# Patient Record
Sex: FEMALE | Race: WHITE | Employment: OTHER | URBAN - NONMETROPOLITAN AREA
[De-identification: names, ages, dates, MRNs, and addresses within clinical notes are randomized per-mention and may not be internally consistent; named-entity substitution may affect disease eponyms.]

---

## 2017-09-19 ENCOUNTER — TELEPHONE (OUTPATIENT)
Dept: ADMINISTRATIVE | Age: 72
End: 2017-09-19

## 2017-10-26 RX ORDER — METOPROLOL SUCCINATE 50 MG/1
TABLET, EXTENDED RELEASE ORAL
Qty: 180 TABLET | Refills: 3 | Status: SHIPPED | OUTPATIENT
Start: 2017-10-26 | End: 2018-10-22 | Stop reason: SDUPTHER

## 2017-10-26 RX ORDER — FENOFIBRATE 145 MG/1
TABLET, COATED ORAL
Qty: 90 TABLET | Refills: 3 | Status: SHIPPED | OUTPATIENT
Start: 2017-10-26 | End: 2018-10-22 | Stop reason: SDUPTHER

## 2017-11-03 ENCOUNTER — OFFICE VISIT (OUTPATIENT)
Dept: FAMILY MEDICINE CLINIC | Age: 72
End: 2017-11-03
Payer: MEDICARE

## 2017-11-03 VITALS
DIASTOLIC BLOOD PRESSURE: 72 MMHG | RESPIRATION RATE: 17 BRPM | SYSTOLIC BLOOD PRESSURE: 160 MMHG | WEIGHT: 160.2 LBS | TEMPERATURE: 98.2 F | HEART RATE: 63 BPM | BODY MASS INDEX: 27.35 KG/M2 | OXYGEN SATURATION: 98 %

## 2017-11-03 DIAGNOSIS — J40 BRONCHITIS: Primary | ICD-10-CM

## 2017-11-03 DIAGNOSIS — M19.90 ARTHRITIS: ICD-10-CM

## 2017-11-03 PROCEDURE — G8484 FLU IMMUNIZE NO ADMIN: HCPCS | Performed by: FAMILY MEDICINE

## 2017-11-03 PROCEDURE — 3014F SCREEN MAMMO DOC REV: CPT | Performed by: FAMILY MEDICINE

## 2017-11-03 PROCEDURE — 4004F PT TOBACCO SCREEN RCVD TLK: CPT | Performed by: FAMILY MEDICINE

## 2017-11-03 PROCEDURE — G8427 DOCREV CUR MEDS BY ELIG CLIN: HCPCS | Performed by: FAMILY MEDICINE

## 2017-11-03 PROCEDURE — 99213 OFFICE O/P EST LOW 20 MIN: CPT | Performed by: FAMILY MEDICINE

## 2017-11-03 PROCEDURE — 4040F PNEUMOC VAC/ADMIN/RCVD: CPT | Performed by: FAMILY MEDICINE

## 2017-11-03 PROCEDURE — 1090F PRES/ABSN URINE INCON ASSESS: CPT | Performed by: FAMILY MEDICINE

## 2017-11-03 PROCEDURE — G8419 CALC BMI OUT NRM PARAM NOF/U: HCPCS | Performed by: FAMILY MEDICINE

## 2017-11-03 PROCEDURE — G8399 PT W/DXA RESULTS DOCUMENT: HCPCS | Performed by: FAMILY MEDICINE

## 2017-11-03 PROCEDURE — 1123F ACP DISCUSS/DSCN MKR DOCD: CPT | Performed by: FAMILY MEDICINE

## 2017-11-03 PROCEDURE — 3017F COLORECTAL CA SCREEN DOC REV: CPT | Performed by: FAMILY MEDICINE

## 2017-11-03 RX ORDER — PREDNISONE 20 MG/1
40 TABLET ORAL DAILY
Qty: 10 TABLET | Refills: 0 | Status: SHIPPED | OUTPATIENT
Start: 2017-11-03 | End: 2017-11-08

## 2017-11-03 RX ORDER — LEVOFLOXACIN 500 MG/1
500 TABLET, FILM COATED ORAL DAILY
Qty: 10 TABLET | Refills: 0 | Status: SHIPPED | OUTPATIENT
Start: 2017-11-03 | End: 2017-11-13

## 2017-11-03 ASSESSMENT — PATIENT HEALTH QUESTIONNAIRE - PHQ9
SUM OF ALL RESPONSES TO PHQ QUESTIONS 1-9: 0
1. LITTLE INTEREST OR PLEASURE IN DOING THINGS: 0
2. FEELING DOWN, DEPRESSED OR HOPELESS: 0
SUM OF ALL RESPONSES TO PHQ9 QUESTIONS 1 & 2: 0

## 2017-11-03 NOTE — PROGRESS NOTES
Subjective:      Patient ID: Keith Sanon is a 81 y.o. female. HPI  Chief Complaint   Patient presents with    Cough     2 weeks using OTC helping a little    Trigger finger     bilateral stiffness on going but getting worse in the morning        Here for 2 weeks of cough productive of phlegm. Associated with headache, chest congestion, low grade temp up to 101, but normal appetite. Tried:  mucinex BId and push water and cough drops with temporary relief only. Hands swelling and tender in the morning. Worse since the weather changing. Review of Systems  Constitutional: Negative for fever, chills, diaphoresis, activity change, appetite change and fatigue. HENT: Negative for hearing loss, ear pain, congestion, sore throat, rhinorrhea, postnasal drip and ear discharge. Eyes: Negative for photophobia and visual disturbance. Respiratory: Negative for cough, chest tightness, shortness of breath and wheezing. Cardiovascular: Negative for chest pain and leg swelling. Gastrointestinal: Negative for nausea, vomiting, abdominal pain, diarrhea and constipation. Genitourinary: Negative for dysuria, urgency and frequency. Neurological: Negative for weakness, light-headedness and headaches. Psychiatric/Behavioral: Negative for sleep disturbance. Objective:   Physical Exam  Vitals:    11/03/17 1104   BP: (!) 160/72   Pulse:    Resp:    Temp:    SpO2:      Wt Readings from Last 3 Encounters:   11/03/17 160 lb 3.2 oz (72.7 kg)   11/22/16 154 lb (69.9 kg)   10/17/16 156 lb 3.2 oz (70.9 kg)     Physical Exam   Constitutional: Vital signs are normal. She appears well-developed and well-nourished. She is active. HENT:   Head: Normocephalic and atraumatic. Right Ear: Tympanic membrane, external ear and ear canal normal. No drainage or tenderness. Left Ear: Tympanic membrane, external ear and ear canal normal. No drainage or tenderness. Nose: Nose normal. No mucosal edema or rhinorrhea. Mouth/Throat: Uvula is midline, oropharynx is clear and moist and mucous membranes are normal. Mucous membranes are not pale. Normal dentition. No posterior oropharyngeal edema or posterior oropharyngeal erythema. Eyes: Lids are normal. Right eye exhibits no chemosis and no discharge. Left eye exhibits no chemosis and no drainage. Right conjunctiva has no hemorrhage. Left conjunctiva has no hemorrhage. Right eye exhibits normal extraocular motion. Left eye exhibits normal extraocular motion. Right pupil is round and reactive. Left pupil is round and reactive. Pupils are equal.   Cardiovascular: Normal rate, regular rhythm, S1 normal, S2 normal and normal heart sounds. Exam reveals no gallop. No murmur heard. Pulmonary/Chest: Effort normal and breath sounds normal. No respiratory distress. She has no wheezes. She has no rhonchi. She has no rales. Abdominal: Soft. Normal appearance and bowel sounds are normal. She exhibits no distension and no mass. There is no hepatosplenomegaly. No tenderness. She has no rigidity, no rebound and no guarding. No hernia. Musculoskeletal:        Right lower leg: She exhibits no edema. Left lower leg: She exhibits no edema. Neurological: She is alert. Assessment:      Mamadou Welsh was seen today for cough and trigger finger. Diagnoses and all orders for this visit:    Bronchitis  -     predniSONE (DELTASONE) 20 MG tablet; Take 2 tablets by mouth daily for 5 days  -     levofloxacin (LEVAQUIN) 500 MG tablet; Take 1 tablet by mouth daily for 10 days    Arthritis    Other orders  -     Cancel: INFLUENZA, QUADV, 3 YRS AND OLDER, IM, PF, PREFILL SYR OR SDV, 0.5ML (FLUZONE QUADV, PF)      STOP SMOKING    Push fluids  Tylenol or ibuprofen prn fever  Cool mist Humidifier in the bedroom  Follow up if not better in 1 week or if symptoms get worse.

## 2018-02-12 RX ORDER — DARIFENACIN HYDROBROMIDE 15 MG/1
TABLET, EXTENDED RELEASE ORAL
Qty: 90 TABLET | Refills: 3 | Status: SHIPPED | OUTPATIENT
Start: 2018-02-12 | End: 2019-02-04 | Stop reason: SDUPTHER

## 2018-05-23 ENCOUNTER — HOSPITAL ENCOUNTER (OUTPATIENT)
Age: 73
Discharge: HOME | End: 2018-05-23
Payer: MEDICARE

## 2018-05-23 LAB
ALBUMIN SERPL-MCNC: 4.5 G/DL (ref 3.5–5.1)
ALP BLD-CCNC: 73 U/L (ref 38–126)
ALT SERPL-CCNC: 23 U/L (ref 11–66)
ANION GAP SERPL CALCULATED.3IONS-SCNC: 14 MEQ/L (ref 8–16)
AST SERPL-CCNC: 25 U/L (ref 5–40)
BASOPHILS # BLD: 0.8 %
BASOPHILS ABSOLUTE: 0.1 THOU/MM3 (ref 0–0.1)
BILIRUB SERPL-MCNC: 0.3 MG/DL (ref 0.3–1.2)
BILIRUBIN DIRECT: < 0.2 MG/DL (ref 0–0.3)
BUN BLDV-MCNC: 20 MG/DL (ref 7–22)
CALCIUM SERPL-MCNC: 9.7 MG/DL (ref 8.5–10.5)
CHLORIDE BLD-SCNC: 106 MEQ/L (ref 98–111)
CHOLESTEROL, FASTING: 178 MG/DL (ref 100–199)
CO2: 24 MEQ/L (ref 23–33)
CREAT SERPL-MCNC: 0.8 MG/DL (ref 0.4–1.2)
EOSINOPHIL # BLD: 3.6 %
EOSINOPHILS ABSOLUTE: 0.3 THOU/MM3 (ref 0–0.4)
GFR SERPL CREATININE-BSD FRML MDRD: 71 ML/MIN/1.73M2
GLUCOSE FASTING: 128 MG/DL (ref 70–108)
HCT VFR BLD CALC: 42.2 % (ref 37–47)
HDLC SERPL-MCNC: 46 MG/DL
HEMOGLOBIN: 14 GM/DL (ref 12–16)
LDL CHOLESTEROL CALCULATED: 88 MG/DL
LYMPHOCYTES # BLD: 27.2 %
LYMPHOCYTES ABSOLUTE: 2.4 THOU/MM3 (ref 1–4.8)
MCH RBC QN AUTO: 29.9 PG (ref 27–31)
MCHC RBC AUTO-ENTMCNC: 33.1 GM/DL (ref 33–37)
MCV RBC AUTO: 90.3 FL (ref 81–99)
MONOCYTES # BLD: 7 %
MONOCYTES ABSOLUTE: 0.6 THOU/MM3 (ref 0.4–1.3)
NUCLEATED RED BLOOD CELLS: 0 /100 WBC
PDW BLD-RTO: 13.8 % (ref 11.5–14.5)
PLATELET # BLD: 295 THOU/MM3 (ref 130–400)
PMV BLD AUTO: 10.6 FL (ref 7.4–10.4)
POTASSIUM SERPL-SCNC: 4.1 MEQ/L (ref 3.5–5.2)
RBC # BLD: 4.67 MILL/MM3 (ref 4.2–5.4)
SEG NEUTROPHILS: 61.4 %
SEGMENTED NEUTROPHILS ABSOLUTE COUNT: 5.5 THOU/MM3 (ref 1.8–7.7)
SODIUM BLD-SCNC: 144 MEQ/L (ref 135–145)
TOTAL PROTEIN: 7 G/DL (ref 6.1–8)
TRIGLYCERIDE, FASTING: 221 MG/DL (ref 0–199)
WBC # BLD: 9 THOU/MM3 (ref 4.8–10.8)

## 2018-05-23 PROCEDURE — 36415 COLL VENOUS BLD VENIPUNCTURE: CPT

## 2018-05-23 PROCEDURE — 83036 HEMOGLOBIN GLYCOSYLATED A1C: CPT

## 2018-05-23 PROCEDURE — 80076 HEPATIC FUNCTION PANEL: CPT

## 2018-05-23 PROCEDURE — 80048 BASIC METABOLIC PNL TOTAL CA: CPT

## 2018-05-23 PROCEDURE — 80061 LIPID PANEL: CPT

## 2018-05-23 PROCEDURE — 85025 COMPLETE CBC W/AUTO DIFF WBC: CPT

## 2018-05-23 PROCEDURE — 84443 ASSAY THYROID STIM HORMONE: CPT

## 2018-05-24 ENCOUNTER — OFFICE VISIT (OUTPATIENT)
Dept: FAMILY MEDICINE CLINIC | Age: 73
End: 2018-05-24
Payer: MEDICARE

## 2018-05-24 VITALS
RESPIRATION RATE: 16 BRPM | BODY MASS INDEX: 27.59 KG/M2 | OXYGEN SATURATION: 96 % | WEIGHT: 161.6 LBS | TEMPERATURE: 98.2 F | HEIGHT: 64 IN | HEART RATE: 76 BPM | DIASTOLIC BLOOD PRESSURE: 78 MMHG | SYSTOLIC BLOOD PRESSURE: 136 MMHG

## 2018-05-24 DIAGNOSIS — Z00.00 MEDICARE ANNUAL WELLNESS VISIT, INITIAL: Primary | ICD-10-CM

## 2018-05-24 DIAGNOSIS — F33.42 RECURRENT MAJOR DEPRESSIVE DISORDER, IN FULL REMISSION (HCC): ICD-10-CM

## 2018-05-24 DIAGNOSIS — I10 HYPERTENSION, UNSPECIFIED TYPE: ICD-10-CM

## 2018-05-24 DIAGNOSIS — R73.9 HYPERGLYCEMIA: ICD-10-CM

## 2018-05-24 DIAGNOSIS — R35.0 URINARY FREQUENCY: ICD-10-CM

## 2018-05-24 DIAGNOSIS — Z00.00 ROUTINE GENERAL MEDICAL EXAMINATION AT A HEALTH CARE FACILITY: ICD-10-CM

## 2018-05-24 DIAGNOSIS — E78.00 PURE HYPERCHOLESTEROLEMIA: ICD-10-CM

## 2018-05-24 DIAGNOSIS — Z72.0 TOBACCO ABUSE: ICD-10-CM

## 2018-05-24 DIAGNOSIS — M85.89 OSTEOPENIA OF MULTIPLE SITES: ICD-10-CM

## 2018-05-24 LAB
AVERAGE GLUCOSE: 129 MG/DL (ref 70–126)
HBA1C MFR BLD: 6.3 % (ref 4.4–6.4)
TSH SERPL DL<=0.05 MIU/L-ACNC: 1.98 UIU/ML (ref 0.4–4.2)

## 2018-05-24 PROCEDURE — G0438 PPPS, INITIAL VISIT: HCPCS | Performed by: FAMILY MEDICINE

## 2018-05-24 RX ORDER — BUPROPION HYDROCHLORIDE 300 MG/1
300 TABLET ORAL EVERY MORNING
Qty: 30 TABLET | Refills: 3 | Status: ON HOLD | OUTPATIENT
Start: 2018-05-24 | End: 2018-07-18 | Stop reason: ALTCHOICE

## 2018-05-24 RX ORDER — BUPROPION HYDROCHLORIDE 150 MG/1
150 TABLET ORAL EVERY MORNING
Qty: 30 TABLET | Refills: 0 | Status: SHIPPED | OUTPATIENT
Start: 2018-05-24 | End: 2018-07-18 | Stop reason: SDUPTHER

## 2018-05-24 ASSESSMENT — LIFESTYLE VARIABLES
HAS A RELATIVE, FRIEND, DOCTOR, OR ANOTHER HEALTH PROFESSIONAL EXPRESSED CONCERN ABOUT YOUR DRINKING OR SUGGESTED YOU CUT DOWN: 0
HOW OFTEN DURING THE LAST YEAR HAVE YOU BEEN UNABLE TO REMEMBER WHAT HAPPENED THE NIGHT BEFORE BECAUSE YOU HAD BEEN DRINKING: 0
HOW OFTEN DURING THE LAST YEAR HAVE YOU FAILED TO DO WHAT WAS NORMALLY EXPECTED FROM YOU BECAUSE OF DRINKING: 0
HOW OFTEN DO YOU HAVE A DRINK CONTAINING ALCOHOL: 1
HAVE YOU OR SOMEONE ELSE BEEN INJURED AS A RESULT OF YOUR DRINKING: 0
HOW OFTEN DURING THE LAST YEAR HAVE YOU FOUND THAT YOU WERE NOT ABLE TO STOP DRINKING ONCE YOU HAD STARTED: 0
HOW OFTEN DO YOU HAVE SIX OR MORE DRINKS ON ONE OCCASION: 0
HOW OFTEN DURING THE LAST YEAR HAVE YOU HAD A FEELING OF GUILT OR REMORSE AFTER DRINKING: 0
HOW MANY STANDARD DRINKS CONTAINING ALCOHOL DO YOU HAVE ON A TYPICAL DAY: 0
AUDIT TOTAL SCORE: 1
AUDIT-C TOTAL SCORE: 1
HOW OFTEN DURING THE LAST YEAR HAVE YOU NEEDED AN ALCOHOLIC DRINK FIRST THING IN THE MORNING TO GET YOURSELF GOING AFTER A NIGHT OF HEAVY DRINKING: 0

## 2018-05-24 ASSESSMENT — ANXIETY QUESTIONNAIRES: GAD7 TOTAL SCORE: 0

## 2018-05-24 ASSESSMENT — PATIENT HEALTH QUESTIONNAIRE - PHQ9: SUM OF ALL RESPONSES TO PHQ QUESTIONS 1-9: 0

## 2018-06-11 ENCOUNTER — TELEPHONE (OUTPATIENT)
Dept: FAMILY MEDICINE CLINIC | Age: 73
End: 2018-06-11

## 2018-06-11 ENCOUNTER — HOSPITAL ENCOUNTER (OUTPATIENT)
Dept: WOMENS IMAGING | Age: 73
Discharge: HOME | End: 2018-06-11
Payer: MEDICARE

## 2018-06-11 DIAGNOSIS — M85.89 OSTEOPENIA OF MULTIPLE SITES: ICD-10-CM

## 2018-06-11 DIAGNOSIS — Z12.31 VISIT FOR SCREENING MAMMOGRAM: ICD-10-CM

## 2018-06-11 PROCEDURE — 77080 DXA BONE DENSITY AXIAL: CPT

## 2018-06-11 PROCEDURE — 77067 SCR MAMMO BI INCL CAD: CPT

## 2018-06-12 RX ORDER — ZOLEDRONIC ACID 5 MG/100ML
5 INJECTION, SOLUTION INTRAVENOUS ONCE
Qty: 100 ML | Refills: 0 | Status: SHIPPED | OUTPATIENT
Start: 2018-06-12 | End: 2018-07-30

## 2018-07-05 ENCOUNTER — TELEPHONE (OUTPATIENT)
Dept: FAMILY MEDICINE CLINIC | Age: 73
End: 2018-07-05

## 2018-07-05 NOTE — TELEPHONE ENCOUNTER
[facility] on 6[facility]-18 is [id number]

## 2018-07-18 ENCOUNTER — APPOINTMENT (OUTPATIENT)
Dept: CARDIAC CATH/INVASIVE PROCEDURES | Age: 73
End: 2018-07-18
Attending: INTERNAL MEDICINE
Payer: MEDICARE

## 2018-07-18 PROBLEM — Z98.61 POST PTCA: Status: ACTIVE | Noted: 2018-07-18

## 2018-07-19 ENCOUNTER — APPOINTMENT (OUTPATIENT)
Dept: CARDIAC CATH/INVASIVE PROCEDURES | Age: 73
End: 2018-07-19
Attending: INTERNAL MEDICINE
Payer: MEDICARE

## 2018-07-23 ENCOUNTER — TELEPHONE (OUTPATIENT)
Dept: FAMILY MEDICINE CLINIC | Age: 73
End: 2018-07-23

## 2018-07-23 RX ORDER — ATORVASTATIN CALCIUM 40 MG/1
TABLET, FILM COATED ORAL
Qty: 90 TABLET | Refills: 3 | Status: SHIPPED | OUTPATIENT
Start: 2018-07-23 | End: 2019-07-23 | Stop reason: SDUPTHER

## 2018-07-27 NOTE — TELEPHONE ENCOUNTER
Pt is having SOB off and on since leaving the hospital. Pt states it is not terrible, she just can not do a lot without feeling winded. She spoke with Cardiologist, Dr. Sofia Salgado who wants her to follow up with Dr. Aure Lewis. If it is cardiac related, then he will see her. Can you see pt? Please advise. Luzmaria 45 Transitions Initial Follow Up Call    Call within 2 business days of discharge: No     Patient: Marsha Mirza Patient : 1945   MRN: [id number]  Reason for Admission: There are no discharge diagnoses documented for the most recent discharge. Discharge Date: 18 RARS: No Data Recorded     Spoke with: Pt    Discharge department/facility: Home      Non-face-to-face services provided: Follow Up  Future Appointments  Date Time Provider Harvinder Ramon   2018 9:10 AM MD Aleyda Nloan [facility] [address] [facility] ([address] [place] [address])     Have the medications prescribed at discharge been filled? No meds prescribed  Does the patient understand what the medications are for? NA  Has the patient experienced any new symptoms or have previous symptoms worsened? Yes - SOB  Is the patient experiencing any pain? No If yes, is the pain well controlled? NA  Does the patient understand all the discharge instructions and how to care for self at home? Yes  Does the patient have any questions/concerns? SOB

## 2018-07-30 ENCOUNTER — HOSPITAL ENCOUNTER (OUTPATIENT)
Age: 73
Discharge: HOME | End: 2018-07-30
Payer: MEDICARE

## 2018-07-30 ENCOUNTER — OFFICE VISIT (OUTPATIENT)
Dept: FAMILY MEDICINE CLINIC | Age: 73
End: 2018-07-30
Payer: MEDICARE

## 2018-07-30 ENCOUNTER — TELEPHONE (OUTPATIENT)
Dept: FAMILY MEDICINE CLINIC | Age: 73
End: 2018-07-30

## 2018-07-30 ENCOUNTER — HOSPITAL ENCOUNTER (OUTPATIENT)
Dept: GENERAL RADIOLOGY | Age: 73
Discharge: HOME | End: 2018-07-30
Payer: MEDICARE

## 2018-07-30 VITALS
DIASTOLIC BLOOD PRESSURE: 60 MMHG | SYSTOLIC BLOOD PRESSURE: 128 MMHG | OXYGEN SATURATION: 98 % | RESPIRATION RATE: 8 BRPM | WEIGHT: 166 LBS | BODY MASS INDEX: 28.94 KG/M2 | HEART RATE: 72 BPM | TEMPERATURE: 98.4 F

## 2018-07-30 DIAGNOSIS — Z01.89 ROUTINE LAB DRAW: ICD-10-CM

## 2018-07-30 DIAGNOSIS — R07.89 OTHER CHEST PAIN: ICD-10-CM

## 2018-07-30 DIAGNOSIS — Z98.61 POST PTCA: ICD-10-CM

## 2018-07-30 DIAGNOSIS — R06.02 SOB (SHORTNESS OF BREATH) ON EXERTION: ICD-10-CM

## 2018-07-30 DIAGNOSIS — R07.89 OTHER CHEST PAIN: Primary | ICD-10-CM

## 2018-07-30 LAB
ALBUMIN SERPL-MCNC: 4.4 G/DL (ref 3.5–5.1)
ALP BLD-CCNC: 61 U/L (ref 38–126)
ALT SERPL-CCNC: 19 U/L (ref 11–66)
ANION GAP SERPL CALCULATED.3IONS-SCNC: 15 MEQ/L (ref 8–16)
AST SERPL-CCNC: 23 U/L (ref 5–40)
BILIRUB SERPL-MCNC: 0.2 MG/DL (ref 0.3–1.2)
BUN BLDV-MCNC: 24 MG/DL (ref 7–22)
CALCIUM SERPL-MCNC: 10 MG/DL (ref 8.5–10.5)
CHLORIDE BLD-SCNC: 105 MEQ/L (ref 98–111)
CO2: 23 MEQ/L (ref 23–33)
CREAT SERPL-MCNC: 1 MG/DL (ref 0.4–1.2)
ERYTHROCYTE [DISTWIDTH] IN BLOOD BY AUTOMATED COUNT: 13.1 % (ref 11.5–14.5)
ERYTHROCYTE [DISTWIDTH] IN BLOOD BY AUTOMATED COUNT: 44.7 FL (ref 35–45)
GFR SERPL CREATININE-BSD FRML MDRD: 55 ML/MIN/1.73M2
GLUCOSE BLD-MCNC: 254 MG/DL (ref 70–108)
HCT VFR BLD CALC: 35.7 % (ref 37–47)
HEMOGLOBIN: 11.5 GM/DL (ref 12–16)
MCH RBC QN AUTO: 30.3 PG (ref 26–33)
MCHC RBC AUTO-ENTMCNC: 32.2 GM/DL (ref 32.2–35.5)
MCV RBC AUTO: 93.9 FL (ref 81–99)
PLATELET # BLD: 387 THOU/MM3 (ref 130–400)
PMV BLD AUTO: 11.5 FL (ref 9.4–12.4)
POTASSIUM SERPL-SCNC: 4 MEQ/L (ref 3.5–5.2)
PRO-BNP: 343.4 PG/ML (ref 0–900)
RBC # BLD: 3.8 MILL/MM3 (ref 4.2–5.4)
SODIUM BLD-SCNC: 143 MEQ/L (ref 135–145)
TOTAL PROTEIN: 7 G/DL (ref 6.1–8)
TROPONIN T: < 0.01 NG/ML
WBC # BLD: 7.5 THOU/MM3 (ref 4.8–10.8)

## 2018-07-30 PROCEDURE — 36415 COLL VENOUS BLD VENIPUNCTURE: CPT | Performed by: FAMILY MEDICINE

## 2018-07-30 PROCEDURE — 71046 X-RAY EXAM CHEST 2 VIEWS: CPT

## 2018-07-30 PROCEDURE — 93000 ELECTROCARDIOGRAM COMPLETE: CPT | Performed by: FAMILY MEDICINE

## 2018-07-30 PROCEDURE — 99214 OFFICE O/P EST MOD 30 MIN: CPT | Performed by: FAMILY MEDICINE

## 2018-07-30 NOTE — PROGRESS NOTES
Subjective:      Patient ID: Shannon Swift is a 72 y.o. female. HPI  Chief Complaint   Patient presents with    Shortness of Breath       Here for SOB that started on 7/20/18. She went home from the hospital that day after a total of 4 stents were placed. associated with posterior headache, sore in the right jaw worse with chewing, chest tight, SOB with exertion, but normal appetite and no fever. Weight gain but that has steadily increased since stopping smoking 2 months ago. Review of Systems  Constitutional: Positive for fever, chills, diaphoresis, activity change, appetite change and fatigue. HENT: Negative for hearing loss, ear pain, congestion, sore throat, rhinorrhea, postnasal drip and ear discharge. Eyes: Negative for photophobia and visual disturbance. Respiratory: Negative for cough, and wheezing. Cardiovascular: Negative for chest pain and leg swelling. Gastrointestinal: Negative for nausea, vomiting, abdominal pain, diarrhea and constipation. Genitourinary: Negative for dysuria, urgency and frequency. Neurological: Negative for weakness, light-headedness and headaches. Psychiatric/Behavioral: Negative for sleep disturbance. Objective:   Physical Exam  Vitals:    07/30/18 1445   BP: 128/60   Pulse: 72   Resp: 8   Temp: 98.4 °F (36.9 °C)   SpO2: 98%     Wt Readings from Last 3 Encounters:   07/30/18 166 lb (75.3 kg)   07/20/18 166 lb 11.2 oz (75.6 kg)   05/24/18 161 lb 9.6 oz (73.3 kg)     Physical Exam   Constitutional: Vital signs are normal. She appears well-developed and well-nourished. She is active. HENT:   Head: Normocephalic and atraumatic. Right Ear: Tympanic membrane, external ear and ear canal normal. No drainage or tenderness. Left Ear: Tympanic membrane, external ear and ear canal normal. No drainage or tenderness. Nose: Nose normal. No mucosal edema or rhinorrhea.    Mouth/Throat: Uvula is midline, oropharynx is clear and moist and mucous membranes are normal. Mucous membranes are not pale. Normal dentition. No posterior oropharyngeal edema or posterior oropharyngeal erythema. Eyes: Lids are normal. Right eye exhibits no chemosis and no discharge. Left eye exhibits no chemosis and no drainage. Right conjunctiva has no hemorrhage. Left conjunctiva has no hemorrhage. Right eye exhibits normal extraocular motion. Left eye exhibits normal extraocular motion. Right pupil is round and reactive. Left pupil is round and reactive. Pupils are equal.   Cardiovascular: Normal rate, regular rhythm, S1 normal, S2 normal and normal heart sounds. Exam reveals no gallop. No murmur heard. Pulmonary/Chest: Effort normal and breath sounds normal. No respiratory distress. She has no wheezes. She has no rhonchi. She has no rales. Abdominal: Soft. Normal appearance and bowel sounds are normal. She exhibits no distension and no mass. There is no hepatosplenomegaly. No tenderness. She has no rigidity, no rebound and no guarding. No hernia. Musculoskeletal:        Right lower leg: She exhibits no edema. Left lower leg: She exhibits no edema. Neurological: She is alert. EKG done and reviewed by cardiology who does not believe there has been a change since her recent hospitalization. Assessment:      Reed Allen was seen today for shortness of breath. Diagnoses and all orders for this visit:    Other chest pain  -     EKG 12 Lead  -     XR CHEST STANDARD (2 VW); Future  -     CBC; Future  -     Comprehensive Metabolic Panel; Future  -     Troponin; Future  -     Brain Natriuretic Peptide; Future  -     FULL PFT STUDY WITH PRE AND POST; Future  -     CBC  -     Comprehensive Metabolic Panel  -     Troponin  -     Brain Natriuretic Peptide    Post PTCA  -     EKG 12 Lead  -     XR CHEST STANDARD (2 VW); Future  -     CBC; Future  -     Comprehensive Metabolic Panel; Future  -     Troponin; Future  -     Brain Natriuretic Peptide;  Future  -     FULL PFT

## 2018-07-31 ENCOUNTER — TELEPHONE (OUTPATIENT)
Dept: FAMILY MEDICINE CLINIC | Age: 73
End: 2018-07-31

## 2018-07-31 DIAGNOSIS — D64.9 ANEMIA, UNSPECIFIED TYPE: Primary | ICD-10-CM

## 2018-08-01 NOTE — PLAN OF CARE
Hospital Facility-Based Program  [facility]/Traditional Cardiac Rehab  PHYSICIAN ORDER  Class I Level B based on research  Medical Director:  Dr. Sonia Espitia MD     Patient Name: Phuong Alicia : 1945  Referring Physician: Dr. Adriana Silva  Date: 2018  Allergies: Allergies as of 2018 - Review Complete 2018   Allergen Reaction Noted    Benadryl [diphenhydramine hcl]  2011    Flexeril [cyclobenzaprine hcl]  2011        Diagnosis:  PCI  on     [x] [facility]ensive Cardiac Rehab with telemetry monitoring, resting and exercise        BPs & HRs with each session. Hospital setting for patient safety. [x] 72 sessions: 36 exercise sessions, 36 education sessions   [] 36 sessions: 18 exercise sessions, 18 education sessions  [] Traditional Cardiac Rehab with telemetry monitoring, resting and exercise BPs &       HRs with each session. Hospital setting for patient safety. [] 36 sessions:  32 exercise sessions, 4 education sessions     Per Patient symptoms, proceed with:   [x]Nitroglycerine 0.4mg SL every 5 minutes prn, maximum of 3, for chest pain   [x]12-lead EKG for symptoms of chest pain or noted change in heart rhythm   [x]Administer O2 per nasal cannula for symptoms of chest pain or acute dyspnea    Physician Prescribed Exercise:  Plan of Care:  Patient to attend exercise sessions with aerobic endurance and strength training for a total of 31-60 min/day, 3 days/week with supplemented 30+ minutes of aerobic exercise at home on days not participating in Cardiac Rehab. Aerobic Endurance Training  Aerobic Endurance mode (TM, AD, NS) starting at 5-8  minutes progressing by 2-3 minutes each week to a total of 15-30 minutes 2-3x/week. Arms only 5 min  Stair step increasing to 2 min  Resistance/strength training:  Hand weights starting at 1-5 lbs increasing in weight by 1-2 lbs and/or per patient tolerance weekly.   Start with 8 repetitions and increase the repetitions each exercise session per patient tolerance for a total of 15 repetitions. Measurable Endurance Goal:  Aerobic endurance goal to be measured in minutes. Start endurance training per patient tolerance at 1-8 minutes per exercise type, progressing to a total of 31-60 minutes using various modes of training (see Exercise Prescription). Progression:    [x] Weekly 5-10% intensity progression, as tolerated, during cardiac rehab sessions. [x] 13-17 Rate of Perceived Exertion on the Liberty Scale (6-20). Measurable Muscular Strength Goal:  Starting at 1-5 lbs x 8 reps, progressing to 5-25 lbs x 15 reps per patient tolerance.       Electronically signed by Tarik Santamaria RN on 8/1/2018 at 9:57 AM

## 2018-08-08 ENCOUNTER — HOSPITAL ENCOUNTER (OUTPATIENT)
Dept: CARDIAC REHAB | Age: 73
Setting detail: THERAPIES SERIES
Discharge: HOME | End: 2018-08-08
Payer: MEDICARE

## 2018-08-08 PROCEDURE — G0422 INTENS CARDIAC REHAB W/EXERC: HCPCS

## 2018-08-08 PROCEDURE — G0423 INTENS CARDIAC REHAB NO EXER: HCPCS

## 2018-08-08 NOTE — PLAN OF CARE
[address] [place] [address],[facility] ~ [place], [facility] Facility-Based Program  Individualized Cardiac Treatment Plan    Patient Name:  Caity Simons  :  1945  Age:  72 y.o. MRN:  [id number]  Diagnosis: PCI RCA, LAD  Date of Event: 2018 &2018   Physician:  Kristy Ellington Office Visit:    Date Entered Program: 2018  Risk Stratifications: [] Low [] Intermediate [x] High  Allergies:    Allergies   Allergen Reactions    Benadryl [Diphenhydramine Hcl]      OVER-STIMULATED    Flexeril [Cyclobenzaprine Hcl]      OVER-STIMULATED         Individual Cardiac Treatment Plan -EXERCISE  INITIAL 30 DAY 61 DAY 90 DAY FINAL DAY   EXERCISE  ASSESSMENT/PLAN EXERCISE  REASSESSMENT EXERCISE   REASSESSMENT EXERCISE   REASSESSMENT EXERCISE  DISCHARGE/FOLLOW-UP   Date: 2018 Date: Date: Date: Date:   Session #1 Session # ** Session # ** Session # ** Session # **  Last session completed on **   Stages of Change Stages of Change Stages of Change Stages of Change Stages of Change   [] Pre Contemplation  [x] Contemplation  [] Preparation  [] Action  [] Maintenance  [] Relapse [] Pre Contemplation  [] Contemplation  [] Preparation  [] Action  [] Maintenance  [] Relapse [] Pre Contemplation  [] Contemplation  [] Preparation  [] Action  [] Maintenance  [] Relapse [] Pre Contemplation  [] Contemplation  [] Preparation  [] Action  [] Maintenance  [] Relapse [] Pre Contemplation  [] Contemplation  [] Preparation  [] Action  [] Maintenance  [] Relapse   EXERCISE ASSESSMENT EXERCISE ASSESSMENT EXERCISE ASSESSMENT EXERCISE ASSESSMENT EXERCISE ASSESSMENT   6 Min Walk Test  Distance walked:   0.18 miles  950 ft.  2.37 METs  Max HR:111 BPM      RPE:  14   THR:  104-119  Rhythm:  NSR    6 Min Walk Test  Distance walked:   ** miles  ** ft  ** METs  Max HR:** BPM      RPE:  **  %Change ft= **    Rhythm:  **   DASI: 6.79 METS DASI: ** METS DASI: ** METS DASI: ** METS DASI: ** METS   Return to Work  [employer] on returning to work? [] Yes              [] No   [] Disabled     [x] Retired     Return to work:  Has Aidan Check returned to work? [] Yes    [] No    Return to work date set? [] Yes, **    [] No    Aidan Check is doing ** at work. Return to work:  Has Aidan Check returned to work? [] Yes    [] No    Return to work date set? [] Yes, **    [] No    Aidan Check is doing ** at work. Return to work:  Has Aidan Check returned to work? [] Yes    [] No    Return to work date set? [] Yes, **    [] No    Aidan Check is doing ** at work. Return to work:  Has Aidan Check returned to work? [] Yes    [] No    Return to work? [] Yes, **    [] No    *Required MET Level achieved for job duties? [] Yes    [] No   Orthopedic Limitations/  [] Yes    [x] No  If yes please list:       Orthopedic Limitations  *If patient has orthopedic issue:   Actions/  accomodations needed to make Aidan Check successful : Orthopedic Limitations   Orthopedic Limitations   Orthopedic Limitations     Fall Risk  Fall risk assessed? [x] Yes      [] No    Balance Issues? [] Yes      [x] No     [] Walker [] Cane    [x] Safety issues reviewed      Fall Risk  *If patient is a fall risk, action needed to accommodate:  Fall Risk Fall Risk Fall Risk   Home Exercise  [] Yes    [x] No  Type:   Frequency:   Duration:  Home Exercise  [] Yes    [] No  Type: **  Frequency:**  Duration: ** Home Exercise  [] Yes    [] No  Type: **  Frequency: **  Duration: ** Home Exercise  [] Yes    [] No  Type: **  Frequency: **  Duration: ** Home Exercise  [] Yes    [] No  Type: **  Frequency: **  Duration: **   Angina with Activity? [x] Yes    [] No  Angina Management: Nitro/rest Angina with Activity? [] Yes    [] No  Angina Management: ** Angina with Activity? [] Yes    [] No  Angina Management: ** Angina with Activity? [] Yes    [] No  Angina Management: ** Angina with Activity?   [] Yes    [] No  Angina Management: **   EXERCISE PLAN EXERCISE PLAN EXERCISE PLAN EXERCISE PLAN EXERCISE PLAN   *Interventions* *Interventions* *Interventions* *Interventions* *Interventions*   Exercise Prescription  (per physician & CR staff) Exercise Prescription  (per physician & CR staff) Exercise Prescription  (per physician & CR staff) Exercise Prescription  (per physician & CR staff) Exercise Prescription  (per physician & CR staff)   Cardiovascular Cardiovascular Cardiovascular Cardiovascular Cardiovascular   Mode:    [x] Treadmill (TM)  [x] Schwinn Airdyne (AD)  [x] Arms Ergometer (AE)  [] NuStep  [] Elliptical (E) MODE:    [] Treadmill (TM)  [] Schwinn Airdyne (AD)  [] Arms Ergometer (AE)  [] NuStep  [] Elliptical (E) MODE:    [] Treadmill (TM)  [] Schwinn Airdyne (AD)  [] Arms Ergometer (AE)  [] NuStep  [] Elliptical (E) MODE:    [] Treadmill (TM)  [] Schwinn Airdyne (AD)  [] Arms Ergometer (AE)  [] NuStep  [] Elliptical (E) MODE:    [] Treadmill (TM)  [] Schwinn Airdyne (AD)  [] Arms Ergometer (AE)  [] NuStep  [] Elliptical (E)   Initial Workloads  TM: [email or web address] 2.4 METs  AD: 0.7 level = 2.4 METs  NS: 46  Kramer= 2.4 METs  AE: 0.4 level = 1.7 METs Current Workloads  TM:  @ %=  METs  AD:  level =  METs  NS:   Kramer=  METs  AE:  level =  METs Current Workloads  TM:  @ %=  METs  AD:  level =  METs  NS:   Kramer=  METs  AE:  level =  METs Current Workloads  TM:  @ %=  METs  AD:  level =  METs  NS:   Kramer=  METs  AE:  level =  METs Current Workloads  TM:  @ %=  METs  AD:  level =  METs  NS:   Kramer=  METs  AE:  level =  METs     Frequency:    ICR: 3x/week  Home: 2-3x/wk Frequency:   ICR: 3x/week  Home: 3x/wk Frequency:  ICR: 3x/week  Home: 3-4x/wk Frequency:  ICR: 3x/week  Home: 3-4x/wk Frequency:  Tito Dials will continue exercise at  5-7 days/week   Duration:   Total aerobic exercise = 20 min    5-8min/mode Duration:  Total aerobic exercises = ** min     **min/mode Duration:  Total aerobic exercises = ** min     **min/mode Duration:  Total aerobic exercises = ** min     **min/mode Duration:  Total erobic exercise =  60-90 min   Intensity:   MET Level = 2.37  RPE = 12-15 Intensity:  Max MET Level = **  RPE = 12-15 Intensity:  Max MET Level = **  RPE = 12-15 Intensity:  Max MET Level = **  RPE = 12-15 Intensity:  Max MET Level = ** RPE = 12-15   Progression: increase aerobic activity up to 15 min over next 4 weeks by increasing time 2-3 min/week. Progression:   Progression:   Progression: Progression:  Increase time/intensity when RPE <13, and HR is in [facility]   [x] Yes      [] No  Upper and Lower body strength training 2x/wk    Wt: 2#       Reps:  8-15    *Increase wt. after completing 15 reps with an RPE of <12/13. [] Yes      [] No  Upper and Lower body strength training 2x/wk    Wt: **#       Reps:  8-15    *Increase wt. after completing 15 reps with an RPE of <12/13. [] Yes      [] No  Upper and Lower body strength training 2x/wk    Wt: **#       Reps:  8-15    *Increase wt. after completing 15 reps with an RPE of <12/13. [] Yes      [] No  Upper and Lower body strength training 2x/wk    Wt: **#       Reps:  8-15    *Increase wt. after completing 15 reps with an RPE of <12/13. Continue Strength Training at home   [] Exercise Log & Strength training handout given     Wt: **#       Reps:  8-15    *Increase wt. after completing 15 reps with an RPE of <12/13. Home Exercise  *Mayra verbalizes planning to walk 3 days/week for 10-15 min on days not in rehab. Home Exercise  *Mayra verbalizes planning to ** ** days/week for ** min on days not in rehab. Home Exercise  *Mayra verbalizes planning to ** ** days/week for ** min on days not in rehab. Home Exercise  *Mayra verbalizes planning to ** ** days/week for ** min on days not in rehab.  239 Megathread his/her plan to ** ** days/week for ** min @ **   *Education* *Education* *Education* *Education* *Education*   RPE Scale  [x] Yes      [] Nutrition  NUTRITION  ASSESSMENT/PLAN NUTRITION  REASSESSMENT NUTRITION   REASSESSMENT NUTRITION   REASSESSMENT NUTRITION  DISCHARGE/FOLLOW-UP   Stages of Change Stages of Change Stages of Change Stages of Change Stages of Change   [] Pre Contemplation  [x] Contemplation  [] Preparation  [] Action  [] Maintenance  [] Relapse [] Pre Contemplation  [] Contemplation  [] Preparation  [] Action  [] Maintenance  [] Relapse [] Pre Contemplation  [] Contemplation  [] Preparation  [] Action  [] Maintenance  [] Relapse [] Pre Contemplation  [] Contemplation  [] Preparation  [] Action  [] Maintenance  [] Relapse [] Pre Contemplation  [] Contemplation  [] Preparation  [] Action  [] Maintenance  [] Relapse   NUTRITION ASSESSMENT NUTRITION ASSESSMENT NUTRITION ASSESSMENT NUTRITION ASSESSMENT NUTRITION ASSESSMENT   Weight Management  Weight: 169       Height: 5'3\"  BMI: 29.9 Weight Management  Weight: **                  Weight Management  Weight: **                  Weight Management  Weight: ** Weight Management  Weight: **                    BMI: **   Eating Plan  Current eating habits: reg. Eating Plan  Changes: Eating Plan  Changes: Eating Plan  Changes: Eating Plan Improvements:   Alcohol Use  [] none          [] daily  [] weekly      [x] special   Type:wine  Amount: 1 glass       Diet Assessment Tool:  RATE YOUR PLATE  *Given to patient to complete and return. Diet Assessment Tool:    Score: **/69       Diet Assessment Tool: RATE YOUR PLATE  Score: **/37   NUTRITION PLAN NUTRITION PLAN NUTRITION PLAN NUTRITION PLAN NUTRITION PLAN   *Interventions* *Interventions* *Interventions* *Interventions* *Interventions*   Initial Survey given Goal Setting Discussion:   [] Yes      [] No       Follow Up Survey Reviewed & Goals Updated:     Professional Referral  Please check if needed. [] Dietician Consult   [] Wt. Management Referral  [] Other:  Professional Referral  Please check if needed. [] Dietician Consult   [] Wt.  Management Referral  [] Other: Professional Referral  Please check if needed. [] Dietician Consult   [] Wt. Management Referral  [] Other: Professional Referral  Please check if needed. [] Dietician Consult   [] Wt. Management Referral  [] Other: Professional Referral  Please check if needed. [] Dietician Consult   [] Wt. Management Referral  [] Other:   *Education* *Education* *Education* *Education* *Education*   Nutritional Education Recommended    1:1 Registered Dietitian    Workshops  Label Reading   Menu  Targeting Nutrition     Priorities  Fueling a Healthy      Body   Nutritional Education Attended/Date Nutritional Education Attended/Date Nutritional Education Attended/Date All Sessions Completed? [] Yes  [] No    If No, why:   Cooking School  Video Education      *See Education Section       [employer]  Sessions Completed   [facility], [employer]  Sessions Completed [facility], [employer]  Sessions Completed     [employer]  All videos completed? [] Yes  [] No    If No, why:     *Goals* *Goals* *Goals* *Goals* *Goals*   Mayra's nutritional goals are as follows:  Complete and return diet survey Mayra's nutritional goals are as follows:  [] Attend Nutrition Workshops  [] Attend 1:1   [] Attend Cooking Videos  [] ** Mayra's nutritional goals are as follows:  [] Attend Nutrition Workshops  [] Attend 1:1   [] Attend Cooking Videos  [] Complete and return diet survey  [] ** Mayra's nutritional goals are as follows:  [] Attend Nutrition Workshops  [] Attend 1:1   [] Attend Cooking Videos  [] ** Mayra achieved nutritional goals   [] Yes    [] No  If no, why?   Use knowledge gained to continue Pritikin eating plan at home       Individual Cardiac Treatment Plan - Psychosocial  PSYCHOSOCIAL  ASSESSMENT/PLAN PSYCHOSOCIAL  REASSESSMENT PSYCHOSOCIAL   REASSESSMENT PSYCHOSOCIAL   REASSESSMENT PSYCHOSOCIAL  DISCHARGE/FOLLOW-UP   Stages of Change Stages of Change Stages of Change Stages of Change Stages of Change   [] Pre 0-10, 0=none, 10=very:   Rate your depression: **  Rate your anxiety:  **   Signs and Symptoms of Depression Present? [] Yes      [x] No  If yes, please explain:   Signs and Symptoms of Depression Present? [] Yes      [] No  If yes, please explain:  ** Signs and Symptoms of Depression Present? [] Yes      [] No  If yes, please explain:  ** Signs and Symptoms of Depression Present? [] Yes      [] No  If yes, please explain:  ** Signs and Symptoms of Depression Present? [] Yes      [] No  If yes, please explain:  **   Signs and Symptoms of Anxiety Present? [] Yes      [x] No  If yes, please explain:   Signs and Symptoms of Anxiety Present? [] Yes      [] No  If yes, please explain:  ** Signs and Symptoms of Anxiety Present? [] Yes      [] No  If yes, please explain:  ** Signs and Symptoms of Anxiety Present? [] Yes      [] No  If yes, please explain:  ** Signs and Symptoms of Anxiety Present? [] Yes      [] No  If yes, please explain:  **   [] Patient has poor eye contact   [] Flat affect present. [] Signs of anxiety, anger or hostility    [] Signs social isolation present. []  Signs of alcohol or substance abuse       PSYCHOSOCIAL PLAN PSYCHOSOCIAL PLAN PSYCHOSOCIAL PLAN PSYCHOSOCIAL PLAN PSYCHOSOCIAL PLAN   *Interventions* *Interventions* *Interventions* *Interventions* *Interventions*   *Please check if needed  [] Psych Consult  [] Physician Referral  [x] Stress Management Skills *Please check if needed  [] Psych Consult  [] Physician Referral  [] Stress Management Skills *Please check if needed  [] Psych Consult  [] Physician Referral  [] Stress Management Skills *Please check if needed  [] Psych Consult  [] Physician Referral  [] Stress Management Skills *Please check if needed  [] Psych Consult  [] Physician Referral  [] Stress Management Skills   Is patient currently taking anti-depressant or anti-anxiety medications?   [x] Yes      [] No  If yes, please list medications: Effexor Change in anti-depressant or anti-anxiety medications? [] Yes      [] No  If yes, please list medications:  ** Change in anti-depressant or anti-anxiety medications? [] Yes      [] No  If yes, please list medications:  ** Change in anti-depressant or anti-anxiety medications? [] Yes      [] No  If yes, please list medications:  ** Change in anti-depressant or anti-anxiety medications? [] Yes      [] No  If yes, please list medications:  **   *Education* *Education* *Education* *Education* *Education*   Healthy Mind-Set Videos Recommended    *See Education Section Healthy Mind-Set videos  Attended/Date Healthy Mind-Set Videos Attended/Date Healthy Mind-Set Videos Attended/Date Healthy Mind-Set Videos  Completed  [] Yes      [] No    If No, why:   *Goals* *Goals* *Goals* *Goals* *Goals*   Mayra's psychosocial goals are as follows:  Complete HADS & Abe & Joy, Quality of Life Index, Cardiac Version IV Mayra's psychosocial goals are as follows:  [] Attend Healthy Mind-Set Workshops  [] Reduce depression symptom severity by 1 level  [] ** Mayra's psychosocial goals are as follows:  [] Attend Healthy Mind-Set Workshops  [] Reduce depression symptom severity by 1 level  [] ** Mayra's psychosocial goals are as follows:  [] Attend Healthy Mind-Set Workshops  [] Reduce depression symptom severity by 1 level  [] ** Mayra achieved psychosocial goals?   [] Yes    [] No  If no, why?  **  [] Use methods learned to continue to reduce depression symptom severity by 1 level  [] **     Individual Cardiac Treatment Plan - Other:  Risk Factor/Education  RISK FACTOR  ASSESSMENT/PLAN RISK FACTOR  REASSESSMENT  RISK FACTOR  REASSESSMENT RISK FACTOR  REASSESSMENT RISK FACTOR   DISCHARGE/FOLLOW-UP   Stages of Change Stages of Change Stages of Change Stages of Change Stages of Change   [] Pre Contemplation  [x] Contemplation  [] Preparation  [] Action  [] Maintenance  [] Relapse [] Pre Contemplation  [] Videos Completed Videos Recommended Educational Videos Completed    [] Yes      [] No    **If not completed, Why? **          Smoking Cessation/Relaspe Prevention Intervention needed? [x] Yes      [] No  *Pt verbalizes and agrees to attend intervention Smoking Cessation/Relapse Prevention Scheduled? [] Yes      [] No  Date:  ** Smoking Cessation/Relapse Prevention completed? [] Yes      [] No  Date: **    Smoking Cessation Counseling attended  [] Yes      [] No  **If not completed, Why? **   Professional Referrals:  *Please check if needed  [] Diabetes Clinic  [] Lipid Clinic   [] Other:     Professional Referrals:  *Please check if needed  [] Diabetes Clinic  [] Lipid Clinic   [] Other:   Preventative Medication Preventative Medication Preventative Medication Preventative Medication Preventative Medication   Aspirin  [x] Yes    [] No  Blood Thinner: Clopidogrel/Effient/Brillinta  [x] Yes    [] No  Beta Blocker  [x] Yes    [] No  Ace Inhibitor  [x] Yes    [] No  Statin/Lipid Lowering  [x] Yes    [] No Medication Changes? [] Yes    [] No Medication Changes? [] Yes    [] No Medication Changes? [] Yes    [] No Medication Changes? [] Yes    [] No   *Education* *Education* *Education* *Education* *Education*   Does Norberto Zaragoza require any additional education? [] Yes    [x] No   Does Norberto Zaragoza require any additional education? [] Yes    [] No Does Norberto Zaragoza require any additional education? [] Yes    [] No Does Norberto Zaragoza require any additional education? [] Yes    [] No Does Norberto Zaragoza require any additional education?   [] Yes    [] No   Additional Educational Videos  [x] Yes    [] No    37-Smoking Cessation  Desserts   Additional Educational Videos Completed Additional Educational Videos Completed Additional Educational Videos Completed Additional Educational Videos Completed    [] Yes    [] No   *Goals* *Goals* *Goals* *Goals* *Goals*   Mayra's risk factor/education goals are as follows:    [x] Optimal BP <140/90  [] Blood Sugar <120  [x] Attend recommended video education sessions  [x] Takes medications as prescribed 100% of the time   [] ** Mayra's risk factor/education goals are as follows:    [x] Optimal BP <140/90  [] Blood Sugar <120  [x] Attend recommended video education sessions  [x] Takes medications as prescribed 100% of the time   [] ** Mayra's risk factor/education goals are as follows:    [x] Optimal BP <140/90  [] Blood Sugar <120  [x] Attend recommended video education sessions  [x] Takes medications as prescribed 100% of the time   [] ** Mayra's risk factor/education goals are as follows:    [x] Optimal BP <140/90  [] Blood Sugar <120  [x] Attend recommended video education sessions  [x] Takes medications as prescribed 100% of the time   [] ** Mayra achieved risk factor goals?   [] Yes    [] No  If no, why?  **     Monitored telemetry has revealed:NSR   Monitored telemetry has revealed **  [] documented arrhythmia at increasing workloads  [] associated symptoms ** Monitored telemetry has revealed  [] documented arrhythmia at increasing workloads  [] associated symptoms ** Monitored telemetry has revealed **  [] documented arrhythmia at increasing workloads  [] associated symptoms ** Monitored telemetry has revealed **  [] documented arrhythmia at increasing workloads  [] associated symptoms **   Physician Response    [x] Cardiac rehab is reasonably and medically necessary for continuous cardiac monitoring surveillance  of patient's cardiac activity  [x] Initiate continuous telemerty monitoring and notify me with any concerns  [] Other      Lupillo Coley RN Physician Response    [x] Cardiac rehab is reasonably and medically necessary for continuous cardiac monitoring surveillance  of patient's cardiac activity  [x] Continue continuous telemerty monitoring and notify me with any concerns  [] Other     Physician Response      [x] Cardiac rehab is reasonably and medically necessary for continuous cardiac monitoring surveillance  of patient's cardiac activity  [x] Continue continuous telemerty monitoring and notify me with any concerns   [] Other     Physician Response    [x] Cardiac rehab is reasonably and medically necessary for continuous cardiac monitoring surveillance  of patient's cardiac activity  [x] Continue continuous telemerty monitoring and notify me with any concerns   [] Other

## 2018-08-10 ENCOUNTER — APPOINTMENT (OUTPATIENT)
Dept: CARDIAC REHAB | Age: 73
End: 2018-08-10
Payer: MEDICARE

## 2018-08-13 ENCOUNTER — APPOINTMENT (OUTPATIENT)
Dept: CARDIAC REHAB | Age: 73
End: 2018-08-13
Payer: MEDICARE

## 2018-08-14 ENCOUNTER — HOSPITAL ENCOUNTER (OUTPATIENT)
Dept: PULMONOLOGY | Age: 73
Discharge: HOME | End: 2018-08-14
Payer: MEDICARE

## 2018-08-14 DIAGNOSIS — Z98.61 POST PTCA: ICD-10-CM

## 2018-08-14 DIAGNOSIS — R06.02 SOB (SHORTNESS OF BREATH) ON EXERTION: ICD-10-CM

## 2018-08-14 DIAGNOSIS — R07.89 OTHER CHEST PAIN: ICD-10-CM

## 2018-08-14 PROCEDURE — 94726 PLETHYSMOGRAPHY LUNG VOLUMES: CPT

## 2018-08-14 PROCEDURE — 94060 EVALUATION OF WHEEZING: CPT

## 2018-08-14 PROCEDURE — 94729 DIFFUSING CAPACITY: CPT

## 2018-08-15 ENCOUNTER — APPOINTMENT (OUTPATIENT)
Dept: CARDIAC REHAB | Age: 73
End: 2018-08-15
Payer: MEDICARE

## 2018-08-16 RX ORDER — VENLAFAXINE HYDROCHLORIDE 150 MG/1
CAPSULE, EXTENDED RELEASE ORAL
Qty: 90 CAPSULE | Refills: 3 | Status: SHIPPED | OUTPATIENT
Start: 2018-08-16 | End: 2019-08-13 | Stop reason: SDUPTHER

## 2018-08-17 ENCOUNTER — HOSPITAL ENCOUNTER (OUTPATIENT)
Dept: CARDIAC REHAB | Age: 73
Setting detail: THERAPIES SERIES
Discharge: HOME | End: 2018-08-17
Payer: MEDICARE

## 2018-08-17 PROCEDURE — G0423 INTENS CARDIAC REHAB NO EXER: HCPCS

## 2018-08-17 PROCEDURE — G0422 INTENS CARDIAC REHAB W/EXERC: HCPCS

## 2018-08-17 NOTE — PROGRESS NOTES
Video Education Report - ICR/CR    Name:  Marsha Mirza     Date:  8/17/2018  MRN: [id number]     Session #:  2  Session Length: 40 min    Recommended Videos        []01 [employer] - Program Overview   34:22    []02 Overview of Pritikin Eating Plan   34:10    []03 Becoming a Nadean Saver   33:08     []04 Diseases of Our Time - Part 1   34:22    []05 Calorie Density     33:39   []06 Label Reading - Part 1    32:15   []07 Move it      32.54   []08 Healthy Minds, Bodies, Hearts   32:14   []09 Dining Out - Part 1    32:28   []10 Heart Disease Risk Reduction   41:57   []61 Metabolic Syndrome and Belly Fat  31:52   []12 Facts on Fat     35:29   []13 Diseases of Our Time - Part 2   33:07   []14 Biology of Weight Control   32:36   []15 Biomechanical Limitations   35:20   []16 Nutrition Action Plan    34:23    Additional Videos         []17 Hypertension & Heart Disease   32:39        []18 Cooking Breakfasts and Snacks  32:00   []19 Planning Your Eating Strategy   33:30   [x]20 Label Reading - Part 2    32:36  []21    Comments:  Video completed,

## 2018-08-20 ENCOUNTER — TELEPHONE (OUTPATIENT)
Dept: FAMILY MEDICINE CLINIC | Age: 73
End: 2018-08-20

## 2018-08-20 ENCOUNTER — HOSPITAL ENCOUNTER (OUTPATIENT)
Dept: CARDIAC REHAB | Age: 73
Setting detail: THERAPIES SERIES
Discharge: HOME | End: 2018-08-20
Payer: MEDICARE

## 2018-08-20 DIAGNOSIS — J43.1 PANLOBULAR EMPHYSEMA (HCC): Primary | ICD-10-CM

## 2018-08-20 PROCEDURE — G0422 INTENS CARDIAC REHAB W/EXERC: HCPCS

## 2018-08-20 PROCEDURE — G0423 INTENS CARDIAC REHAB NO EXER: HCPCS

## 2018-08-20 NOTE — TELEPHONE ENCOUNTER
----- Message from Carrie Crump LPN sent at [phone]  2:29 PM EDT -----  Patient aware, voiced she does not see a pulmonologist but is willing to see one. Will go to [facility]. Please advise.

## 2018-08-21 ENCOUNTER — HOSPITAL ENCOUNTER (OUTPATIENT)
Dept: CT IMAGING | Age: 73
Discharge: HOME | End: 2018-08-21
Payer: MEDICARE

## 2018-08-21 DIAGNOSIS — R06.02 SHORTNESS OF BREATH: ICD-10-CM

## 2018-08-21 PROCEDURE — 71250 CT THORAX DX C-: CPT

## 2018-08-21 NOTE — TELEPHONE ENCOUNTER
Appointment scheduled with Dr. Ayleen Molina for November 16th Friday at [facility] [address] [place] .[place], [place]

## 2018-08-22 ENCOUNTER — HOSPITAL ENCOUNTER (OUTPATIENT)
Dept: CARDIAC REHAB | Age: 73
Setting detail: THERAPIES SERIES
Discharge: HOME | End: 2018-08-22
Payer: MEDICARE

## 2018-08-22 PROCEDURE — G0423 INTENS CARDIAC REHAB NO EXER: HCPCS

## 2018-08-22 PROCEDURE — G0422 INTENS CARDIAC REHAB W/EXERC: HCPCS

## 2018-08-22 NOTE — PROGRESS NOTES
Rob MALDONADO.:  1945    Acct Number: [de-identified]   MRN:  [id number]                             [facility] NUTRITION WORKSHOP             Date: 2018        Session # _______    [employer] class covered:    ()  Label Reading  (X)  Menus  ()  Targeting Nutrition Priorities  ()  Fueling a Healthy Body      Damir Cashhrie was in the Workshop with the Dietitian for 50 minutes. The content was presented via Powerpoint, lecture, and patient participation based format.       Electronically signed by Dulce Pitts RD, LD on 2018 at 2:11 PM

## 2018-08-24 ENCOUNTER — HOSPITAL ENCOUNTER (OUTPATIENT)
Dept: CARDIAC REHAB | Age: 73
Setting detail: THERAPIES SERIES
Discharge: HOME | End: 2018-08-24
Payer: MEDICARE

## 2018-08-24 PROCEDURE — G0422 INTENS CARDIAC REHAB W/EXERC: HCPCS

## 2018-08-24 PROCEDURE — G0423 INTENS CARDIAC REHAB NO EXER: HCPCS

## 2018-08-27 ENCOUNTER — HOSPITAL ENCOUNTER (OUTPATIENT)
Dept: CARDIAC REHAB | Age: 73
Setting detail: THERAPIES SERIES
Discharge: HOME | End: 2018-08-27
Payer: MEDICARE

## 2018-08-27 PROCEDURE — G0423 INTENS CARDIAC REHAB NO EXER: HCPCS

## 2018-08-27 PROCEDURE — G0422 INTENS CARDIAC REHAB W/EXERC: HCPCS

## 2018-08-27 NOTE — PROGRESS NOTES
Video Education Report - ICR/CR    Name:  Misty Ro     Date:  8/27/2018  MRN: [id number]     Session #:  6  Session Length: 40 min    Recommended Videos        []01 [employer] - Program Overview   34:22    []02 Overview of Pritikin Eating Plan   34:10    []03 Becoming a Lynette Cari   33:08     []04 Diseases of Our Time - Part 1   34:22    []05 Calorie Density     33:39   []06 Label Reading - Part 1    32:15   []07 Move it      32.54   []08 Healthy Minds, Bodies, Hearts   32:14   []09 Dining Out - Part 1    32:28   []10 Heart Disease Risk Reduction   54:39   []64 Metabolic Syndrome and Belly Fat  31:52   []12 Facts on Fat     35:29   []13 Diseases of Our Time - Part 2   33:07   []14 Biology of Weight Control   32:36   []15 Biomechanical Limitations   35:20   []16 Nutrition Action Plan    34:23    Additional Videos           [x]33 Improving Performance    32:13  []34 Fueling a Healthy Body    31:32  []35 Introduction to Yoga    33:47  []36 Aging-Enhancing the Quality of Your Life 33:22  []37 Smoking Cessation    36:19    Comments:  Video completed,

## 2018-08-29 ENCOUNTER — HOSPITAL ENCOUNTER (OUTPATIENT)
Dept: CARDIAC REHAB | Age: 73
Setting detail: THERAPIES SERIES
Discharge: HOME | End: 2018-08-29
Payer: MEDICARE

## 2018-08-29 PROCEDURE — G0422 INTENS CARDIAC REHAB W/EXERC: HCPCS

## 2018-08-29 PROCEDURE — G0423 INTENS CARDIAC REHAB NO EXER: HCPCS

## 2018-08-29 NOTE — PROGRESS NOTES
Video Education Report - ICR/CR    Name:  Hitesh Gonzalez     Date:  8/29/2018  MRN: [id number]     Session #:  7  Session Length: 40 min    Recommended Videos        []01 [employer] - Program Overview   34:22    []02 Overview of Pritikin Eating Plan   34:10    []03 Becoming a Lamar Jeans   33:08     []04 Diseases of Our Time - Part 1   34:22    []05 Calorie Density     33:39   []06 Label Reading - Part 1    32:15   []07 Move it      32.54   []08 Healthy Minds, Bodies, Hearts   32:14   []09 Dining Out - Part 1    32:28   []10 Heart Disease Risk Reduction   51:39   []64 Metabolic Syndrome and Belly Fat  31:52   []12 Facts on Fat     35:29   []13 Diseases of Our Time - Part 2   33:07   []14 Biology of Weight Control   32:36   []15 Biomechanical Limitations   35:20   []16 Nutrition Action Plan    34:23    Additional Videos           [x]36 Aging-Enhancing the Quality of Your Life 33:22  []37 Smoking Cessation    36:19    Comments:  Video completed,

## 2018-08-31 ENCOUNTER — HOSPITAL ENCOUNTER (OUTPATIENT)
Dept: CARDIAC REHAB | Age: 73
Setting detail: THERAPIES SERIES
Discharge: HOME | End: 2018-08-31
Payer: MEDICARE

## 2018-08-31 PROCEDURE — G0423 INTENS CARDIAC REHAB NO EXER: HCPCS

## 2018-08-31 PROCEDURE — G0422 INTENS CARDIAC REHAB W/EXERC: HCPCS

## 2018-08-31 NOTE — PROGRESS NOTES
[facility] Facility[facility] Program  Phase 2 Cardiac Rehab Weekly Progress Report      Patient prescribed exercise:  10:00 class. 3 times per week in rehab, 1-4 times per week at home for the amount of sessions/weeks specified by insurance. Current Levels: Treadmill: 2. 5mph/0% for 12 minutes, Schwinn Airdyne: Level 1.0 for 12 minutes,  UBE: Level 0.5 for 5 minutes. Progression Discussion: Increase Aerobic exercise 15 minutes to work on endurance. Attempt to increase intensity by 5-20% for each modality this week. Try to increase intensities until Lei Nava rates the exercises a 13-17 on Liberty RPE.

## 2018-09-05 ENCOUNTER — HOSPITAL ENCOUNTER (OUTPATIENT)
Dept: CARDIAC REHAB | Age: 73
Setting detail: THERAPIES SERIES
Discharge: HOME | End: 2018-09-05
Payer: MEDICARE

## 2018-09-05 PROCEDURE — G0422 INTENS CARDIAC REHAB W/EXERC: HCPCS

## 2018-09-05 PROCEDURE — G0423 INTENS CARDIAC REHAB NO EXER: HCPCS

## 2018-09-05 NOTE — PROGRESS NOTES
Video Education Report - ICR/CR    Name:  Rob Nolasco     Date:  9/5/2018  MRN: [id number]     Session #:  9  Session Length: 40 min    Recommended Videos        []01 [employer] - Program Overview   34:22    []02 Overview of Pritikin Eating Plan   34:10    []03 Becoming a Naomie Joni   33:08     []04 Diseases of Our Time - Part 1   34:22    []05 Calorie Density     33:39   []06 Label Reading - Part 1    32:15   []07 Move it      32.54   []08 Healthy Minds, Bodies, Hearts   32:14   []09 Dining Out - Part 1    32:28   []10 Heart Disease Risk Reduction   17:68   []59 Metabolic Syndrome and Belly Fat  31:52   []12 Facts on Fat     35:29   []13 Diseases of Our Time - Part 2   33:07   []14 Biology of Weight Control   32:36   []15 Biomechanical Limitations   35:20   []16 Nutrition Action Plan    34:23    Additional Videos           [x]35 Introduction to Yoga    33:47  []36 Aging-Enhancing the Quality of Your Life 33:22  []37 Smoking Cessation    36:19    Comments:  Video completed,

## 2018-09-05 NOTE — PROGRESS NOTES
[address] [place] [address],[facility] ~ [facility], [facility]  Individualized Cardiac Treatment Plan    Patient Name:  Zach Davis  :  1945  Age:  72 y.o. MRN:  [id number]  Diagnosis: PCI RCA, LAD  Date of Event: 2018 &2018   Physician:  Bailey Kawasaki Next Office Visit:    Date Entered Program: 2018  Risk Stratifications: [] Low [] Intermediate [x] High  Allergies:    Allergies   Allergen Reactions    Benadryl [Diphenhydramine Hcl]      OVER-STIMULATED    Flexeril [Cyclobenzaprine Hcl]      OVER-STIMULATED         Individual Cardiac Treatment Plan -EXERCISE  INITIAL 30 DAY 61 DAY 90 DAY FINAL DAY   EXERCISE  ASSESSMENT/PLAN EXERCISE  REASSESSMENT EXERCISE   REASSESSMENT EXERCISE   REASSESSMENT EXERCISE  DISCHARGE/FOLLOW-UP   Date: 2018 Date:2018 Date: Date: Date:   Session #1 Session # 8 Session # ** Session # ** Session # **  Last session completed on **   Stages of Change Stages of Change Stages of Change Stages of Change Stages of Change   [] Pre Contemplation  [x] Contemplation  [] Preparation  [] Action  [] Maintenance  [] Relapse [] Pre Contemplation  [] Contemplation  [] Preparation  [x] Action  [] Maintenance  [] Relapse [] Pre Contemplation  [] Contemplation  [] Preparation  [] Action  [] Maintenance  [] Relapse [] Pre Contemplation  [] Contemplation  [] Preparation  [] Action  [] Maintenance  [] Relapse [] Pre Contemplation  [] Contemplation  [] Preparation  [] Action  [] Maintenance  [] Relapse   EXERCISE ASSESSMENT EXERCISE ASSESSMENT EXERCISE ASSESSMENT EXERCISE ASSESSMENT EXERCISE ASSESSMENT   6 Min Walk Test  Distance walked:   0.18 miles  950 ft.  2.37 METs  Max HR:111 BPM      RPE:  14   THR:  104-119  Rhythm:  NSR    6 Min Walk Test  Distance walked:   ** miles  ** ft  ** METs  Max HR:** BPM      RPE:  **  %Change ft= **    Rhythm:  **   DASI: 6.79 METS DASI: 9.89 METS DASI: ** METS DASI: ** METS DASI: ** METS   Return to Nutrition  NUTRITION  ASSESSMENT/PLAN NUTRITION  REASSESSMENT NUTRITION   REASSESSMENT NUTRITION   REASSESSMENT NUTRITION  DISCHARGE/FOLLOW-UP   Stages of Change Stages of Change Stages of Change Stages of Change Stages of Change   [] Pre Contemplation  [x] Contemplation  [] Preparation  [] Action  [] Maintenance  [] Relapse [] Pre Contemplation  [] Contemplation  [] Preparation  [x] Action  [] Maintenance  [] Relapse [] Pre Contemplation  [] Contemplation  [] Preparation  [] Action  [] Maintenance  [] Relapse [] Pre Contemplation  [] Contemplation  [] Preparation  [] Action  [] Maintenance  [] Relapse [] Pre Contemplation  [] Contemplation  [] Preparation  [] Action  [] Maintenance  [] Relapse   NUTRITION ASSESSMENT NUTRITION ASSESSMENT NUTRITION ASSESSMENT NUTRITION ASSESSMENT NUTRITION ASSESSMENT   Weight Management  Weight: 169       Height: 5'3\"  BMI: 29.9 Weight Management  Weight: 161.8                  Weight Management  Weight: **                  Weight Management  Weight: ** Weight Management  Weight: **                    BMI: **   Eating Plan  Current eating habits: reg. Eating Plan  Changes:more fruit and veggies Eating Plan  Changes: Eating Plan  Changes: Eating Plan Improvements:   Alcohol Use  [] none          [] daily  [] weekly      [x] special   Type:wine  Amount: 1 glass       Diet Assessment Tool:  RATE YOUR PLATE  *Given to patient to complete and return. Diet Assessment Tool:    Score: 41/69       Diet Assessment Tool: RATE YOUR PLATE  Score: **/62   NUTRITION PLAN NUTRITION PLAN NUTRITION PLAN NUTRITION PLAN NUTRITION PLAN   *Interventions* *Interventions* *Interventions* *Interventions* *Interventions*   Initial Survey given Goal Setting Discussion:   [x] Yes      [] No       Follow Up Survey Reviewed & Goals Updated:     Professional Referral  Please check if needed. [] Dietician Consult   [] Wt. Management Referral  [] Other:  Professional Referral  Please check if needed.   [] Dietician medications? [x] Yes      [] No  If yes, please list medications:  Effexor Change in anti-depressant or anti-anxiety medications? [x] Yes      [] No  If yes, please list medications:  Effexor Change in anti-depressant or anti-anxiety medications? [] Yes      [] No  If yes, please list medications:  ** Change in anti-depressant or anti-anxiety medications? [] Yes      [] No  If yes, please list medications:  ** Change in anti-depressant or anti-anxiety medications? [] Yes      [] No  If yes, please list medications:  **   *Education* *Education* *Education* *Education* *Education*   Healthy Mind-Set Videos Recommended    *See Education Section Healthy Mind-Set videos  Attended/Date Healthy Mind-Set Videos Attended/Date Healthy Mind-Set Videos Attended/Date Healthy Mind-Set Videos  Completed  [] Yes      [] No    If No, why:   *Goals* *Goals* *Goals* *Goals* *Goals*   Mayra's psychosocial goals are as follows:  Complete HADS & Abe & Joy, Quality of Life Index, Cardiac Version IV Mayra's psychosocial goals are as follows:  [] Attend Healthy Mind-Set Workshops  [x] Reduce depression symptom severity by 1 level  [] ** Mayra's psychosocial goals are as follows:  [] Attend Healthy Mind-Set Workshops  [] Reduce depression symptom severity by 1 level  [] ** Mayra's psychosocial goals are as follows:  [] Attend Healthy Mind-Set Workshops  [] Reduce depression symptom severity by 1 level  [] ** Mayra achieved psychosocial goals?   [] Yes    [] No  If no, why?  **  [] Use methods learned to continue to reduce depression symptom severity by 1 level  [] **     Individual Cardiac Treatment Plan - Other:  Risk Factor/Education  RISK FACTOR  ASSESSMENT/PLAN RISK FACTOR  REASSESSMENT  RISK FACTOR  REASSESSMENT RISK FACTOR  REASSESSMENT RISK FACTOR   DISCHARGE/FOLLOW-UP   Stages of Change Stages of Change Stages of Change Stages of Change Stages of Change   [] Pre Contemplation  [x] Contemplation  [] Kathleen MEDRANO Physician Response      [x] Cardiac rehab is reasonably and medically necessary for continuous cardiac monitoring surveillance  of patient's cardiac activity  [x] Continue continuous telemerty monitoring and notify me with any concerns   [] Other     Physician Response    [x] Cardiac rehab is reasonably and medically necessary for continuous cardiac monitoring surveillance  of patient's cardiac activity  [x] Continue continuous telemerty monitoring and notify me with any concerns   [] Other

## 2018-09-07 ENCOUNTER — HOSPITAL ENCOUNTER (OUTPATIENT)
Dept: CARDIAC REHAB | Age: 73
Setting detail: THERAPIES SERIES
Discharge: HOME | End: 2018-09-07
Payer: MEDICARE

## 2018-09-07 PROCEDURE — G0423 INTENS CARDIAC REHAB NO EXER: HCPCS

## 2018-09-07 PROCEDURE — G0422 INTENS CARDIAC REHAB W/EXERC: HCPCS

## 2018-09-07 NOTE — PROGRESS NOTES
Video Education Report - ICR/CR    Name:  Autumn Chew     Date:  9/7/2018  MRN: [id number]     Session #:  10  Session Length: 40 min    Recommended Videos        []01 [employer] - Program Overview   34:22    []02 Overview of Pritikin Eating Plan   34:10    []03 Becoming a Genie Christopher   33:08     []04 Diseases of Our Time - Part 1   34:22    []05 Calorie Density     33:39   []06 Label Reading - Part 1    32:15   []07 Move it      32.54   []08 Healthy Minds, Bodies, Hearts   32:14   []09 Dining Out - Part 1    32:28   []10 Heart Disease Risk Reduction   29:23   []18 Metabolic Syndrome and Belly Fat  31:52   []12 Facts on Fat     35:29   []13 Diseases of Our Time - Part 2   33:07   []14 Biology of Weight Control   32:36   []15 Biomechanical Limitations   35:20   []16 Nutrition Action Plan    34:23    Additional Videos         []17 Hypertension & Heart Disease   32:39        []18 Cooking Breakfasts and Snacks  32:00   []19 Planning Your Eating Strategy   33:30   []20 Label Reading - Part 2    32:36  []21 Cooking Soups and Desserts   31:41  []22 How Our Thoughts Can Heal Our Hearts 33:05  []23 Targeting Your Nutrition Priorities  33:58  []24 Healthy Salads & Dressings   35:32  []25 Dining Out - Part 2    32:35  []26 Cooking Dinner and Sides   35:06  [x]27 Sleep Disorders     33:14      Comments:  Video completed,

## 2018-09-10 ENCOUNTER — HOSPITAL ENCOUNTER (OUTPATIENT)
Dept: CARDIAC REHAB | Age: 73
Setting detail: THERAPIES SERIES
Discharge: HOME | End: 2018-09-10
Payer: MEDICARE

## 2018-09-10 PROCEDURE — G0423 INTENS CARDIAC REHAB NO EXER: HCPCS

## 2018-09-10 PROCEDURE — G0422 INTENS CARDIAC REHAB W/EXERC: HCPCS

## 2018-09-11 ENCOUNTER — TELEPHONE (OUTPATIENT)
Dept: FAMILY MEDICINE CLINIC | Age: 73
End: 2018-09-11

## 2018-09-11 DIAGNOSIS — K57.10 DUODENAL DIVERTICULUM: Primary | ICD-10-CM

## 2018-09-11 NOTE — TELEPHONE ENCOUNTER
Patient stopped in office today, stated that there was an abnormality on recent chest CT and Dr Kristy De La Fuente is requesting that you review those results and advise further.

## 2018-09-12 ENCOUNTER — HOSPITAL ENCOUNTER (OUTPATIENT)
Dept: CARDIAC REHAB | Age: 73
Setting detail: THERAPIES SERIES
Discharge: HOME | End: 2018-09-12
Payer: MEDICARE

## 2018-09-12 PROCEDURE — G0423 INTENS CARDIAC REHAB NO EXER: HCPCS

## 2018-09-12 PROCEDURE — G0422 INTENS CARDIAC REHAB W/EXERC: HCPCS

## 2018-09-12 NOTE — PROGRESS NOTES
Marsha MALDONADO.:  1945    Acct Number: [de-identified]   MRN:  [id number]                             [facility] NUTRITION WORKSHOP             Date: 2018        Session # _______    Cheral Arite class covered:    ()  Label Reading  ()  Menus  ()  Targeting Nutrition Priorities  (X)  Fueling a Healthy Body      Jailyn Shayayesha was in the Workshop with the Dietitian for 45 minutes. The content was presented via Powerpoint, lecture, and patient participation based format. Motivational interviewing was utilized when needed, to promote change. Patient voiced understanding.     Electronically signed by Niels Elder RD, LD on 2018 at 10:03 AM

## 2018-09-13 NOTE — TELEPHONE ENCOUNTER
Calcified lymph node and granulomas are benign appearing, this can wait until her visit in November  There is a large diverticulum in the abdomen  Refer to GI for further workup. Where is she established?   Call patient

## 2018-09-14 ENCOUNTER — HOSPITAL ENCOUNTER (OUTPATIENT)
Dept: CARDIAC REHAB | Age: 73
Setting detail: THERAPIES SERIES
Discharge: HOME | End: 2018-09-14
Payer: MEDICARE

## 2018-09-14 PROCEDURE — G0422 INTENS CARDIAC REHAB W/EXERC: HCPCS

## 2018-09-14 PROCEDURE — G0423 INTENS CARDIAC REHAB NO EXER: HCPCS

## 2018-09-14 NOTE — PROGRESS NOTES
Hospital Facility-Based Program  Phase 2 Cardiac Rehab Weekly Progress Report      Patient prescribed exercise:  10:00 class. 3 times per week in rehab, 1-4 times per week at home for the amount of sessions/weeks specified by insurance. Current Levels: Treadmill: 3.0mph/1% for 15 minutes, x 2  , UBE: Level 0.6 for 5 minutes. Progression Discussion: Maintain Aerobic exercise 15 minutes to work on endurance. Attempt to increase intensity by 5-20% for each modality this week. Try to increase intensities until Lilliam Amaya rates the exercises a 13-17 on [employer] RPE.

## 2018-09-17 ENCOUNTER — HOSPITAL ENCOUNTER (OUTPATIENT)
Dept: CARDIAC REHAB | Age: 73
Setting detail: THERAPIES SERIES
Discharge: HOME | End: 2018-09-17
Payer: MEDICARE

## 2018-09-17 PROCEDURE — G0423 INTENS CARDIAC REHAB NO EXER: HCPCS

## 2018-09-17 PROCEDURE — G0422 INTENS CARDIAC REHAB W/EXERC: HCPCS

## 2018-09-17 NOTE — PROGRESS NOTES
Video Education Report - ICR/CR    Name:  Queenie Dhillon     Date:  9/17/2018  MRN: [id number]     Session #:  15  Session Length: 40 min    Recommended Videos        []01 [employer] - Program Overview   34:22    []02 Overview of Pritikin Eating Plan   34:10    []03 Becoming a Kenna Novak   33:08     []04 Diseases of Our Time - Part 1   34:22    []05 Calorie Density     33:39   []06 Label Reading - Part 1    32:15   [x]07 Move it      32.54   []08 Healthy Minds, Bodies, Hearts   32:14   []09 Dining Out - Part 1    32:28   []10 Heart Disease Risk Reduction   78:54   []72 Metabolic Syndrome and Belly Fat  31:52   []12 Facts on Fat     35:29   []13 Diseases of Our Time - Part 2   33:07   []14 Biology of Weight Control   32:36       Comments:  Video completed,

## 2018-09-19 ENCOUNTER — HOSPITAL ENCOUNTER (OUTPATIENT)
Dept: CARDIAC REHAB | Age: 73
Setting detail: THERAPIES SERIES
Discharge: HOME | End: 2018-09-19
Payer: MEDICARE

## 2018-09-19 PROCEDURE — G0422 INTENS CARDIAC REHAB W/EXERC: HCPCS

## 2018-09-19 PROCEDURE — G0423 INTENS CARDIAC REHAB NO EXER: HCPCS

## 2018-09-21 ENCOUNTER — HOSPITAL ENCOUNTER (OUTPATIENT)
Dept: CARDIAC REHAB | Age: 73
Setting detail: THERAPIES SERIES
Discharge: HOME | End: 2018-09-21
Payer: MEDICARE

## 2018-09-21 PROCEDURE — G0422 INTENS CARDIAC REHAB W/EXERC: HCPCS

## 2018-09-21 PROCEDURE — G0423 INTENS CARDIAC REHAB NO EXER: HCPCS

## 2018-09-24 ENCOUNTER — HOSPITAL ENCOUNTER (OUTPATIENT)
Dept: CARDIAC REHAB | Age: 73
Setting detail: THERAPIES SERIES
End: 2018-09-24
Payer: MEDICARE

## 2018-09-24 ENCOUNTER — TELEPHONE (OUTPATIENT)
Dept: FAMILY MEDICINE CLINIC | Age: 73
End: 2018-09-24

## 2018-09-24 ENCOUNTER — APPOINTMENT (OUTPATIENT)
Dept: CARDIAC REHAB | Age: 73
End: 2018-09-24
Payer: MEDICARE

## 2018-09-26 ENCOUNTER — APPOINTMENT (OUTPATIENT)
Dept: CARDIAC REHAB | Age: 73
End: 2018-09-26
Payer: MEDICARE

## 2018-09-28 ENCOUNTER — HOSPITAL ENCOUNTER (OUTPATIENT)
Dept: CARDIAC REHAB | Age: 73
Setting detail: THERAPIES SERIES
Discharge: HOME | End: 2018-09-28
Payer: MEDICARE

## 2018-09-28 ENCOUNTER — APPOINTMENT (OUTPATIENT)
Dept: CARDIAC REHAB | Age: 73
End: 2018-09-28
Payer: MEDICARE

## 2018-10-01 ENCOUNTER — HOSPITAL ENCOUNTER (OUTPATIENT)
Dept: CARDIAC REHAB | Age: 73
Setting detail: THERAPIES SERIES
End: 2018-10-01
Payer: MEDICARE

## 2018-10-01 ENCOUNTER — APPOINTMENT (OUTPATIENT)
Dept: CARDIAC REHAB | Age: 73
End: 2018-10-01
Payer: MEDICARE

## 2018-10-03 ENCOUNTER — HOSPITAL ENCOUNTER (OUTPATIENT)
Dept: CARDIAC REHAB | Age: 73
Setting detail: THERAPIES SERIES
Discharge: HOME | End: 2018-10-03
Payer: MEDICARE

## 2018-10-03 PROCEDURE — G0422 INTENS CARDIAC REHAB W/EXERC: HCPCS

## 2018-10-03 PROCEDURE — G0423 INTENS CARDIAC REHAB NO EXER: HCPCS

## 2018-10-03 NOTE — PROGRESS NOTES
Referral  [] Other:  Professional Referral  Please check if needed. [] Dietician Consult   [] Wt. Management Referral  [] Other: Professional Referral  Please check if needed. [] Dietician Consult   [] Wt. Management Referral  [] Other: Professional Referral  Please check if needed. [] Dietician Consult   [] Wt. Management Referral  [] Other: Professional Referral  Please check if needed. [] Dietician Consult   [] Wt. Management Referral  [] Other:   *Education* *Education* *Education* *Education* *Education*   Nutritional Education Recommended    1:1 Registered Dietitian    Workshops  Label Reading   Menu  Targeting Nutrition     Priorities  Fueling a Healthy      Body   Nutritional Education Attended/Date  8/22 Menu Nutritional Education Attended/Date  9/12 Fueling a Healthy Body  10/3/2018 targeting Nutrition Nutritional Education Attended/Date All Sessions Completed? [] Yes  [] No    If No, why:   Cooking School  Video Education      *See Education Section       [employer]  Sessions Completed   [facility]  Sessions Completed [facility]  Sessions Completed     [employer]  All videos completed? [] Yes  [] No    If No, why:     *Goals* *Goals* *Goals* *Goals* *Goals*   Mayra's nutritional goals are as follows:  Complete and return diet survey Mayra's nutritional goals are as follows:  [x] Attend Nutrition Workshops  [x] Attend 1:1   [x] Attend Cooking Videos  [] ** Mayra's nutritional goals are as follows:  [x] Attend Nutrition Workshops  [] Attend 1:1   [x] Attend Cooking Videos  [] Complete and return diet survey  [] ** Mayra's nutritional goals are as follows:  [] Attend Nutrition Workshops  [] Attend 1:1   [] Attend Cooking Videos  [] ** Rayaraman Meigs achieved nutritional goals   [] Yes    [] No  If no, why?   Use knowledge gained to continue Pritikin eating plan at home       Individual Cardiac Treatment Plan - Psychosocial  PSYCHOSOCIAL  ASSESSMENT/PLAN PSYCHOSOCIAL  REASSESSMENT

## 2018-10-05 ENCOUNTER — HOSPITAL ENCOUNTER (OUTPATIENT)
Dept: CARDIAC REHAB | Age: 73
Setting detail: THERAPIES SERIES
Discharge: HOME | End: 2018-10-05
Payer: MEDICARE

## 2018-10-05 ENCOUNTER — APPOINTMENT (OUTPATIENT)
Dept: CARDIAC REHAB | Age: 73
End: 2018-10-05
Payer: MEDICARE

## 2018-10-08 ENCOUNTER — HOSPITAL ENCOUNTER (OUTPATIENT)
Dept: CARDIAC REHAB | Age: 73
Setting detail: THERAPIES SERIES
Discharge: HOME | End: 2018-10-08
Payer: MEDICARE

## 2018-10-08 PROCEDURE — G0422 INTENS CARDIAC REHAB W/EXERC: HCPCS

## 2018-10-08 PROCEDURE — G0423 INTENS CARDIAC REHAB NO EXER: HCPCS

## 2018-10-10 ENCOUNTER — HOSPITAL ENCOUNTER (OUTPATIENT)
Dept: CARDIAC REHAB | Age: 73
Setting detail: THERAPIES SERIES
Discharge: HOME | End: 2018-10-10
Payer: MEDICARE

## 2018-10-10 PROCEDURE — G0422 INTENS CARDIAC REHAB W/EXERC: HCPCS

## 2018-10-10 PROCEDURE — G0423 INTENS CARDIAC REHAB NO EXER: HCPCS

## 2018-10-12 ENCOUNTER — HOSPITAL ENCOUNTER (OUTPATIENT)
Dept: CARDIAC REHAB | Age: 73
Setting detail: THERAPIES SERIES
Discharge: HOME | End: 2018-10-12
Payer: MEDICARE

## 2018-10-12 PROCEDURE — G0423 INTENS CARDIAC REHAB NO EXER: HCPCS

## 2018-10-12 PROCEDURE — G0422 INTENS CARDIAC REHAB W/EXERC: HCPCS

## 2018-10-12 NOTE — PROGRESS NOTES
Video Education Report - ICR/CR    Name:  Promise Chavez     Date:  10/12/2018  MRN: [id number]     Session #:  21  Session Length: 40 min    Recommended Videos        []01 [employer] - Program Overview   34:22    []02 Overview of Pritikin Eating Plan   34:10    []03 Becoming a Pritikin    33:08     []04 Diseases of Our Time - Part 1   34:22    []05 Calorie Density     33:39   [x]06 Label Reading - Part 1    32:15       Comments:  Video completed,

## 2018-10-15 ENCOUNTER — HOSPITAL ENCOUNTER (OUTPATIENT)
Dept: CARDIAC REHAB | Age: 73
Setting detail: THERAPIES SERIES
Discharge: HOME | End: 2018-10-15
Payer: MEDICARE

## 2018-10-15 PROCEDURE — G0423 INTENS CARDIAC REHAB NO EXER: HCPCS

## 2018-10-15 PROCEDURE — G0422 INTENS CARDIAC REHAB W/EXERC: HCPCS

## 2018-10-17 ENCOUNTER — APPOINTMENT (OUTPATIENT)
Dept: CARDIAC REHAB | Age: 73
End: 2018-10-17
Payer: MEDICARE

## 2018-10-19 ENCOUNTER — HOSPITAL ENCOUNTER (OUTPATIENT)
Dept: CARDIAC REHAB | Age: 73
Setting detail: THERAPIES SERIES
Discharge: HOME | End: 2018-10-19
Payer: MEDICARE

## 2018-10-19 PROCEDURE — G0422 INTENS CARDIAC REHAB W/EXERC: HCPCS

## 2018-10-19 PROCEDURE — G0423 INTENS CARDIAC REHAB NO EXER: HCPCS

## 2018-10-22 ENCOUNTER — HOSPITAL ENCOUNTER (OUTPATIENT)
Age: 73
Discharge: HOME | End: 2018-10-22
Payer: MEDICARE

## 2018-10-22 ENCOUNTER — HOSPITAL ENCOUNTER (OUTPATIENT)
Dept: CARDIAC REHAB | Age: 73
Setting detail: THERAPIES SERIES
Discharge: HOME | End: 2018-10-22
Payer: MEDICARE

## 2018-10-22 LAB
ERYTHROCYTE [DISTWIDTH] IN BLOOD BY AUTOMATED COUNT: 13.1 % (ref 11.5–14.5)
ERYTHROCYTE [DISTWIDTH] IN BLOOD BY AUTOMATED COUNT: 44.1 FL (ref 35–45)
FERRITIN: 104 NG/ML (ref 10–291)
FOLATE: 15.1 NG/ML (ref 4.8–24.2)
HCT VFR BLD CALC: 38.4 % (ref 37–47)
HEMOGLOBIN: 12.4 GM/DL (ref 12–16)
IRON: 71 UG/DL (ref 50–170)
MCH RBC QN AUTO: 29.9 PG (ref 26–33)
MCHC RBC AUTO-ENTMCNC: 32.3 GM/DL (ref 32.2–35.5)
MCV RBC AUTO: 92.5 FL (ref 81–99)
PLATELET # BLD: 314 THOU/MM3 (ref 130–400)
PMV BLD AUTO: 12.2 FL (ref 9.4–12.4)
RBC # BLD: 4.15 MILL/MM3 (ref 4.2–5.4)
TOTAL IRON BINDING CAPACITY: 444 UG/DL (ref 171–450)
VITAMIN B-12: 329 PG/ML (ref 211–911)
WBC # BLD: 8.5 THOU/MM3 (ref 4.8–10.8)

## 2018-10-22 PROCEDURE — 83550 IRON BINDING TEST: CPT

## 2018-10-22 PROCEDURE — 83540 ASSAY OF IRON: CPT

## 2018-10-22 PROCEDURE — 82728 ASSAY OF FERRITIN: CPT

## 2018-10-22 PROCEDURE — G0422 INTENS CARDIAC REHAB W/EXERC: HCPCS

## 2018-10-22 PROCEDURE — 36415 COLL VENOUS BLD VENIPUNCTURE: CPT

## 2018-10-22 PROCEDURE — 82607 VITAMIN B-12: CPT

## 2018-10-22 PROCEDURE — 82746 ASSAY OF FOLIC ACID SERUM: CPT

## 2018-10-22 PROCEDURE — 85027 COMPLETE CBC AUTOMATED: CPT

## 2018-10-22 PROCEDURE — G0423 INTENS CARDIAC REHAB NO EXER: HCPCS

## 2018-10-22 RX ORDER — METOPROLOL SUCCINATE 50 MG/1
TABLET, EXTENDED RELEASE ORAL
Qty: 180 TABLET | Refills: 3 | Status: SHIPPED | OUTPATIENT
Start: 2018-10-22 | End: 2019-10-21 | Stop reason: SDUPTHER

## 2018-10-22 RX ORDER — FENOFIBRATE 145 MG/1
TABLET, COATED ORAL
Qty: 90 TABLET | Refills: 3 | Status: SHIPPED | OUTPATIENT
Start: 2018-10-22 | End: 2019-10-21 | Stop reason: SDUPTHER

## 2018-10-24 ENCOUNTER — HOSPITAL ENCOUNTER (OUTPATIENT)
Dept: CARDIAC REHAB | Age: 73
Setting detail: THERAPIES SERIES
Discharge: HOME | End: 2018-10-24
Payer: MEDICARE

## 2018-10-24 PROCEDURE — G0423 INTENS CARDIAC REHAB NO EXER: HCPCS

## 2018-10-24 PROCEDURE — G0422 INTENS CARDIAC REHAB W/EXERC: HCPCS

## 2018-10-26 ENCOUNTER — HOSPITAL ENCOUNTER (OUTPATIENT)
Dept: CARDIAC REHAB | Age: 73
Setting detail: THERAPIES SERIES
Discharge: HOME | End: 2018-10-26
Payer: MEDICARE

## 2018-10-26 ENCOUNTER — APPOINTMENT (OUTPATIENT)
Dept: CARDIAC REHAB | Age: 73
End: 2018-10-26
Payer: MEDICARE

## 2018-10-29 ENCOUNTER — HOSPITAL ENCOUNTER (OUTPATIENT)
Dept: CARDIAC REHAB | Age: 73
Setting detail: THERAPIES SERIES
Discharge: HOME | End: 2018-10-29
Payer: MEDICARE

## 2018-10-29 PROCEDURE — G0423 INTENS CARDIAC REHAB NO EXER: HCPCS

## 2018-10-29 PROCEDURE — G0422 INTENS CARDIAC REHAB W/EXERC: HCPCS

## 2018-10-31 ENCOUNTER — HOSPITAL ENCOUNTER (OUTPATIENT)
Dept: CARDIAC REHAB | Age: 73
Setting detail: THERAPIES SERIES
Discharge: HOME | End: 2018-10-31
Payer: MEDICARE

## 2018-10-31 PROCEDURE — G0423 INTENS CARDIAC REHAB NO EXER: HCPCS

## 2018-10-31 PROCEDURE — G0422 INTENS CARDIAC REHAB W/EXERC: HCPCS

## 2018-10-31 NOTE — PROGRESS NOTES
Current Workloads  TM:  @ %=  METs  AD:  level =  METs  NS:   Kramer=  METs  AE:  level =  METs Current Workloads  TM:  @ %=  METs  AD:  level =  METs  NS:   Kramer=  METs  AE:  level =  METs     Frequency:    ICR: 3x/week  Home: 2-3x/wk Frequency:   ICR: 3x/week  Home: 3x/wk Frequency:  ICR: 3x/week  Home: 3-4x/wk Frequency:  ICR: 3x/week  Home: 3-4x/wk Frequency:  ICR: 3x/week  Home: 3-4x/wk Frequency:  Marlon Cunha will continue exercise at  5-7 days/week   Duration:   Total aerobic exercise = 20 min    5-8min/mode Duration:  Total aerobic exercises = 45 min     15min/mode Duration:  Total aerobic exercises = 45 min     15 min/mode Duration:  Total aerobic exercises = 45 min     15 min/mode Duration:  Total aerobic exercises = 45 min     15 min/mode Duration:  Total erobic exercise =  60-90 min   Intensity:   MET Level = 2.37  RPE = 12-15 Intensity:  Max MET Level = 2.0-3.0  RPE = 12-15 Intensity:  Max MET Level =2.2-3.7  RPE = 12-15 Intensity:  Max MET Level = 2.5-4.3  RPE = 12-15 Intensity:  Max MET Level =   RPE = 12-15 Intensity:  Max MET Level = ** RPE = 12-15   Progression: increase aerobic activity up to 15 min over next 4 weeks by increasing time 2-3 min/week. Progression:Increase METs 5-10% over 4 weeks   Progression:  Increase METS 5-10% over 4 weeks   Progression:Increase METS 5-10 % over next 4 weeks Progression:Increase METS 5-10 % over next 4 weeks Progression:  Increase time/intensity when RPE <13, and HR is in [facility]   [x] Yes      [] No  Upper and Lower body strength training 2x/wk    Wt: 2#       Reps:  8-15    *Increase wt. after completing 15 reps with an RPE of <12/13. [x] Yes      [] No  Upper and Lower body strength training 2x/wk    Wt: 2#       Reps:  8-15    *Increase wt. after completing 15 reps with an RPE of <12/13.  [x] Yes      [] No  Upper and Lower body strength training 2x/wk    Wt: 3#       Reps:  8-15    *Increase wt. after completing 15 reps with an RPE of <12/13. [x] Yes      [] No  Upper and Lower body strength training 2x/wk    Wt: 3#       Reps:  8-15    *Increase wt. after completing 15 reps with an RPE of <12/13. [x] Yes      [] No  Upper and Lower body strength training 2x/wk    Wt: 3#       Reps:  8-15    *Increase wt. after completing 15 reps with an RPE of <12/13. Continue Strength Training at home   [] Exercise Log & Strength training handout given     Wt: **#       Reps:  8-15    *Increase wt. after completing 15 reps with an RPE of <12/13. Home Exercise  *Mayra verbalizes planning to walk 3 days/week for 10-15 min on days not in rehab. Home Exercise  *Mayra verbalizes planning to walk 3 days/week for 15-20 min on days not in rehab. Home Exercise  *Mayra verbalizes planning to walk 5 days/week for 30 min on days not in rehab. Home Exercise  *Mayra verbalizes planning to walk 3-5 days/week for 20-30 min on days not in rehab. Home Exercise  *Mayra verbalizes planning to walk 3-5 days/week for 20-30 min on days not in rehab.  [address] [employer] his/her plan to ** ** days/week for ** min @ **   *Education* *Education* *Education* *Education* *Education* *Education*   RPE Scale  [x] Yes      [] No  Exercise Safety  [x] Yes      [] No  Equipment Orientation  [x] Yes      [] No  S/S to Report  [x] Yes      [] No  Warm Up/Cool Down  [x] Yes      [] No  Home Exercise  [x] Yes      [] No     All Exercise Education Completed  [] Yes      [] No   *Goals* *Goals* *Goals* *Goals* *Goals* *Goals*   Initial Exercise Goals Exercise Goals  Exercise Goals  Exercise Goals   Exercise Goals  Exercise Goals   Mayra plans to:  [x] Attend exercise sessions 3x/wk  [x] initiate home exercise 2-3x/wk for 10-20 min  [x] Increase 6 min walk distance by 10%  [] ** Mayra plans to:  [x] Attend exercise sessions 3x/wk  [x] continue home exercise 2-3x/wk for 20-30 Attended/Date Healthy Mind-Set Videos  Completed  [] Yes      [] No Healthy Mind-Set Videos  Completed  [] Yes      [] No    If No, why:   *Goals* *Goals* *Goals* *Goals* *Goals* *Goals*   Deepak psychosocial goals are as follows:  Complete HADS & Abe & Joy, Quality of Life Index, Cardiac Version IV Deepak psychosocial goals are as follows:  [] Attend Healthy Mind-Set Workshops  [x] Reduce depression symptom severity by 1 level  [] ** Deepak psychosocial goals are as follows:  [] Attend Healthy Mind-Set Workshops  [x] Reduce depression symptom severity by 1 level  [] ** Deepak psychosocial goals are as follows:  [] Attend Healthy Mind-Set Workshops  [x] Reduce depression symptom severity by 1 level  [] ** Mayra achieved psychosocial goals? [] Yes    [] No  If no, why?  **  [] Use methods learned to continue to reduce depression symptom severity by 1 level  [] ** Mayra achieved psychosocial goals?   [] Yes    [] No  If no, why?  **  [] Use methods learned to continue to reduce depression symptom severity by 1 level  [] **     Individual Cardiac Treatment Plan - Other:  Risk Factor/Education  RISK FACTOR  ASSESSMENT/PLAN RISK FACTOR  REASSESSMENT  RISK FACTOR  REASSESSMENT RISK FACTOR  REASSESSMENT RISK FACTOR  REASSESSMENT RISK FACTOR   DISCHARGE/FOLLOW-UP   Stages of Change Stages of Change Stages of Change Stages of Change Stages of Change Stages of Change   [] Pre Contemplation  [x] Contemplation  [] Preparation  [] Action  [] Maintenance  [] Relapse [] Pre Contemplation  [x] Contemplation  [] Preparation  [] Action  [] Maintenance  [] Relapse [] Pre Contemplation  [] Contemplation  [] Preparation  [x] Action  [] Maintenance  [] Relapse [] Pre Contemplation  [] Contemplation  [] Preparation  [x] Action  [] Maintenance  [] Relapse [] Pre Contemplation  [] Contemplation  [] Preparation  [] Action  [] Maintenance  [] Relapse [] Pre Contemplation  [] Contemplation  [] Preparation  [] Action  []

## 2018-11-09 ENCOUNTER — APPOINTMENT (OUTPATIENT)
Dept: CARDIAC REHAB | Age: 73
End: 2018-11-09
Payer: MEDICARE

## 2018-11-12 ENCOUNTER — APPOINTMENT (OUTPATIENT)
Dept: CARDIAC REHAB | Age: 73
End: 2018-11-12
Payer: MEDICARE

## 2018-11-14 ENCOUNTER — HOSPITAL ENCOUNTER (OUTPATIENT)
Dept: CARDIAC REHAB | Age: 73
Setting detail: THERAPIES SERIES
Discharge: HOME | End: 2018-11-14
Payer: MEDICARE

## 2018-11-14 PROCEDURE — G0422 INTENS CARDIAC REHAB W/EXERC: HCPCS

## 2018-11-14 PROCEDURE — G0423 INTENS CARDIAC REHAB NO EXER: HCPCS

## 2018-11-14 NOTE — PROGRESS NOTES
[address] [place] [address],[address] [facility] Program ~ [employer], [facility] Facility-Based Program  Individualized Cardiac Treatment Plan    Patient Name:  Stephen Solis  :  1945  Age:  72 y.o. MRN:  [id number]  Diagnosis: PCI RCA, LAD  Date of Event: 2018 &2018   Physician:  Nikki Ellington Office Visit:    Date Entered Program: 2018  Risk Stratifications: [] Low [] Intermediate [x] High  Allergies:    Allergies   Allergen Reactions    Benadryl [Diphenhydramine Hcl]      OVER-STIMULATED    Flexeril [Cyclobenzaprine Hcl]      OVER-STIMULATED         Individual Cardiac Treatment Plan -EXERCISE  INITIAL 30 DAY 60 DAY 90 DAY FINAL DAY   EXERCISE  ASSESSMENT/PLAN EXERCISE  REASSESSMENT EXERCISE   REASSESSMENT EXERCISE   REASSESSMENT EXERCISE  DISCHARGE/FOLLOW-UP   Date: 2018 Date:2018 Date:10/03/2018 Date:10/31/2018 Date:2018   Session #1 Session # 8 Session # 17  Plus educ.17 Session # 25  plus  Education -25 Session # 27  Education 27 total 54  Last session completed on 2018   Stages of Change Stages of Change Stages of Change Stages of Change Stages of Change   [] Pre Contemplation  [x] Contemplation  [] Preparation  [] Action  [] Maintenance  [] Relapse [] Pre Contemplation  [] Contemplation  [] Preparation  [x] Action  [] Maintenance  [] Relapse [] Pre Contemplation  [] Contemplation  [] Preparation  [x] Action  [] Maintenance  [] Relapse [] Pre Contemplation  [] Contemplation  [] Preparation  [x] Action  [] Maintenance  [] Relapse [] Pre Contemplation  [] Contemplation  [] Preparation  [x] Action  [] Maintenance  [] Relapse   EXERCISE ASSESSMENT EXERCISE ASSESSMENT EXERCISE ASSESSMENT EXERCISE ASSESSMENT EXERCISE ASSESSMENT   6 Min Walk Test  Distance walked:   0.18 miles  950 ft.  2.37 METs  Max HR:111 BPM      RPE:  14   THR:  104-119  Rhythm:  NSR    6 Min Walk Test  Distance walked:   0.28miles  1478 ft  3.15 METs  Max HR:105 BPM      RPE:  14  %Change ft= EXERCISE PLAN EXERCISE PLAN EXERCISE PLAN EXERCISE PLAN   *Interventions* *Interventions* *Interventions* *Interventions* *Interventions*   Exercise Prescription  (per physician & CR staff) Exercise Prescription  (per physician & CR staff) Exercise Prescription  (per physician & CR staff) Exercise Prescription  (per physician & CR staff) Exercise Prescription  (per physician & CR staff)   Cardiovascular Cardiovascular Cardiovascular Cardiovascular Cardiovascular   Mode:    [x] Treadmill (TM)  [x] Schwinn Airdyne (AD)  [x] Arms Ergometer (AE)  [] NuStep  [] Elliptical (E) MODE:    [x] Treadmill (TM)  [x] Schwinn Airdyne (AD)  [x] Arms Ergometer (AE)  [] NuStep  [] Elliptical (E) MODE:    [x] Treadmill (TM)  [] Schwinn Airdyne (AD)  [x] Arms Ergometer (AE)  [] NuStep  [] Elliptical (E) MODE:    [x] Treadmill (TM)  [] Schwinn Airdyne (AD)  [x] Arms Ergometer (AE)  [] NuStep  [] Elliptical (E) MODE:    [x] Treadmill (TM)  [] Schwinn Airdyne (AD)  [x] Arms Ergometer (AE)  [] NuStep  [] Elliptical (E)   Initial Workloads  TM: [email or web address] 2.4 METs  AD: 0.7 level = 2.4 METs  NS: 46  Kramer= 2.4 METs  AE: 0.4 level = 1.7 METs Current Workloads  TM: 2.6 @0 %= 3.0 METs  AE:0.5  level = 2 METs Current Workloads  TM:2.6  @ 2%=3.7  METs    AE: 0.6 level = 2.2 METs Current Workloads  TM: 2.8 @ 3%= 4.3 METs    AE:0.8  level = 2.5 METs Current Workloads  TM: 2.9 @ 3%= 4.5 METs  AE:0.8  level =2.5  METs     Frequency:    ICR: 3x/week  Home: 2-3x/wk Frequency:   ICR: 3x/week  Home: 3x/wk Frequency:  ICR: 3x/week  Home: 3-4x/wk Frequency:  ICR: 3x/week  Home: 3-4x/wk Frequency:  An Santamaria will continue exercise at  5-7 days/week   Duration:   Total aerobic exercise = 20 min    5-8min/mode Duration:  Total aerobic exercises = 45 min     15min/mode Duration:  Total aerobic exercises = 45 min     15 min/mode Duration:  Total aerobic exercises = 45 min     15 min/mode Duration:  Total erobic exercise =  60-90 min   Intensity:   MET Level = ** Mayra achieved nutritional goals   [x] Yes    [] No  If no, why? Use knowledge gained to continue Pritikin eating plan at home       Individual Cardiac Treatment Plan - Psychosocial  PSYCHOSOCIAL  ASSESSMENT/PLAN PSYCHOSOCIAL  REASSESSMENT PSYCHOSOCIAL   REASSESSMENT PSYCHOSOCIAL   REASSESSMENT PSYCHOSOCIAL  DISCHARGE/FOLLOW-UP   Stages of Change Stages of Change Stages of Change Stages of Change Stages of Change   [] Pre Contemplation  [x] Contemplation  [] Preparation  [] Action  [] Maintenance  [] Relapse [] Pre Contemplation  [] Contemplation  [] Preparation  [x] Action  [] Maintenance  [] Relapse [] Pre Contemplation  [] Contemplation  [] Preparation  [x] Action  [] Maintenance  [] Relapse [] Pre Contemplation  [] Contemplation  [] Preparation  [x] Action  [] Maintenance  [] Relapse [] Pre Contemplation  [] Contemplation  [] Preparation  [x] Action  [] Maintenance  [] Relapse   PSYCHOSOCIAL ASSESSMENT PSYCHOSOCIAL ASSESSMENT PSYCHOSOCIAL ASSESSMENT PSYCHOSOCIAL ASSESSMENT PSYCHOSOCIAL ASSESSMENT   Behavioral Outcomes Behavioral Outcomes Behavioral Outcomes Behavioral Outcomes Behavioral Outcomes   Tool Used:  Abe & Joy, Quality of Life Index, Cardiac Version IV  *Given to patient to complete. Tool Used:    Abe & Joy, Quality of Life Index, Cardiac Version IV     QOL Index Score: 26.52  HF:24  S&E:27.0  P&S: 29.14  Family: 30   Tool Used:     Abe & Joy, Quality of Life Index, Cardiac Version IV    QOL Index Score: 27.71  HF:26.7  S&E:27.19  P&S: 29.14  Family: 30   PHQ-9 score 5  Depression Severity  []Minimal  [x]Mild   []Moderate  []Moderately Severe  []Severe    PHQ-9 score 1  Depression Severity  [x]Minimal  []Mild   []Moderate  []Moderately Severe  []Severe   Does patient have Family Support? [x] Yes      [] No  No signs of marital/family distress       Within the Past Month:  *Have you wished you were dead or wished you could go to sleep and not wake up?   [] Yes      [x] Referral  [x] Stress Management Skills *Please check if needed  [] Psych Consult  [] Physician Referral  [x] Stress Management Skills *Please check if needed  [] Psych Consult  [] Physician Referral  [x] Stress Management Skills *Please check if needed  [] Psych Consult  [] Physician Referral  [x] Stress Management Skills   Is patient currently taking anti-depressant or anti-anxiety medications? [x] Yes      [] No  If yes, please list medications:  Effexor Change in anti-depressant or anti-anxiety medications? [x] Yes      [] No  If yes, please list medications:  Effexor Change in anti-depressant or anti-anxiety medications? [x] Yes      [] No  If yes, please list medications:  Effexor Change in anti-depressant or anti-anxiety medications? [x] Yes      [] No  If yes, please list medications:  Effexor Change in anti-depressant or anti-anxiety medications? [x] Yes      [] No  If yes, please list medications:  Effexor   *Education* *Education* *Education* *Education* *Education*   Healthy Mind-Set Videos Recommended    *See Education Section Healthy Mind-Set videos  Attended/Date Healthy Mind-Set Videos Attended/Date Healthy Mind-Set Videos Attended/Date Healthy Mind-Set Videos  Completed  [x] Yes      [] No       *Goals* *Goals* *Goals* *Goals* *Goals*   Mayra's psychosocial goals are as follows:  Complete HADS & Abe & Joy, Quality of Life Index, Cardiac Version IV Mayra's psychosocial goals are as follows:  [] Attend Healthy Mind-Set Workshops  [x] Reduce depression symptom severity by 1 level  [] ** Mayra's psychosocial goals are as follows:  [] Attend Healthy Mind-Set Workshops  [x] Reduce depression symptom severity by 1 level  [] ** Mayra's psychosocial goals are as follows:  [] Attend Healthy Mind-Set Workshops  [x] Reduce depression symptom severity by 1 level  [] ** Mayra achieved psychosocial goals?   [x] Yes    [] No  [x] Use methods learned to continue to reduce depression symptom severity by 1 level  []      Individual Cardiac Treatment Plan - Other:  Risk Factor/Education  RISK FACTOR  ASSESSMENT/PLAN RISK FACTOR  REASSESSMENT  RISK FACTOR  REASSESSMENT RISK FACTOR  REASSESSMENT RISK FACTOR   DISCHARGE/FOLLOW-UP   Stages of Change Stages of Change Stages of Change Stages of Change Stages of Change   [] Pre Contemplation  [x] Contemplation  [] Preparation  [] Action  [] Maintenance  [] Relapse [] Pre Contemplation  [x] Contemplation  [] Preparation  [] Action  [] Maintenance  [] Relapse [] Pre Contemplation  [] Contemplation  [] Preparation  [x] Action  [] Maintenance  [] Relapse [] Pre Contemplation  [] Contemplation  [] Preparation  [x] Action  [] Maintenance  [] Relapse [] Pre Contemplation  [] Contemplation  [] Preparation  [x] Action  [] Maintenance  [] Relapse   RISK FACTOR/EDUCATION ASSESSMENT RISK FACTOR/EDUCATION ASSESSMENT RISK FACTOR/EDUCATION ASSESSMENT RISK FACTOR/EDUCATION ASSESSMENT RISK FACTOR /EDUCATION ASSESSMENT   Hypertension  [] Yes      [] No    Resting BP: 116/60  Peak Ex BP:164/60  Medication: Toporol, Imdur, Cozaar,Norvasc   Hypertension  Resting BP: 136/62  Peak Ex BP:142/62  Medication Changes:  [] Yes      [x] No Hypertension  Resting BP: 104/60  Peak Ex BP:128/42  Medication Changes:  [] Yes      [x] No Hypertension  Resting BP: 124/62  Peak Ex BP:138/56  Medication Changes:  [] Yes      [x] No Hypertension  Resting BP: 136/62  Peak Ex BP:148/60  Medication Changes:  [] Yes      [x] No   Lipids  HLD/DLD  [x] Yes      [] No  TOTAL CHOL: 176  HDL:  46  LDL:  94  TRI  Medication: Lipitor Lipids  Medication Changes:  [] Yes      [x] No     Lipids  Medication Changes:  [] Yes      [x] No     Lipids  Medication Changes:  [] Yes      [x] No     Lipids      Medication Changes:  [] Yes      [x] No   Diabetes  [] Yes      [x] No  FBS: 131           HbA1C: 6.3        Diabetes  Most Recent BS:  na   Diabetes  na     Diabetes  na     Diabetes  na       Tobacco Use  []

## 2018-11-16 ENCOUNTER — APPOINTMENT (OUTPATIENT)
Dept: CARDIAC REHAB | Age: 73
End: 2018-11-16
Payer: MEDICARE

## 2018-11-16 ENCOUNTER — OFFICE VISIT (OUTPATIENT)
Dept: PULMONOLOGY | Age: 73
End: 2018-11-16
Payer: MEDICARE

## 2018-11-16 VITALS
TEMPERATURE: 98.7 F | DIASTOLIC BLOOD PRESSURE: 60 MMHG | HEART RATE: 62 BPM | WEIGHT: 163 LBS | SYSTOLIC BLOOD PRESSURE: 122 MMHG | HEIGHT: 64 IN | BODY MASS INDEX: 27.83 KG/M2 | OXYGEN SATURATION: 96 %

## 2018-11-16 DIAGNOSIS — I51.9 DIASTOLIC DYSFUNCTION, LEFT VENTRICLE: ICD-10-CM

## 2018-11-16 DIAGNOSIS — F17.200 SMOKER: ICD-10-CM

## 2018-11-16 DIAGNOSIS — R06.02 SOB (SHORTNESS OF BREATH): Primary | ICD-10-CM

## 2018-11-16 DIAGNOSIS — R94.2 DIFFUSION CAPACITY OF LUNG (DL), DECREASED: ICD-10-CM

## 2018-11-16 PROCEDURE — 99204 OFFICE O/P NEW MOD 45 MIN: CPT | Performed by: INTERNAL MEDICINE

## 2018-11-16 RX ORDER — ZOLEDRONIC ACID 5 MG/100ML
5 INJECTION, SOLUTION INTRAVENOUS ONCE
COMMUNITY
End: 2018-11-26 | Stop reason: SDUPTHER

## 2018-11-16 RX ORDER — HYDROCODONE BITARTRATE AND ACETAMINOPHEN 5; 325 MG/1; MG/1
1 TABLET ORAL EVERY 6 HOURS PRN
COMMUNITY
End: 2019-04-25 | Stop reason: SDUPTHER

## 2018-11-16 ASSESSMENT — ENCOUNTER SYMPTOMS
CHOKING: 0
STRIDOR: 0
WHEEZING: 0
COUGH: 0
TROUBLE SWALLOWING: 0
VOICE CHANGE: 0
CHEST TIGHTNESS: 0
SHORTNESS OF BREATH: 1
ABDOMINAL DISTENTION: 0

## 2018-11-16 NOTE — COMMUNICATION BODY
Subjective:      Patient ID: Benji Marion is a 72 y.o. female. CC: COPD    HPI     Referred by Edmundo Goel MD for pulmonary evaluation  48 PY smoker until a few months ago when she quit cold turkey due to ACS. 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 Review of Systems   Constitutional: Positive for unexpected weight change. Negative for fatigue. HENT: Negative for trouble swallowing and voice change. Eyes: Negative for visual disturbance. Respiratory: Positive for shortness of breath. Negative for cough, choking, chest tightness, wheezing and stridor. Cardiovascular: Negative for chest pain, palpitations and leg swelling. Gastrointestinal: Negative for abdominal distention. Endocrine: Negative. Genitourinary: Negative. Musculoskeletal: Positive for arthralgias. Skin: Negative. Neurological: Negative. Hematological: Negative. Psychiatric/Behavioral: Negative.           All other systems reviewed and are negative    Lung Nodule Screening     [x] Qualifies    [] Does not qualify   [] Declined   [] Completed  Past Medical History:   Diagnosis Date    CAD (coronary artery disease)     Depression     Fibromyalgia     Fibromyalgia     Headache(784.0)     Hyperlipidemia     Hypertension     Macular degeneration, dry     MI (myocardial infarction) ([facility] [address]     Osteoarthritis     Urinary incontinence      Past Surgical History:   Procedure Laterality Date    CARPAL TUNNEL RELEASE      x2    CHOLECYSTECTOMY  1975    COLON SURGERY  2010    partial resection of flat polyp    CORONARY ANGIOPLASTY WITH STENT PLACEMENT  07/2011    Dr Mag Henning

## 2018-11-16 NOTE — LETTER
[address] [facility]  [address]  [address] [facility],[address]  [address] [address]  Phone: [phone]  Fax: [phone]    James Louis MD        November 16, 2018     Ramírez [address], [address] [facility], [address]  [address] [address]    Patient: Courtney Dsouza  MR Number: [id number]  YOB: 1945  Date of Visit: 11/16/2018    Dear Dr. Ramírez Tello: Thank you for the request for consultation for Gaby Putnam to me for the evaluation of Emphysema. Below are the relevant portions of my assessment and plan of care. Subjective:      Patient ID: Courtney Dsouza is a 72 y.o. female. CC: COPD    HPI     Referred by Georgiana Hand MD for pulmonary evaluation  48 PY smoker until a few months ago when she quit cold turkey due to ACS. 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 Review of Systems   Constitutional: Positive for unexpected weight change. Negative for fatigue. HENT: Negative for trouble swallowing and voice change. Eyes: Negative for visual disturbance. Respiratory: Positive for shortness of breath. Negative for cough, choking, chest tightness, wheezing and stridor. Cardiovascular: Negative for chest pain, palpitations and leg swelling. Gastrointestinal: Negative for abdominal distention. Endocrine: Negative. Genitourinary: Negative. Musculoskeletal: Positive for arthralgias. Skin: Negative. Neurological: Negative. Hematological: Negative. Psychiatric/Behavioral: Negative.           All other systems reviewed and are negative    Lung Nodule Screening     [x] Qualifies    [] Does not qualify   [] Declined   [] Completed  Past Medical History:   Diagnosis Date    CAD (coronary artery disease)  Depression     Fibromyalgia     Fibromyalgia     Headache(784.0)     Hyperlipidemia     Hypertension     Macular degeneration, dry     MI (myocardial infarction) ([facility] [address]     Osteoarthritis     Urinary incontinence      Past Surgical History:   Procedure Laterality Date    CARPAL TUNNEL RELEASE      x2    CHOLECYSTECTOMY  1975    COLON SURGERY  2010    partial resection of flat polyp    CORONARY ANGIOPLASTY WITH STENT PLACEMENT  07/2011    Dr Donnell Ladd CYST REMOVAL      BILATERAL HANDS     Social History   Substance Use Topics    Smoking status: Former Smoker     Packs/day: 0.00     Start date: 5/24/1965     Quit date: 6/1/2018    Smokeless tobacco: Never Used    Alcohol use Yes      Comment: <1 per week      Allergies   Allergen Reactions    Benadryl [Diphenhydramine Hcl]      OVER-STIMULATED    Flexeril [Cyclobenzaprine Hcl]      OVER-STIMULATED        Family History   Problem Relation Age of Onset    COPD Father    Marylu Hamman Cancer Mother 39        uterine and breast    Heart Disease Mother     Diabetes Mother     High Cholesterol Mother     Diabetes Sister     High Cholesterol Sister     Stroke Sister 72    Cancer Maternal Uncle     Heart Attack Paternal Grandmother 76        MI     Current Outpatient Prescriptions   Medication Sig Dispense Refill    HYDROcodone-acetaminophen (NORCO) 5-325 MG per tablet Take 1 tablet by mouth every 6 hours as needed for Pain. Catarina Leavitt zoledronic acid (RECLAST) 5 MG/100ML SOLN Infuse 5 mg intravenously once      fenofibrate (TRICOR) 145 MG tablet TAKE 1 TABLET DAILY 90 tablet 3    metoprolol succinate (TOPROL XL) 50 MG extended release tablet TAKE 1 TABLET TWICE A  tablet 3    venlafaxine (EFFEXOR XR) 150 MG extended release capsule TAKE 1 CAPSULE DAILY 90 capsule 3    atorvastatin (LIPITOR) 40 MG tablet TAKE 1 TABLET DAILY 90 tablet 3    aspirin 325 MG tablet Take 1 tablet by mouth daily 30 tablet 3  amLODIPine (NORVASC) 5 MG tablet Take 5 mg by mouth daily      isosorbide mononitrate (IMDUR) 30 MG extended release tablet Take 30 mg by mouth daily      Multiple Vitamins-Minerals (PRESERVISION AREDS 2+MULTI VIT PO) Take by mouth daily       darifenacin (ENABLEX) 15 MG extended release tablet TAKE 1 TABLET DAILY 90 tablet 3    losartan (COZAAR) 50 MG tablet Take 1 tablet by mouth daily. 90 tablet 3    clopidogrel (PLAVIX) 75 MG tablet Take 75 mg by mouth daily. No current facility-administered medications for this visit. LABS - none   There were no vitals taken for this visit. Wt Readings from Last 3 Encounters:   07/30/18 166 lb (75.3 kg)   07/20/18 166 lb 11.2 oz (75.6 kg)   05/24/18 161 lb 9.6 oz (73.3 kg)     Neck Circumference - 15.75 in; Mallampati Score - 3        Objective:   Physical Exam   Constitutional: She appears well-nourished. HENT:   Head: Normocephalic and atraumatic. Eyes: Conjunctivae and EOM are normal. No scleral icterus. Neck: Normal range of motion. Neck supple. No tracheal deviation present. No thyromegaly present. Cardiovascular: Normal rate, regular rhythm and normal heart sounds. Exam reveals no gallop and no friction rub. No murmur heard. Pulmonary/Chest: Effort normal and breath sounds normal. No stridor. No respiratory distress. She has no wheezes. She has no rales. She exhibits no tenderness. Abdominal: Bowel sounds are normal.   Lymphadenopathy:     She has no cervical adenopathy. Skin: No rash noted. CT chest:       FINDINGS: There is no mediastinal, hilar, or axillary lymphadenopathy. Coronary artery stents are noted. Heart size is normal. There is no pericardial effusion. Calcified lymph node is present at the left parahilar space. No infiltrates calcified    granulomas present left lower lung. Upper abdomen   Very large duodenal diverticulum is partially imaged.    There are no suspicious bone lesions.

## 2018-11-26 ENCOUNTER — OFFICE VISIT (OUTPATIENT)
Dept: FAMILY MEDICINE CLINIC | Age: 73
End: 2018-11-26
Payer: MEDICARE

## 2018-11-26 ENCOUNTER — TELEPHONE (OUTPATIENT)
Dept: FAMILY MEDICINE CLINIC | Age: 73
End: 2018-11-26

## 2018-11-26 ENCOUNTER — TELEPHONE (OUTPATIENT)
Dept: PULMONOLOGY | Age: 73
End: 2018-11-26

## 2018-11-26 VITALS
WEIGHT: 162 LBS | HEART RATE: 68 BPM | TEMPERATURE: 97.2 F | RESPIRATION RATE: 10 BRPM | HEIGHT: 63 IN | BODY MASS INDEX: 28.7 KG/M2 | SYSTOLIC BLOOD PRESSURE: 124 MMHG | DIASTOLIC BLOOD PRESSURE: 68 MMHG

## 2018-11-26 DIAGNOSIS — Z23 NEED FOR PNEUMOCOCCAL VACCINATION: ICD-10-CM

## 2018-11-26 DIAGNOSIS — M85.89 OSTEOPENIA OF MULTIPLE SITES: ICD-10-CM

## 2018-11-26 DIAGNOSIS — R73.03 BORDERLINE DIABETES: ICD-10-CM

## 2018-11-26 DIAGNOSIS — Z09 POSTOP CHECK: ICD-10-CM

## 2018-11-26 DIAGNOSIS — E78.00 PURE HYPERCHOLESTEROLEMIA: ICD-10-CM

## 2018-11-26 DIAGNOSIS — Z23 NEED FOR INFLUENZA VACCINATION: ICD-10-CM

## 2018-11-26 DIAGNOSIS — Z72.0 TOBACCO ABUSE: ICD-10-CM

## 2018-11-26 DIAGNOSIS — K21.00 GASTROESOPHAGEAL REFLUX DISEASE WITH ESOPHAGITIS: ICD-10-CM

## 2018-11-26 DIAGNOSIS — E53.8 B12 DEFICIENCY: ICD-10-CM

## 2018-11-26 DIAGNOSIS — Z98.61 POST PTCA: ICD-10-CM

## 2018-11-26 DIAGNOSIS — F33.42 RECURRENT MAJOR DEPRESSIVE DISORDER, IN FULL REMISSION (HCC): ICD-10-CM

## 2018-11-26 DIAGNOSIS — I10 HYPERTENSION, UNSPECIFIED TYPE: Primary | ICD-10-CM

## 2018-11-26 LAB
ANION GAP SERPL CALCULATED.3IONS-SCNC: 12 MEQ/L (ref 8–16)
AVERAGE GLUCOSE: 126 MG/DL (ref 70–126)
BUN BLDV-MCNC: 27 MG/DL (ref 7–22)
CALCIUM SERPL-MCNC: 10.9 MG/DL (ref 8.5–10.5)
CHLORIDE BLD-SCNC: 104 MEQ/L (ref 98–111)
CO2: 25 MEQ/L (ref 23–33)
CREAT SERPL-MCNC: 1 MG/DL (ref 0.4–1.2)
ERYTHROCYTE [DISTWIDTH] IN BLOOD BY AUTOMATED COUNT: 13.2 % (ref 11.5–14.5)
ERYTHROCYTE [DISTWIDTH] IN BLOOD BY AUTOMATED COUNT: 45.1 FL (ref 35–45)
GFR SERPL CREATININE-BSD FRML MDRD: 55 ML/MIN/1.73M2
GLUCOSE BLD-MCNC: 126 MG/DL (ref 70–108)
HBA1C MFR BLD: 6.2 % (ref 4.4–6.4)
HCT VFR BLD CALC: 39.9 % (ref 37–47)
HEMOGLOBIN: 12.5 GM/DL (ref 12–16)
MCH RBC QN AUTO: 29.1 PG (ref 26–33)
MCHC RBC AUTO-ENTMCNC: 31.3 GM/DL (ref 32.2–35.5)
MCV RBC AUTO: 93 FL (ref 81–99)
PLATELET # BLD: 413 THOU/MM3 (ref 130–400)
PMV BLD AUTO: 11.8 FL (ref 9.4–12.4)
POTASSIUM SERPL-SCNC: 4.7 MEQ/L (ref 3.5–5.2)
RBC # BLD: 4.29 MILL/MM3 (ref 4.2–5.4)
SODIUM BLD-SCNC: 141 MEQ/L (ref 135–145)
WBC # BLD: 9.2 THOU/MM3 (ref 4.8–10.8)

## 2018-11-26 PROCEDURE — 36415 COLL VENOUS BLD VENIPUNCTURE: CPT | Performed by: FAMILY MEDICINE

## 2018-11-26 PROCEDURE — 90662 IIV NO PRSV INCREASED AG IM: CPT | Performed by: FAMILY MEDICINE

## 2018-11-26 PROCEDURE — G0009 ADMIN PNEUMOCOCCAL VACCINE: HCPCS | Performed by: FAMILY MEDICINE

## 2018-11-26 PROCEDURE — 90732 PPSV23 VACC 2 YRS+ SUBQ/IM: CPT | Performed by: FAMILY MEDICINE

## 2018-11-26 PROCEDURE — G0008 ADMIN INFLUENZA VIRUS VAC: HCPCS | Performed by: FAMILY MEDICINE

## 2018-11-26 PROCEDURE — 99214 OFFICE O/P EST MOD 30 MIN: CPT | Performed by: FAMILY MEDICINE

## 2018-11-26 RX ORDER — ELECTROLYTES/DEXTROSE
SOLUTION, ORAL ORAL
COMMUNITY
End: 2019-10-07

## 2018-11-26 RX ORDER — PANTOPRAZOLE SODIUM 40 MG/1
TABLET, DELAYED RELEASE ORAL
Status: ON HOLD | COMMUNITY
Start: 2018-10-25 | End: 2019-04-25 | Stop reason: ALTCHOICE

## 2018-11-26 RX ORDER — ZOLEDRONIC ACID 5 MG/100ML
5 INJECTION, SOLUTION INTRAVENOUS ONCE
Qty: 100 ML | Refills: 0 | Status: SHIPPED | OUTPATIENT
Start: 2018-11-26 | End: 2020-08-31 | Stop reason: SDUPTHER

## 2018-11-26 RX ORDER — UBIDECARENONE 75 MG
50 CAPSULE ORAL DAILY
Status: ON HOLD | COMMUNITY
End: 2019-04-25 | Stop reason: ALTCHOICE

## 2018-11-26 NOTE — PROGRESS NOTES
[address] [facility]  [address] [facility] [address]  [facility]  Dept: [phone]  Dept Fax: [phone]  Loc: [phone]    Carmencita Medel is a 72 y.o. female who presents today for:  Chief Complaint   Patient presents with    6 Month Follow-Up     HTN, pure hypercholesterolemia, hyperglycemia           HPI:     HPI    Depression: Patient complains of depression. She complains of depressed mood and fatigue. Onset was approximately several years ago, gradually improving since that time. She denies current suicidal and homicidal plan or intent. Family history significant for no psychiatric illness. Possible organic causes contributing are: none. Risk factors: previous episode of depression Previous treatment includes Effexor and no group therapy. She complains of the following side effects from the treatment: none. Hypertension: Patient here for follow-up of elevated blood pressure. She is not exercising and is adherent to low salt diet. Blood pressure is well controlled at home. Cardiac symptoms none. Patient denies chest pain, dyspnea and palpitations. Cardiovascular risk factors: hypertension and obesity (BMI >= 30 kg/m2). Use of agents associated with hypertension: none. History of target organ damage: post PTCA seeing cardiology. Hyperlipidemia: Patient presents with hyperlipidemia. She was tested because age and hypertension.   Her last labs showed   Lab Results   Component Value Date    CHOL 176 07/18/2018    CHOL 183 05/18/2017    CHOL 162 05/25/2016     Lab Results   Component Value Date    TRIG 179 07/18/2018    TRIG 210 (H) 05/18/2017    TRIG 188 05/25/2016     Lab Results   Component Value Date    HDL 46 07/18/2018    HDL 46 05/23/2018    HDL 43 05/18/2017     Lab Results   Component Value Date    LDLCALC 94 07/18/2018    LDLCALC 88 05/23/2018    LDLCALC 98 05/18/2017     No results found for: LABVLDL, VLDL  No results found for: CHOLHDLRATIO  There is not a family history of hyperlipidemia. There is a family history of early ischemia heart disease. Also concerned about a fine tremor in the right hand for the past year. It seems to be getting worse and is definitely more noticeable when she is writing. Her mother had an essential tremor as well. Stable now. Borderline diabetes    GERD is controlled on PPI. Reviewed chart forpast medical history , surgical history , allergies, social history , family history and medications. Health Maintenance   Topic Date Due    DTaP/Tdap/Td vaccine (1 - Tdap) 04/09/1966    Shingles Vaccine (1 of 2 - 2 Dose Series) 04/09/1997    Hepatitis C screen  11/26/2020 (Originally 1945)    A1C test (Diabetic or Prediabetic)  05/23/2019    Colon cancer screen colonoscopy  11/20/2019    Breast cancer screen  06/11/2020    Lipid screen  07/18/2023    DEXA (modify frequency per FRAX score)  Completed    Flu vaccine  Completed    Pneumococcal low/med risk  Completed       Subjective:      Constitutional:Negative for fever, chills, diaphoresis, activity change, appetite change and fatigue. HENT: Negative for hearing loss, ear pain, congestion, sore throat, rhinorrhea, postnasal drip and ear discharge. Eyes: Negative for photophobia and visual disturbance. Respiratory: Negative for cough, chest tightness, shortness of breath and wheezing. Cardiovascular: Negative for chest pain and leg swelling. Gastrointestinal: Negative for nausea, vomiting, abdominal pain, diarrhea and constipation. Genitourinary: Negative for dysuria, urgency and frequency. Neurological: Negative for weakness, light-headedness and headaches. Psychiatric/Behavioral: Negative for sleep disturbance.       Objective:     Vitals:    11/26/18 0933   BP: 124/68   Site: Left Upper Arm   Position: Sitting   Cuff Size: Medium Adult   Pulse: 68   Resp: 10   Temp: 97.2 °F (36.2 °C)   TempSrc: Temporal visit:    Hypertension, unspecified type    Need for influenza vaccination  -     INFLUENZA, HIGH DOSE, 65 YRS +, IM, PF, PREFILL SYR, 0.5ML (FLUZONE HD)    Need for pneumococcal vaccination  -     Pneumococcal polysaccharide vaccine 23-valent greater than or equal to 1yo subcutaneous/IM    Pure hypercholesterolemia    Recurrent major depressive disorder, in full remission ([facility] [address]    Post PTCA    Tobacco abuse    Borderline diabetes  -     Basic Metabolic Panel; Future  -     Hemoglobin A1C; Future  -     Basic Metabolic Panel  -     Hemoglobin A1C    Postop check  -     CBC; Future  -     CBC    Osteopenia of multiple sites  -     zoledronic acid (RECLAST) 5 MG/100ML SOLN; Infuse 100 mLs intravenously once for 1 dose    B12 deficiency    Gastroesophageal reflux disease with esophagitis    Other orders  -     Anion Gap  -     Glomerular Filtration Rate, Estimated    No change to medications   Continue healthy diet and exercise  Aspirin daily    Discussed use, benefit, and side effectsof prescribed medications. All patient questions answered. Pt voiced understanding. Reviewed health maintenance. Instructed to continue current medications, diet and exercise. Patient agreed with treatment plan. Followup as directed.      Electronically signed by René Palomino MD

## 2018-12-04 ENCOUNTER — TELEPHONE (OUTPATIENT)
Dept: FAMILY MEDICINE CLINIC | Age: 73
End: 2018-12-04

## 2018-12-14 ENCOUNTER — TELEPHONE (OUTPATIENT)
Dept: PULMONOLOGY | Age: 73
End: 2018-12-14

## 2018-12-14 DIAGNOSIS — R06.02 SOB (SHORTNESS OF BREATH): Primary | ICD-10-CM

## 2018-12-17 ENCOUNTER — HOSPITAL ENCOUNTER (OUTPATIENT)
Dept: PULMONOLOGY | Age: 73
Discharge: HOME | End: 2018-12-17
Payer: MEDICARE

## 2018-12-17 PROCEDURE — 94729 DIFFUSING CAPACITY: CPT

## 2018-12-17 PROCEDURE — 94010 BREATHING CAPACITY TEST: CPT

## 2018-12-19 NOTE — PROGRESS NOTES
Video Education Report - ICR/CR    Name:  Caity Simons     Date:  8/31/2018  MRN: [id number]     Session #:  8  Session Length: 40 min    Recommended Videos        []01 [employer] - Program Overview   34:22    []02 Overview of Pritikin Eating Plan   34:10    []03 Becoming a Alyssia Slough   33:08     []04 Diseases of Our Time - Part 1   34:22    []05 Calorie Density     33:39   []06 Label Reading - Part 1    32:15   []07 Move it      32.54   []08 Healthy Minds, Bodies, Hearts   32:14   []09 Dining Out - Part 1    32:28   []10 Heart Disease Risk Reduction   29:74   []03 Metabolic Syndrome and Belly Fat  31:52   []12 Facts on Fat     35:29   []13 Diseases of Our Time - Part 2   33:07   []14 Biology of Weight Control   32:36   []15 Biomechanical Limitations   35:20   []16 Nutrition Action Plan    34:23    Additional Videos         []17 Hypertension & Heart Disease   32:39        []18 Cooking Breakfasts and Snacks  32:00   [x]19 Planning Your Exercising  Strategy  33:30       Comments:  Video completed, no...

## 2019-01-02 ENCOUNTER — OFFICE VISIT (OUTPATIENT)
Dept: PULMONOLOGY | Age: 74
End: 2019-01-02
Payer: MEDICARE

## 2019-01-02 VITALS
SYSTOLIC BLOOD PRESSURE: 130 MMHG | DIASTOLIC BLOOD PRESSURE: 72 MMHG | TEMPERATURE: 98.7 F | WEIGHT: 162.2 LBS | HEIGHT: 64 IN | HEART RATE: 71 BPM | BODY MASS INDEX: 27.69 KG/M2 | RESPIRATION RATE: 16 BRPM | OXYGEN SATURATION: 95 %

## 2019-01-02 DIAGNOSIS — I51.9 DIASTOLIC DYSFUNCTION, LEFT VENTRICLE: ICD-10-CM

## 2019-01-02 DIAGNOSIS — R94.2 DIFFUSION CAPACITY OF LUNG (DL), DECREASED: Primary | ICD-10-CM

## 2019-01-02 DIAGNOSIS — Z87.891 PERSONAL HISTORY OF TOBACCO USE: ICD-10-CM

## 2019-01-02 PROCEDURE — 99214 OFFICE O/P EST MOD 30 MIN: CPT | Performed by: NURSE PRACTITIONER

## 2019-01-02 ASSESSMENT — ENCOUNTER SYMPTOMS
SHORTNESS OF BREATH: 0
COUGH: 0
STRIDOR: 0
CHEST TIGHTNESS: 0
WHEEZING: 0
NAUSEA: 0
ALLERGIC/IMMUNOLOGIC NEGATIVE: 1
EYES NEGATIVE: 1
BACK PAIN: 0
DIARRHEA: 0

## 2019-01-07 DIAGNOSIS — R06.02 SOB (SHORTNESS OF BREATH): ICD-10-CM

## 2019-01-11 ENCOUNTER — HOSPITAL ENCOUNTER (OUTPATIENT)
Dept: OCCUPATIONAL THERAPY | Age: 74
Setting detail: THERAPIES SERIES
Discharge: HOME | End: 2019-01-11
Payer: MEDICARE

## 2019-01-11 ENCOUNTER — TELEPHONE (OUTPATIENT)
Dept: FAMILY MEDICINE CLINIC | Age: 74
End: 2019-01-11

## 2019-01-11 PROCEDURE — 97165 OT EVAL LOW COMPLEX 30 MIN: CPT

## 2019-01-11 PROCEDURE — 97110 THERAPEUTIC EXERCISES: CPT

## 2019-01-11 ASSESSMENT — PAIN DESCRIPTION - LOCATION: LOCATION: WRIST

## 2019-01-11 ASSESSMENT — PAIN DESCRIPTION - ORIENTATION: ORIENTATION: LEFT

## 2019-01-11 ASSESSMENT — PAIN SCALES - GENERAL: PAINLEVEL_OUTOF10: 5

## 2019-01-14 ENCOUNTER — HOSPITAL ENCOUNTER (OUTPATIENT)
Dept: OCCUPATIONAL THERAPY | Age: 74
Setting detail: THERAPIES SERIES
Discharge: HOME | End: 2019-01-14
Payer: MEDICARE

## 2019-01-14 PROCEDURE — 97110 THERAPEUTIC EXERCISES: CPT

## 2019-01-14 PROCEDURE — 97022 WHIRLPOOL THERAPY: CPT

## 2019-01-14 ASSESSMENT — PAIN DESCRIPTION - LOCATION: LOCATION: WRIST

## 2019-01-14 ASSESSMENT — PAIN DESCRIPTION - PAIN TYPE: TYPE: ACUTE PAIN

## 2019-01-14 ASSESSMENT — PAIN DESCRIPTION - ORIENTATION: ORIENTATION: LEFT

## 2019-01-14 ASSESSMENT — PAIN SCALES - GENERAL: PAINLEVEL_OUTOF10: 4

## 2019-01-16 ENCOUNTER — HOSPITAL ENCOUNTER (OUTPATIENT)
Dept: OCCUPATIONAL THERAPY | Age: 74
Setting detail: THERAPIES SERIES
Discharge: HOME | End: 2019-01-16
Payer: MEDICARE

## 2019-01-16 PROCEDURE — 97022 WHIRLPOOL THERAPY: CPT

## 2019-01-16 PROCEDURE — 97110 THERAPEUTIC EXERCISES: CPT

## 2019-01-16 ASSESSMENT — PAIN DESCRIPTION - ORIENTATION: ORIENTATION: LEFT

## 2019-01-16 ASSESSMENT — PAIN DESCRIPTION - LOCATION: LOCATION: WRIST

## 2019-01-16 ASSESSMENT — PAIN SCALES - GENERAL: PAINLEVEL_OUTOF10: 2

## 2019-01-22 ENCOUNTER — HOSPITAL ENCOUNTER (OUTPATIENT)
Dept: OCCUPATIONAL THERAPY | Age: 74
Setting detail: THERAPIES SERIES
Discharge: HOME | End: 2019-01-22
Payer: MEDICARE

## 2019-01-22 PROCEDURE — 97110 THERAPEUTIC EXERCISES: CPT

## 2019-01-22 PROCEDURE — 97022 WHIRLPOOL THERAPY: CPT

## 2019-01-22 ASSESSMENT — PAIN DESCRIPTION - ORIENTATION: ORIENTATION: LEFT

## 2019-01-22 ASSESSMENT — PAIN SCALES - GENERAL: PAINLEVEL_OUTOF10: 4

## 2019-01-22 ASSESSMENT — PAIN DESCRIPTION - LOCATION: LOCATION: WRIST

## 2019-01-24 ENCOUNTER — HOSPITAL ENCOUNTER (OUTPATIENT)
Dept: OCCUPATIONAL THERAPY | Age: 74
Setting detail: THERAPIES SERIES
Discharge: HOME | End: 2019-01-24
Payer: MEDICARE

## 2019-01-24 PROCEDURE — 97022 WHIRLPOOL THERAPY: CPT

## 2019-01-24 PROCEDURE — 97110 THERAPEUTIC EXERCISES: CPT

## 2019-01-24 ASSESSMENT — PAIN DESCRIPTION - ORIENTATION: ORIENTATION: LEFT

## 2019-01-24 ASSESSMENT — PAIN SCALES - GENERAL: PAINLEVEL_OUTOF10: 3

## 2019-01-24 ASSESSMENT — PAIN DESCRIPTION - LOCATION: LOCATION: WRIST

## 2019-01-30 ENCOUNTER — TELEPHONE (OUTPATIENT)
Dept: FAMILY MEDICINE CLINIC | Age: 74
End: 2019-01-30

## 2019-01-30 ENCOUNTER — APPOINTMENT (OUTPATIENT)
Dept: OCCUPATIONAL THERAPY | Age: 74
End: 2019-01-30
Payer: MEDICARE

## 2019-02-01 ENCOUNTER — HOSPITAL ENCOUNTER (OUTPATIENT)
Dept: OCCUPATIONAL THERAPY | Age: 74
Setting detail: THERAPIES SERIES
End: 2019-02-01
Payer: MEDICARE

## 2019-02-04 ENCOUNTER — HOSPITAL ENCOUNTER (OUTPATIENT)
Dept: OCCUPATIONAL THERAPY | Age: 74
Setting detail: THERAPIES SERIES
Discharge: HOME | End: 2019-02-04
Payer: MEDICARE

## 2019-02-04 PROCEDURE — 97110 THERAPEUTIC EXERCISES: CPT

## 2019-02-04 PROCEDURE — 97022 WHIRLPOOL THERAPY: CPT

## 2019-02-04 RX ORDER — DARIFENACIN HYDROBROMIDE 15 MG/1
TABLET, EXTENDED RELEASE ORAL
Qty: 90 TABLET | Refills: 3 | Status: SHIPPED | OUTPATIENT
Start: 2019-02-04 | End: 2020-02-03

## 2019-02-05 ENCOUNTER — HOSPITAL ENCOUNTER (OUTPATIENT)
Dept: GENERAL RADIOLOGY | Age: 74
Discharge: HOME | End: 2019-02-05
Payer: MEDICARE

## 2019-02-05 VITALS
HEIGHT: 64 IN | BODY MASS INDEX: 27.14 KG/M2 | DIASTOLIC BLOOD PRESSURE: 69 MMHG | OXYGEN SATURATION: 97 % | HEART RATE: 70 BPM | SYSTOLIC BLOOD PRESSURE: 147 MMHG | WEIGHT: 159 LBS | RESPIRATION RATE: 16 BRPM | TEMPERATURE: 98 F

## 2019-02-05 PROBLEM — M85.80 OSTEOPENIA: Status: ACTIVE | Noted: 2019-02-05

## 2019-02-05 PROCEDURE — 96365 THER/PROPH/DIAG IV INF INIT: CPT

## 2019-02-05 PROCEDURE — 2709999900 HC NON-CHARGEABLE SUPPLY

## 2019-02-05 PROCEDURE — 6360000002 HC RX W HCPCS: Performed by: FAMILY MEDICINE

## 2019-02-05 RX ORDER — ZOLEDRONIC ACID 5 MG/100ML
5 INJECTION, SOLUTION INTRAVENOUS ONCE
Status: COMPLETED | OUTPATIENT
Start: 2019-02-05 | End: 2019-02-05

## 2019-02-05 RX ADMIN — ZOLEDRONIC ACID 5 MG: 5 INJECTION, SOLUTION INTRAVENOUS at 09:04

## 2019-02-05 NOTE — PLAN OF CARE
Problem: Intellectual/Education/Knowledge Deficit  Goal: Teaching initiated upon admission  First dose of reclast given today. Written information given to patient. Goal: Written Disposition Instruction form completed  Written instructions and education to patient.

## 2019-02-08 ENCOUNTER — APPOINTMENT (OUTPATIENT)
Dept: OCCUPATIONAL THERAPY | Age: 74
End: 2019-02-08
Payer: MEDICARE

## 2019-04-22 ENCOUNTER — HOSPITAL ENCOUNTER (OUTPATIENT)
Age: 74
Discharge: HOME | End: 2019-04-22
Payer: MEDICARE

## 2019-04-22 LAB
ALBUMIN SERPL-MCNC: 4.1 G/DL (ref 3.5–5.1)
ALP BLD-CCNC: 51 U/L (ref 38–126)
ALT SERPL-CCNC: 30 U/L (ref 11–66)
ANION GAP SERPL CALCULATED.3IONS-SCNC: 14 MEQ/L (ref 8–16)
AST SERPL-CCNC: 38 U/L (ref 5–40)
BILIRUB SERPL-MCNC: 0.3 MG/DL (ref 0.3–1.2)
BILIRUBIN DIRECT: < 0.2 MG/DL (ref 0–0.3)
BUN BLDV-MCNC: 21 MG/DL (ref 7–22)
CALCIUM SERPL-MCNC: 9.6 MG/DL (ref 8.5–10.5)
CHLORIDE BLD-SCNC: 105 MEQ/L (ref 98–111)
CHOLESTEROL, FASTING: 156 MG/DL (ref 100–199)
CO2: 23 MEQ/L (ref 23–33)
CREAT SERPL-MCNC: 0.8 MG/DL (ref 0.4–1.2)
GFR SERPL CREATININE-BSD FRML MDRD: 71 ML/MIN/1.73M2
GLUCOSE FASTING: 116 MG/DL (ref 70–108)
HDLC SERPL-MCNC: 43 MG/DL
LDL CHOLESTEROL CALCULATED: 74 MG/DL
POTASSIUM SERPL-SCNC: 4 MEQ/L (ref 3.5–5.2)
SODIUM BLD-SCNC: 142 MEQ/L (ref 135–145)
TOTAL PROTEIN: 6.8 G/DL (ref 6.1–8)
TRIGLYCERIDE, FASTING: 195 MG/DL (ref 0–199)

## 2019-04-22 PROCEDURE — 80061 LIPID PANEL: CPT

## 2019-04-22 PROCEDURE — 36415 COLL VENOUS BLD VENIPUNCTURE: CPT

## 2019-04-22 PROCEDURE — 80076 HEPATIC FUNCTION PANEL: CPT

## 2019-04-22 PROCEDURE — 80048 BASIC METABOLIC PNL TOTAL CA: CPT

## 2019-04-25 ENCOUNTER — APPOINTMENT (OUTPATIENT)
Dept: INTERVENTIONAL RADIOLOGY/VASCULAR | Age: 74
End: 2019-04-25
Payer: MEDICARE

## 2019-04-25 ENCOUNTER — APPOINTMENT (OUTPATIENT)
Dept: GENERAL RADIOLOGY | Age: 74
End: 2019-04-25
Payer: MEDICARE

## 2019-04-25 ENCOUNTER — HOSPITAL ENCOUNTER (OUTPATIENT)
Age: 74
Setting detail: OBSERVATION
Discharge: HOME | End: 2019-04-30
Attending: EMERGENCY MEDICINE | Admitting: INTERNAL MEDICINE
Payer: MEDICARE

## 2019-04-25 ENCOUNTER — HOSPITAL ENCOUNTER (OUTPATIENT)
Age: 74
Discharge: HOME | End: 2019-04-25
Payer: MEDICARE

## 2019-04-25 ENCOUNTER — HOSPITAL ENCOUNTER (EMERGENCY)
Age: 74
Discharge: HOME | End: 2019-04-25
Attending: EMERGENCY MEDICINE
Payer: MEDICARE

## 2019-04-25 ENCOUNTER — APPOINTMENT (OUTPATIENT)
Dept: CT IMAGING | Age: 74
End: 2019-04-25
Payer: MEDICARE

## 2019-04-25 VITALS
HEIGHT: 64 IN | DIASTOLIC BLOOD PRESSURE: 81 MMHG | SYSTOLIC BLOOD PRESSURE: 145 MMHG | WEIGHT: 155 LBS | HEART RATE: 72 BPM | BODY MASS INDEX: 26.46 KG/M2 | OXYGEN SATURATION: 97 % | TEMPERATURE: 98 F

## 2019-04-25 DIAGNOSIS — S80.12XD LEG HEMATOMA, LEFT, SUBSEQUENT ENCOUNTER: ICD-10-CM

## 2019-04-25 DIAGNOSIS — M79.89 LEFT LEG SWELLING: ICD-10-CM

## 2019-04-25 DIAGNOSIS — M79.89 LEG SWELLING: Primary | ICD-10-CM

## 2019-04-25 DIAGNOSIS — M79.662 PAIN OF LEFT CALF: Primary | ICD-10-CM

## 2019-04-25 LAB
ALBUMIN SERPL-MCNC: 3.9 G/DL (ref 3.5–5.1)
ALP BLD-CCNC: 47 U/L (ref 38–126)
ALT SERPL-CCNC: 27 U/L (ref 11–66)
ANION GAP SERPL CALCULATED.3IONS-SCNC: 13 MEQ/L (ref 8–16)
APTT: 30 SECONDS (ref 22–38)
AST SERPL-CCNC: 33 U/L (ref 5–40)
BASOPHILS # BLD: 0.7 %
BASOPHILS ABSOLUTE: 0.1 THOU/MM3 (ref 0–0.1)
BILIRUB SERPL-MCNC: 0.2 MG/DL (ref 0.3–1.2)
BILIRUBIN DIRECT: < 0.2 MG/DL (ref 0–0.3)
BUN BLDV-MCNC: 33 MG/DL (ref 7–22)
CALCIUM SERPL-MCNC: 9.7 MG/DL (ref 8.5–10.5)
CHLORIDE BLD-SCNC: 109 MEQ/L (ref 98–111)
CO2: 21 MEQ/L (ref 23–33)
CREAT SERPL-MCNC: 1.1 MG/DL (ref 0.4–1.2)
EOSINOPHIL # BLD: 4.1 %
EOSINOPHILS ABSOLUTE: 0.3 THOU/MM3 (ref 0–0.4)
ERYTHROCYTE [DISTWIDTH] IN BLOOD BY AUTOMATED COUNT: 13.5 % (ref 11.5–14.5)
ERYTHROCYTE [DISTWIDTH] IN BLOOD BY AUTOMATED COUNT: 45.2 FL (ref 35–45)
GFR SERPL CREATININE-BSD FRML MDRD: 49 ML/MIN/1.73M2
GLUCOSE BLD-MCNC: 133 MG/DL (ref 70–108)
HCT VFR BLD CALC: 35.6 % (ref 37–47)
HEMOGLOBIN: 11.6 GM/DL (ref 12–16)
IMMATURE GRANS (ABS): 0.04 THOU/MM3 (ref 0–0.07)
IMMATURE GRANULOCYTES: 0.5 %
INR BLD: 0.96 (ref 0.85–1.13)
LIPASE: 48.2 U/L (ref 5.6–51.3)
LYMPHOCYTES # BLD: 24.8 %
LYMPHOCYTES ABSOLUTE: 2 THOU/MM3 (ref 1–4.8)
MAGNESIUM: 2.3 MG/DL (ref 1.6–2.4)
MCH RBC QN AUTO: 29.7 PG (ref 26–33)
MCHC RBC AUTO-ENTMCNC: 32.6 GM/DL (ref 32.2–35.5)
MCV RBC AUTO: 91 FL (ref 81–99)
MONOCYTES # BLD: 10.4 %
MONOCYTES ABSOLUTE: 0.8 THOU/MM3 (ref 0.4–1.3)
NUCLEATED RED BLOOD CELLS: 0 /100 WBC
OSMOLALITY CALCULATION: 294.2 MOSMOL/KG (ref 275–300)
PLATELET # BLD: 302 THOU/MM3 (ref 130–400)
PMV BLD AUTO: 11.3 FL (ref 9.4–12.4)
POTASSIUM SERPL-SCNC: 3.7 MEQ/L (ref 3.5–5.2)
RBC # BLD: 3.91 MILL/MM3 (ref 4.2–5.4)
SEG NEUTROPHILS: 59.5 %
SEGMENTED NEUTROPHILS ABSOLUTE COUNT: 4.8 THOU/MM3 (ref 1.8–7.7)
SODIUM BLD-SCNC: 143 MEQ/L (ref 135–145)
TOTAL PROTEIN: 6.5 G/DL (ref 6.1–8)
TROPONIN T: < 0.01 NG/ML
URIC ACID: 5.7 MG/DL (ref 2.4–5.7)
WBC # BLD: 8.1 THOU/MM3 (ref 4.8–10.8)

## 2019-04-25 PROCEDURE — 83690 ASSAY OF LIPASE: CPT

## 2019-04-25 PROCEDURE — 2709999900 HC NON-CHARGEABLE SUPPLY

## 2019-04-25 PROCEDURE — 36415 COLL VENOUS BLD VENIPUNCTURE: CPT

## 2019-04-25 PROCEDURE — 99220 PR INITIAL OBSERVATION CARE/DAY 70 MINUTES: CPT | Performed by: INTERNAL MEDICINE

## 2019-04-25 PROCEDURE — 85730 THROMBOPLASTIN TIME PARTIAL: CPT

## 2019-04-25 PROCEDURE — 84550 ASSAY OF BLOOD/URIC ACID: CPT

## 2019-04-25 PROCEDURE — 83735 ASSAY OF MAGNESIUM: CPT

## 2019-04-25 PROCEDURE — 85025 COMPLETE CBC W/AUTO DIFF WBC: CPT

## 2019-04-25 PROCEDURE — 99285 EMERGENCY DEPT VISIT HI MDM: CPT

## 2019-04-25 PROCEDURE — 6370000000 HC RX 637 (ALT 250 FOR IP): Performed by: EMERGENCY MEDICINE

## 2019-04-25 PROCEDURE — G0378 HOSPITAL OBSERVATION PER HR: HCPCS

## 2019-04-25 PROCEDURE — 82248 BILIRUBIN DIRECT: CPT

## 2019-04-25 PROCEDURE — 6360000004 HC RX CONTRAST MEDICATION: Performed by: EMERGENCY MEDICINE

## 2019-04-25 PROCEDURE — 99283 EMERGENCY DEPT VISIT LOW MDM: CPT

## 2019-04-25 PROCEDURE — 2580000003 HC RX 258: Performed by: EMERGENCY MEDICINE

## 2019-04-25 PROCEDURE — 80053 COMPREHEN METABOLIC PANEL: CPT

## 2019-04-25 PROCEDURE — 96360 HYDRATION IV INFUSION INIT: CPT

## 2019-04-25 PROCEDURE — 71275 CT ANGIOGRAPHY CHEST: CPT

## 2019-04-25 PROCEDURE — 96361 HYDRATE IV INFUSION ADD-ON: CPT

## 2019-04-25 PROCEDURE — 84484 ASSAY OF TROPONIN QUANT: CPT

## 2019-04-25 PROCEDURE — 73564 X-RAY EXAM KNEE 4 OR MORE: CPT

## 2019-04-25 PROCEDURE — 93971 EXTREMITY STUDY: CPT

## 2019-04-25 PROCEDURE — 85610 PROTHROMBIN TIME: CPT

## 2019-04-25 RX ORDER — 0.9 % SODIUM CHLORIDE 0.9 %
1000 INTRAVENOUS SOLUTION INTRAVENOUS ONCE
Status: COMPLETED | OUTPATIENT
Start: 2019-04-25 | End: 2019-04-25

## 2019-04-25 RX ORDER — ONDANSETRON 2 MG/ML
4 INJECTION INTRAMUSCULAR; INTRAVENOUS EVERY 6 HOURS PRN
Status: DISCONTINUED | OUTPATIENT
Start: 2019-04-25 | End: 2019-04-30 | Stop reason: HOSPADM

## 2019-04-25 RX ORDER — SODIUM CHLORIDE 0.9 % (FLUSH) 0.9 %
10 SYRINGE (ML) INJECTION EVERY 12 HOURS SCHEDULED
Status: DISCONTINUED | OUTPATIENT
Start: 2019-04-25 | End: 2019-04-30 | Stop reason: HOSPADM

## 2019-04-25 RX ORDER — AMLODIPINE BESYLATE 5 MG/1
5 TABLET ORAL DAILY
Status: DISCONTINUED | OUTPATIENT
Start: 2019-04-26 | End: 2019-04-28

## 2019-04-25 RX ORDER — TROSPIUM CHLORIDE 20 MG/1
20 TABLET, FILM COATED ORAL 2 TIMES DAILY
Status: DISCONTINUED | OUTPATIENT
Start: 2019-04-26 | End: 2019-04-30 | Stop reason: HOSPADM

## 2019-04-25 RX ORDER — SODIUM CHLORIDE 0.9 % (FLUSH) 0.9 %
10 SYRINGE (ML) INJECTION PRN
Status: DISCONTINUED | OUTPATIENT
Start: 2019-04-25 | End: 2019-04-30 | Stop reason: HOSPADM

## 2019-04-25 RX ORDER — HYDROCODONE BITARTRATE AND ACETAMINOPHEN 5; 325 MG/1; MG/1
1 TABLET ORAL EVERY 6 HOURS PRN
Status: DISCONTINUED | OUTPATIENT
Start: 2019-04-25 | End: 2019-04-30 | Stop reason: HOSPADM

## 2019-04-25 RX ORDER — ISOSORBIDE MONONITRATE 30 MG/1
30 TABLET, EXTENDED RELEASE ORAL DAILY
Status: DISCONTINUED | OUTPATIENT
Start: 2019-04-26 | End: 2019-04-30 | Stop reason: HOSPADM

## 2019-04-25 RX ORDER — ACETAMINOPHEN 325 MG/1
650 TABLET ORAL EVERY 4 HOURS PRN
Status: DISCONTINUED | OUTPATIENT
Start: 2019-04-25 | End: 2019-04-30 | Stop reason: HOSPADM

## 2019-04-25 RX ORDER — PANTOPRAZOLE SODIUM 40 MG/1
40 TABLET, DELAYED RELEASE ORAL
Status: DISCONTINUED | OUTPATIENT
Start: 2019-04-26 | End: 2019-04-30 | Stop reason: HOSPADM

## 2019-04-25 RX ORDER — ASPIRIN 325 MG
325 TABLET ORAL DAILY
Status: DISCONTINUED | OUTPATIENT
Start: 2019-04-26 | End: 2019-04-30 | Stop reason: HOSPADM

## 2019-04-25 RX ORDER — VENLAFAXINE HYDROCHLORIDE 150 MG/1
150 CAPSULE, EXTENDED RELEASE ORAL DAILY
Status: DISCONTINUED | OUTPATIENT
Start: 2019-04-26 | End: 2019-04-30 | Stop reason: HOSPADM

## 2019-04-25 RX ORDER — FENOFIBRATE 160 MG/1
160 TABLET ORAL DAILY
Status: DISCONTINUED | OUTPATIENT
Start: 2019-04-26 | End: 2019-04-30 | Stop reason: HOSPADM

## 2019-04-25 RX ORDER — IBUPROFEN 200 MG
400 TABLET ORAL ONCE
Status: COMPLETED | OUTPATIENT
Start: 2019-04-25 | End: 2019-04-25

## 2019-04-25 RX ORDER — CLOPIDOGREL BISULFATE 75 MG/1
75 TABLET ORAL DAILY
Status: DISCONTINUED | OUTPATIENT
Start: 2019-04-26 | End: 2019-04-30 | Stop reason: HOSPADM

## 2019-04-25 RX ORDER — HYDROCODONE BITARTRATE AND ACETAMINOPHEN 5; 325 MG/1; MG/1
1 TABLET ORAL ONCE
Status: COMPLETED | OUTPATIENT
Start: 2019-04-25 | End: 2019-04-25

## 2019-04-25 RX ORDER — LOSARTAN POTASSIUM 50 MG/1
50 TABLET ORAL DAILY
Status: DISCONTINUED | OUTPATIENT
Start: 2019-04-26 | End: 2019-04-30 | Stop reason: HOSPADM

## 2019-04-25 RX ORDER — METOPROLOL SUCCINATE 50 MG/1
50 TABLET, EXTENDED RELEASE ORAL 2 TIMES DAILY
Status: DISCONTINUED | OUTPATIENT
Start: 2019-04-25 | End: 2019-04-30 | Stop reason: HOSPADM

## 2019-04-25 RX ORDER — ATORVASTATIN CALCIUM 40 MG/1
40 TABLET, FILM COATED ORAL DAILY
Status: DISCONTINUED | OUTPATIENT
Start: 2019-04-26 | End: 2019-04-30 | Stop reason: HOSPADM

## 2019-04-25 RX ORDER — HYDROCODONE BITARTRATE AND ACETAMINOPHEN 5; 325 MG/1; MG/1
1 TABLET ORAL EVERY 6 HOURS PRN
Qty: 12 TABLET | Refills: 0 | Status: ON HOLD | OUTPATIENT
Start: 2019-04-25 | End: 2019-04-30 | Stop reason: HOSPADM

## 2019-04-25 RX ADMIN — IBUPROFEN 400 MG: 200 TABLET, FILM COATED ORAL at 17:37

## 2019-04-25 RX ADMIN — SODIUM CHLORIDE 1000 ML: 9 INJECTION, SOLUTION INTRAVENOUS at 19:35

## 2019-04-25 RX ADMIN — HYDROCODONE BITARTRATE AND ACETAMINOPHEN 1 TABLET: 5; 325 TABLET ORAL at 17:37

## 2019-04-25 RX ADMIN — IOPAMIDOL 80 ML: 755 INJECTION, SOLUTION INTRAVENOUS at 20:20

## 2019-04-25 ASSESSMENT — PAIN DESCRIPTION - LOCATION
LOCATION: LEG

## 2019-04-25 ASSESSMENT — PAIN DESCRIPTION - ORIENTATION
ORIENTATION: LEFT
ORIENTATION: LEFT;POSTERIOR
ORIENTATION: LEFT
ORIENTATION: LEFT
ORIENTATION: LEFT;POSTERIOR
ORIENTATION: LEFT

## 2019-04-25 ASSESSMENT — PAIN SCALES - GENERAL
PAINLEVEL_OUTOF10: 8
PAINLEVEL_OUTOF10: 10
PAINLEVEL_OUTOF10: 8
PAINLEVEL_OUTOF10: 10
PAINLEVEL_OUTOF10: 8
PAINLEVEL_OUTOF10: 10
PAINLEVEL_OUTOF10: 8

## 2019-04-25 ASSESSMENT — ENCOUNTER SYMPTOMS
RHINORRHEA: 0
CONSTIPATION: 0
BACK PAIN: 0
VOMITING: 0
DIARRHEA: 0
ABDOMINAL PAIN: 0
NAUSEA: 0
EYE REDNESS: 0
EYE DISCHARGE: 0
EYE ITCHING: 0
EYE PAIN: 0
ABDOMINAL DISTENTION: 0
SORE THROAT: 0
VOICE CHANGE: 0
SINUS PRESSURE: 0
CHOKING: 0
TROUBLE SWALLOWING: 0
PHOTOPHOBIA: 0
WHEEZING: 0
CHEST TIGHTNESS: 0
COUGH: 0
SHORTNESS OF BREATH: 0
BLOOD IN STOOL: 0

## 2019-04-25 ASSESSMENT — PAIN DESCRIPTION - PAIN TYPE
TYPE: ACUTE PAIN

## 2019-04-25 NOTE — ED NOTES
Presents via wheelchair with pain in the back of the left leg. Pain has gradually worsened over the day to the point that it hurts to even touch the leg. Limited range of motion noted. Denies injury. Neuro/circ status intact.      Tom Carbajal RN  04/25/19 2698

## 2019-04-25 NOTE — ED PROVIDER NOTES
[facility]  eMERGENCY dEPARTMENT eNCOUnter          [address] [facility]       Chief Complaint   Patient presents with    Leg Swelling     left       Nurses Notes reviewed and I agreeexcept as noted in the HPI. HISTORY OF PRESENT ILLNESS    Diego Louis is a 69 y.o. female who presents to the Emergency Department with a medical history of CAD, HLD, MI, and HTN for the evaluation of left lower extremity swelling and associated pain. She reports that her pain became present yesterday but admits that her swelling did not developed until today. She states that due to continual pain and new onset swelling that she went to urgent care when she was sent to the ED for further evaluation. Patient rates her current pain as a 10/10 in severity and aching/throbbing in character. She reports no chest pain or shortness of breath. She denies any injury or recent travel. Patient denies a medical history of DVT, gout, or cancer. She is currently anticoagulated with Plavix. Her cardiologist is Dr. Rick Musa. The patient reports no additional symptoms or complaints at this time. The HPI was provided by the patient. REVIEW OF SYSTEMS     Review of Systems   Constitutional: Negative for activity change, appetite change, diaphoresis, fatigue and unexpected weight change. HENT: Negative for congestion, ear discharge, ear pain, hearing loss, rhinorrhea, sinus pressure, sore throat, trouble swallowing and voice change. Eyes: Negative for photophobia, pain, discharge, redness and itching. Respiratory: Negative for cough, choking, chest tightness, shortness of breath and wheezing. Cardiovascular: Positive for leg swelling (left). Negative for chest pain and palpitations. Gastrointestinal: Negative for abdominal distention, abdominal pain, blood in stool, constipation, diarrhea, nausea and vomiting. Endocrine: Negative for polydipsia, polyphagia and polyuria.    Genitourinary: Negative for decreased urine volume, difficulty urinating, dysuria, enuresis, frequency, hematuria and urgency. Musculoskeletal: Positive for myalgias (right lower extremity paini). Negative for arthralgias, back pain, gait problem, neck pain and neck stiffness. Skin: Negative for pallor and rash. Allergic/Immunologic: Negative for immunocompromised state. Neurological: Negative for dizziness, tremors, seizures, syncope, facial asymmetry, weakness, light-headedness, numbness and headaches. Hematological: Negative for adenopathy. Does not bruise/bleed easily. Psychiatric/Behavioral: Negative for agitation, hallucinations and suicidal ideas. The patient is not nervous/anxious. PAST MEDICAL HISTORY    has a past medical history of CAD (coronary artery disease), Depression, Fibromyalgia, Fibromyalgia, GERD (gastroesophageal reflux disease), Headache(784.0), Hyperlipidemia, Hypertension, Macular degeneration, dry, MI (myocardial infarction) ([facility] [address] Osteoarthritis, SOB (shortness of breath), and Urinary incontinence. SURGICALHISTORY      has a past surgical history that includes Cholecystectomy (1975); Carpal tunnel release; cyst removal; Coronary angioplasty with stent (07/2011); Colon surgery (2010); Coronary angioplasty with stent; and Arm Surgery (Left, 11/20/2018). CURRENT MEDICATIONS       Previous Medications    AMLODIPINE (NORVASC) 5 MG TABLET    Take 5 mg by mouth daily    ASPIRIN 325 MG TABLET    Take 1 tablet by mouth daily    ATORVASTATIN (LIPITOR) 40 MG TABLET    TAKE 1 TABLET DAILY    CLOPIDOGREL (PLAVIX) 75 MG TABLET    Take 75 mg by mouth daily. DARIFENACIN (ENABLEX) 15 MG EXTENDED RELEASE TABLET    TAKE 1 TABLET DAILY    FENOFIBRATE (TRICOR) 145 MG TABLET    TAKE 1 TABLET DAILY    HYDROCODONE-ACETAMINOPHEN (NORCO) 5-325 MG PER TABLET    Take 1 tablet by mouth every 6 hours as needed for Pain for up to 3 days. Intended supply: 3 days.  Take lowest dose possible to manage pain    ISOSORBIDE MONONITRATE (IMDUR) 30 MG EXTENDED RELEASE TABLET    Take 30 mg by mouth daily Half tablet    LOSARTAN (COZAAR) 50 MG TABLET    Take 1 tablet by mouth daily. METOPROLOL SUCCINATE (TOPROL XL) 50 MG EXTENDED RELEASE TABLET    TAKE 1 TABLET TWICE A DAY    MULTIPLE VITAMINS-MINERALS (MULTIVITAMIN ADULT) TABS    Take by mouth    MULTIPLE VITAMINS-MINERALS (PRESERVISION AREDS 2+MULTI VIT PO)    Take by mouth daily     PANTOPRAZOLE (PROTONIX) 40 MG TABLET        VENLAFAXINE (EFFEXOR XR) 150 MG EXTENDED RELEASE CAPSULE    TAKE 1 CAPSULE DAILY    VITAMIN B-12 (CYANOCOBALAMIN) 100 MCG TABLET    Take 50 mcg by mouth daily    ZOLEDRONIC ACID (RECLAST) 5 MG/100ML SOLN    Infuse 100 mLs intravenously once for 1 dose       ALLERGIES     is allergic to benadryl [diphenhydramine hcl] and flexeril [cyclobenzaprine hcl]. FAMILY HISTORY     indicated that the status of her mother is unknown. She indicated that her father is alive. She indicated that the status of her sister is unknown. She indicated that the status of her paternal grandmother is unknown. She indicated that the status of her maternal uncle is unknown.   family history includes COPD in her father; Cancer in her maternal uncle; Cancer (age of onset: 39) in her mother; Diabetes in her mother and sister; Heart Attack (age of onset: 76) in her paternal grandmother; Heart Disease in her mother; High Cholesterol in her mother and sister; Stroke (age of onset: 72) in her sister. SOCIAL HISTORY       Social History     Socioeconomic History    Marital status:       Spouse name: Not on file    Number of children: Not on file    Years of education: Not on file    Highest education level: Not on file   Occupational History    Not on file   Social Needs    Financial resource strain: Not on file    Food insecurity:     Worry: Not on file     Inability: Not on file    Transportation needs:     Medical: Not on file     Non-medical: Not on file   Tobacco Use    Smoking status: Former Smoker     Packs/day: 0.00     Years: 50.00     Pack years: 0.00     Start date: 1965     Last attempt to quit: 2018     Years since quittin.8    Smokeless tobacco: Never Used   Substance and Sexual Activity    Alcohol use: Yes     Comment: <1 per week    Drug use: No    Sexual activity: Not on file   Lifestyle    Physical activity:     Days per week: Not on file     Minutes per session: Not on file    Stress: Not on file   Relationships    Social connections:     Talks on phone: Not on file     Gets together: Not on file     Attends Sabianist service: Not on file     Active member of club or organization: Not on file     Attends meetings of clubs or organizations: Not on file     Relationship status: Not on file    Intimate partner violence:     Fear of current or ex partner: Not on file     Emotionally abused: Not on file     Physically abused: Not on file     Forced sexual activity: Not on file   Other Topics Concern    Not on file   Social History Narrative    Not on file       PHYSICAL EXAM     INITIAL VITALS:  height is 5' 4\" (1.626 m) and weight is 155 lb (70.3 kg). Her oral temperature is 97.7 °F (36.5 °C). Her blood pressure is 150/77 (abnormal) and her pulse is 69. Her respiration is 18 and oxygen saturation is 93%. Physical Exam   Constitutional: She is oriented to person, place, and time. She appears well-developed and well-nourished. No distress. HENT:   Head: Normocephalic and atraumatic. Right Ear: External ear normal.   Left Ear: External ear normal.   Nose: Nose normal.   Mouth/Throat: Oropharynx is clear and moist. No oropharyngeal exudate. Eyes: Pupils are equal, round, and reactive to light. Conjunctivae and EOM are normal. Right eye exhibits no discharge. Left eye exhibits no discharge. No scleral icterus. Neck: Normal range of motion. Neck supple. No JVD present. No tracheal deviation present.    Cardiovascular: Normal rate, regular rhythm, normal heart sounds and intact distal pulses. Exam reveals no gallop and no friction rub. No murmur heard. Pulses:       Dorsalis pedis pulses are 2+ on the right side. Posterior tibial pulses are 2+ on the right side. Pulmonary/Chest: Effort normal and breath sounds normal. She has no wheezes. She has no rales. Abdominal: Soft. Bowel sounds are normal. She exhibits no mass. There is no tenderness. There is no rebound and no guarding. Musculoskeletal: Normal range of motion. She exhibits edema (right lower extremity). Right lower leg: She exhibits tenderness and swelling. She exhibits no bony tenderness and no edema. Left lower leg: Normal. She exhibits no tenderness, no bony tenderness, no swelling and no edema. Right lower extremity Liv's sign is positive. Right lower extremity is firm and tender secondary to palpation. Right lower extremity presents with associated erythema. Lymphadenopathy:     She has no cervical adenopathy. Neurological: She is alert and oriented to person, place, and time. She has normal reflexes. She displays normal reflexes. No cranial nerve deficit. She exhibits normal muscle tone. Coordination normal.   Skin: Skin is warm and dry. No rash noted. She is not diaphoretic. There is erythema (RLE). Psychiatric: She has a normal mood and affect. Her behavior is normal. Judgment and thought content normal.   Nursing note and vitals reviewed.         DIFFERENTIALDIAGNOSIS:   Differential diagnoses were discussed extensively with thepatient and family including but no limited to DVT, muscle strain, muscle sprain, lower extremity injury     DIAGNOSTIC RESULTS     EKG: All EKG's are interpreted by the Emergency Department Physician who either signs or Co-signs this chart in the absence of a cardiologist.  EKG interpreted by Hardik Liriano DO:    None    RADIOLOGY:non-plain film images(s) such as CT, Ultrasound and MRI are read by the radiologist.    BENJA DUP LOWER EXTREMITY VENOUS LEFT   Final Result   No evidence of acute left lower extremity DVT. **This report has been created using voice recognition software. It may contain minor errors which are inherent in voice recognition technology. **      Final report electronically signed by Dr. Philipp Emerson on 4/25/2019 7:50 PM      XR KNEE LEFT (MIN 4 VIEWS)   Final Result    Possible small suprapatellar joint effusion. Otherwise unremarkable left knee series. **This report has been created using voice recognition software. It may contain minor errors which are inherent in voice recognition technology. **      Final report electronically signed by Dr. Philipp Emerson on 4/25/2019 7:43 PM      CTA CHEST [address]    (Results Pending)       LABS:   Labs Reviewed   CBC WITH AUTO DIFFERENTIAL - Abnormal; Notable for the following components:       Result Value    RBC 3.91 (*)     Hemoglobin 11.6 (*)     Hematocrit 35.6 (*)     RDW-SD 45.2 (*)     All other components within normal limits   BASIC METABOLIC PANEL - Abnormal; Notable for the following components:    CO2 21 (*)     Glucose 133 (*)     BUN 33 (*)     All other components within normal limits   HEPATIC FUNCTION PANEL - Abnormal; Notable for the following components: Total Bilirubin 0.2 (*)     All other components within normal limits   GLOMERULAR FILTRATION RATE, ESTIMATED - Abnormal; Notable for the following components:    Est, Glom Filt Rate 49 (*)     All other components within normal limits   LIPASE   TROPONIN   MAGNESIUM   PROTIME-INR   APTT   URIC ACID   ANION GAP   OSMOLALITY       EMERGENCY DEPARTMENT COURSE:   Vitals:    Vitals:    04/25/19 1842 04/25/19 1936 04/25/19 2005   BP: (!) 172/79 (!) 142/65 (!) 150/77   Pulse: 73 62 69   Resp: 18 16 18   Temp: 97.7 °F (36.5 °C)     TempSrc: Oral     SpO2: 96% 92% 93%   Weight: 155 lb (70.3 kg)     Height: 5' 4\" (1.626 m)       6:50 PM: The patient was seen and evaluated.  Appropriate labs were ordered and reviewed. She denies of a physical finding of a DVT of the left lower extremity however ultrasound was done and was read as negative. I'm skeptical of this. Patient is currently on antiplatelet medication. At this point I came to the end of my shift. CT of the chest is yet to be performed. I signed the patient out to my evening colleague Dr. Elise Hendricks in stable condition. CRITICAL CARE:   None     CONSULTS:  None    PROCEDURES:  None    FINAL IMPRESSION      1. Leg swelling          DISPOSITION/PLAN   ED observation/sign out    PATIENT REFERRED TO:  No follow-up provider specified. DISCHARGE MEDICATIONS:  New Prescriptions    No medications on file       (Please note that portions of this note were completed with a voice recognition program.  Efforts were made to edit thedictations but occasionally words are mis-transcribed.)    Scribe:  Rodrigue Levy 4/25/19 6:50 PM Scribing for and in the presence of Dolores Lamar DO. Scribe: Rodrigue Levy 4/25/19 6:50 PM    Provider:  I personally performed the services described in the documentation, reviewed and edited the documentation which was dictated to the scribe inmy presence, and it accurately records my words and actions.     Dolores Lamar DO 4/25/19 8:19 PM       Dolores Lamar DO  04/25/19 100 [facility],   04/28/19 1527

## 2019-04-25 NOTE — ED PROVIDER NOTES
[facility]  eMERGENCY dEPARTMENT eNCOUnter             [address] COMPLAINT    Chief Complaint   Patient presents with    Leg Pain     left       Nurses Notes reviewed and I agree except as noted in the HPI. HPI    Taryn Quigley is a 69 y.o. female who presents with a 2 day history of progressively worsening pain in the left leg, starting in the calf and radiating up into the back of the upper leg, onset after \"walking a lot\". No falls or known injury. Pain is 10/10, aching, cramping, increased with any movement or palpation, better with immobilization. No treatment tried. No previous similar episodes. REVIEW OF SYSTEMS      Review of Systems   Constitutional: Positive for malaise/fatigue. Negative for fever. HENT: Negative. Respiratory: Negative. Cardiovascular: Negative. Gastrointestinal: Negative. Musculoskeletal: Negative for back pain, falls and neck pain. Skin: Negative for rash. Neurological: Negative for sensory change and weakness. Endo/Heme/Allergies: Does not bruise/bleed easily. All other systems reviewed and are negative. PAST MEDICAL HISTORY     has a past medical history of CAD (coronary artery disease), Depression, Fibromyalgia, Fibromyalgia, GERD (gastroesophageal reflux disease), Headache(784.0), Hyperlipidemia, Hypertension, Macular degeneration, dry, MI (myocardial infarction) ([facility] [address] Osteoarthritis, SOB (shortness of breath), and Urinary incontinence. SURGICAL HISTORY     has a past surgical history that includes Cholecystectomy (1975); Carpal tunnel release; cyst removal; Coronary angioplasty with stent (07/2011); Colon surgery (2010); Coronary angioplasty with stent; Arm Surgery (Left, 11/20/2018); Colonoscopy; Endoscopy, colon, diagnostic; fracture surgery; Dilatation, esophagus; and Cardiac surgery.     CURRENT MEDICATIONS    Discharge Medication List as of 4/25/2019  5:41 PM      CONTINUE these medications which have NOT CHANGED    Details   darifenacin (ENABLEX) 15 MG extended release tablet TAKE 1 TABLET DAILY, Disp-90 tablet, R-3Normal      Multiple Vitamins-Minerals (MULTIVITAMIN ADULT) TABS Take by mouthHistorical Med      zoledronic acid (RECLAST) 5 MG/100ML SOLN Infuse 100 mLs intravenously once for 1 dose, Disp-100 mL, R-0Print      vitamin B-12 (CYANOCOBALAMIN) 100 MCG tablet Take 50 mcg by mouth dailyHistorical Med      pantoprazole (PROTONIX) 40 MG tablet Historical Med      fenofibrate (TRICOR) 145 MG tablet TAKE 1 TABLET DAILY, Disp-90 tablet, R-3Normal      metoprolol succinate (TOPROL XL) 50 MG extended release tablet TAKE 1 TABLET TWICE A DAY, Disp-180 tablet, R-3Normal      venlafaxine (EFFEXOR XR) 150 MG extended release capsule TAKE 1 CAPSULE DAILY, Disp-90 capsule, R-3Normal      atorvastatin (LIPITOR) 40 MG tablet TAKE 1 TABLET DAILY, Disp-90 tablet, R-3Normal      aspirin 325 MG tablet Take 1 tablet by mouth daily, Disp-30 tablet, R-3Print      isosorbide mononitrate (IMDUR) 30 MG extended release tablet Take 30 mg by mouth daily Half tabletHistorical Med      amLODIPine (NORVASC) 5 MG tablet Take 5 mg by mouth dailyHistorical Med      Multiple Vitamins-Minerals (PRESERVISION AREDS 2+MULTI VIT PO) Take by mouth daily Historical Med      losartan (COZAAR) 50 MG tablet Take 1 tablet by mouth daily. , Disp-90 tablet, R-3      clopidogrel (PLAVIX) 75 MG tablet Take 75 mg by mouth daily. ALLERGIES    is allergic to benadryl [diphenhydramine hcl] and flexeril [cyclobenzaprine hcl]. FAMILY HISTORY    indicated that the status of her mother is unknown. She indicated that her father is alive. She indicated that the status of her sister is unknown. She indicated that the status of her maternal grandmother is unknown. She indicated that the status of her maternal grandfather is unknown. She indicated that the status of her paternal grandmother is unknown.  She indicated that the status of her maternal uncle is unknown.   family history includes Alcohol Abuse in her maternal grandfather; Allergy (Severe) in her sister; Arthritis in her mother, paternal grandmother, and sister; COPD in her father; Cancer in her maternal uncle; Cancer (age of onset: 39) in her mother; Coronary Art Dis in her mother; Diabetes in her mother and sister; Early Death in her maternal grandmother; Hearing Loss in her father; Heart Attack in her mother; Heart Attack (age of onset: 76) in her paternal grandmother; Heart Disease in her mother; High Cholesterol in her mother and sister; Marney Allegra / Ellin Campbell in her sister; Obesity in her paternal grandmother; Stroke (age of onset: 72) in her sister. SOCIAL HISTORY     reports that she quit smoking about 10 months ago. She started smoking about 53 years ago. She smoked 0.00 packs per day for 50.00 years. She has never used smokeless tobacco. She reports that she drank alcohol. She reports that she does not use drugs. PHYSICAL EXAM       INITIAL VITALS: BP (!) 145/81   Pulse 72   Temp 98 °F (36.7 °C) (Oral)   Ht 5' 4\" (1.626 m)   Wt 155 lb (70.3 kg)   SpO2 97%   BMI 26.61 kg/m²      Physical Exam   Constitutional: She is oriented to person, place, and time. She appears well-developed and well-nourished. She appears distressed. HENT:   Mouth/Throat: Oropharynx is clear and moist.   Eyes: Pupils are equal, round, and reactive to light. Neck: Neck supple. Cardiovascular: Normal rate, regular rhythm and intact distal pulses. No murmur heard. Pulmonary/Chest: Breath sounds normal. No respiratory distress. Abdominal: Soft. Bowel sounds are normal. She exhibits no mass. There is no tenderness. Musculoskeletal:   Very tender, some swelling left calf, positive Liv's sign, tender left medial and posterior thigh, ROM limited by pain. DP pulses, capillary refill normal. Mild swelling left knee, especially in the popliteal area.     Lymphadenopathy: She has no cervical adenopathy. Neurological: She is alert and oriented to person, place, and time. No sensory deficit. Skin: Skin is warm and dry. Capillary refill takes less than 2 seconds. No rash noted. She is not diaphoretic. Psychiatric:   Pain behavior   Nursing note and vitals reviewed. RADIOLOGY:    VL DUP LOWER EXTREMITY VENOUS LEFT    (Results Pending)       EMERGENCY DEPARTMENT COURSE:    I explained to her that we do not have access to ultrsound here. She is sent to [facility] for Doppler and further care. Instructions given. She was given Norco and Ibuprofen for  her pain. FINAL IMPRESSION      1. Pain of left calf        DISPOSITION/PLAN    DISPOSITION Decision To Discharge 04/25/2019 05:24:31 PM      PATIENT REFERRED TO:    Marsha Vallecillo , [address]  [address] [place] [address]  [phone]    Schedule an appointment as soon as possible for a visit         DISCHARGE MEDICATIONS:    Discharge Medication List as of 4/25/2019  5:41 PM           Addendum: I note that she was sent from [facility] lab to main ED for her pain, Doppler negative, further management in main ED.          (Please note that portions of this note were completed with a voice recognition program.  Efforts were made to edit the dictations but occasionally words are mis-transcribed.)      Theodore Arndt MD  04/26/19 9178

## 2019-04-25 NOTE — ED TRIAGE NOTES
Pt presents to the ED for left leg swelling and pain. Pt states states she was at [facility] ambulatory care today and they sent her here for possible DVT. Pt states the pain started yesterday and swelling started today. Pt received one norco at ambulatory care but denies pain relief. Pt denies chest pain and SOB.

## 2019-04-25 NOTE — ED NOTES
Pt back in room from vascular. Pt states her pain has improved. Pt and family updated on plan of care.       Rosita Osgood, RN  04/25/19 6457

## 2019-04-25 NOTE — ED NOTES
Released to go directly to [facility] for doppler. Order for test given to son, patient and son verbalize understanding of instructions. Neuro/circ status intact to the left foot and leg. Remains alert and cooperative. Skin warm and dry, color normal for ethncity.      Baljinder Coleman RN  04/25/19 0765

## 2019-04-26 ENCOUNTER — APPOINTMENT (OUTPATIENT)
Dept: INTERVENTIONAL RADIOLOGY/VASCULAR | Age: 74
End: 2019-04-26
Payer: MEDICARE

## 2019-04-26 ENCOUNTER — APPOINTMENT (OUTPATIENT)
Dept: GENERAL RADIOLOGY | Age: 74
End: 2019-04-26
Payer: MEDICARE

## 2019-04-26 ENCOUNTER — APPOINTMENT (OUTPATIENT)
Dept: MRI IMAGING | Age: 74
End: 2019-04-26
Payer: MEDICARE

## 2019-04-26 PROBLEM — D64.9 ANEMIA: Status: ACTIVE | Noted: 2019-04-26

## 2019-04-26 PROBLEM — M79.605 LEFT LEG PAIN: Status: ACTIVE | Noted: 2019-04-26

## 2019-04-26 PROBLEM — R26.2 IMPAIRED AMBULATION: Status: ACTIVE | Noted: 2019-04-26

## 2019-04-26 LAB
ANION GAP SERPL CALCULATED.3IONS-SCNC: 13 MEQ/L (ref 8–16)
BUN BLDV-MCNC: 26 MG/DL (ref 7–22)
C-REACTIVE PROTEIN: 0.57 MG/DL (ref 0–1)
CALCIUM SERPL-MCNC: 8.7 MG/DL (ref 8.5–10.5)
CHLORIDE BLD-SCNC: 109 MEQ/L (ref 98–111)
CO2: 20 MEQ/L (ref 23–33)
CREAT SERPL-MCNC: 1 MG/DL (ref 0.4–1.2)
ERYTHROCYTE [DISTWIDTH] IN BLOOD BY AUTOMATED COUNT: 13.5 % (ref 11.5–14.5)
ERYTHROCYTE [DISTWIDTH] IN BLOOD BY AUTOMATED COUNT: 45.5 FL (ref 35–45)
FERRITIN: 105 NG/ML (ref 10–291)
FOLATE: > 20 NG/ML (ref 4.8–24.2)
GFR SERPL CREATININE-BSD FRML MDRD: 54 ML/MIN/1.73M2
GLUCOSE BLD-MCNC: 109 MG/DL (ref 70–108)
HCT VFR BLD CALC: 33.6 % (ref 37–47)
HEMOGLOBIN: 10.9 GM/DL (ref 12–16)
IRON: 62 UG/DL (ref 50–170)
MCH RBC QN AUTO: 29.7 PG (ref 26–33)
MCHC RBC AUTO-ENTMCNC: 32.4 GM/DL (ref 32.2–35.5)
MCV RBC AUTO: 91.6 FL (ref 81–99)
PLATELET # BLD: 282 THOU/MM3 (ref 130–400)
PMV BLD AUTO: 11.4 FL (ref 9.4–12.4)
POTASSIUM SERPL-SCNC: 3.9 MEQ/L (ref 3.5–5.2)
RBC # BLD: 3.67 MILL/MM3 (ref 4.2–5.4)
SEDIMENTATION RATE, ERYTHROCYTE: 8 MM/HR (ref 0–20)
SODIUM BLD-SCNC: 142 MEQ/L (ref 135–145)
VITAMIN B-12: 453 PG/ML (ref 211–911)
WBC # BLD: 10.2 THOU/MM3 (ref 4.8–10.8)

## 2019-04-26 PROCEDURE — 2580000003 HC RX 258: Performed by: INTERNAL MEDICINE

## 2019-04-26 PROCEDURE — 83540 ASSAY OF IRON: CPT

## 2019-04-26 PROCEDURE — 93971 EXTREMITY STUDY: CPT

## 2019-04-26 PROCEDURE — 86140 C-REACTIVE PROTEIN: CPT

## 2019-04-26 PROCEDURE — 85651 RBC SED RATE NONAUTOMATED: CPT

## 2019-04-26 PROCEDURE — 2709999900 HC NON-CHARGEABLE SUPPLY

## 2019-04-26 PROCEDURE — 80048 BASIC METABOLIC PNL TOTAL CA: CPT

## 2019-04-26 PROCEDURE — 6360000002 HC RX W HCPCS: Performed by: INTERNAL MEDICINE

## 2019-04-26 PROCEDURE — 73600 X-RAY EXAM OF ANKLE: CPT

## 2019-04-26 PROCEDURE — 99218 PR INITIAL OBSERVATION CARE/DAY 30 MINUTES: CPT | Performed by: SURGERY

## 2019-04-26 PROCEDURE — 82607 VITAMIN B-12: CPT

## 2019-04-26 PROCEDURE — 82728 ASSAY OF FERRITIN: CPT

## 2019-04-26 PROCEDURE — 73718 MRI LOWER EXTREMITY W/O DYE: CPT

## 2019-04-26 PROCEDURE — 82746 ASSAY OF FOLIC ACID SERUM: CPT

## 2019-04-26 PROCEDURE — 99225 PR SBSQ OBSERVATION CARE/DAY 25 MINUTES: CPT | Performed by: FAMILY MEDICINE

## 2019-04-26 PROCEDURE — 85027 COMPLETE CBC AUTOMATED: CPT

## 2019-04-26 PROCEDURE — G0378 HOSPITAL OBSERVATION PER HR: HCPCS

## 2019-04-26 PROCEDURE — 6370000000 HC RX 637 (ALT 250 FOR IP): Performed by: INTERNAL MEDICINE

## 2019-04-26 PROCEDURE — 96361 HYDRATE IV INFUSION ADD-ON: CPT

## 2019-04-26 PROCEDURE — 36415 COLL VENOUS BLD VENIPUNCTURE: CPT

## 2019-04-26 PROCEDURE — 96372 THER/PROPH/DIAG INJ SC/IM: CPT

## 2019-04-26 RX ORDER — SODIUM CHLORIDE 9 MG/ML
INJECTION, SOLUTION INTRAVENOUS CONTINUOUS
Status: DISCONTINUED | OUTPATIENT
Start: 2019-04-26 | End: 2019-04-27

## 2019-04-26 RX ADMIN — METOPROLOL SUCCINATE 50 MG: 50 TABLET, FILM COATED, EXTENDED RELEASE ORAL at 08:19

## 2019-04-26 RX ADMIN — AMLODIPINE BESYLATE 5 MG: 5 TABLET ORAL at 08:20

## 2019-04-26 RX ADMIN — METOPROLOL SUCCINATE 50 MG: 50 TABLET, FILM COATED, EXTENDED RELEASE ORAL at 20:48

## 2019-04-26 RX ADMIN — ATORVASTATIN CALCIUM 40 MG: 40 TABLET, FILM COATED ORAL at 08:20

## 2019-04-26 RX ADMIN — TROSPIUM CHLORIDE 20 MG: 20 TABLET, FILM COATED ORAL at 08:19

## 2019-04-26 RX ADMIN — LOSARTAN POTASSIUM 50 MG: 50 TABLET, FILM COATED ORAL at 08:20

## 2019-04-26 RX ADMIN — VENLAFAXINE HYDROCHLORIDE 150 MG: 150 CAPSULE, EXTENDED RELEASE ORAL at 08:19

## 2019-04-26 RX ADMIN — ISOSORBIDE MONONITRATE 30 MG: 30 TABLET ORAL at 08:20

## 2019-04-26 RX ADMIN — FENOFIBRATE 160 MG: 160 TABLET ORAL at 08:20

## 2019-04-26 RX ADMIN — ENOXAPARIN SODIUM 70 MG: 80 INJECTION SUBCUTANEOUS at 00:27

## 2019-04-26 RX ADMIN — HYDROCODONE BITARTRATE AND ACETAMINOPHEN 1 TABLET: 5; 325 TABLET ORAL at 07:40

## 2019-04-26 RX ADMIN — METOPROLOL SUCCINATE 50 MG: 50 TABLET, FILM COATED, EXTENDED RELEASE ORAL at 00:27

## 2019-04-26 RX ADMIN — SODIUM CHLORIDE: 9 INJECTION, SOLUTION INTRAVENOUS at 05:42

## 2019-04-26 RX ADMIN — HYDROCODONE BITARTRATE AND ACETAMINOPHEN 1 TABLET: 5; 325 TABLET ORAL at 00:27

## 2019-04-26 RX ADMIN — ASPIRIN 325 MG: 325 TABLET ORAL at 08:23

## 2019-04-26 RX ADMIN — TROSPIUM CHLORIDE 20 MG: 20 TABLET, FILM COATED ORAL at 20:48

## 2019-04-26 RX ADMIN — HYDROCODONE BITARTRATE AND ACETAMINOPHEN 1 TABLET: 5; 325 TABLET ORAL at 15:53

## 2019-04-26 RX ADMIN — ENOXAPARIN SODIUM 70 MG: 80 INJECTION SUBCUTANEOUS at 13:13

## 2019-04-26 RX ADMIN — Medication 10 ML: at 00:34

## 2019-04-26 RX ADMIN — CLOPIDOGREL BISULFATE 75 MG: 75 TABLET ORAL at 08:23

## 2019-04-26 ASSESSMENT — PAIN SCALES - GENERAL
PAINLEVEL_OUTOF10: 8
PAINLEVEL_OUTOF10: 8
PAINLEVEL_OUTOF10: 0
PAINLEVEL_OUTOF10: 6
PAINLEVEL_OUTOF10: 8
PAINLEVEL_OUTOF10: 10
PAINLEVEL_OUTOF10: 8

## 2019-04-26 ASSESSMENT — PAIN DESCRIPTION - PAIN TYPE: TYPE: ACUTE PAIN

## 2019-04-26 ASSESSMENT — PAIN DESCRIPTION - DESCRIPTORS: DESCRIPTORS: SHARP;CRAMPING;STABBING

## 2019-04-26 ASSESSMENT — PAIN DESCRIPTION - ORIENTATION: ORIENTATION: LEFT

## 2019-04-26 ASSESSMENT — ENCOUNTER SYMPTOMS
GASTROINTESTINAL NEGATIVE: 1
RESPIRATORY NEGATIVE: 1
BACK PAIN: 0

## 2019-04-26 ASSESSMENT — PAIN DESCRIPTION - PROGRESSION: CLINICAL_PROGRESSION: GRADUALLY IMPROVING

## 2019-04-26 ASSESSMENT — PAIN DESCRIPTION - LOCATION: LOCATION: LEG

## 2019-04-26 ASSESSMENT — PAIN DESCRIPTION - ONSET: ONSET: ON-GOING

## 2019-04-26 ASSESSMENT — PAIN DESCRIPTION - FREQUENCY: FREQUENCY: CONTINUOUS

## 2019-04-26 NOTE — CONSULTS
Dr Sabina William incontinence      Past Surgical History:      Procedure Laterality Date    ARM SURGERY Left 11/20/2018    dr. Katz [place]      x2    CHOLECYSTECTOMY  1975    COLON SURGERY  2010    partial resection of flat polyp    COLONOSCOPY      CORONARY ANGIOPLASTY WITH STENT PLACEMENT  07/2011    Dr Singh Ax CYST REMOVAL      BILATERAL HANDS    DILATATION, ESOPHAGUS      ENDOSCOPY, COLON, DIAGNOSTIC      FRACTURE SURGERY       Medications:  Prior to Admission medications    Medication Sig Start Date End Date Taking? Authorizing Provider   HYDROcodone-acetaminophen (NORCO) 5-325 MG per tablet Take 1 tablet by mouth every 6 hours as needed for Pain for up to 3 days. Intended supply: 3 days. Take lowest dose possible to manage pain 4/25/19 4/28/19 Yes Soraya Harrison MD   darifenacin (ENABLEX) 15 MG extended release tablet TAKE 1 TABLET DAILY 2/4/19  Yes Arnold Bowen MD   Multiple Vitamins-Minerals (MULTIVITAMIN ADULT) TABS Take by mouth   Yes Historical Provider, MD   fenofibrate (TRICOR) 145 MG tablet TAKE 1 TABLET DAILY 10/22/18  Yes Arnold Bowen MD   metoprolol succinate (TOPROL XL) 50 MG extended release tablet TAKE 1 TABLET TWICE A DAY 10/22/18  Yes Arnold Bowen MD   venlafaxine (EFFEXOR XR) 150 MG extended release capsule TAKE 1 CAPSULE DAILY 8/16/18  Yes Arnold Bowen MD   atorvastatin (LIPITOR) 40 MG tablet TAKE 1 TABLET DAILY 7/23/18  Yes Arnold Bowen MD   aspirin 325 MG tablet Take 1 tablet by mouth daily 7/21/18  Yes Farideh Ramirez MD   isosorbide mononitrate (IMDUR) 30 MG extended release tablet Take 30 mg by mouth daily Half tablet   Yes Historical Provider, MD   Multiple Vitamins-Minerals (PRESERVISION AREDS 2+MULTI VIT PO) Take by mouth daily    Yes Historical Provider, MD   losartan (COZAAR) 50 MG tablet Take 1 tablet by mouth daily.  3/18/15  Yes Arnold Bowen MD   clopidogrel used smokeless tobacco. She reports that she drank alcohol. She reports that she does not use drugs. Review of Systems:  General Denies any fever or chills. No significant unexpected weight change. HEENT Denies any diplopia, tinnitus or vertigo. No chronic headaches. Macular degeneration. Resp Shortness of breath. No cough or wheezing  Cardiac Denies any chest pain, palpitations, claudication or edema. Hypertension. Hyperlipidemia. History of myocardial infarction. GI Denies any melena, hematochezia, hematemesis or pyrosis. GERD.  Denies any frequency, urgency, hesitancy or incontinence  Heme Denies bruising or bleeding easily  Endocrine Denies any history of diabetes or thyroid disease  Neuro Denies any focal motor or sensory deficits  Musculoskeletal  Osteoarthritis. Fibromyalgia. No gout. No weakness. Left lower extremity swelling. Psychiatric  Depression. No agitation. No panic attacks. No suicidal ideation. OBJECTIVE:   CURRENT VITALS:  height is 5' 4\" (1.626 m) and weight is 169 lb 4.8 oz (76.8 kg). Her oral temperature is 97.7 °F (36.5 °C). Her blood pressure is 130/65 and her pulse is 65. Her respiration is 18 and oxygen saturation is 94%. Temperature Range (24h):Temp: 97.7 °F (36.5 °C) Temp  Av.1 °F (36.7 °C)  Min: 97.7 °F (36.5 °C)  Max: 98.6 °F (37 °C)  BP Range (53W): Systolic (69GZG), KZP:953 , Min:123 , WQH:705     Diastolic (51KXY), GUH:59, Min:65, Max:93    Pulse Range (24h): Pulse  Av.1  Min: 60  Max: 77  Respiration Range (24h): Resp  Av.3  Min: 16  Max: 20  Current Pulse Ox (24h):  SpO2: 94 %  Pulse Ox Range (24h):  SpO2  Av.5 %  Min: 91 %  Max: 97 %  Oxygen Amount and Delivery:    CONSTITUTIONAL: Alert and oriented times 3, no acute distress and cooperative to examination with proper mood and affect. SKIN: Skin color, texture, turgor normal. No rashes or lesions. LYMPH: no cervical nodes, no inguinal nodes  HEENT: Head is normocephalic, atraumatic. EOMI, PERRLA. NECK: Supple, symmetrical, trachea midline, no adenopathy, thyroid symmetric, not enlarged and no tenderness, skin normal.  CHEST/LUNGS: chest symmetric with normal A/P diameter, normal respiratory rate and rhythm, lungs clear to auscultation without wheezes, rales or rhonchi. No accessory muscle use. Scars None   CARDIOVASCULAR: Heart sounds are normal.  Regular rate and rhythm. Normal S1 and S2.  ABDOMEN: Normal shape. Normal bowel sounds. Soft, nondistended, no masses or organomegaly. No evidence of incarcerated/strangulated hernia. Percussion: Normal without hepatosplenomegally. Tenderness: absent. RECTAL: deferred, not clinically indicated  NEUROLOGIC: There are no focalizing motor or sensory deficits. CN II-XII are grossly intact. EXTREMITIES: no cyanosis, no clubbing. Swelling left lower extremity from the down through foot. No obvious infection. LABS:     Recent Labs     04/25/19  1901 04/26/19  0457   WBC 8.1 10.2   HGB 11.6* 10.9*   HCT 35.6* 33.6*    282    142   K 3.7 3.9    109   CO2 21* 20*   BUN 33* 26*   CREATININE 1.1 1.0   MG 2.3  --    CALCIUM 9.7 8.7   INR 0.96  --    AST 33  --    ALT 27  --    BILITOT 0.2*  --    BILIDIR <0.2  --    LIPASE 48.2  --      RADIOLOGY:   I have personally reviewed the following films:    Narrative   PROCEDURE: CTA CHEST W WO CONTRAST       CLINICAL INFORMATION: Shortness of breath tachycardia obvious swelling of left lower extremity. .       COMPARISON: Noncontrast chest CT dated 8/21/2018       TECHNIQUE: 3 mm thick by 1.5 mm interval axial images were obtained through the chest after the administration of IV contrast.  A non-contrast localizer was obtained.  Sagittal and coronal MIP 3D reconstructions were performed on the scanner.  80 mL    Isovue-370 were administered intravenously.       All CT scans at this facility use dose modulation, iterative reconstruction, and/or weight-based dosing when appropriate to reduce radiation dose to as low as reasonably achievable.       FINDINGS:   Lungs: There is no pneumonia or mass. There is bilateral upper and lower lobe dependent atelectasis. There is a subcentimeter left lower lobe calcified granuloma. The trachea and central bronchi are unremarkable.       Pleura: There is no pleural effusion. There is no pneumothorax.       Heart: There is mild cardiomegaly. There is no pericardial effusion. There are heavy coronary artery atherosclerotic calcifications.       Pulmonary vasculature: There is adequate opacification of the pulmonary arteries. There is no pulmonary embolism.       Lina and mediastinum: There is minimal left hilar adenopathy. There are left hilar calcified lymph nodes. There is a small hiatal hernia.       Thoracic aorta/vascular: There is no thoracic aortic aneurysm or dissection. The imaged brachiocephalic arteries are unremarkable.       Imaged upper abdomen: There is a splenic calcified granuloma.       Musculoskeletal system: No change in a nearly 1 cm sclerotic bone island in the left humeral head. There is multilevel vertebral endplate spondylosis.       Chest/body wall soft tissues: Unremarkable       Thyroid: Unremarkable               Impression       No acute pulmonary embolism. No acute pneumonia. Bilateral upper and lower lobe dependent atelectasis. Mild cardiomegaly with heavy coronary artery calcifications.       **This report has been created using voice recognition software. It may contain minor errors which are inherent in voice recognition technology. **       Final report electronically signed by Dr. Alphonse Garrido on 4/25/2019 9:16 PM         Narrative   PROCEDURE: XR KNEE LEFT (MIN 4 VIEWS)       CLINICAL INFORMATION: Pain and swelling.       COMPARISON: No prior study.       TECHNIQUE: 4 views of the left knee include an AP view, a lateral view and bilateral oblique views.       FINDINGS:   Bones/joints: No fracture or dislocation.  No

## 2019-04-26 NOTE — PROGRESS NOTES
Hospitalist Progress Note    Patient:  Osmar Bajwa      Unit/Bed:8B-36/036-A    YOB: 1945    MRN: [id number]       Acct: [de-identified]     PCP: Aguila Good MD    Date of Admission: 4/25/2019    Chief Complaint:   Chief Complaint   Patient presents with    Leg Swelling     left         Hospital Course:     Please see H/P for details. In summary, this is a 69 y.o. Female, with PMH of CAD, with 4 stentes placement ( 1 stent in 2006 and 3 stents in 7/2018), follows Dr. Roosevelt Madsen, hx of HTN, HLD,  who presented to [address] [place] [address] on 4/25/19 with complaints of sudden onset left leg swelling and pain x 1 day. Patient denies left leg injury. She also denies chest pain, sob, palpitations. In ER, duplex left LE US (-) for DVT. Chest CTA (-) PE and pna, (+) Bilateral upper and lower lobe dependent atelectasis, Mild cardiomegaly with heavy coronary artery calcifications. Repeat duplex LLE US again (-) for DVT, but now (+) Nonvascular hypoechoic structure in the medial left calf which is nonspecific but could be related to a hematoma. Please correlate for history of trauma. Patient was seen by surgery Dr. Macarthur Boeck, who states that No signs of fluid collections or need for urgent surgical intervention. He recommend possible repeat doppler US, may benefit from MRI of left lower extremity, orthopedic consultation. Subjective:     Patient seen and examined. Pt states that left leg swelling got worse from time of admission, and she's still having pain of left medical calf and above the left medial malleolus. She denies chest pain, sob, palpitations.         Medications:  Reviewed    Infusion Medications    sodium chloride 50 mL/hr at 04/26/19 0542     Scheduled Medications    sodium chloride flush  10 mL Intravenous 2 times per day    amLODIPine  5 mg Oral Daily    aspirin  325 mg Oral Daily    atorvastatin  40 mg Oral Daily    clopidogrel  75 mg Oral Daily    trospium  20 mg Oral BID  fenofibrate  160 mg Oral Daily    isosorbide mononitrate  30 mg Oral Daily    losartan  50 mg Oral Daily    metoprolol succinate  50 mg Oral BID    pantoprazole  40 mg Oral QAM AC    venlafaxine  150 mg Oral Daily     PRN Meds: sodium chloride flush, magnesium hydroxide, ondansetron, HYDROcodone-acetaminophen, acetaminophen      Intake/Output Summary (Last 24 hours) at 4/26/2019 1711  Last data filed at 4/26/2019 1505  Gross per 24 hour   Intake 1085.3 ml   Output --   Net 1085.3 ml       Diet:  DIET GENERAL;    Exam:  /68   Pulse 81   Temp 98 °F (36.7 °C) (Oral)   Resp 16   Ht 5' 4\" (1.626 m)   Wt 169 lb 4.8 oz (76.8 kg)   SpO2 90%   BMI 29.06 kg/m²     General appearance: alert, not in acute distress. Neck: Supple, with full range of motion. Respiratory:  Normal respiratory effort. Clear to auscultation, bilaterally without Rales/Wheezes/Rhonchi. Cardiovascular: normal rate, regular rhythm with normal S1/S2 without murmurs, rubs or gallops. Musculoskeletal: passive and active ROM x 4 extremities. Exam of extremities: 2+ DP pulse on right foot. Left pedal pulse did not appreciate due to left foot swelling. (+) left leg swelling from below left knee up to left foot. (+) tenderness on left medial calf and left medial malleolus. (+) bluish-greenish discoloration on left medial malleolus. Limited active and passive ROM on left ankle. Labs:   Recent Labs     04/25/19 1901 04/26/19 0457   WBC 8.1 10.2   HGB 11.6* 10.9*   HCT 35.6* 33.6*    282     Recent Labs     04/25/19 1901 04/26/19  0457    142   K 3.7 3.9    109   CO2 21* 20*   BUN 33* 26*   CREATININE 1.1 1.0   CALCIUM 9.7 8.7     Recent Labs     04/25/19 1901   AST 33   ALT 27   BILIDIR <0.2   BILITOT 0.2*   ALKPHOS 47     Recent Labs     04/25/19 1901   INR 0.96     No results for input(s): Shameka Alexandra in the last 72 hours.     Urinalysis:      Lab Results   Component Value Date    BLOODU Negative 10/14/2013    SPECGRAV 1.015 10/14/2013    GLUCOSEU Negative 10/14/2013       Radiology:  VL DUP LOWER EXTREMITY VENOUS LEFT   Final Result      1. No evidence of deep vein thrombosis throughout the left lower extremity. 2. Nonvascular hypoechoic structure in the medial left calf which is nonspecific but could be related to a hematoma. Please correlate for history of trauma. **This report has been created using voice recognition software. It may contain minor errors which are inherent in voice recognition technology. **      Final report electronically signed by Dr Parag Moy on 4/26/2019 1:02 PM      CTA CHEST W [address]   Final Result      No acute pulmonary embolism. No acute pneumonia. Bilateral upper and lower lobe dependent atelectasis. Mild cardiomegaly with heavy coronary artery calcifications. **This report has been created using voice recognition software. It may contain minor errors which are inherent in voice recognition technology. **      Final report electronically signed by Dr. Wilmer Crow on 4/25/2019 9:16 PM      VL DUP LOWER EXTREMITY VENOUS LEFT   Final Result   No evidence of acute left lower extremity DVT. **This report has been created using voice recognition software. It may contain minor errors which are inherent in voice recognition technology. **      Final report electronically signed by Dr. Wilmer Crow on 4/25/2019 7:50 PM      XR KNEE LEFT (MIN 4 VIEWS)   Final Result    Possible small suprapatellar joint effusion. Otherwise unremarkable left knee series. **This report has been created using voice recognition software. It may contain minor errors which are inherent in voice recognition technology. **      Final report electronically signed by Dr. Wilmer Crow on 4/25/2019 7:43 PM      MRI TIBIA FIBULA LEFT WO CONTRAST    (Results Pending)   XR ANKLE LEFT (2 VIEWS)    (Results Pending)         Assessment/Plan: This is a 69 y.o.

## 2019-04-26 NOTE — CARE COORDINATION
CASE MANAGEMENT OBSERVATION NOTE       4/26/19, 11:31 AM    Osmar Bajwa       Admitted from: *ER** 4/25/2019/ 1836   Location: [facility]-A Reason for admit: Leg swelling [M79.89]   Admit order signed?: yes    Procedure: No    Pertinent Info/Orders/Treatment Plan:  Telemetry. Gen Surgery consulted. Repeat dopplers may be ordered. PCP: Aguila Good MD    Discharge Plan: Met with pt today. She is from home alone but she has a son who lives just a mile away. Pt is preparing to move to a new home and is looking forward to this. She is self sufficient. She has no services or DME's. She has a PCP, she drives and she denies issues obtaining her prescriptions. She denies home going needs. 4/26/19, 12:41 PM    Discharge plan discussed by  and . Discharge plan reviewed with patient/ family. Patient/ family verbalize understanding of discharge plan and are in agreement with plan. Understanding was demonstrated using the teach back method. Potential discharge in next 24-48 hours. No discharge needs voices.

## 2019-04-26 NOTE — H&P
History & Physical        Patient:  Whitney Richardson  YOB: 1945    MRN: [id number]     Acct: [de-identified]    PCP: Mady Villarreal MD    Date of Admission: 4/25/2019    Date of Service: Pt seen/examined on 4/26/2019   and Admitted to Observation with expected LOS less than two midnights due to medical therapy. Chief Complaint:   Chief Complaint   Patient presents with    Leg Swelling     left       History Of Present Illness:      69 y.o. female who presented to [address] [place] [address] with complaints of left leg swelling and pain. Pain came on 24 hours ago. She rates pain 10/10. She notes pain medications have helped alleviate some pain. Swelling started today she reports. She describes pain as aching and throbbing. Patient states she cannot bear weight due to current symptoms. At baseline patient ambulates without assistance. Patient denies chest pain or shortness of breath. Denies abdominal pain. Denies N/V/D/C. PMH CAD, HTN, HLD. Patient has had no recent injury. Denies recent travel. Denies history of clots or cancer. While in ED patient was evaluated and US of leg was negative according to radiology report. ED physician skeptical on report findings. Another ED physician performed bedside doppler and is concerned that patient does have DVT. Patient to be admitted for left leg swelling and pain; patient to be re-evaluated in AM for suspected DVT.      Past Medical History:          Diagnosis Date    CAD (coronary artery disease)     Depression     Fibromyalgia     Fibromyalgia     GERD (gastroesophageal reflux disease)     Dr Jumana Campbell YFRZHAVH([id number])     Hyperlipidemia     Hypertension     Macular degeneration, dry     [facility]     Dr Dulce Berrios SOB (shortness of breath)     Dr Gavin Richards incontinence        Past Surgical History:          Procedure Laterality Date    ARM SURGERY Left 11/20/2018    dr. Kathy Mendoza CARDIAC SURGERY      CARPAL TUNNEL RELEASE      x2    CHOLECYSTECTOMY  1975    COLON SURGERY  2010    partial resection of flat polyp    COLONOSCOPY      CORONARY ANGIOPLASTY WITH STENT PLACEMENT  07/2011    Dr Claudia Harley CYST REMOVAL      BILATERAL HANDS    DILATATION, ESOPHAGUS      ENDOSCOPY, COLON, DIAGNOSTIC      FRACTURE SURGERY         Medications Prior to Admission:      Prior to Admission medications    Medication Sig Start Date End Date Taking? Authorizing Provider   HYDROcodone-acetaminophen (NORCO) 5-325 MG per tablet Take 1 tablet by mouth every 6 hours as needed for Pain for up to 3 days. Intended supply: 3 days. Take lowest dose possible to manage pain 4/25/19 4/28/19 Yes Ankur Pepper MD   darifenacin (ENABLEX) 15 MG extended release tablet TAKE 1 TABLET DAILY 2/4/19  Yes Nate Mckeon MD   Multiple Vitamins-Minerals (MULTIVITAMIN ADULT) TABS Take by mouth   Yes Historical Provider, MD   fenofibrate (TRICOR) 145 MG tablet TAKE 1 TABLET DAILY 10/22/18  Yes Nate Mckeon MD   metoprolol succinate (TOPROL XL) 50 MG extended release tablet TAKE 1 TABLET TWICE A DAY 10/22/18  Yes Nate Mckeon MD   venlafaxine (EFFEXOR XR) 150 MG extended release capsule TAKE 1 CAPSULE DAILY 8/16/18  Yes Nate Mckeon MD   atorvastatin (LIPITOR) 40 MG tablet TAKE 1 TABLET DAILY 7/23/18  Yes Nate Mckeon MD   aspirin 325 MG tablet Take 1 tablet by mouth daily 7/21/18  Yes Tricia De La Cruz MD   isosorbide mononitrate (IMDUR) 30 MG extended release tablet Take 30 mg by mouth daily Half tablet   Yes Historical Provider, MD   Multiple Vitamins-Minerals (PRESERVISION AREDS 2+MULTI VIT PO) Take by mouth daily    Yes Historical Provider, MD   losartan (COZAAR) 50 MG tablet Take 1 tablet by mouth daily. 3/18/15  Yes Nate Mckeon MD   clopidogrel (PLAVIX) 75 MG tablet Take 75 mg by mouth daily.      Yes Historical Provider, MD   zoledronic acid (RECLAST) 5 MG/100ML SOLN Infuse 100 mLs intravenously once for 1 dose 11/26/18 4/25/19  Yobani Marie MD   darifenacin (ENABLEX) 15 MG SR tablet Take 1 tablet by mouth daily. 9/26/13 9/15/14  Yobani Marie MD       Allergies:  Benadryl [diphenhydramine hcl] and Flexeril [cyclobenzaprine hcl]    Social History:      The patient currently lives at home    TOBACCO:   reports that she quit smoking about 10 months ago. She started smoking about 53 years ago. She smoked 0.00 packs per day for 50.00 years. She has never used smokeless tobacco.  ETOH:   reports that she drank alcohol. Family History:      Reviewed in detail and negative for DM Positive as follows:        Problem Relation Age of Onset    COPD Father    Santos Hearing Loss Father    Santos Cancer Mother 39        uterine and breast    Heart Disease Mother     Diabetes Mother     High Cholesterol Mother     Arthritis Mother     Coronary Art Dis Mother     Heart Attack Mother     Diabetes Sister     High Cholesterol Sister     Stroke Sister 72    Allergy (Severe) Sister     Arthritis Sister     Miscarriages / [place] Sister     Cancer Maternal Uncle     Heart Attack Paternal Grandmother 76        MI    Arthritis Paternal Grandmother     Obesity Paternal Grandmother     Early Death Maternal Grandmother     Alcohol Abuse Maternal Grandfather        Diet:  DIET GENERAL;    REVIEW OF SYSTEMS:   Pertinent positives as noted in the HPI. All other systems reviewed and negative. PHYSICAL EXAM:    /71   Pulse 60   Temp 98.6 °F (37 °C) (Oral)   Resp 18   Ht 5' 4\" (1.626 m)   Wt 169 lb 4.8 oz (76.8 kg)   SpO2 93%   BMI 29.06 kg/m²     General appearance:  Noted apparent distress, appears stated age and cooperative. HEENT:  Normal cephalic, atraumatic without obvious deformity. Pupils equal, round, and reactive to light. Extra ocular muscles intact. Conjunctivae/corneas clear. Neck: Supple, with full range of motion. No jugular venous distention.  Trachea

## 2019-04-26 NOTE — ED PROVIDER NOTES
Signed out to me by Dr. Bernarda Leventhal. Pt comes to ED with acute LLE swelling in last 24 hours. No leg trauma. No history of DVT or PE. Mild left calf pain but with significant calf swelling. No leg redness. No fever or chills. Vitals  Vitals:    04/25/19 2005 04/25/19 2130 04/25/19 2238 04/26/19 0015   BP: (!) 150/77 (!) 165/93 (!) 156/83 123/65   Pulse: 69 65 77 70   Resp: 18 20 20    Temp:   98.3 °F (36.8 °C)    TempSrc:   Oral    SpO2: 93% 92% 91%    Weight:   169 lb 4.8 oz (76.8 kg)    Height:           Exam:  Leg lower leg significant swelling to the level of popliteal fossa with mild tenderness to calf. No redness. Intact LLE NV exam.     Her exam does not show compartment syndrome (no passive movement pain, no pallor, no pulseless, no paralysis, no paresthesia, no poikilothermia).     LABS:  Results for orders placed or performed during the hospital encounter of 04/25/19   CBC auto differential   Result Value Ref Range    WBC 8.1 4.8 - 10.8 thou/mm3    RBC 3.91 (L) 4.20 - 5.40 mill/mm3    Hemoglobin 11.6 (L) 12.0 - 16.0 gm/dl    Hematocrit 35.6 (L) 37.0 - 47.0 %    MCV 91.0 81.0 - 99.0 fL    MCH 29.7 26.0 - 33.0 pg    MCHC 32.6 32.2 - 35.5 gm/dl    RDW-CV 13.5 11.5 - 14.5 %    RDW-SD 45.2 (H) 35.0 - 45.0 fL    Platelets 500 231 - 210 thou/mm3    MPV 11.3 9.4 - 12.4 fL    Seg Neutrophils 59.5 %    Lymphocytes 24.8 %    Monocytes 10.4 %    Eosinophils 4.1 %    Basophils 0.7 %    Immature Granulocytes 0.5 %    Segs Absolute 4.8 1.8 - 7.7 thou/mm3    Lymphocytes # 2.0 1.0 - 4.8 thou/mm3    Monocytes # 0.8 0.4 - 1.3 thou/mm3    Eosinophils # 0.3 0.0 - 0.4 thou/mm3    Basophils # 0.1 0.0 - 0.1 thou/mm3    Immature Grans (Abs) 0.04 0.00 - 0.07 thou/mm3    nRBC 0 /100 wbc   Basic Metabolic Panel   Result Value Ref Range    Sodium 143 135 - 145 meq/L    Potassium 3.7 3.5 - 5.2 meq/L    Chloride 109 98 - 111 meq/L    CO2 21 (L) 23 - 33 meq/L    Glucose 133 (H) 70 - 108 mg/dL    BUN 33 (H) 7 - 22 mg/dL    CREATININE 1.1 0.4 Sodium 143 135 - 145 meq/L    Potassium 3.7 3.5 - 5.2 meq/L    Chloride 109 98 - 111 meq/L    CO2 21 (L) 23 - 33 meq/L    Glucose 133 (H) 70 - 108 mg/dL    BUN 33 (H) 7 - 22 mg/dL    CREATININE 1.1 0.4 - 1.2 mg/dL    Calcium 9.7 8.5 - 10.5 mg/dL   Hepatic function panel   Result Value Ref Range    Alb 3.9 3.5 - 5.1 g/dL    Total Bilirubin 0.2 (L) 0.3 - 1.2 mg/dL    Bilirubin, Direct <0.2 0.0 - 0.3 mg/dL    Alkaline Phosphatase 47 38 - 126 U/L    AST 33 5 - 40 U/L    ALT 27 11 - 66 U/L    Total Protein 6.5 6.1 - 8.0 g/dL   Lipase   Result Value Ref Range    Lipase 48.2 5.6 - 51.3 U/L   Troponin   Result Value Ref Range    Troponin T < 0.010 ng/ml   Magnesium   Result Value Ref Range    Magnesium 2.3 1.6 - 2.4 mg/dL   Protime-INR   Result Value Ref Range    INR 0.96 0.85 - 1.13   APTT   Result Value Ref Range    aPTT 30.0 22.0 - 38.0 seconds   Uric acid   Result Value Ref Range    Uric Acid 5.7 2.4 - 5.7 mg/dL   Anion Gap   Result Value Ref Range    Anion Gap 13.0 8.0 - 16.0 meq/L   Glomerular Filtration Rate, Estimated   Result Value Ref Range    Est, Glom Filt Rate 49 (A) ml/min/1.73m2   Osmolality   Result Value Ref Range    Osmolality Calc 294.2 275.0 - 300 mOsmol/kg     I have no clear etiology for her LLE acute swelling. I discussed with Dr. Ken Baeza who graciously admits her for observation. IMPRESSION  1.  Leg swelling      DISPOSITION AND PLAN  Admit     Bev Ribeiro MD  04/26/19 0496

## 2019-04-27 LAB
ANION GAP SERPL CALCULATED.3IONS-SCNC: 10 MEQ/L (ref 8–16)
BUN BLDV-MCNC: 18 MG/DL (ref 7–22)
CALCIUM SERPL-MCNC: 8.1 MG/DL (ref 8.5–10.5)
CHLORIDE BLD-SCNC: 110 MEQ/L (ref 98–111)
CO2: 19 MEQ/L (ref 23–33)
CREAT SERPL-MCNC: 0.8 MG/DL (ref 0.4–1.2)
GFR SERPL CREATININE-BSD FRML MDRD: 71 ML/MIN/1.73M2
GLUCOSE BLD-MCNC: 116 MG/DL (ref 70–108)
HCT VFR BLD CALC: 32 % (ref 37–47)
HEMOCCULT STL QL: NEGATIVE
HEMOGLOBIN: 10.3 GM/DL (ref 12–16)
POTASSIUM SERPL-SCNC: 4.3 MEQ/L (ref 3.5–5.2)
SODIUM BLD-SCNC: 139 MEQ/L (ref 135–145)

## 2019-04-27 PROCEDURE — 85018 HEMOGLOBIN: CPT

## 2019-04-27 PROCEDURE — 2580000003 HC RX 258: Performed by: INTERNAL MEDICINE

## 2019-04-27 PROCEDURE — 85014 HEMATOCRIT: CPT

## 2019-04-27 PROCEDURE — 96361 HYDRATE IV INFUSION ADD-ON: CPT

## 2019-04-27 PROCEDURE — 99225 PR SBSQ OBSERVATION CARE/DAY 25 MINUTES: CPT | Performed by: FAMILY MEDICINE

## 2019-04-27 PROCEDURE — 2709999900 HC NON-CHARGEABLE SUPPLY

## 2019-04-27 PROCEDURE — G0378 HOSPITAL OBSERVATION PER HR: HCPCS

## 2019-04-27 PROCEDURE — 6370000000 HC RX 637 (ALT 250 FOR IP): Performed by: INTERNAL MEDICINE

## 2019-04-27 PROCEDURE — 80048 BASIC METABOLIC PNL TOTAL CA: CPT

## 2019-04-27 PROCEDURE — 82272 OCCULT BLD FECES 1-3 TESTS: CPT

## 2019-04-27 PROCEDURE — 99232 SBSQ HOSP IP/OBS MODERATE 35: CPT | Performed by: SURGERY

## 2019-04-27 PROCEDURE — 36415 COLL VENOUS BLD VENIPUNCTURE: CPT

## 2019-04-27 RX ADMIN — METOPROLOL SUCCINATE 50 MG: 50 TABLET, FILM COATED, EXTENDED RELEASE ORAL at 08:28

## 2019-04-27 RX ADMIN — LOSARTAN POTASSIUM 50 MG: 50 TABLET, FILM COATED ORAL at 08:28

## 2019-04-27 RX ADMIN — ATORVASTATIN CALCIUM 40 MG: 40 TABLET, FILM COATED ORAL at 08:28

## 2019-04-27 RX ADMIN — FENOFIBRATE 160 MG: 160 TABLET ORAL at 08:28

## 2019-04-27 RX ADMIN — TROSPIUM CHLORIDE 20 MG: 20 TABLET, FILM COATED ORAL at 08:28

## 2019-04-27 RX ADMIN — CLOPIDOGREL BISULFATE 75 MG: 75 TABLET ORAL at 08:27

## 2019-04-27 RX ADMIN — TROSPIUM CHLORIDE 20 MG: 20 TABLET, FILM COATED ORAL at 20:18

## 2019-04-27 RX ADMIN — VENLAFAXINE HYDROCHLORIDE 150 MG: 150 CAPSULE, EXTENDED RELEASE ORAL at 08:28

## 2019-04-27 RX ADMIN — ASPIRIN 325 MG: 325 TABLET ORAL at 08:27

## 2019-04-27 RX ADMIN — Medication 10 ML: at 20:18

## 2019-04-27 RX ADMIN — ISOSORBIDE MONONITRATE 30 MG: 30 TABLET ORAL at 08:28

## 2019-04-27 RX ADMIN — METOPROLOL SUCCINATE 50 MG: 50 TABLET, FILM COATED, EXTENDED RELEASE ORAL at 20:18

## 2019-04-27 ASSESSMENT — PAIN SCALES - GENERAL
PAINLEVEL_OUTOF10: 0

## 2019-04-27 NOTE — PROGRESS NOTES
Ace wrap 4 inch used to wrap left leg from toes to knee. Pt tolerated well. Left leg elevated on 2 pillows.

## 2019-04-27 NOTE — PROGRESS NOTES
Hospitalist Progress Note    Patient:  Kitty Kent      Unit/Bed:8B-36/036-A    YOB: 1945    MRN: [id number]       Acct: [de-identified]     PCP: Arnold Bowen MD    Date of Admission: 4/25/2019    Chief Complaint:   Chief Complaint   Patient presents with    Leg Swelling     left         Hospital Course:     Please see H/P for details. In summary, this is a 69 y.o. Female, with PMH of CAD, with 4 stentes placement ( 1 stent in 2006 and 3 stents in 7/2018), follows Dr. Angelita Watts, hx of HTN, HLD,  who presented to [facility] on 4/25/19 with complaints of sudden onset left leg swelling and pain x 1 day. Patient denies left leg injury. She also denies chest pain, sob, palpitations. In ER, duplex left LE US (-) for DVT. Chest CTA (-) PE and pna, (+) Bilateral upper and lower lobe dependent atelectasis, Mild cardiomegaly with heavy coronary artery calcifications. Repeat duplex LLE US again (-) for DVT, but now (+) Nonvascular hypoechoic structure in the medial left calf which is nonspecific but could be related to a hematoma. Please correlate for history of trauma. Patient was seen by surgery Dr. Bert North, who states that No signs of fluid collections or need for urgent surgical intervention. He recommend possible repeat doppler US, may benefit from MRI of left lower extremity, orthopedic consultation. Subjective:     Patient seen and examined. Pt states that left leg swelling is about the same. She states that the pain on left medial calf and left medial malleolus is now intermittent, no pain at rest, only occur on standing and walking. She denies chest pain, sob, palpitations.         Medications:  Reviewed    Infusion Medications    sodium chloride Stopped (04/27/19 1400)     Scheduled Medications    sodium chloride flush  10 mL Intravenous 2 times per day    amLODIPine  5 mg Oral Daily    aspirin  325 mg Oral Daily    atorvastatin  40 mg Oral Daily    clopidogrel  75 mg Oral Daily    trospium  20 mg Oral BID    fenofibrate  160 mg Oral Daily    isosorbide mononitrate  30 mg Oral Daily    losartan  50 mg Oral Daily    metoprolol succinate  50 mg Oral BID    pantoprazole  40 mg Oral QAM AC    venlafaxine  150 mg Oral Daily     PRN Meds: sodium chloride flush, magnesium hydroxide, ondansetron, HYDROcodone-acetaminophen, acetaminophen      Intake/Output Summary (Last 24 hours) at 4/27/2019 1435  Last data filed at 4/27/2019 1400  Gross per 24 hour   Intake 1719.84 ml   Output --   Net 1719.84 ml       Diet:  DIET GENERAL;    Exam:  /62   Pulse 88   Temp 98.2 °F (36.8 °C) (Oral)   Resp 16   Ht 5' 4\" (1.626 m)   Wt 169 lb 4.8 oz (76.8 kg)   SpO2 92%   BMI 29.06 kg/m²     General appearance: alert, not in acute distress. Neck: Supple, with full range of motion. Respiratory:  Normal respiratory effort. Clear to auscultation, bilaterally without Rales/Wheezes/Rhonchi. Cardiovascular: normal rate, regular rhythm with normal S1/S2 without murmurs, rubs or gallops. Musculoskeletal: passive and active ROM x 4 extremities. Exam of extremities: 2+ DP pulses. (+) ace wrap in placed on left leg. (+) left leg swelling from below left knee up to left foot. (+) tenderness on left medial calf and left medial malleolus. (+) bluish-greenish discoloration on left medial malleolus. Limited active and passive ROM on left ankle.        Labs:   Recent Labs     04/25/19 1901 04/26/19 0457 04/27/19 0425   WBC 8.1 10.2  --    HGB 11.6* 10.9* 10.3*   HCT 35.6* 33.6* 32.0*    282  --      Recent Labs     04/25/19 1901 04/26/19 0457 04/27/19 0425    142 139   K 3.7 3.9 4.3    109 110   CO2 21* 20* 19*   BUN 33* 26* 18   CREATININE 1.1 1.0 0.8   CALCIUM 9.7 8.7 8.1*     Recent Labs     04/25/19 1901   AST 33   ALT 27   BILIDIR <0.2   BILITOT 0.2*   ALKPHOS 47     Recent Labs     04/25/19 1901   INR 0.96     No results for input(s): Carissa Diehl in the last 72 hours.    Urinalysis:      Lab Results   Component Value Date    BLOODU Negative 10/14/2013    SPECGRAV 1.015 10/14/2013    GLUCOSEU Negative 10/14/2013       Radiology:  XR ANKLE LEFT (2 VIEWS)   Final Result   1. Degenerative changes of the ankle. Negative exam for fracture. 2. Nonspecific edema of the lower extremity. **This report has been created using voice recognition software. It may contain minor errors which are inherent in voice recognition technology. **      Final report electronically signed by Dr. Richar Raines on 4/26/2019 9:33 PM      VL DUP LOWER EXTREMITY VENOUS LEFT   Final Result      1. No evidence of deep vein thrombosis throughout the left lower extremity. 2. Nonvascular hypoechoic structure in the medial left calf which is nonspecific but could be related to a hematoma. Please correlate for history of trauma. **This report has been created using voice recognition software. It may contain minor errors which are inherent in voice recognition technology. **      Final report electronically signed by Dr Roberto Carlos Harden on 4/26/2019 1:02 PM      CTA CHEST [address]   Final Result      No acute pulmonary embolism. No acute pneumonia. Bilateral upper and lower lobe dependent atelectasis. Mild cardiomegaly with heavy coronary artery calcifications. **This report has been created using voice recognition software. It may contain minor errors which are inherent in voice recognition technology. **      Final report electronically signed by Dr. Amelia Iniguez on 4/25/2019 9:16 PM      VL DUP LOWER EXTREMITY VENOUS LEFT   Final Result   No evidence of acute left lower extremity DVT. **This report has been created using voice recognition software. It may contain minor errors which are inherent in voice recognition technology. **      Final report electronically signed by Dr. Amelia Iniguez on 4/25/2019 7:50 PM      XR KNEE LEFT (MIN 4 VIEWS)   Final Result Possible small suprapatellar joint effusion. Otherwise unremarkable left knee series. **This report has been created using voice recognition software. It may contain minor errors which are inherent in voice recognition technology. **      Final report electronically signed by Dr. Maria Del Rosario Land on 4/25/2019 7:43 PM      MRI TIBIA FIBULA LEFT WO CONTRAST    (Results Pending)         Assessment/Plan: This is a 69 y.o. Female      1. Left leg swelling and pain, etiology unclear yet    -2 duplex LLE US both (-) DVT  -repeat duplex LLE US showed \"Nonvascular hypoechoic structure in the medial left calf which is nonspecific but could be related to a hematoma\". -Pt denies trauma  -MRI left leg in process   -ortho on-board. Ortho recommend ace wrap, ice elevate leg, no surgical intervention at this time, and f/u in OP w/ Dr. Marlon Mae on 4/29/19 at [facility]. Appreciate ortho input.   -surgery evaluated the patient, no surgical intervention recommended. Appreciate surgery input.   -cont pain meds prn  -elevate legs  -cont ace wrap     2. Nonvascular hypoechoic structure in the medial left calf which is nonspecific but could be related to a hematoma noted on duplex LLE US 4/26/19    -DC therapeutic lovenox on 4/26 as no DVT noted and with possible hematoma  -SCD ordered for DVT prophylaxis   -ortho and surgery on-board  -MRI left leg in process       3. CAD with stent placement    -even having possible hematoma noted on repeat duplex LLE US, given hx of stent placement in 7/2018, will cont asa/plavix  -cont statin, BB, losartan     4. HTN, controlled     -cont amlodipine, losartan, toprol   -VS per protocol     5. Depression, stable    -cont effexor XR    6. Normocytic anemia, chronic    -baseline Hgb around 11 since 7/2018  -no symptoms of bleeding  -Hgb stable at 10.3  -iron panel, B12 and folate all nl  -H/H in am  -f/u with PCP in OP    7.  Mild non-AG metabolic acidosis, slightly worsen      -bicarb 19 today from 20 yesterday  -BMP in am   -sodium bicarb po if worsen       Anticipated Discharge in : pending      Diet: DIET GENERAL;    DVT prophylaxis: [] Lovenox                                 [] SCDs                                 [] SQ Heparin                                 [] Encourage ambulation           [] Already on Anticoagulation     Disposition:    [] Home       [] TCU       [] Rehab       [] Psych       [] SNF       [] [place]       [x] Other-Plan as above       Code Status: Full Code          Electronically signed by Parviz Richardson MD on 4/27/2019 at 2:35 PM

## 2019-04-27 NOTE — CONSULTS
Ortho Consult  Patient:  Gwen King  YOB: 1945  MRN: [id number]     Acct: [de-identified]    PCP: Claudene Sis, MD  Date of Admission: 4/25/2019  Date of Service: Pt seen/examined on 4/26/2019     Chief Complaint: Left leg pain and swelling  History Of Present Illness: 69 y.o. female who presents with left leg pain and swelling that started on Wednesday,  4/24/19, she did yard work the next day, and on Friday 4/25/19 patient had pain and swelling into knee. Patient went to urgent care due to pain and swelling in left leg and was sent to [address] [place] [address] ED where she was admitted for observation. Patient lives alone and has been doing more activity recently with going up and down step ladders as she is preparing to move. Patient is on Plavix and Aspirin. Patient ambulation status: no difficulty prior to admission  Antiplatelets/Anticoagulation includes: Plavix and Aspirin    Hx from chart and/or Pt.  And patient's nurse    Past Medical History:        Diagnosis Date    CAD (coronary artery disease)     Depression     Fibromyalgia     Fibromyalgia     GERD (gastroesophageal reflux disease)     Dr Rae Crespo Headache(784.0)     Hyperlipidemia     Hypertension     Macular degeneration, dry     MI (myocardial infarction) ([facility] [address]     Dr Tramaine Kenney SOB (shortness of breath)     Dr Diogo Topete incontinence        Past Surgical History:        Procedure Laterality Date    ARM SURGERY Left 11/20/2018    dr. Yari Neal      x2   [address] COLON SURGERY  2010    partial resection of flat polyp    COLONOSCOPY      CORONARY ANGIOPLASTY WITH STENT PLACEMENT  07/2011    Dr Daksha Gray CYST REMOVAL      BILATERAL HANDS    DILATATION, ESOPHAGUS      ENDOSCOPY, COLON, DIAGNOSTIC      FRACTURE SURGERY         Home Medications:   Prior to Admission medications Medication Sig Start Date End Date Taking? Authorizing Provider   HYDROcodone-acetaminophen (NORCO) 5-325 MG per tablet Take 1 tablet by mouth every 6 hours as needed for Pain for up to 3 days. Intended supply: 3 days. Take lowest dose possible to manage pain 4/25/19 4/28/19 Yes Mavis Matute MD   darifenacin (ENABLEX) 15 MG extended release tablet TAKE 1 TABLET DAILY 2/4/19  Yes Roosevelt Mcclain MD   Multiple Vitamins-Minerals (MULTIVITAMIN ADULT) TABS Take by mouth   Yes Historical Provider, MD   fenofibrate (TRICOR) 145 MG tablet TAKE 1 TABLET DAILY 10/22/18  Yes Roosevelt Mcclain MD   metoprolol succinate (TOPROL XL) 50 MG extended release tablet TAKE 1 TABLET TWICE A DAY 10/22/18  Yes Roosevelt Mcclain MD   venlafaxine (EFFEXOR XR) 150 MG extended release capsule TAKE 1 CAPSULE DAILY 8/16/18  Yes Roosevelt Mcclain MD   atorvastatin (LIPITOR) 40 MG tablet TAKE 1 TABLET DAILY 7/23/18  Yes Roosevelt Mcclain MD   aspirin 325 MG tablet Take 1 tablet by mouth daily 7/21/18  Yes Jordan Land MD   isosorbide mononitrate (IMDUR) 30 MG extended release tablet Take 30 mg by mouth daily Half tablet   Yes Historical Provider, MD   Multiple Vitamins-Minerals (PRESERVISION AREDS 2+MULTI VIT PO) Take by mouth daily    Yes Historical Provider, MD   losartan (COZAAR) 50 MG tablet Take 1 tablet by mouth daily. 3/18/15  Yes Roosevelt Mcclain MD   clopidogrel (PLAVIX) 75 MG tablet Take 75 mg by mouth daily. Yes Historical Provider, MD   zoledronic acid (RECLAST) 5 MG/100ML SOLN Infuse 100 mLs intravenously once for 1 dose 11/26/18 4/25/19  Roosevelt Mcclain MD   darifenacin (ENABLEX) 15 MG SR tablet Take 1 tablet by mouth daily.  9/26/13 9/15/14  Roosevelt Mcclain MD       Current Hospital Medications:    Current Facility-Administered Medications:     0.9 % sodium chloride infusion, , Intravenous, Continuous, Rockney Ing, DO, Last Rate: 50 mL/hr at 04/26/19 0542    sodium chloride flush 0.9 % injection 10 mL, 10 mL, Intravenous, 2 times  Number of children: Not on file    Years of education: Not on file    Highest education level: Not on file   Occupational History    Not on file   Social Needs    Financial resource strain: Not on file    Food insecurity:     Worry: Not on file     Inability: Not on file    Transportation needs:     Medical: Not on file     Non-medical: Not on file   Tobacco Use    Smoking status: Former Smoker     Packs/day: 0.00     Years: 50.00     Pack years: 0.00     Start date: 1965     Last attempt to quit: 2018     Years since quittin.9    Smokeless tobacco: Never Used   Substance and Sexual Activity    Alcohol use: Not Currently    Drug use: No    Sexual activity: Not on file   Lifestyle    Physical activity:     Days per week: Not on file     Minutes per session: Not on file    Stress: Not on file   Relationships    Social connections:     Talks on phone: Not on file     Gets together: Not on file     Attends Church service: Not on file     Active member of club or organization: Not on file     Attends meetings of clubs or organizations: Not on file     Relationship status: Not on file    Intimate partner violence:     Fear of current or ex partner: Not on file     Emotionally abused: Not on file     Physically abused: Not on file     Forced sexual activity: Not on file   Other Topics Concern    Not on file   Social History Narrative    Not on file     Family History:        Problem Relation Age of Onset    COPD Father     Hearing Loss Father     Cancer Mother 39        uterine and breast    Heart Disease Mother     Diabetes Mother     High Cholesterol Mother     Arthritis Mother     Coronary Art Dis Mother     Heart Attack Mother     Diabetes Sister     High Cholesterol Sister     Stroke Sister 72    Allergy (Severe) Sister     Arthritis Sister     Miscarriages / [place] Sister     Cancer Maternal Uncle     Heart Attack Paternal Grandmother 76        MI    Arthritis Paternal Grandmother     Obesity Paternal Grandmother     Early Death Maternal Grandmother     Alcohol Abuse Maternal Grandfather      Further Family History is noncontributory to this injury. REVIEW OF SYSTEMS:    Patient denies any numbness or tingling of bilateral LE  Review of Systems - General ROS: negative for - chills, fatigue, fever, malaise or night sweats  Psychological ROS: negative  Ophthalmic ROS: negative   ENT ROS: negative for - headaches or sore throat  Hematological and Lymphatic ROS: negative for - bleeding problems or blood clots  Respiratory ROS: no cough, shortness of breath, or wheezing  Cardiovascular ROS: no chest pain or dyspnea on exertion  Gastrointestinal ROS: negative  Musculoskeletal ROS: See HPI  Neurological ROS: negative for - bowel and bladder control changes, gait disturbance or numbness/tingling  All other systems reviewed and are negative      PHYSICAL EXAM:  BP (!) 125/58   Pulse 74   Temp 98.5 °F (36.9 °C) (Oral)   Resp 16   Ht 5' 4\" (1.626 m)   Wt 169 lb 4.8 oz (76.8 kg)   SpO2 94%   BMI 29.06 kg/m²   GENERAL APPEARANCE: Awake and oriented x3. No acute distress, except appropriate to injury. MOOD AND AFFECT: Calm appropriate to situation  GAIT AND STATION: Patient is in bed and unable to ambulate secondary to known injury. REFLEXES: No clonus or Babinski. COORDINATION and BALANCE: Patient is grossly coordinated unable to ambulate secondary to known injury. Lymphadenopathy: none on examination of the affected extremity(s)    Right Lower Extremity:  No obvious pain or deformity to inspection with normal joint range of motion, stability, and muscle strength except noted below. DP/PT 2+. No Lymphedema. Skin intact except where noted below. No tenderness to palpation over hip/knee/tibia/medial mal/lateral mal/calcaneus/midfoot/forefoot.  Sensation intact to light touch in the superficial peroneal, deep peroneal, tibial, sural, saphenous nerve distributions. Motor function of tibialis anterior, extensor hallucis longus, and gactrocsoleus complex intact. Negative log roll test.  Full ROM right LE. No swelling or TTP present. Left Lower Extremity:  No obvious pain or deformity to inspection with normal joint range of motion, stability, and muscle strength except noted below. DP/PT 2=. No Lymphedema. Skin intact except where noted below. No tenderness to palpation over hip/knee/tibia/medial mal/lateral mal/calcaneus/midfoot/forefoot. Sensation intact to light touch in the superficial peroneal, deep peroneal, tibial, sural, saphenous nerve distributions. Motor function of tibialis anterior, extensor hallucis longus, and gactrocsoleus complex intact. Negative log roll test.  TTP medial gastrocnemius muscle belly into musculotendinous junction. Decreased ROM left knee and ankle due to swelling. Negative Jones test.  Knee ligaments stable. Swelling present from knee to toes. Ecchymosis lateral ankle, minimally medial ankle. Labs:   CBC:   Lab Results   Component Value Date    WBC 10.2 04/26/2019    RBC 3.67 04/26/2019    RBC 4.07 09/16/2011    HEMOGLOBIN 12.5 09/16/2011     BMP:  Lab Results   Component Value Date    GLUCOSE 109 04/26/2019    GLUCOSE 95 03/16/2012    CO2 20 04/26/2019    BUN 26 04/26/2019    CREATININE 1.0 04/26/2019    CALCIUM 8.7 04/26/2019     PT/INR:   Lab Results   Component Value Date    INR 0.96 04/25/2019    APTT 30.0 04/25/2019     Type and Screen:   Lab Results   Component Value Date    LABANTI NEG 07/18/2018     CRP:   Lab Results   Component Value Date    CRP 0.57 04/26/2019     ESR:   Lab Results   Component Value Date    SEDRATE 8 04/26/2019     HgBA1c:    Lab Results   Component Value Date    LABA1C 6.2 11/26/2018     The above labs were reviewed by me. Radiology:   XR: Left knee -   Possible small suprapatellar joint effusion. Otherwise unremarkable left knee series. Left ankle -   1.  Degenerative changes of the ankle. Negative exam for fracture.           2. Nonspecific edema of the lower extremity.         CT: chest -   No acute pulmonary embolism. No acute pneumonia. Bilateral upper and lower lobe dependent atelectasis. Mild cardiomegaly with heavy coronary artery calcifications. MRI: Tibia/Fibula - results pending  Doppler - Left LE on 4/26/19 - NO DVT                Left LE on 4/27/19 -   1. No evidence of deep vein thrombosis throughout the left lower extremity. 2. Nonvascular hypoechoic structure in the medial left calf which is nonspecific but could be related to a hematoma. Please correlate for history of trauma.         Radiology report reviewed.     ASSESSMENT:  69 y.o. female with left leg hematoma    PLAN:  -D/w Attending Physician   -Ace wrap or TU hose to left LE  -No acute surgical intervention  -f/u OP w/ Dr. Eleni Rain on 4/29/19 at [address]  -Weight Bearing as Tolerated left LE with walker or cane  -Pain management per Primary team  -Medical Management per Primary team

## 2019-04-27 NOTE — PLAN OF CARE
Problem: Pain:  Goal: Pain level will decrease  Description  Pain level will decrease  Outcome: Ongoing  Note:   Patient's only complaint is some left lower leg pain when standing on it. Patient states that she doesn't not have any pain while laying in the bed. Reminded patient to report any pain, pressure, or shortness of breath to the nurse. Problem: Pain:  Goal: Control of acute pain  Description  Control of acute pain  Outcome: Ongoing     Problem: Pain:  Goal: Control of chronic pain  Description  Control of chronic pain  Outcome: Ongoing     Problem: Discharge Planning:  Goal: Discharged to appropriate level of care  Description  Discharged to appropriate level of care  Outcome: Ongoing  Note:   Patient is alert and oriented. Patient is from home and will return there when medically able. Problem: Falls - Risk of:  Goal: Will remain free from falls  Description  Will remain free from falls  Outcome: Ongoing  Note:   No falls this shift. Patient is a fall risk due to leg lower leg injury. Patient is up with a walker. Patient is alert and oriented and will use the call light appropriately. Bed alarm on. Hourly rounding performed. Problem: Falls - Risk of:  Goal: Absence of physical injury  Description  Absence of physical injury  Outcome: Ongoing  Note:   No S/SX of injury this shift. Problem: Tissue Perfusion - Cardiopulmonary, Altered:  Goal: Absence of angina  Description  Absence of angina  Outcome: Ongoing  Note:   No chest pain.       Problem: Tissue Perfusion - Cardiopulmonary, Altered:  Goal: Hemodynamic stability will improve  Description  Hemodynamic stability will improve  Outcome: Ongoing  Note:   Vitals:    04/26/19 1238 04/26/19 1505 04/26/19 2039 04/26/19 2314   BP: 123/69 105/68 (!) 125/58 138/63   Pulse: 72 81 74 71   Resp: 18 16 16 16   Temp: 97.9 °F (36.6 °C) 98 °F (36.7 °C) 98.5 °F (36.9 °C) 99 °F (37.2 °C)   TempSrc: Oral Oral Oral Oral   SpO2: 90% 90% 94% 91%

## 2019-04-27 NOTE — CONSULTS
RELEASE      x2    CHOLECYSTECTOMY  1975    COLON SURGERY  2010    partial resection of flat polyp    COLONOSCOPY      CORONARY ANGIOPLASTY WITH STENT PLACEMENT  07/2011    Dr Sari Primrose CYST REMOVAL      BILATERAL HANDS    DILATATION, ESOPHAGUS      ENDOSCOPY, COLON, DIAGNOSTIC      FRACTURE SURGERY       Medications:  Prior to Admission medications    Medication Sig Start Date End Date Taking? Authorizing Provider   HYDROcodone-acetaminophen (NORCO) 5-325 MG per tablet Take 1 tablet by mouth every 6 hours as needed for Pain for up to 3 days. Intended supply: 3 days. Take lowest dose possible to manage pain 4/25/19 4/28/19 Yes Viet Berry MD   darifenacin (ENABLEX) 15 MG extended release tablet TAKE 1 TABLET DAILY 2/4/19  Yes Christian Ahumada MD   Multiple Vitamins-Minerals (MULTIVITAMIN ADULT) TABS Take by mouth   Yes Historical Provider, MD   fenofibrate (TRICOR) 145 MG tablet TAKE 1 TABLET DAILY 10/22/18  Yes Christian Ahumada MD   metoprolol succinate (TOPROL XL) 50 MG extended release tablet TAKE 1 TABLET TWICE A DAY 10/22/18  Yes Christian Ahumada MD   venlafaxine (EFFEXOR XR) 150 MG extended release capsule TAKE 1 CAPSULE DAILY 8/16/18  Yes Christian Ahumada MD   atorvastatin (LIPITOR) 40 MG tablet TAKE 1 TABLET DAILY 7/23/18  Yes Christian Ahumada MD   aspirin 325 MG tablet Take 1 tablet by mouth daily 7/21/18  Yes Arelis Soliz MD   isosorbide mononitrate (IMDUR) 30 MG extended release tablet Take 30 mg by mouth daily Half tablet   Yes Historical Provider, MD   Multiple Vitamins-Minerals (PRESERVISION AREDS 2+MULTI VIT PO) Take by mouth daily    Yes Historical Provider, MD   losartan (COZAAR) 50 MG tablet Take 1 tablet by mouth daily. 3/18/15  Yes Christian Ahumada MD   clopidogrel (PLAVIX) 75 MG tablet Take 75 mg by mouth daily.      Yes Historical Provider, MD   zoledronic acid (RECLAST) 5 MG/100ML SOLN Infuse 100 mLs intravenously once for 1 dose 11/26/18 4/25/19  Albaro Garcia MD   darifenacin (ENABLEX) 15 MG SR tablet Take 1 tablet by mouth daily. 9/26/13 9/15/14  Albaro Garcia MD    Scheduled Meds:   sodium chloride flush  10 mL Intravenous 2 times per day    amLODIPine  5 mg Oral Daily    aspirin  325 mg Oral Daily    atorvastatin  40 mg Oral Daily    clopidogrel  75 mg Oral Daily    trospium  20 mg Oral BID    fenofibrate  160 mg Oral Daily    isosorbide mononitrate  30 mg Oral Daily    losartan  50 mg Oral Daily    metoprolol succinate  50 mg Oral BID    pantoprazole  40 mg Oral QAM AC    venlafaxine  150 mg Oral Daily     Continuous Infusions:   sodium chloride 50 mL/hr at 04/26/19 0542     PRN Meds:.sodium chloride flush, magnesium hydroxide, ondansetron, HYDROcodone-acetaminophen, acetaminophen  Allergies:  is allergic to benadryl [diphenhydramine hcl] and flexeril [cyclobenzaprine hcl]. Family History:  family history includes Alcohol Abuse in her maternal grandfather; Allergy (Severe) in her sister; Arthritis in her mother, paternal grandmother, and sister; COPD in her father; Cancer in her maternal uncle; Cancer (age of onset: 39) in her mother; Coronary Art Dis in her mother; Diabetes in her mother and sister; Early Death in her maternal grandmother; Hearing Loss in her father; Heart Attack in her mother; Heart Attack (age of onset: 76) in her paternal grandmother; Heart Disease in her mother; High Cholesterol in her mother and sister; Marney Allegra / Ellin [place] in her sister; Obesity in her paternal grandmother; Stroke (age of onset: 72) in her sister. Social History:   reports that she quit smoking about 10 months ago. She started smoking about 53 years ago. She smoked 0.00 packs per day for 50.00 years. She has never used smokeless tobacco. She reports that she drank alcohol. She reports that she does not use drugs. Review of Systems:  General Denies any fever or chills. No significant unexpected weight change.   HEENT Denies any diplopia, tinnitus or vertigo. No chronic headaches. Macular degeneration. Resp Shortness of breath. No cough or wheezing  Cardiac Denies any chest pain, palpitations, claudication or edema. Hypertension. Hyperlipidemia. History of myocardial infarction. GI Denies any melena, hematochezia, hematemesis or pyrosis. GERD.  Denies any frequency, urgency, hesitancy or incontinence  Heme Denies bruising or bleeding easily  Endocrine Denies any history of diabetes or thyroid disease  Neuro Denies any focal motor or sensory deficits  Musculoskeletal  Osteoarthritis. Fibromyalgia. No gout. No weakness. Left lower extremity swelling. Psychiatric  Depression. No agitation. No panic attacks. No suicidal ideation. OBJECTIVE:   CURRENT VITALS:  height is 5' 4\" (1.626 m) and weight is 169 lb 4.8 oz (76.8 kg). Her oral temperature is 98.2 °F (36.8 °C). Her blood pressure is 130/62 and her pulse is 84. Her respiration is 16 and oxygen saturation is 88% (abnormal). Temperature Range (24h):Temp: 98.2 °F (36.8 °C) Temp  Av.4 °F (36.9 °C)  Min: 97.9 °F (36.6 °C)  Max: 99 °F (37.2 °C)  BP Range (73B): Systolic (45RHM), GSX:590 , Min:105 , LDF:592     Diastolic (43GZN), SQD:76, Min:58, Max:69    Pulse Range (24h): Pulse  Av.7  Min: 70  Max: 84  Respiration Range (24h): Resp  Av.6  Min: 16  Max: 18  Current Pulse Ox (24h):  SpO2: (!) 88 %  Pulse Ox Range (24h):  SpO2  Av.1 %  Min: 88 %  Max: 95 %  Oxygen Amount and Delivery: O2 Flow Rate (L/min): 0 L/min  CONSTITUTIONAL: Alert and oriented times 3, no acute distress and cooperative to examination with proper mood and affect. SKIN: Skin color, texture, turgor normal. No rashes or lesions. LYMPH: no cervical nodes, no inguinal nodes  HEENT: Head is normocephalic, atraumatic. EOMI, PERRLA.   NECK: Supple, symmetrical, trachea midline, no adenopathy, thyroid symmetric, not enlarged and no tenderness, skin normal.  CHEST/LUNGS: chest symmetric with normal A/P diameter, normal respiratory rate and rhythm, lungs clear to auscultation without wheezes, rales or rhonchi. No accessory muscle use. Scars None   CARDIOVASCULAR: Heart sounds are normal.  Regular rate and rhythm. Normal S1 and S2.  ABDOMEN: Normal shape. Normal bowel sounds. Soft, nondistended, no masses or organomegaly. No evidence of incarcerated/strangulated hernia. Percussion: Normal without hepatosplenomegally. Tenderness: absent. RECTAL: deferred, not clinically indicated  NEUROLOGIC: There are no focalizing motor or sensory deficits. CN II-XII are grossly intact. EXTREMITIES: no cyanosis, no clubbing. Swelling left lower extremity from the down through foot. No obvious infection. LABS:     Recent Labs     04/25/19  1901 04/26/19  0457 04/27/19  0425   WBC 8.1 10.2  --    HGB 11.6* 10.9* 10.3*   HCT 35.6* 33.6* 32.0*    282  --     142 139   K 3.7 3.9 4.3    109 110   CO2 21* 20* 19*   BUN 33* 26* 18   CREATININE 1.1 1.0 0.8   MG 2.3  --   --    CALCIUM 9.7 8.7 8.1*   INR 0.96  --   --    AST 33  --   --    ALT 27  --   --    BILITOT 0.2*  --   --    BILIDIR <0.2  --   --    LIPASE 48.2  --   --      RADIOLOGY:   I have personally reviewed the following films:    Narrative   PROCEDURE: CTA CHEST W WO CONTRAST       CLINICAL INFORMATION: Shortness of breath tachycardia obvious swelling of left lower extremity. .       COMPARISON: Noncontrast chest CT dated 8/21/2018       TECHNIQUE: 3 mm thick by 1.5 mm interval axial images were obtained through the chest after the administration of IV contrast.  A non-contrast localizer was obtained.  Sagittal and coronal MIP 3D reconstructions were performed on the scanner.  80 mL    Isovue-370 were administered intravenously.       All CT scans at this facility use dose modulation, iterative reconstruction, and/or weight-based dosing when appropriate to reduce radiation dose to as low as reasonably achievable.       FINDINGS:   Lungs: There is no pneumonia or mass. There is bilateral upper and lower lobe dependent atelectasis. There is a subcentimeter left lower lobe calcified granuloma. The trachea and central bronchi are unremarkable.       Pleura: There is no pleural effusion. There is no pneumothorax.       Heart: There is mild cardiomegaly. There is no pericardial effusion. There are heavy coronary artery atherosclerotic calcifications.       Pulmonary vasculature: There is adequate opacification of the pulmonary arteries. There is no pulmonary embolism.       Lina and mediastinum: There is minimal left hilar adenopathy. There are left hilar calcified lymph nodes. There is a small hiatal hernia.       Thoracic aorta/vascular: There is no thoracic aortic aneurysm or dissection. The imaged brachiocephalic arteries are unremarkable.       Imaged upper abdomen: There is a splenic calcified granuloma.       Musculoskeletal system: No change in a nearly 1 cm sclerotic bone island in the left humeral head. There is multilevel vertebral endplate spondylosis.       Chest/body wall soft tissues: Unremarkable       Thyroid: Unremarkable               Impression       No acute pulmonary embolism. No acute pneumonia. Bilateral upper and lower lobe dependent atelectasis. Mild cardiomegaly with heavy coronary artery calcifications.       **This report has been created using voice recognition software. It may contain minor errors which are inherent in voice recognition technology. **       Final report electronically signed by Dr. Wilmer Crow on 4/25/2019 9:16 PM         Narrative   PROCEDURE: XR KNEE LEFT (MIN 4 VIEWS)       CLINICAL INFORMATION: Pain and swelling.       COMPARISON: No prior study.       TECHNIQUE: 4 views of the left knee include an AP view, a lateral view and bilateral oblique views.       FINDINGS:   Bones/joints: No fracture or dislocation. No joint space narrowing or erosive changes.  There may be a small suprapatellar joint effusion. Soft tissues: No significant soft tissue swelling.           Impression    Possible small suprapatellar joint effusion. Otherwise unremarkable left knee series.       **This report has been created using voice recognition software. It may contain minor errors which are inherent in voice recognition technology. **       Final report electronically signed by Dr. Jud Salazar on 4/25/2019 7:43 PM     Narrative   PROCEDURE: VL DUP LOWER EXTREMITY VENOUS LEFT       CLINICAL INFORMATION: Calf pain and tense swelling.       COMPARISON: No prior study.       TECHNIQUE: Left lower extremity duplex venous ultrasound was performed using gray scale, color flow and spectral doppler imaging.       FINDINGS:       Deep veins (common femoral, femoral, popliteal, and calf veins): Patent and compressible, with spontaneous flow, phasicity, and satisfactory augmentation. The right common femoral vein is patent and compressible with flow noted by color flow and spectral Doppler ultrasound.       Superficial veins (saphenous veins): The imaged portion of the greater saphenous vein at the saphenofemoral junction is patent by color-flow ultrasound.       Soft tissues: No Baker's cyst. No focal fluid collection or mass.               Impression   No evidence of acute left lower extremity DVT.           **This report has been created using voice recognition software. It may contain minor errors which are inherent in voice recognition technology. **       Final report electronically signed by Dr. Jud Salazar on 4/25/2019 7:50 PM       Probable lymphedema cause?   No palpable L Groin LN  MRI pending    Electronically signed by Bety Aldana MD on 4/27/2019 at 11:33 AM

## 2019-04-28 LAB
ANION GAP SERPL CALCULATED.3IONS-SCNC: 11 MEQ/L (ref 8–16)
BUN BLDV-MCNC: 17 MG/DL (ref 7–22)
CALCIUM SERPL-MCNC: 8.6 MG/DL (ref 8.5–10.5)
CHLORIDE BLD-SCNC: 110 MEQ/L (ref 98–111)
CO2: 23 MEQ/L (ref 23–33)
CREAT SERPL-MCNC: 0.9 MG/DL (ref 0.4–1.2)
GFR SERPL CREATININE-BSD FRML MDRD: 62 ML/MIN/1.73M2
GLUCOSE BLD-MCNC: 119 MG/DL (ref 70–108)
HCT VFR BLD CALC: 32.7 % (ref 37–47)
HEMOGLOBIN: 10.5 GM/DL (ref 12–16)
POTASSIUM SERPL-SCNC: 4.2 MEQ/L (ref 3.5–5.2)
SODIUM BLD-SCNC: 144 MEQ/L (ref 135–145)

## 2019-04-28 PROCEDURE — 85018 HEMOGLOBIN: CPT

## 2019-04-28 PROCEDURE — 80048 BASIC METABOLIC PNL TOTAL CA: CPT

## 2019-04-28 PROCEDURE — G0378 HOSPITAL OBSERVATION PER HR: HCPCS

## 2019-04-28 PROCEDURE — 6370000000 HC RX 637 (ALT 250 FOR IP): Performed by: INTERNAL MEDICINE

## 2019-04-28 PROCEDURE — 2580000003 HC RX 258: Performed by: INTERNAL MEDICINE

## 2019-04-28 PROCEDURE — 99225 PR SBSQ OBSERVATION CARE/DAY 25 MINUTES: CPT | Performed by: FAMILY MEDICINE

## 2019-04-28 PROCEDURE — 85014 HEMATOCRIT: CPT

## 2019-04-28 PROCEDURE — 6370000000 HC RX 637 (ALT 250 FOR IP): Performed by: FAMILY MEDICINE

## 2019-04-28 PROCEDURE — 99225 PR SBSQ OBSERVATION CARE/DAY 25 MINUTES: CPT | Performed by: SURGERY

## 2019-04-28 PROCEDURE — 2709999900 HC NON-CHARGEABLE SUPPLY

## 2019-04-28 PROCEDURE — 36415 COLL VENOUS BLD VENIPUNCTURE: CPT

## 2019-04-28 RX ORDER — AMLODIPINE BESYLATE 10 MG/1
10 TABLET ORAL DAILY
Status: DISCONTINUED | OUTPATIENT
Start: 2019-04-29 | End: 2019-04-30 | Stop reason: HOSPADM

## 2019-04-28 RX ORDER — AMLODIPINE BESYLATE 5 MG/1
5 TABLET ORAL ONCE
Status: COMPLETED | OUTPATIENT
Start: 2019-04-28 | End: 2019-04-28

## 2019-04-28 RX ADMIN — CLOPIDOGREL BISULFATE 75 MG: 75 TABLET ORAL at 09:43

## 2019-04-28 RX ADMIN — VENLAFAXINE HYDROCHLORIDE 150 MG: 150 CAPSULE, EXTENDED RELEASE ORAL at 09:44

## 2019-04-28 RX ADMIN — Medication 10 ML: at 21:33

## 2019-04-28 RX ADMIN — PANTOPRAZOLE SODIUM 40 MG: 40 TABLET, DELAYED RELEASE ORAL at 09:43

## 2019-04-28 RX ADMIN — ATORVASTATIN CALCIUM 40 MG: 40 TABLET, FILM COATED ORAL at 09:45

## 2019-04-28 RX ADMIN — METOPROLOL SUCCINATE 50 MG: 50 TABLET, FILM COATED, EXTENDED RELEASE ORAL at 09:46

## 2019-04-28 RX ADMIN — TROSPIUM CHLORIDE 20 MG: 20 TABLET, FILM COATED ORAL at 21:33

## 2019-04-28 RX ADMIN — TROSPIUM CHLORIDE 20 MG: 20 TABLET, FILM COATED ORAL at 09:44

## 2019-04-28 RX ADMIN — FENOFIBRATE 160 MG: 160 TABLET ORAL at 09:44

## 2019-04-28 RX ADMIN — LOSARTAN POTASSIUM 50 MG: 50 TABLET, FILM COATED ORAL at 09:44

## 2019-04-28 RX ADMIN — AMLODIPINE BESYLATE 5 MG: 5 TABLET ORAL at 18:15

## 2019-04-28 RX ADMIN — Medication 10 ML: at 09:45

## 2019-04-28 RX ADMIN — METOPROLOL SUCCINATE 50 MG: 50 TABLET, FILM COATED, EXTENDED RELEASE ORAL at 21:32

## 2019-04-28 RX ADMIN — ISOSORBIDE MONONITRATE 30 MG: 30 TABLET ORAL at 09:44

## 2019-04-28 RX ADMIN — AMLODIPINE BESYLATE 5 MG: 5 TABLET ORAL at 09:43

## 2019-04-28 RX ADMIN — ASPIRIN 325 MG: 325 TABLET ORAL at 09:43

## 2019-04-28 ASSESSMENT — PAIN SCALES - GENERAL
PAINLEVEL_OUTOF10: 0

## 2019-04-28 ASSESSMENT — ENCOUNTER SYMPTOMS
SHORTNESS OF BREATH: 0
EYE PAIN: 0
PHOTOPHOBIA: 0
ABDOMINAL PAIN: 0
ABDOMINAL DISTENTION: 0
EYE DISCHARGE: 0
DIARRHEA: 0
BACK PAIN: 0
WHEEZING: 0
VOICE CHANGE: 0
CONSTIPATION: 0
CHOKING: 0
NAUSEA: 0
SINUS PRESSURE: 0
TROUBLE SWALLOWING: 0
EYE REDNESS: 0
COUGH: 0
BLOOD IN STOOL: 0
SORE THROAT: 0
EYE ITCHING: 0
VOMITING: 0
RHINORRHEA: 0
CHEST TIGHTNESS: 0

## 2019-04-28 NOTE — PROGRESS NOTES
Re iced left heel and ankle area. Pt stated pain is much better after icing the areas of left heel and ankle. Pt is satisfied.

## 2019-04-28 NOTE — PROGRESS NOTES
Ace wrap removed to evelaute pts left leg. Left knee swelling decreased per pts statement. Left leg remains with 2+ edema and increase in discoloration and brusing left lateral foot posterior heel around into inner heel.  Pt denies pain while at rest.

## 2019-04-28 NOTE — PROGRESS NOTES
Daily    atorvastatin  40 mg Oral Daily    clopidogrel  75 mg Oral Daily    trospium  20 mg Oral BID    fenofibrate  160 mg Oral Daily    isosorbide mononitrate  30 mg Oral Daily    losartan  50 mg Oral Daily    metoprolol succinate  50 mg Oral BID    pantoprazole  40 mg Oral QAM AC    venlafaxine  150 mg Oral Daily     PRN Meds: sodium chloride flush, magnesium hydroxide, ondansetron, HYDROcodone-acetaminophen, acetaminophen      Intake/Output Summary (Last 24 hours) at 4/28/2019 1422  Last data filed at 4/28/2019 0958  Gross per 24 hour   Intake 1290 ml   Output --   Net 1290 ml       Diet:  DIET GENERAL;    Exam:  BP (!) 142/58   Pulse 71   Temp 97.6 °F (36.4 °C) (Oral)   Resp 16   Ht 5' 4\" (1.626 m)   Wt 170 lb 1.6 oz (77.2 kg)   SpO2 91%   BMI 29.20 kg/m²     General appearance: alert, not in acute distress. Neck: Supple, with full range of motion. Respiratory:  Normal respiratory effort. Clear to auscultation, bilaterally without Rales/Wheezes/Rhonchi. Cardiovascular: normal rate, regular rhythm with normal S1/S2 without murmurs, rubs or gallops. Musculoskeletal: passive and active ROM x 4 extremities. Exam of extremities: 2+ DP pulses. (+) ace wrap in placed on left leg. (+) left leg swelling from below left knee up to left foot. (+) tenderness on left medial calf and left medial malleolus. (+) bluish-greenish discoloration on left medial malleolus. Limited active and passive ROM on left ankle.        Labs:   Recent Labs     04/25/19  1901 04/26/19 0457 04/27/19 0425 04/28/19  0537   WBC 8.1 10.2  --   --    HGB 11.6* 10.9* 10.3* 10.5*   HCT 35.6* 33.6* 32.0* 32.7*    282  --   --      Recent Labs     04/26/19 0457 04/27/19  0425 04/28/19  0537    139 144   K 3.9 4.3 4.2    110 110   CO2 20* 19* 23   BUN 26* 18 17   CREATININE 1.0 0.8 0.9   CALCIUM 8.7 8.1* 8.6     Recent Labs     04/25/19  1901   AST 33   ALT 27   BILIDIR <0.2   BILITOT 0.2*   ALKPHOS 47     Recent Labs     04/25/19  1901   INR 0.96     No results for input(s): Santiago Su in the last 72 hours. Urinalysis:      Lab Results   Component Value Date    BLOODU Negative 10/14/2013    SPECGRAV 1.015 10/14/2013    GLUCOSEU Negative 10/14/2013       Radiology:  XR ANKLE LEFT (2 VIEWS)   Final Result   1. Degenerative changes of the ankle. Negative exam for fracture. 2. Nonspecific edema of the lower extremity. **This report has been created using voice recognition software. It may contain minor errors which are inherent in voice recognition technology. **      Final report electronically signed by Dr. Autumn Salazar on 4/26/2019 9:33 PM      VL DUP LOWER EXTREMITY VENOUS LEFT   Final Result      1. No evidence of deep vein thrombosis throughout the left lower extremity. 2. Nonvascular hypoechoic structure in the medial left calf which is nonspecific but could be related to a hematoma. Please correlate for history of trauma. **This report has been created using voice recognition software. It may contain minor errors which are inherent in voice recognition technology. **      Final report electronically signed by Dr Otto Smith on 4/26/2019 1:02 PM      CTA CHEST [address]   Final Result      No acute pulmonary embolism. No acute pneumonia. Bilateral upper and lower lobe dependent atelectasis. Mild cardiomegaly with heavy coronary artery calcifications. **This report has been created using voice recognition software. It may contain minor errors which are inherent in voice recognition technology. **      Final report electronically signed by Dr. Mikala Ibrahim on 4/25/2019 9:16 PM      VL DUP LOWER EXTREMITY VENOUS LEFT   Final Result   No evidence of acute left lower extremity DVT. **This report has been created using voice recognition software. It may contain minor errors which are inherent in voice recognition technology. **      Final report electronically around 11 since 7/2018  -no symptoms of bleeding  -Hgb stable at 10.5  -iron panel, B12 and folate all nl  -H/H in am  -f/u with PCP in [place]    7. Mild non-AG metabolic acidosis, resolved         Anticipated Discharge in : pending      Diet: DIET GENERAL;    DVT prophylaxis: [] Lovenox                                 [] SCDs                                 [] SQ Heparin                                 [] Encourage ambulation           [] Already on Anticoagulation     Disposition:    [] Home       [] TCU       [] Rehab       [] Psych       [] SNF       [] [place]       [x] Other-Plan as above.  Awaiting left leg MRI report        Code Status: Full Code          Electronically signed by Lissette Castillo MD on 4/28/2019 at 2:22 PM

## 2019-04-28 NOTE — PROGRESS NOTES
[address] [place] [address]  General Surgery Progress Note  Dr. Calli Lara for Dr. Katherin Ramirez     Pt Name: Syed Weeks  MRN: [id number]  YOB: 1945  Date of evaluation: 4/27/2019  Primary Care Physician: Oniel De La Cruz MD  Patient evaluated at the request of Hospitalist     Reason for evaluation: Left lower extremity swelling  IMPRESSIONS:   1. Left lower extremity swelling  2. Now with some bruising in patient on antiplatelet agents. No history of trauma  PLANS:   1. MRI completed interpretation is pending. Await results. 2. Suspect swelling may be too hemorrhage within the leg. 3. Pain control  4. Increase activity. PT/OT. 5. Ultrasound negative for venous thrombosis. 6. Continue supportive care and monitoring  7. Hemoglobin stable. 8. No general surgical process  9. Call general surgery as needed.     SUBJECTIVE:   Chief complaint: Left lower extremity swelling     History of present illness:  Smitha Benjamin is a 72-year-old female who was admitted 2 nites ago secondary to left lower extremity swelling and pain. She states it started 3 days ago. Swelling from knee down through the ankle. Denies any trauma. On Plavix and aspirin. History of coronary artery disease. Initially had some shortness of breath and tachycardia. CT chest negative. Initial Doppler negative for DVT. No redness. Mild warmth. Painful to walk due to swelling. No fever, chills or sweats. No unexplained weight loss. No abdominal pain. No issues with the right lower extremity. She states that just started to swell and gradually worsened. Interval history: Smitha Benjamin now has some bruising at the ankle. She mass spontaneously ruptured a vessel causing swelling. Hemoglobin is stable. MRI completed interpretation is pending. She has no increased swelling. Venous study negative for DVT.   Continue compression to the lower extremity activity as tolerated.     Past Medical History:  Past Medical History             Diagnosis Date    CAD (coronary artery disease)      Depression      Fibromyalgia      Fibromyalgia      GERD (gastroesophageal reflux disease)       Dr William Sharpe Headache(784.0)      Hyperlipidemia      Hypertension      Macular degeneration, dry      MI (myocardial infarction) ([facility] [address]       Dr Breanna Gomez SOB (shortness of breath)       Dr Deng Irwin Urinary incontinence           Past Surgical History:  Past Surgical History             Procedure Laterality Date    ARM SURGERY Left 11/20/2018     dr. Marisa El         x2    CHOLECYSTECTOMY   1975    COLON SURGERY   2010     partial resection of flat polyp    COLONOSCOPY        CORONARY ANGIOPLASTY WITH STENT PLACEMENT   07/2011     Dr Partida Saliva        CYST REMOVAL         BILATERAL HANDS    DILATATION, ESOPHAGUS        ENDOSCOPY, COLON, DIAGNOSTIC        FRACTURE SURGERY             Medications:  Home Medications           Prior to Admission medications    Medication Sig Start Date End Date Taking? Authorizing Provider   HYDROcodone-acetaminophen (NORCO) 5-325 MG per tablet Take 1 tablet by mouth every 6 hours as needed for Pain for up to 3 days. Intended supply: 3 days.  Take lowest dose possible to manage pain 4/25/19 4/28/19 Yes Armen Quevedo MD   darifenacin (ENABLEX) 15 MG extended release tablet TAKE 1 TABLET DAILY 2/4/19   Yes Donna Oliveros MD   Multiple Vitamins-Minerals (MULTIVITAMIN ADULT) TABS Take by mouth     Yes Historical Provider, MD   fenofibrate (TRICOR) 145 MG tablet TAKE 1 TABLET DAILY 10/22/18   Yes Donna Oliveros MD   metoprolol succinate (TOPROL XL) 50 MG extended release tablet TAKE 1 TABLET TWICE A DAY 10/22/18   Yes Donna Oliveros MD   venlafaxine (EFFEXOR XR) 150 MG extended release capsule TAKE 1 CAPSULE DAILY 8/16/18   Yes Donna Oliveros MD   atorvastatin (LIPITOR) 40 MG tablet TAKE 1 TABLET DAILY 7/23/18   Yes Roosevelt Mcclain MD   aspirin 325 MG tablet Take 1 tablet by mouth daily 7/21/18   Yes Jordan Land MD   isosorbide mononitrate (IMDUR) 30 MG extended release tablet Take 30 mg by mouth daily Half tablet     Yes Historical Provider, MD   Multiple Vitamins-Minerals (PRESERVISION AREDS 2+MULTI VIT PO) Take by mouth daily      Yes Historical Provider, MD   losartan (COZAAR) 50 MG tablet Take 1 tablet by mouth daily. 3/18/15   Yes Roosevelt Mcclain MD   clopidogrel (PLAVIX) 75 MG tablet Take 75 mg by mouth daily.       Yes Historical Provider, MD   zoledronic acid (RECLAST) 5 MG/100ML SOLN Infuse 100 mLs intravenously once for 1 dose 11/26/18 4/25/19   Roosevelt Mcclain MD   darifenacin (ENABLEX) 15 MG SR tablet Take 1 tablet by mouth daily. 9/26/13 9/15/14   Roosevelt Mcclain MD       Scheduled Meds:  Scheduled Medications    sodium chloride flush  10 mL Intravenous 2 times per day    amLODIPine  5 mg Oral Daily    aspirin  325 mg Oral Daily    atorvastatin  40 mg Oral Daily    clopidogrel  75 mg Oral Daily    trospium  20 mg Oral BID    fenofibrate  160 mg Oral Daily    isosorbide mononitrate  30 mg Oral Daily    losartan  50 mg Oral Daily    metoprolol succinate  50 mg Oral BID    pantoprazole  40 mg Oral QAM AC    venlafaxine  150 mg Oral Daily         Continuous Infusions:  Infusions Meds    sodium chloride 50 mL/hr at 04/26/19 0542         PRN Meds:. PRN Medications   sodium chloride flush, magnesium hydroxide, ondansetron, HYDROcodone-acetaminophen, acetaminophen     Allergies:  is allergic to benadryl [diphenhydramine hcl] and flexeril [cyclobenzaprine hcl]. Family History:  family history includes Alcohol Abuse in her maternal grandfather; Allergy (Severe) in her sister; Arthritis in her mother, paternal grandmother, and sister; COPD in her father; Cancer in her maternal uncle;  Cancer (age of onset: 39) in her mother; Coronary Art Dis in her mother; Diabetes in her mother and sister; Early Death in her maternal grandmother; Hearing Loss in her father; Heart Attack in her mother; Heart Attack (age of onset: 76) in her paternal grandmother; Heart Disease in her mother; High Cholesterol in her mother and sister; Marney Allegra / [place] in her sister; Obesity in her paternal grandmother; Stroke (age of onset: 72) in her sister. Social History:   reports that she quit smoking about 10 months ago. She started smoking about 53 years ago. She smoked 0.00 packs per day for 50.00 years. She has never used smokeless tobacco. She reports that she drank alcohol. She reports that she does not use drugs. Review of Systems:  General Denies any fever or chills. No significant unexpected weight change. HEENT Denies any diplopia, tinnitus or vertigo. No chronic headaches. Macular degeneration. Resp Shortness of breath. No cough or wheezing  Cardiac Denies any chest pain, palpitations, claudication or edema. Hypertension. Hyperlipidemia. History of myocardial infarction. GI Denies any melena, hematochezia, hematemesis or pyrosis. GERD.  Denies any frequency, urgency, hesitancy or incontinence  Heme Denies bruising or bleeding easily  Endocrine Denies any history of diabetes or thyroid disease  Neuro Denies any focal motor or sensory deficits  Musculoskeletal  Osteoarthritis. Fibromyalgia. No gout. No weakness. Left lower extremity swelling. Psychiatric  Depression. No agitation. No panic attacks. No suicidal ideation. Review of system was completed on complains of swelling in her leg. Denies chest pain shortness of breath as above. OBJECTIVE:   CURRENT VITALS:  height is 5' 4\" (1.626 m) and weight is 169 lb 4.8 oz (76.8 kg). Her oral temperature is 98.2 °F (36.8 °C). Her blood pressure is 130/62 and her pulse is 84. Her respiration is 16 and oxygen saturation is 88% (abnormal).    Temperature Range (24h):Temp: 98.2 °F (36.8 °C) Temp  Av.4 °F (36.9 °C)  Min: 97.9 °F (36.6 °C)  Max: 99 °F (37.2 °C)  BP Range (56I): Systolic (15FTL), EZM:917 , Min:105 , HJO:590     Diastolic (55MMG), HVL:99, Min:58, Max:69     Pulse Range (24h): Pulse  Av.7  Min: 70  Max: 84  Respiration Range (24h): Resp  Av.6  Min: 16  Max: 18  Current Pulse Ox (24h):  SpO2: (!) 88 %  Pulse Ox Range (24h):  SpO2  Av.1 %  Min: 88 %  Max: 95 %  Oxygen Amount and Delivery: O2 Flow Rate (L/min): 0 L/min  CONSTITUTIONAL: Alert and oriented times 3, no acute distress and cooperative to examination with proper mood and affect. SKIN: Skin color, texture, turgor normal. No rashes or lesions. LYMPH: no cervical nodes, no inguinal nodes  HEENT: Head is normocephalic, atraumatic. EOMI, PERRLA. NECK: Supple, symmetrical, trachea midline, no adenopathy, thyroid symmetric, not enlarged and no tenderness, skin normal.  CHEST/LUNGS: chest symmetric with normal A/P diameter, normal respiratory rate and rhythm, lungs clear to auscultation without wheezes, rales or rhonchi. No accessory muscle use. Scars None   CARDIOVASCULAR: Heart sounds are normal.  Regular rate and rhythm. Normal S1 and S2.  ABDOMEN: Soft nontender  RECTAL: deferred, not clinically indicated  NEUROLOGIC: There are no focalizing motor or sensory deficits. CN II-XII are grossly intact. EXTREMITIES: no cyanosis, no clubbing. wrap to the left lower extremity. Some soft tissue bruising at the ankle. Left  Foot is warm. No numbness or tingling.   LABS:            Recent Labs     19  1901 19  0457 19  0425   WBC 8.1 10.2  --    HGB 11.6* 10.9* 10.3*   HCT 35.6* 33.6* 32.0*    282  --     142 139   K 3.7 3.9 4.3    109 110   CO2 21* 20* 19*   BUN 33* 26* 18   CREATININE 1.1 1.0 0.8   MG 2.3  --   --    CALCIUM 9.7 8.7 8.1*   INR 0.96  --   --    AST 33  --   --    ALT 27  --   --    BILITOT 0.2*  --   --    BILIDIR <0.2  --   --    LIPASE 48.2  --   --       RADIOLOGY:   I have personally reviewed the following films:     Narrative   PROCEDURE: CTA CHEST W WO CONTRAST       CLINICAL INFORMATION: Shortness of breath tachycardia obvious swelling of left lower extremity. .       COMPARISON: Noncontrast chest CT dated 8/21/2018       TECHNIQUE: 3 mm thick by 1.5 mm interval axial images were obtained through the chest after the administration of IV contrast.  A non-contrast localizer was obtained.  Sagittal and coronal MIP 3D reconstructions were performed on the scanner. 80 mL    Isovue-370 were administered intravenously.       All CT scans at this facility use dose modulation, iterative reconstruction, and/or weight-based dosing when appropriate to reduce radiation dose to as low as reasonably achievable.       FINDINGS:   Lungs: There is no pneumonia or mass. There is bilateral upper and lower lobe dependent atelectasis. There is a subcentimeter left lower lobe calcified granuloma. The trachea and central bronchi are unremarkable.       Pleura: There is no pleural effusion. There is no pneumothorax.       Heart: There is mild cardiomegaly. There is no pericardial effusion. There are heavy coronary artery atherosclerotic calcifications.       Pulmonary vasculature: There is adequate opacification of the pulmonary arteries. There is no pulmonary embolism.       Lina and mediastinum: There is minimal left hilar adenopathy. There are left hilar calcified lymph nodes. There is a small hiatal hernia.       Thoracic aorta/vascular: There is no thoracic aortic aneurysm or dissection. The imaged brachiocephalic arteries are unremarkable.       Imaged upper abdomen: There is a splenic calcified granuloma.       Musculoskeletal system: No change in a nearly 1 cm sclerotic bone island in the left humeral head. There is multilevel vertebral endplate spondylosis.       Chest/body wall soft tissues: Unremarkable       Thyroid: Unremarkable               Impression       No acute pulmonary embolism. No acute pneumonia.    Bilateral upper and lower lobe dependent atelectasis. Mild cardiomegaly with heavy coronary artery calcifications.       **This report has been created using voice recognition software. It may contain minor errors which are inherent in voice recognition technology. **       Final report electronically signed by Dr. Jessika Jaramillo on 4/25/2019 9:16 PM          Narrative   PROCEDURE: XR KNEE LEFT (MIN 4 VIEWS)       CLINICAL INFORMATION: Pain and swelling.       COMPARISON: No prior study.       TECHNIQUE: 4 views of the left knee include an AP view, a lateral view and bilateral oblique views.       FINDINGS:   Bones/joints: No fracture or dislocation. No joint space narrowing or erosive changes. There may be a small suprapatellar joint effusion. Soft tissues: No significant soft tissue swelling.           Impression    Possible small suprapatellar joint effusion. Otherwise unremarkable left knee series.       **This report has been created using voice recognition software. It may contain minor errors which are inherent in voice recognition technology. **       Final report electronically signed by Dr. Jessika Jaramillo on 4/25/2019 7:43 PM      Narrative   PROCEDURE: VL DUP LOWER EXTREMITY VENOUS LEFT       CLINICAL INFORMATION: Calf pain and tense swelling.       COMPARISON: No prior study.       TECHNIQUE: Left lower extremity duplex venous ultrasound was performed using gray scale, color flow and spectral doppler imaging.       FINDINGS:       Deep veins (common femoral, femoral, popliteal, and calf veins): Patent and compressible, with spontaneous flow, phasicity, and satisfactory augmentation. The right common femoral vein is patent and compressible with flow noted by color flow and spectral Doppler ultrasound.       Superficial veins (saphenous veins):  The imaged portion of the greater saphenous vein at the saphenofemoral junction is patent by color-flow ultrasound.       Soft tissues: No Baker's cyst. No focal fluid collection or mass.

## 2019-04-29 PROCEDURE — 2580000003 HC RX 258: Performed by: INTERNAL MEDICINE

## 2019-04-29 PROCEDURE — 6370000000 HC RX 637 (ALT 250 FOR IP): Performed by: FAMILY MEDICINE

## 2019-04-29 PROCEDURE — 6370000000 HC RX 637 (ALT 250 FOR IP): Performed by: INTERNAL MEDICINE

## 2019-04-29 PROCEDURE — 99225 PR SBSQ OBSERVATION CARE/DAY 25 MINUTES: CPT | Performed by: FAMILY MEDICINE

## 2019-04-29 PROCEDURE — G0378 HOSPITAL OBSERVATION PER HR: HCPCS

## 2019-04-29 RX ADMIN — VENLAFAXINE HYDROCHLORIDE 150 MG: 150 CAPSULE, EXTENDED RELEASE ORAL at 09:51

## 2019-04-29 RX ADMIN — ATORVASTATIN CALCIUM 40 MG: 40 TABLET, FILM COATED ORAL at 09:52

## 2019-04-29 RX ADMIN — ISOSORBIDE MONONITRATE 30 MG: 30 TABLET ORAL at 09:52

## 2019-04-29 RX ADMIN — TROSPIUM CHLORIDE 20 MG: 20 TABLET, FILM COATED ORAL at 20:23

## 2019-04-29 RX ADMIN — METOPROLOL SUCCINATE 50 MG: 50 TABLET, FILM COATED, EXTENDED RELEASE ORAL at 20:23

## 2019-04-29 RX ADMIN — FENOFIBRATE 160 MG: 160 TABLET ORAL at 09:52

## 2019-04-29 RX ADMIN — Medication 10 ML: at 09:52

## 2019-04-29 RX ADMIN — TROSPIUM CHLORIDE 20 MG: 20 TABLET, FILM COATED ORAL at 09:51

## 2019-04-29 RX ADMIN — LOSARTAN POTASSIUM 50 MG: 50 TABLET, FILM COATED ORAL at 09:51

## 2019-04-29 RX ADMIN — AMLODIPINE BESYLATE 10 MG: 10 TABLET ORAL at 09:52

## 2019-04-29 RX ADMIN — ASPIRIN 325 MG: 325 TABLET ORAL at 09:51

## 2019-04-29 RX ADMIN — METOPROLOL SUCCINATE 50 MG: 50 TABLET, FILM COATED, EXTENDED RELEASE ORAL at 09:51

## 2019-04-29 RX ADMIN — Medication 10 ML: at 20:24

## 2019-04-29 ASSESSMENT — PAIN SCALES - GENERAL
PAINLEVEL_OUTOF10: 0

## 2019-04-29 NOTE — PLAN OF CARE
Problem: Pain:  Goal: Pain level will decrease  Description  Pain level will decrease  Outcome: Ongoing  Note:   Patient repositioned as heat applied to decrease pain   Goal: Control of acute pain  Description  Control of acute pain  Outcome: Ongoing  Note:   Duplicate   Goal: Control of chronic pain  Description  Control of chronic pain  Outcome: Ongoing  Note:   Duplicate      Problem: Discharge Planning:  Goal: Discharged to appropriate level of care  Description  Discharged to appropriate level of care  Outcome: Ongoing  Note:   Patient plans to go home with support at discharge      Problem: Falls - Risk of:  Goal: Will remain free from falls  Description  Will remain free from falls  Outcome: Ongoing  Note:   Patient uses call light appropriately, remains free of falls this shift   Goal: Absence of physical injury  Description  Absence of physical injury  Outcome: Ongoing  Note:   Duplicate      Problem: Tissue Perfusion - Cardiopulmonary, Altered:  Goal: Absence of angina  Description  Absence of angina  Outcome: Ongoing  Note:   Patient denies chest pain this shift   Goal: Hemodynamic stability will improve  Description  Hemodynamic stability will improve  Outcome: Ongoing  Note:   Patient vitals are stable this shift, will continue to monitor    Care plan reviewed with patient. Patient verbalizing understanding the plan of care and contributed to goal setting and plan of care.

## 2019-04-29 NOTE — FLOWSHEET NOTE
04/29/19 1224   Provider Notification   Reason for Communication Evaluate   Provider Name Dr. Daphnie Guzman    Provider Notification Physician   Method of Communication Secure Message   Response Waiting for response   Notification Time [address] Patient in [address] [facility] MRI resulted, it is as stated \"Moderate hemorrhage throughout the gastrocnemius and soleus muscles as well as a large hematoma\"

## 2019-04-29 NOTE — PLAN OF CARE
Problem: Pain:  Goal: Control of acute pain  Description  Control of acute pain  4/29/2019 0142 by Cristy Villalobos RN  Outcome: Ongoing  Note:   Patient denies pain this shift. Problem: Discharge Planning:  Goal: Discharged to appropriate level of care  Description  Discharged to appropriate level of care  4/29/2019 0142 by Cristy Villalobos RN  Outcome: Ongoing  Note:   Discharge needs are assessed daily, patient plans to return home at discharge. Problem: Falls - Risk of:  Goal: Will remain free from falls  Description  Will remain free from falls  4/29/2019 0142 by Cristy Villalobos RN  Outcome: Ongoing  Note:   Patient free of falls this shift. Patient on falling star program. Fall band intact. RN visually checks on patient with hourly rounds. Patient tolerates ambulation each shift. Problem: Falls - Risk of:  Goal: Absence of physical injury  Description  Absence of physical injury  4/29/2019 0142 by Cristy Villalobos RN  Outcome: Ongoing  Note:   Patient free from injury this shift. Problem: Tissue Perfusion - Cardiopulmonary, Altered:  Goal: Absence of angina  Description  Absence of angina  4/29/2019 0142 by Cristy Villalobos RN  Outcome: Ongoing  Note:   Patient is free of chest pain. Pulse oximetry being monitored every four hours. Problem: Tissue Perfusion - Cardiopulmonary, Altered:  Goal: Hemodynamic stability will improve  Description  Hemodynamic stability will improve  4/29/2019 0142 by Cristy Villalobos RN  Outcome: Ongoing  Note:   Vitals monitored and recorded. Patient hemodynamically stable. Cardiac monitor in place with strips being read every four hours. Care plan reviewed with patient. Patient verbalizes understanding of the plan of care and contribute to goal setting.

## 2019-04-30 ENCOUNTER — TELEPHONE (OUTPATIENT)
Dept: FAMILY MEDICINE CLINIC | Age: 74
End: 2019-04-30

## 2019-04-30 VITALS
SYSTOLIC BLOOD PRESSURE: 133 MMHG | RESPIRATION RATE: 18 BRPM | WEIGHT: 170.1 LBS | HEART RATE: 71 BPM | OXYGEN SATURATION: 93 % | HEIGHT: 64 IN | TEMPERATURE: 98.2 F | BODY MASS INDEX: 29.04 KG/M2 | DIASTOLIC BLOOD PRESSURE: 70 MMHG

## 2019-04-30 LAB
ERYTHROCYTE [DISTWIDTH] IN BLOOD BY AUTOMATED COUNT: 13.8 % (ref 11.5–14.5)
ERYTHROCYTE [DISTWIDTH] IN BLOOD BY AUTOMATED COUNT: 44.5 FL (ref 35–45)
HCT VFR BLD CALC: 33.8 % (ref 37–47)
HEMOGLOBIN: 11 GM/DL (ref 12–16)
MCH RBC QN AUTO: 29.5 PG (ref 26–33)
MCHC RBC AUTO-ENTMCNC: 32.5 GM/DL (ref 32.2–35.5)
MCV RBC AUTO: 90.6 FL (ref 81–99)
PLATELET # BLD: 292 THOU/MM3 (ref 130–400)
PMV BLD AUTO: 11.3 FL (ref 9.4–12.4)
RBC # BLD: 3.73 MILL/MM3 (ref 4.2–5.4)
WBC # BLD: 8.2 THOU/MM3 (ref 4.8–10.8)

## 2019-04-30 PROCEDURE — 36415 COLL VENOUS BLD VENIPUNCTURE: CPT

## 2019-04-30 PROCEDURE — 6370000000 HC RX 637 (ALT 250 FOR IP): Performed by: FAMILY MEDICINE

## 2019-04-30 PROCEDURE — 99217 PR OBSERVATION CARE DISCHARGE MANAGEMENT: CPT | Performed by: FAMILY MEDICINE

## 2019-04-30 PROCEDURE — 85027 COMPLETE CBC AUTOMATED: CPT

## 2019-04-30 PROCEDURE — G0378 HOSPITAL OBSERVATION PER HR: HCPCS

## 2019-04-30 PROCEDURE — 6370000000 HC RX 637 (ALT 250 FOR IP): Performed by: INTERNAL MEDICINE

## 2019-04-30 RX ORDER — ASPIRIN 81 MG/1
81 TABLET ORAL DAILY
Qty: 30 TABLET | Refills: 0 | Status: SHIPPED | OUTPATIENT
Start: 2019-04-30

## 2019-04-30 RX ORDER — AMLODIPINE BESYLATE 10 MG/1
10 TABLET ORAL DAILY
Qty: 30 TABLET | Refills: 1 | Status: SHIPPED | OUTPATIENT
Start: 2019-04-30 | End: 2022-05-18 | Stop reason: SDUPTHER

## 2019-04-30 RX ORDER — PANTOPRAZOLE SODIUM 40 MG/1
40 TABLET, DELAYED RELEASE ORAL
Qty: 30 TABLET | Refills: 2 | Status: SHIPPED | OUTPATIENT
Start: 2019-05-01 | End: 2021-03-01 | Stop reason: SDUPTHER

## 2019-04-30 RX ORDER — HYDROCODONE BITARTRATE AND ACETAMINOPHEN 5; 325 MG/1; MG/1
1 TABLET ORAL EVERY 6 HOURS PRN
Qty: 10 TABLET | Refills: 0 | Status: SHIPPED | OUTPATIENT
Start: 2019-04-30 | End: 2019-05-03

## 2019-04-30 RX ADMIN — TROSPIUM CHLORIDE 20 MG: 20 TABLET, FILM COATED ORAL at 08:18

## 2019-04-30 RX ADMIN — ASPIRIN 325 MG: 325 TABLET ORAL at 08:18

## 2019-04-30 RX ADMIN — VENLAFAXINE HYDROCHLORIDE 150 MG: 150 CAPSULE, EXTENDED RELEASE ORAL at 08:18

## 2019-04-30 RX ADMIN — METOPROLOL SUCCINATE 50 MG: 50 TABLET, FILM COATED, EXTENDED RELEASE ORAL at 08:18

## 2019-04-30 RX ADMIN — ATORVASTATIN CALCIUM 40 MG: 40 TABLET, FILM COATED ORAL at 08:18

## 2019-04-30 RX ADMIN — AMLODIPINE BESYLATE 10 MG: 10 TABLET ORAL at 08:18

## 2019-04-30 RX ADMIN — ISOSORBIDE MONONITRATE 30 MG: 30 TABLET ORAL at 08:18

## 2019-04-30 RX ADMIN — FENOFIBRATE 160 MG: 160 TABLET ORAL at 08:18

## 2019-04-30 RX ADMIN — LOSARTAN POTASSIUM 50 MG: 50 TABLET, FILM COATED ORAL at 08:18

## 2019-04-30 ASSESSMENT — PAIN SCALES - GENERAL
PAINLEVEL_OUTOF10: 0
PAINLEVEL_OUTOF10: 0

## 2019-04-30 NOTE — PROGRESS NOTES
chloride flush  10 mL Intravenous 2 times per day    aspirin  325 mg Oral Daily    atorvastatin  40 mg Oral Daily    [Held by provider] clopidogrel  75 mg Oral Daily    trospium  20 mg Oral BID    fenofibrate  160 mg Oral Daily    isosorbide mononitrate  30 mg Oral Daily    losartan  50 mg Oral Daily    metoprolol succinate  50 mg Oral BID    pantoprazole  40 mg Oral QAM AC    venlafaxine  150 mg Oral Daily     PRN Meds: sodium chloride flush, magnesium hydroxide, ondansetron, HYDROcodone-acetaminophen, acetaminophen      Intake/Output Summary (Last 24 hours) at 4/30/2019 0703  Last data filed at 4/30/2019 0507  Gross per 24 hour   Intake 510 ml   Output --   Net 510 ml       Diet:  DIET GENERAL;    Exam:  /70   Pulse 68   Temp 97.7 °F (36.5 °C) (Oral)   Resp 18   Ht 5' 4\" (1.626 m)   Wt 170 lb 1.6 oz (77.2 kg)   SpO2 97%   BMI 29.20 kg/m²     General appearance: No apparent distress, appears stated age and cooperative. HEENT: Pupils equal, round, and reactive to light. Conjunctivae/corneas clear. Neck: Supple, with full range of motion. No jugular venous distention. Trachea midline. Respiratory:  Normal respiratory effort. Clear to auscultation, bilaterally without Rales/Wheezes/Rhonchi. Cardiovascular: Regular rate and rhythm with normal S1/S2 without murmurs, rubs or gallops. Abdomen: Soft, non-tender, non-distended with normal bowel sounds. Musculoskeletal: passive and active ROM x 4 extremities. Left leg with minimal swelling, anterior leg appears to have yellowish cast from hematoma, non tender on palpation  Skin: Skin color, texture, turgor normal.  No rashes or lesions. Neurologic:  Neurovascularly intact without any focal sensory/motor deficits.  Cranial nerves: II-XII intact, grossly non-focal.  Psychiatric: Alert and oriented, thought content appropriate, normal insight  Capillary Refill: Brisk,< 3 seconds   Peripheral Pulses: +2 palpable, equal bilaterally       Labs: Recent Labs     04/28/19  0537 04/30/19  0653   WBC  --  8.2   HGB 10.5* 11.0*   HCT 32.7* 33.8*   PLT  --  292     Recent Labs     04/28/19  0537      K 4.2      CO2 23   BUN 17   CREATININE 0.9   CALCIUM 8.6     No results for input(s): AST, ALT, BILIDIR, BILITOT, ALKPHOS in the last 72 hours. No results for input(s): INR in the last 72 hours. No results for input(s): Delcie Coamo in the last 72 hours. Urinalysis:      Lab Results   Component Value Date    BLOODU Negative 10/14/2013    SPECGRAV 1.015 10/14/2013    GLUCOSEU Negative 10/14/2013       Radiology:  XR ANKLE LEFT (2 VIEWS)   Final Result   1. Degenerative changes of the ankle. Negative exam for fracture. 2. Nonspecific edema of the lower extremity. **This report has been created using voice recognition software. It may contain minor errors which are inherent in voice recognition technology. **      Final report electronically signed by Dr. Pan Sagastume on 4/26/2019 9:33 PM      MRI TIBIA FIBULA LEFT WO CONTRAST   Final Result      Moderate hemorrhage throughout the gastrocnemius and soleus muscles as well as a large hematoma. **This report has been created using voice recognition software. It may contain minor errors which are inherent in voice recognition technology. **      Final report electronically signed by Dr. Alaina Islas on 4/29/2019 9:07 AM      VL DUP LOWER EXTREMITY VENOUS LEFT   Final Result      1. No evidence of deep vein thrombosis throughout the left lower extremity. 2. Nonvascular hypoechoic structure in the medial left calf which is nonspecific but could be related to a hematoma. Please correlate for history of trauma. **This report has been created using voice recognition software. It may contain minor errors which are inherent in voice recognition technology. **      Final report electronically signed by Dr Jose Rand on 4/26/2019 1:02 PM      CTA CHEST [facility] CONTRAST   Final Result      No acute pulmonary embolism. No acute pneumonia. Bilateral upper and lower lobe dependent atelectasis. Mild cardiomegaly with heavy coronary artery calcifications. **This report has been created using voice recognition software. It may contain minor errors which are inherent in voice recognition technology. **      Final report electronically signed by Dr. Chelle Zhong on 4/25/2019 9:16 PM      VL DUP LOWER EXTREMITY VENOUS LEFT   Final Result   No evidence of acute left lower extremity DVT. **This report has been created using voice recognition software. It may contain minor errors which are inherent in voice recognition technology. **      Final report electronically signed by Dr. Chelle Zhong on 4/25/2019 7:50 PM      XR KNEE LEFT (MIN 4 VIEWS)   Final Result    Possible small suprapatellar joint effusion. Otherwise unremarkable left knee series. **This report has been created using voice recognition software. It may contain minor errors which are inherent in voice recognition technology. **      Final report electronically signed by Dr. Chelle Zhong on 4/25/2019 7:43 PM          Diet: DIET GENERAL;    DVT prophylaxis: [] Lovenox                                 [] SCDs                                 [] SQ Heparin                                 [] Encourage ambulation           [] Already on Anticoagulation     Disposition:    [] Home       [] TCU       [] Rehab       [] Psych       [] SNF       [] [place]       [] Other-    Code Status: Full Code    PT/OT Eval Status:     Assessment/Plan:    Anticipated Discharge in : 1 day    JUJU/Orlin Ni 1106 Problems    Diagnosis Date Noted    Left leg pain [M79.605] 04/26/2019    Impaired ambulation [R26.2] 04/26/2019    Anemia [D64.9] 04/26/2019    Leg swelling [M79.89] 04/25/2019    Depression [F32.9] 03/19/2012    Hyperlipidemia [E78.5] 03/19/2012    Hypertension [I10] 03/19/2012     1.

## 2019-04-30 NOTE — PLAN OF CARE
Problem: Pain:  Goal: Control of acute pain  Description  Control of acute pain  4/30/2019 0142 by Fatemeh Arora RN  Outcome: Ongoing  Note:   Patient denies pain this shift. Problem: Discharge Planning:  Goal: Discharged to appropriate level of care  Description  Discharged to appropriate level of care  4/30/2019 0142 by Fatemeh Arora RN  Outcome: Ongoing  Note:   Discharge needs are assessed daily, patient plans to return home at discharge. Problem: Falls - Risk of:  Goal: Will remain free from falls  Description  Will remain free from falls  4/30/2019 0142 by Fatemeh Arora RN  Outcome: Ongoing  Note:   Patient free of falls this shift. Patient on falling star program. Fall band intact. RN visually checks on patient with hourly rounds.  Patient tolerates ambulation each shift.

## 2019-04-30 NOTE — PROGRESS NOTES
mg Oral Daily    atorvastatin  40 mg Oral Daily    [Held by provider] clopidogrel  75 mg Oral Daily    trospium  20 mg Oral BID    fenofibrate  160 mg Oral Daily    isosorbide mononitrate  30 mg Oral Daily    losartan  50 mg Oral Daily    metoprolol succinate  50 mg Oral BID    pantoprazole  40 mg Oral QAM AC    venlafaxine  150 mg Oral Daily     PRN Meds: sodium chloride flush, magnesium hydroxide, ondansetron, HYDROcodone-acetaminophen, acetaminophen      Intake/Output Summary (Last 24 hours) at 4/30/2019 8069  Last data filed at 4/30/2019 0507  Gross per 24 hour   Intake 510 ml   Output --   Net 510 ml       Diet:  DIET GENERAL;    Exam:  /70   Pulse 68   Temp 97.7 °F (36.5 °C) (Oral)   Resp 18   Ht 5' 4\" (1.626 m)   Wt 170 lb 1.6 oz (77.2 kg)   SpO2 97%   BMI 29.20 kg/m²     General appearance: No apparent distress, appears stated age and cooperative. HEENT: Pupils equal, round, and reactive to light. Conjunctivae/corneas clear. Neck: Supple, with full range of motion. No jugular venous distention. Trachea midline. Respiratory:  Normal respiratory effort. Clear to auscultation, bilaterally without Rales/Wheezes/Rhonchi. Cardiovascular: Regular rate and rhythm with normal S1/S2 without murmurs, rubs or gallops. Abdomen: Soft, non-tender, non-distended with normal bowel sounds. Musculoskeletal: passive and active ROM x 4 extremities. Left leg wrapped in ACE, normal temperature to touch of toes, able to move toes without difficulty. Skin: Skin color, texture, turgor normal.  No rashes or lesions. Neurologic:  Neurovascularly intact without any focal sensory/motor deficits.  Cranial nerves: II-XII intact, grossly non-focal.  Psychiatric: Alert and oriented, thought content appropriate, normal insight  Capillary Refill: Brisk,< 3 seconds   Peripheral Pulses: +2 palpable, equal bilaterally       Labs:   Recent Labs     04/28/19  0537   HGB 10.5*   HCT 32.7*     Recent Labs 04/28/19  0537      K 4.2      CO2 23   BUN 17   CREATININE 0.9   CALCIUM 8.6     No results for input(s): AST, ALT, BILIDIR, BILITOT, ALKPHOS in the last 72 hours. No results for input(s): INR in the last 72 hours. No results for input(s): [place] in the last 72 hours. Urinalysis:      Lab Results   Component Value Date    BLOODU Negative 10/14/2013    SPECGRAV 1.015 10/14/2013    GLUCOSEU Negative 10/14/2013       Radiology:  XR ANKLE LEFT (2 VIEWS)   Final Result   1. Degenerative changes of the ankle. Negative exam for fracture. 2. Nonspecific edema of the lower extremity. **This report has been created using voice recognition software. It may contain minor errors which are inherent in voice recognition technology. **      Final report electronically signed by Dr. Benton Adams on 4/26/2019 9:33 PM      MRI TIBIA FIBULA LEFT WO CONTRAST   Final Result      Moderate hemorrhage throughout the gastrocnemius and soleus muscles as well as a large hematoma. **This report has been created using voice recognition software. It may contain minor errors which are inherent in voice recognition technology. **      Final report electronically signed by Dr. Carina Coffman on 4/29/2019 9:07 AM      VL DUP LOWER EXTREMITY VENOUS LEFT   Final Result      1. No evidence of deep vein thrombosis throughout the left lower extremity. 2. Nonvascular hypoechoic structure in the medial left calf which is nonspecific but could be related to a hematoma. Please correlate for history of trauma. **This report has been created using voice recognition software. It may contain minor errors which are inherent in voice recognition technology. **      Final report electronically signed by Dr Reed Duong on 4/26/2019 1:02 PM      CTA CHEST [address]   Final Result      No acute pulmonary embolism. No acute pneumonia. Bilateral upper and lower lobe dependent atelectasis. Mild cardiomegaly with heavy coronary artery calcifications. **This report has been created using voice recognition software. It may contain minor errors which are inherent in voice recognition technology. **      Final report electronically signed by Dr. Mikala Ibrahim on 4/25/2019 9:16 PM      VL DUP LOWER EXTREMITY VENOUS LEFT   Final Result   No evidence of acute left lower extremity DVT. **This report has been created using voice recognition software. It may contain minor errors which are inherent in voice recognition technology. **      Final report electronically signed by Dr. Mikala Ibrahim on 4/25/2019 7:50 PM      XR KNEE LEFT (MIN 4 VIEWS)   Final Result    Possible small suprapatellar joint effusion. Otherwise unremarkable left knee series. **This report has been created using voice recognition software. It may contain minor errors which are inherent in voice recognition technology. **      Final report electronically signed by Dr. Mikala Ibrahim on 4/25/2019 7:43 PM          Diet: DIET GENERAL;    DVT prophylaxis: [] Lovenox                                 [] SCDs                                 [] SQ Heparin                                 [] Encourage ambulation           [] Already on Anticoagulation     Disposition:    [] Home       [] TCU       [] Rehab       [] Psych       [] SNF       [] [facility]       [] Other-    Code Status: Full Code    PT/OT Eval Status:     Assessment/Plan:    Anticipated Discharge in : 1 day    C/Orlin Ni 1106 Problems    Diagnosis Date Noted    Left leg pain [M79.605] 04/26/2019    Impaired ambulation [R26.2] 04/26/2019    Anemia [D64.9] 04/26/2019    Leg swelling [M79.89] 04/25/2019    Depression [F32.9] 03/19/2012    Hyperlipidemia [E78.5] 03/19/2012    Hypertension [I10] 03/19/2012     1.  Moderate hemorrhage and large hematoma, gastrocnemius and soleus muscles     -2 duplex LLE US both (-) DVT  -repeat duplex LLE US showed \"Nonvascular hypoechoic structure in the medial left calf which is nonspecific but could be related to a hematoma  -Pt denies trauma  -MRI left leg MRI tib fib L showed Moderate hemorrhage throughout the gastrocnemius and soleus muscles as well as a large hematoma. -Ortho recommend ace wrap, ice elevate leg, no surgical intervention at this time, and f/u in OP w/ Dr. Packer on 4/29/19 at [facility]. -surgery evaluated the patient, no surgical intervention recommended. - improving  - cardio held plavix and continued ASA      3. CAD with stent placement     -Plavix held until seen by cardio at least 7-10 days  -cont statin, BB, losartan      4. HTN, fairly controlled      -increase amlodipine from 5 to 10 mg po daily. cont losartan, toprol   -VS per protocol      5.  Depression, stable     -cont effexor XR     6. Normocytic anemia, chronic     -stable     Disposition  Likely to be discharged tomorrow  Follow-up with primary physician, Cardio and Ortho as outpatient once discharged               Electronically signed by Rafael Mcbride MD on 4/30/2019 at 6:33 AM

## 2019-04-30 NOTE — CARE COORDINATION
4/30/19, 10:48 AM    Discharge plan discussed by  and . Discharge plan reviewed with patient/ family. Patient/ family verbalize understanding of discharge plan and are in agreement with plan. Understanding was demonstrated using the teach back method. Revisit with pt today. Anticipated discharge today. She denies home going needs. She states she has a good support system.

## 2019-04-30 NOTE — DISCHARGE SUMMARY
Hospital Medicine Discharge Summary      Patient Identification:   Diego Louis   : 1945  MRN: [id number]   Account: [de-identified]      Patient's PCP: Edith Mcmullen MD    Admit Date: 2019     Discharge Date:   2019    Admitting Physician: Veena Alba DO     Discharge Physician: Chuck Beaulieu MD     Discharge Diagnoses: Active Hospital Problems    Diagnosis Date Noted    Left leg pain [M79.605] 2019    Impaired ambulation [R26.2] 2019    Anemia [D64.9] 2019    Leg swelling [M79.89] 2019    Depression [F32.9] 2012    Hyperlipidemia [E78.5] 2012    Hypertension [I10] 2012       The patient was seen and examined on day of discharge and this discharge summary is in conjunction with any daily progress note from day of discharge. Hospital Course: Diego Louis is a 69 y.o. female admitted to [facility] on 2019 for left leg pain and swelling. Please see H/P for details. In summary, this is E 55 y.o. Female, with PMH of CAD, with 4 stentes placement ( 1 stent in  and 3 stents in 2018), follows Dr. Rick Musa, hx of HTN, HLD,  who presented to [facility] on 19 with complaints of sudden onset left leg swelling and pain x 1 day. Patient denies left leg injury. She also denies chest pain, sob, palpitations. In ER, duplex left LE US (-) for DVT. Chest CTA (-) PE and pna, (+) Bilateral upper and lower lobe dependent atelectasis, Mild cardiomegaly with heavy coronary artery calcifications. Repeat duplex LLE US again (-) for DVT, but now (+) Nonvascular hypoechoic structure in the medial left calf which is nonspecific but could be related to a hematoma. Please correlate for history of trauma. Patient was seen by surgery Dr. Merlin Cons, who states that No signs of fluid collections or need for urgent surgical intervention.  He recommend possible repeat doppler US, may benefit from MRI of left which are inherent in voice recognition technology. **      Final report electronically signed by Dr Tarah Mota on 4/26/2019 1:02 PM      CTA CHEST [address]   Final Result      No acute pulmonary embolism. No acute pneumonia. Bilateral upper and lower lobe dependent atelectasis. Mild cardiomegaly with heavy coronary artery calcifications. **This report has been created using voice recognition software. It may contain minor errors which are inherent in voice recognition technology. **      Final report electronically signed by Dr. Mary Kate Olivia on 4/25/2019 9:16 PM      VL DUP LOWER EXTREMITY VENOUS LEFT   Final Result   No evidence of acute left lower extremity DVT. **This report has been created using voice recognition software. It may contain minor errors which are inherent in voice recognition technology. **      Final report electronically signed by Dr. Mary Kate Olivia on 4/25/2019 7:50 PM      XR KNEE LEFT (MIN 4 VIEWS)   Final Result    Possible small suprapatellar joint effusion. Otherwise unremarkable left knee series. **This report has been created using voice recognition software. It may contain minor errors which are inherent in voice recognition technology. **      Final report electronically signed by Dr. Mary Kate Olivia on 4/25/2019 7:43 PM             Consults:     [address] TO ORTHOPEDIC SURGERY  IP CONSULT TO CARDIOLOGY    Disposition:    [x] Home       [] TCU       [] Rehab       [] Psych       [] SNF       [] [facility]       [] Other-    Condition at Discharge: Stable    Code Status:  Full Code     Patient Instructions:    Discharge lab work:    Activity: no lifting, or Strenuous exercise for 7-10 days  Diet: DIET GENERAL;      Follow-up visits:   Crystal Contreras MD  [address] [place] [address], [address] [place] [address] [id number]               Discharge Medications:      Eva Haim   Home Medication Instructions [facility]:[id number]    Printed on:04/30/19 0754   Medication Information                      amLODIPine (NORVASC) 10 MG tablet  Take 1 tablet by mouth daily             aspirin 325 MG tablet  Take 1 tablet by mouth daily             atorvastatin (LIPITOR) 40 MG tablet  TAKE 1 TABLET DAILY             darifenacin (ENABLEX) 15 MG extended release tablet  TAKE 1 TABLET DAILY             fenofibrate (TRICOR) 145 MG tablet  TAKE 1 TABLET DAILY             HYDROcodone-acetaminophen (NORCO) 5-325 MG per tablet  Take 1 tablet by mouth every 6 hours as needed for Pain for up to 3 days. isosorbide mononitrate (IMDUR) 30 MG extended release tablet  Take 30 mg by mouth daily Half tablet             losartan (COZAAR) 50 MG tablet  Take 1 tablet by mouth daily. metoprolol succinate (TOPROL XL) 50 MG extended release tablet  TAKE 1 TABLET TWICE A DAY             Multiple Vitamins-Minerals (MULTIVITAMIN ADULT) TABS  Take by mouth             Multiple Vitamins-Minerals (PRESERVISION AREDS 2+MULTI VIT PO)  Take by mouth daily              pantoprazole (PROTONIX) 40 MG tablet  Take 1 tablet by mouth every morning (before breakfast)             venlafaxine (EFFEXOR XR) 150 MG extended release capsule  TAKE 1 CAPSULE DAILY             zoledronic acid (RECLAST) 5 MG/100ML SOLN  Infuse 100 mLs intravenously once for 1 dose                 Time Spent on discharge is more than 30 minutes in the examination, evaluation, counseling and review of medications and discharge plan. Signed: Thank you Yobani Marie MD for the opportunity to be involved in this patient's care.     Electronically signed by Mary Cevallos MD on 4/30/2019 at 7:54 AM

## 2019-04-30 NOTE — TELEPHONE ENCOUNTER
.Transition of Care visit scheduled. Visit date not found  Patient is being discharged to HOME  Date of discharge 4/30/19  Discharge from facility [facility]  Reason for admission LEG SWELLING AND PAIN    GS 11%    PATIENT NEEDS APPOINTMENT. PLEASE ADVISE PT.

## 2019-05-01 ENCOUNTER — TELEPHONE (OUTPATIENT)
Dept: FAMILY MEDICINE CLINIC | Age: 74
End: 2019-05-01

## 2019-05-01 NOTE — TELEPHONE ENCOUNTER
[facility] Transitions Initial Follow Up Call    Outreach made within 2 business days of discharge: Yes    Patient: Viviana Collins Patient : 1945   MRN: [id number]  Reason for Admission: Leg Swelling  Discharge Date: 19       Spoke with: Yani Joaquin    Discharge department/facility: [facility]    TCM Interactive Patient Contact:  Was patient able to fill all prescriptions: Yes  Was patient instructed to bring all medications to the follow-up visit: Yes  Is patient taking all medications as directed in the discharge summary?  Yes  Does patient understand their discharge instructions: Yes  Does patient have questions or concerns that need addressed prior to 7-14 day follow up office visit: No    Scheduled appointment with PCP within 7-14 days    Follow Up  Future Appointments   Date Time Provider Harvinder Ramon   2019  8:00 AM [facility] [place]   [facility]2019  9[facility] [place]   10/7/2019 10:00 AM Rody Whaley APRN - CNP [facility] - [address] [place], [place]

## 2019-05-07 ENCOUNTER — OFFICE VISIT (OUTPATIENT)
Dept: FAMILY MEDICINE CLINIC | Age: 74
End: 2019-05-07
Payer: MEDICARE

## 2019-05-07 VITALS
BODY MASS INDEX: 28.68 KG/M2 | TEMPERATURE: 97 F | RESPIRATION RATE: 16 BRPM | HEART RATE: 64 BPM | WEIGHT: 168 LBS | SYSTOLIC BLOOD PRESSURE: 120 MMHG | DIASTOLIC BLOOD PRESSURE: 70 MMHG | HEIGHT: 64 IN

## 2019-05-07 DIAGNOSIS — M85.89 OSTEOPENIA OF MULTIPLE SITES: ICD-10-CM

## 2019-05-07 DIAGNOSIS — F33.42 RECURRENT MAJOR DEPRESSIVE DISORDER, IN FULL REMISSION (HCC): ICD-10-CM

## 2019-05-07 DIAGNOSIS — D62 ANEMIA DUE TO ACUTE BLOOD LOSS: ICD-10-CM

## 2019-05-07 DIAGNOSIS — S80.12XD HEMATOMA OF LEFT LOWER EXTREMITY, SUBSEQUENT ENCOUNTER: Primary | ICD-10-CM

## 2019-05-07 DIAGNOSIS — J43.1 PANLOBULAR EMPHYSEMA (HCC): ICD-10-CM

## 2019-05-07 DIAGNOSIS — I10 HYPERTENSION, UNSPECIFIED TYPE: ICD-10-CM

## 2019-05-07 DIAGNOSIS — E78.00 PURE HYPERCHOLESTEROLEMIA: ICD-10-CM

## 2019-05-07 DIAGNOSIS — Z72.0 TOBACCO ABUSE: ICD-10-CM

## 2019-05-07 DIAGNOSIS — K21.00 GASTROESOPHAGEAL REFLUX DISEASE WITH ESOPHAGITIS: ICD-10-CM

## 2019-05-07 DIAGNOSIS — R73.03 BORDERLINE DIABETES: ICD-10-CM

## 2019-05-07 DIAGNOSIS — E53.8 B12 DEFICIENCY: ICD-10-CM

## 2019-05-07 PROCEDURE — 99215 OFFICE O/P EST HI 40 MIN: CPT | Performed by: FAMILY MEDICINE

## 2019-05-07 NOTE — PROGRESS NOTES
[address] [facility] FAMILY MEDICINE  [address]  Dept: [phone]  Dept Fax: [phone]  Loc: [phone]    Dave Thomas is a 69 y.o. female who presents today for:  Chief Complaint   Patient presents with    Follow-Up from [address] [place] [address] 1 1/2 weeks ago for 5 days-L leg swelling/pain           HPI:     HPI  Here for regular checkup but also just released from the hospital for a hematoma on the left lower leg. She reports that the admitting doctor thought it was likely a capillary rupture and due to the plavix it became large. It is getting better slowly off the plavix and she is scheduled to see the cardiologist this afternoon. Depression: Patient complains of depression. She complains of depressed mood and fatigue. Onset was approximately several years ago, gradually improving since that time. She denies current suicidal and homicidal plan or intent. Family history significant for no psychiatric illness. Possible organic causes contributing are: none. Risk factors: previous episode of depression Previous treatment includes Effexor and no group therapy. She complains of the following side effects from the treatment: none. Hypertension: Patient here for follow-up of elevated blood pressure. She is not exercising and is adherent to low salt diet. Blood pressure is well controlled at home. Cardiac symptoms none. Patient denies chest pain, dyspnea and palpitations. Cardiovascular risk factors: hypertension and obesity (BMI >= 30 kg/m2). Use of agents associated with hypertension: none. History of target organ damage: post PTCA seeing cardiology. Hyperlipidemia: Patient presents with hyperlipidemia. She was tested because age and hypertension.   Her last labs showed   Lab Results   Component Value Date    CHOL 176 07/18/2018    CHOL 183 05/18/2017    CHOL 162 05/25/2016     Lab Results   Component Value dysuria, urgency and frequency. Neurological: Negative for weakness, light-headedness and headaches. Psychiatric/Behavioral: Negative for sleep disturbance. Objective:     Vitals:    05/07/19 0804   BP: 120/70   Site: Left Upper Arm   Position: Sitting   Cuff Size: Medium Adult   Pulse: 64   Resp: 16   Temp: 97 °F (36.1 °C)   TempSrc: Temporal   Weight: 168 lb (76.2 kg)   Height: 5' 3.78\" (1.62 m)     Wt Readings from Last 3 Encounters:   05/07/19 168 lb (76.2 kg)   04/28/19 170 lb 1.6 oz (77.2 kg)   04/25/19 155 lb (70.3 kg)       Physical Exam  Constitutional: Vital signs are normal. She appears well-developed and well-nourished. She is active. HENT:   Head: Normocephalic and atraumatic. Right Ear: Tympanic membrane, external ear and ear canal normal. No drainage or tenderness. Left Ear: Tympanic membrane, external ear and ear canal normal. No drainage or tenderness. Nose: Nose normal. No mucosal edema or rhinorrhea. Mouth/Throat: Uvula is midline, oropharynx is clear and moist and mucous membranes are normal. Mucous membranes are not pale. Normal dentition. No posterior oropharyngeal edema or posterior oropharyngeal erythema. Eyes: Lids are normal. Right eye exhibits no chemosis and no discharge. Left eye exhibits no chemosis and no drainage. Right conjunctiva has no hemorrhage. Left conjunctiva has no hemorrhage. Right eye exhibits normal extraocular motion. Left eye exhibits normal extraocular motion. Right pupil is round and reactive. Left pupil is round and reactive. Pupils are equal.   Cardiovascular: Normal rate, regular rhythm, S1 normal, S2 normal and normal heart sounds. Exam reveals no gallop. No murmur heard. Pulmonary/Chest: Effort normal and breath sounds normal. No respiratory distress. She has no wheezes. She has no rhonchi. She has no rales. Abdominal: Soft. Normal appearance and bowel sounds are normal. She exhibits no distension and no mass.  There is no hepatosplenomegaly. No tenderness. She has no rigidity, no rebound and no guarding. No hernia. Musculoskeletal:        Right lower leg: She exhibits no edema. Left lower leg: She exhibits tr-1+ edema in the left lower leg. Not tender   Neurological: She is alert. Assessment/Plan   Lei Nava was seen today for follow-up from hospital.    Diagnoses and all orders for this visit:    Hematoma of left lower extremity, subsequent encounter  -     CBC; Future    Anemia due to acute blood loss  -     CBC; Future    Borderline diabetes  -     Hemoglobin A1C; Future  -     Basic Metabolic Panel; Future    Hypertension, unspecified type  -     TSH With Reflex Ft4; Future    Pure hypercholesterolemia    Recurrent major depressive disorder, in full remission ([facility] [address]    Tobacco abuse    Osteopenia of multiple sites    B12 deficiency  -     Vitamin B12 & Folate; Future    Gastroesophageal reflux disease with esophagitis    Panlobular emphysema (HCC)    No change to medications   Continue healthy diet and exercise  Aspirin daily    Continue wrap to the leg until the swelling is gone. [place] foods  Small meals  No late meals    Discussed use, benefit, and side effectsof prescribed medications. All patient questions answered. Pt voiced understanding. Reviewed health maintenance. Instructed to continue current medications, diet and exercise. Patient agreed with treatment plan. Followup as directed.      Over 45 minutes spent with patient with >50% spent in counseling and coordination of care    Electronically signed by Joanna Lindsey MD

## 2019-07-23 RX ORDER — ATORVASTATIN CALCIUM 40 MG/1
TABLET, FILM COATED ORAL
Qty: 90 TABLET | Refills: 3 | Status: SHIPPED | OUTPATIENT
Start: 2019-07-23 | End: 2020-07-18

## 2019-08-13 RX ORDER — VENLAFAXINE HYDROCHLORIDE 150 MG/1
CAPSULE, EXTENDED RELEASE ORAL
Qty: 90 CAPSULE | Refills: 3 | Status: SHIPPED | OUTPATIENT
Start: 2019-08-13 | End: 2020-08-10

## 2019-08-24 ENCOUNTER — HOSPITAL ENCOUNTER (EMERGENCY)
Age: 74
Discharge: HOME | End: 2019-08-24
Attending: EMERGENCY MEDICINE
Payer: MEDICARE

## 2019-08-24 ENCOUNTER — APPOINTMENT (OUTPATIENT)
Dept: GENERAL RADIOLOGY | Age: 74
End: 2019-08-24
Payer: MEDICARE

## 2019-08-24 VITALS
OXYGEN SATURATION: 98 % | BODY MASS INDEX: 27.31 KG/M2 | DIASTOLIC BLOOD PRESSURE: 80 MMHG | WEIGHT: 160 LBS | SYSTOLIC BLOOD PRESSURE: 166 MMHG | HEIGHT: 64 IN | RESPIRATION RATE: 18 BRPM | HEART RATE: 64 BPM | TEMPERATURE: 96.6 F

## 2019-08-24 DIAGNOSIS — M25.512 LEFT SHOULDER PAIN, UNSPECIFIED CHRONICITY: Primary | ICD-10-CM

## 2019-08-24 PROCEDURE — 99283 EMERGENCY DEPT VISIT LOW MDM: CPT

## 2019-08-24 PROCEDURE — 73010 X-RAY EXAM OF SHOULDER BLADE: CPT

## 2019-08-24 ASSESSMENT — ENCOUNTER SYMPTOMS
BACK PAIN: 0
WHEEZING: 0
DIARRHEA: 0
ABDOMINAL PAIN: 0
BLOOD IN STOOL: 0
SORE THROAT: 0
SHORTNESS OF BREATH: 0

## 2019-08-24 ASSESSMENT — PAIN DESCRIPTION - LOCATION: LOCATION: SHOULDER

## 2019-08-24 ASSESSMENT — PAIN DESCRIPTION - ORIENTATION: ORIENTATION: LEFT

## 2019-08-24 ASSESSMENT — PAIN DESCRIPTION - DESCRIPTORS: DESCRIPTORS: STABBING

## 2019-08-24 ASSESSMENT — PAIN SCALES - GENERAL: PAINLEVEL_OUTOF10: 8

## 2019-08-24 ASSESSMENT — PAIN DESCRIPTION - FREQUENCY: FREQUENCY: CONTINUOUS

## 2019-08-24 ASSESSMENT — PAIN DESCRIPTION - PAIN TYPE: TYPE: ACUTE PAIN;CHRONIC PAIN

## 2019-08-24 NOTE — ED TRIAGE NOTES
Pt states a fall a month ago, not sure if it's related to this shoulder pain.  Left shoulder stabbing started 1.5 weeks ago but severe today

## 2019-08-24 NOTE — ED NOTES
Pt stable and ready for dc. Pt is given discharge instructions. Pt verbalizes understanding and ambulates independently.       Keyla Roberts RN  08/24/19 2560

## 2019-08-24 NOTE — ED PROVIDER NOTES
[address]  Phone: [phone]    eMERGENCY dEPARTMENT eNCOUnter           [facility]       Chief Complaint   Patient presents with    Shoulder Pain     left, injury 2 years ago       Nurses Notes reviewed and I agree except as noted in the HPI. HISTORY OF PRESENT ILLNESS    Abdiel Gregg is a 69 y.o. female who presented via private vehicle with the chief complaint mentioned above. She presented with 2 weeks history of left scapular pain. She describes her pain as mild sharp intermittent pain which is worse with movement. She fell 2 weeks ago but did not strike her shoulder. She denies head or neck pain. She was seen by chiropractor several times which did not help her pain. REVIEW OF SYSTEMS     Review of Systems   Constitutional: Negative for chills and fever. HENT: Negative for sore throat. Respiratory: Negative for shortness of breath and wheezing. Cardiovascular: Negative for chest pain and palpitations. Gastrointestinal: Negative for abdominal pain, blood in stool and diarrhea. Genitourinary: Negative for dysuria and hematuria. Musculoskeletal: Negative for back pain and neck pain. Left scapular pain. Skin: Negative for rash. Neurological: Negative for seizures, syncope and headaches. Psychiatric/Behavioral: Negative for confusion and hallucinations. PAST MEDICAL HISTORY    has a past medical history of CAD (coronary artery disease), Depression, Fibromyalgia, Fibromyalgia, GERD (gastroesophageal reflux disease), Headache(784.0), Hyperlipidemia, Hypertension, Macular degeneration, dry, MI (myocardial infarction) ([facility] [address] Osteoarthritis, SOB (shortness of breath), and Urinary incontinence. SURGICAL HISTORY      has a past surgical history that includes Cholecystectomy (1975); Carpal tunnel release; cyst removal; Coronary angioplasty with stent (07/2011); Colon surgery (2010);  Coronary

## 2019-08-26 ENCOUNTER — TELEPHONE (OUTPATIENT)
Dept: FAMILY MEDICINE CLINIC | Age: 74
End: 2019-08-26

## 2019-08-30 ENCOUNTER — CARE COORDINATION (OUTPATIENT)
Dept: CARE COORDINATION | Age: 74
End: 2019-08-30

## 2019-08-30 ENCOUNTER — HOSPITAL ENCOUNTER (OUTPATIENT)
Age: 74
Discharge: HOME | End: 2019-08-30
Payer: MEDICARE

## 2019-08-30 DIAGNOSIS — E53.8 B12 DEFICIENCY: ICD-10-CM

## 2019-08-30 DIAGNOSIS — I10 HYPERTENSION, UNSPECIFIED TYPE: ICD-10-CM

## 2019-08-30 DIAGNOSIS — D62 ANEMIA DUE TO ACUTE BLOOD LOSS: ICD-10-CM

## 2019-08-30 DIAGNOSIS — S80.12XD HEMATOMA OF LEFT LOWER EXTREMITY, SUBSEQUENT ENCOUNTER: ICD-10-CM

## 2019-08-30 DIAGNOSIS — R73.03 BORDERLINE DIABETES: ICD-10-CM

## 2019-08-30 LAB
ALBUMIN SERPL-MCNC: 4.6 G/DL (ref 3.5–5.1)
ALP BLD-CCNC: 52 U/L (ref 38–126)
ALT SERPL-CCNC: 52 U/L (ref 11–66)
ANION GAP SERPL CALCULATED.3IONS-SCNC: 14 MEQ/L (ref 8–16)
AST SERPL-CCNC: 53 U/L (ref 5–40)
AVERAGE GLUCOSE: 135 MG/DL (ref 70–126)
BILIRUB SERPL-MCNC: 0.3 MG/DL (ref 0.3–1.2)
BILIRUBIN DIRECT: < 0.2 MG/DL (ref 0–0.3)
BUN BLDV-MCNC: 18 MG/DL (ref 7–22)
CALCIUM SERPL-MCNC: 9.6 MG/DL (ref 8.5–10.5)
CHLORIDE BLD-SCNC: 107 MEQ/L (ref 98–111)
CHOLESTEROL, FASTING: 161 MG/DL (ref 100–199)
CO2: 23 MEQ/L (ref 23–33)
CREAT SERPL-MCNC: 0.9 MG/DL (ref 0.4–1.2)
ERYTHROCYTE [DISTWIDTH] IN BLOOD BY AUTOMATED COUNT: 14.2 % (ref 11.5–14.5)
ERYTHROCYTE [DISTWIDTH] IN BLOOD BY AUTOMATED COUNT: 48.6 FL (ref 35–45)
FOLATE: > 20 NG/ML (ref 4.8–24.2)
GFR SERPL CREATININE-BSD FRML MDRD: 61 ML/MIN/1.73M2
GLUCOSE BLD-MCNC: 133 MG/DL (ref 70–108)
HBA1C MFR BLD: 6.5 % (ref 4.4–6.4)
HCT VFR BLD CALC: 44.3 % (ref 37–47)
HDLC SERPL-MCNC: 42 MG/DL
HEMOGLOBIN: 13.5 GM/DL (ref 12–16)
LDL CHOLESTEROL CALCULATED: 69 MG/DL
MCH RBC QN AUTO: 28.8 PG (ref 26–33)
MCHC RBC AUTO-ENTMCNC: 30.5 GM/DL (ref 32.2–35.5)
MCV RBC AUTO: 94.5 FL (ref 81–99)
PLATELET # BLD: 259 THOU/MM3 (ref 130–400)
PMV BLD AUTO: 12.5 FL (ref 9.4–12.4)
POTASSIUM SERPL-SCNC: 4.2 MEQ/L (ref 3.5–5.2)
RBC # BLD: 4.69 MILL/MM3 (ref 4.2–5.4)
SODIUM BLD-SCNC: 144 MEQ/L (ref 135–145)
TOTAL PROTEIN: 7 G/DL (ref 6.1–8)
TRIGLYCERIDE, FASTING: 252 MG/DL (ref 0–199)
TSH SERPL DL<=0.05 MIU/L-ACNC: 3.05 UIU/ML (ref 0.4–4.2)
VITAMIN B-12: 396 PG/ML (ref 211–911)
WBC # BLD: 6.9 THOU/MM3 (ref 4.8–10.8)

## 2019-08-30 PROCEDURE — 82607 VITAMIN B-12: CPT

## 2019-08-30 PROCEDURE — 84443 ASSAY THYROID STIM HORMONE: CPT

## 2019-08-30 PROCEDURE — 82746 ASSAY OF FOLIC ACID SERUM: CPT

## 2019-08-30 PROCEDURE — 80053 COMPREHEN METABOLIC PANEL: CPT

## 2019-08-30 PROCEDURE — 83036 HEMOGLOBIN GLYCOSYLATED A1C: CPT

## 2019-08-30 PROCEDURE — 85027 COMPLETE CBC AUTOMATED: CPT

## 2019-08-30 PROCEDURE — 80061 LIPID PANEL: CPT

## 2019-08-30 PROCEDURE — 82248 BILIRUBIN DIRECT: CPT

## 2019-08-30 PROCEDURE — 36415 COLL VENOUS BLD VENIPUNCTURE: CPT

## 2019-08-30 SDOH — ECONOMIC STABILITY: INCOME INSECURITY: HOW HARD IS IT FOR YOU TO PAY FOR THE VERY BASICS LIKE FOOD, HOUSING, MEDICAL CARE, AND HEATING?: NOT HARD AT ALL

## 2019-08-30 SDOH — SOCIAL STABILITY: SOCIAL NETWORK: HOW OFTEN DO YOU ATTEND CHURCH OR RELIGIOUS SERVICES?: MORE THAN 4 TIMES PER YEAR

## 2019-08-30 SDOH — SOCIAL STABILITY: SOCIAL NETWORK: IN A TYPICAL WEEK, HOW MANY TIMES DO YOU TALK ON THE PHONE WITH FAMILY, FRIENDS, OR NEIGHBORS?: THREE TIMES A WEEK

## 2019-08-30 SDOH — HEALTH STABILITY: MENTAL HEALTH
STRESS IS WHEN SOMEONE FEELS TENSE, NERVOUS, ANXIOUS, OR CAN'T SLEEP AT NIGHT BECAUSE THEIR MIND IS TROUBLED. HOW STRESSED ARE YOU?: NOT AT ALL

## 2019-08-30 SDOH — ECONOMIC STABILITY: TRANSPORTATION INSECURITY
IN THE PAST 12 MONTHS, HAS LACK OF TRANSPORTATION KEPT YOU FROM MEETINGS, WORK, OR FROM GETTING THINGS NEEDED FOR DAILY LIVING?: NO

## 2019-08-30 SDOH — SOCIAL STABILITY: SOCIAL NETWORK: HOW OFTEN DO YOU GET TOGETHER WITH FRIENDS OR RELATIVES?: ONCE A WEEK

## 2019-08-30 SDOH — SOCIAL STABILITY: SOCIAL NETWORK: ARE YOU MARRIED, WIDOWED, DIVORCED, SEPARATED, NEVER MARRIED, OR LIVING WITH A PARTNER?: WIDOWED

## 2019-08-30 SDOH — ECONOMIC STABILITY: FOOD INSECURITY: WITHIN THE PAST 12 MONTHS, THE FOOD YOU BOUGHT JUST DIDN'T LAST AND YOU DIDN'T HAVE MONEY TO GET MORE.: NEVER TRUE

## 2019-08-30 SDOH — HEALTH STABILITY: MENTAL HEALTH: HOW MANY STANDARD DRINKS CONTAINING ALCOHOL DO YOU HAVE ON A TYPICAL DAY?: 1 OR 2

## 2019-08-30 SDOH — ECONOMIC STABILITY: TRANSPORTATION INSECURITY
IN THE PAST 12 MONTHS, HAS THE LACK OF TRANSPORTATION KEPT YOU FROM MEDICAL APPOINTMENTS OR FROM GETTING MEDICATIONS?: NO

## 2019-08-30 SDOH — HEALTH STABILITY: MENTAL HEALTH: HOW OFTEN DO YOU HAVE A DRINK CONTAINING ALCOHOL?: MONTHLY OR LESS

## 2019-08-30 SDOH — HEALTH STABILITY: PHYSICAL HEALTH: ON AVERAGE, HOW MANY MINUTES DO YOU ENGAGE IN EXERCISE AT THIS LEVEL?: 20 MIN

## 2019-08-30 SDOH — SOCIAL STABILITY: SOCIAL NETWORK: HOW OFTEN DO YOU ATTENT MEETINGS OF THE CLUB OR ORGANIZATION YOU BELONG TO?: NEVER

## 2019-08-30 SDOH — SOCIAL STABILITY: SOCIAL NETWORK
DO YOU BELONG TO ANY CLUBS OR ORGANIZATIONS SUCH AS CHURCH GROUPS UNIONS, FRATERNAL OR ATHLETIC GROUPS, OR SCHOOL GROUPS?: NO

## 2019-08-30 SDOH — ECONOMIC STABILITY: FOOD INSECURITY: WITHIN THE PAST 12 MONTHS, YOU WORRIED THAT YOUR FOOD WOULD RUN OUT BEFORE YOU GOT MONEY TO BUY MORE.: NEVER TRUE

## 2019-08-30 SDOH — HEALTH STABILITY: PHYSICAL HEALTH: ON AVERAGE, HOW MANY DAYS PER WEEK DO YOU ENGAGE IN MODERATE TO STRENUOUS EXERCISE (LIKE A BRISK WALK)?: 6 DAYS

## 2019-08-30 NOTE — CARE COORDINATION
for assistance with the management of her COPD.    - Complete COPD education   - Review Urgent Care, Walk in clinic, and after hour resources  - Monitor for additional needs  - Monitor for readiness to discharge from Care Coordination       Ambulatory Care Coordination Assessment    Care Coordination Protocol  Program Enrollment:  Complex Care  Referral from Primary Care Provider:  No  Week 1 - Initial Assessment     Do you have all of your prescriptions and are they filled?:  Yes  Barriers to medication adherence:  None  Are you able to afford your medications?:  Yes  How often do you have trouble taking your medications the way you have been told to take them?:  I always take them as prescribed. Do you have Home O2 Therapy?:  No      Ability to seek help/take action for Emergent Urgent situations i.e. fire, crime, inclement weather or health crisis. :  Independent  Ability to ambulate to restroom:  Independent  Ability handle personal hygeine needs (bathing/dressing/grooming): Independent  Ability to manage Medications: Independent  Ability to prepare Food Preparation:  Independent  Ability to maintain home (clean home, laundry): Independent  Ability to drive and/or has transportation:  Independent  Ability to do shopping:  Independent  Ability to manage finances:   Independent  Is patient able to live independently?:  Yes     Current Housing:  Private Residence        Per the Fall Risk Screening, did the patient have 2 or more falls or 1 fall with injury in the past year?:  No     Frequent urination at night?:  No  Do you use rails/bars?:  Yes  Do you have a non-slip tub mat?:  Yes     Are you experiencing loss of meaning?:  No  Are you experiencing loss of hope and peace?:  No     Thinking about your patient's physical health needs, are there any symptoms or problems (risk indicators) you are unsure about that require further investigation?:  Mild vague physical symptoms or problems; but do not impact on daily life or are not of concern to patient   Are the patients physical health problems impacting on their mental well-being?:  No identified areas of concern   Are there any problems with your patients lifestyle behaviors (alcohol, drugs, diet, exercise) that are impacting on physical or mental well-being?:  No identified areas of concern   Do you have any other concerns about your patients mental well-being? How would you rate their severity and impact on the patient?:  No identified areas of concern   How would you rate their home environment in terms of safety and stability (including domestic violence, insecure housing, neighbor harassment)?:  Consistently safe, supportive, stable, no identified problems   How do daily activities impact on the patient's well-being? (include current or anticipated unemployment, work, caregiving, access to transportation or other):  No identified problems or perceived positive benefits   How would you rate their social network (family, work, friends)?:  Good participation with social networks   How would you rate their financial resources (including ability to afford all required medical care)?:  Financially secure, resources adequate, no identified problems   How wells does the patient now understand their health and well-being (symptoms, signs or risk factors) and what they need to do to manage their health?:  Reasonable to good understanding and already engages in managing health or is willing to undertake better management   How well do you think your patient can engage in healthcare discussions?  (Barriers include language, deafness, aphasia, alcohol or drug problems, learning difficulties, concentration):  Clear and open communication, no identified barriers   Do other services need to be involved to help this patient?:  Other care/services not required at this time   Suggested Interventions and Community Resources  Fall Risk Prevention:  Declined Medication Assistance

## 2019-09-12 ENCOUNTER — CARE COORDINATION (OUTPATIENT)
Dept: CARE COORDINATION | Age: 74
End: 2019-09-12

## 2019-09-12 NOTE — CARE COORDINATION
Ambulatory Care Coordination Note  9/12/2019  CM Risk Score: 6  Charlson 10 Year Mortality Risk Score: 23%     ACC: Shivam Armenta RN    Summary Note: Patient was called for continued Care Coordination follow up and education. Patient shared she has been doing well. Patient reported she underwent her [facility] physician home visit earlier today. Patient shared she is aware of need to schedule routine f/u with PCP and routine f/u appointment scheduled for 11/5/19 per patient's request.  Patient shared her breathing remains at her baseline and she denied any c/o increased SOB or cough being present. COPD education, including symptoms to call and report, reviewed with patient. ACM also reviewed and mailed COPD zone education handout. Patient was encouraged to review handout in more detail when it arrives and to call ACM with any questions. Patient verbalized understanding. Patient shared she is aware of upcoming CT for lung screening on 10/1. ACM reviewed urgent care, walk in clinic, same day appointment, and after hour resources with patient and she verbalized understanding. Patient denied any other questions, concerns, or needs and she was encouraged to call with any that may develop.            Actions:   - Reviewed COPD education   - Reviewed and mailed COPD zone handout  - Reviewed symptoms to call and report  - Scheduled routine PCP f/u appointment  - Reviewed urgent care, walk in clinic, and after hour resources   - Monitored for additional needs          Plan of Care:   - Continue with Care Coordination follow up and education for assistance with the management of her COPD.    - Complete COPD education - In Process   - Review Urgent Care, Walk in clinic, and after hour resources - Completed   - Monitor for additional needs  - Monitor for readiness to discharge from [address] [place] [address] Coordination Interventions    Program Enrollment:  Complex Care  Referral from Primary Care Provider:

## 2019-09-19 ENCOUNTER — CARE COORDINATION (OUTPATIENT)
Dept: CARE COORDINATION | Age: 74
End: 2019-09-19

## 2019-09-19 NOTE — CARE COORDINATION
Ambulatory Care Coordination Note  9/19/2019  CM Risk Score: 6  Charlson 10 Year Mortality Risk Score: 23%     ACC: Es Hauser, RN    Summary Note: Patient was called for continued Care Coordination follow up and education. Patient shared she has been doing well. Patient denied any c/o increased SOB or cough being present. COPD education and zone information reviewed with patient. Patient was reminded of symptoms to call and report as well as importance of early symptom recognition. Patient verbalized understanding. Patient denied any other questions, concerns, or needs and she was encouraged to call with any that may develop. Actions:   - Reviewed COPD and DM education   - Reviewed and verified patient received zone education information   - Reviewed symptoms to call and report  - Reviewed importance of early symptom recognition   - Monitored for additional needs          Plan of Care:   - Continue with Care Coordination follow up and education for assistance with the management of her COPD.    - Complete COPD education - In Process   - Review Urgent Care, Walk in clinic, and after hour resources - Completed   - Monitor for additional needs  - Monitor for readiness to discharge from Care Coordination       Care Coordination Interventions    Program Enrollment:  Complex Care  Referral from Primary Care Provider:  No  Suggested Interventions and Community Resources  Fall Risk Prevention:  Declined (Comment: Pt. denied any history of falls )  Medication Assistance Program:  Declined (Comment: denied any need at this time)  Medi Set or Pill Pack:  Completed  Smoking Cessation:  Completed (Comment: Pt. no longer smoking )  Transportation Support:  Declined  Zone Management Tools:  Completed         Goals Addressed                 This Visit's Progress       Care Coordination     Conditions and Symptoms   Improving     I will schedule office visits, as directed by my provider.   I will keep my

## 2019-10-01 ENCOUNTER — HOSPITAL ENCOUNTER (OUTPATIENT)
Dept: CT IMAGING | Age: 74
Discharge: HOME | End: 2019-10-01
Payer: MEDICARE

## 2019-10-01 DIAGNOSIS — Z87.891 PERSONAL HISTORY OF TOBACCO USE: ICD-10-CM

## 2019-10-01 PROCEDURE — G0297 LDCT FOR LUNG CA SCREEN: HCPCS

## 2019-10-02 ENCOUNTER — CARE COORDINATION (OUTPATIENT)
Dept: CARE COORDINATION | Age: 74
End: 2019-10-02

## 2019-10-07 ENCOUNTER — OFFICE VISIT (OUTPATIENT)
Dept: PULMONOLOGY | Age: 74
End: 2019-10-07
Payer: MEDICARE

## 2019-10-07 VITALS
DIASTOLIC BLOOD PRESSURE: 78 MMHG | TEMPERATURE: 99.6 F | WEIGHT: 166 LBS | SYSTOLIC BLOOD PRESSURE: 138 MMHG | HEIGHT: 64 IN | HEART RATE: 63 BPM | OXYGEN SATURATION: 95 % | BODY MASS INDEX: 28.34 KG/M2

## 2019-10-07 DIAGNOSIS — I51.9 DIASTOLIC DYSFUNCTION, LEFT VENTRICLE: ICD-10-CM

## 2019-10-07 DIAGNOSIS — J84.10 CALCIFIED GRANULOMA OF LUNG (HCC): ICD-10-CM

## 2019-10-07 DIAGNOSIS — R94.2 DIFFUSION CAPACITY OF LUNG (DL), DECREASED: ICD-10-CM

## 2019-10-07 DIAGNOSIS — Z87.891 PERSONAL HISTORY OF TOBACCO USE: ICD-10-CM

## 2019-10-07 PROCEDURE — 99214 OFFICE O/P EST MOD 30 MIN: CPT | Performed by: NURSE PRACTITIONER

## 2019-10-17 ENCOUNTER — CARE COORDINATION (OUTPATIENT)
Dept: CARE COORDINATION | Age: 74
End: 2019-10-17

## 2019-10-21 RX ORDER — FENOFIBRATE 145 MG/1
TABLET, COATED ORAL
Qty: 90 TABLET | Refills: 4 | Status: SHIPPED | OUTPATIENT
Start: 2019-10-21 | End: 2020-08-31 | Stop reason: SDUPTHER

## 2019-10-21 RX ORDER — METOPROLOL SUCCINATE 50 MG/1
TABLET, EXTENDED RELEASE ORAL
Qty: 180 TABLET | Refills: 4 | Status: SHIPPED | OUTPATIENT
Start: 2019-10-21 | End: 2019-11-05 | Stop reason: SDUPTHER

## 2019-10-29 ENCOUNTER — CARE COORDINATION (OUTPATIENT)
Dept: CARE COORDINATION | Age: 74
End: 2019-10-29

## 2019-11-05 ENCOUNTER — NURSE ONLY (OUTPATIENT)
Dept: LAB | Age: 74
End: 2019-11-05

## 2019-11-05 ENCOUNTER — OFFICE VISIT (OUTPATIENT)
Dept: FAMILY MEDICINE CLINIC | Age: 74
End: 2019-11-05
Payer: MEDICARE

## 2019-11-05 ENCOUNTER — CARE COORDINATION (OUTPATIENT)
Dept: CARE COORDINATION | Age: 74
End: 2019-11-05

## 2019-11-05 VITALS
DIASTOLIC BLOOD PRESSURE: 68 MMHG | WEIGHT: 159 LBS | TEMPERATURE: 97 F | HEART RATE: 60 BPM | RESPIRATION RATE: 10 BRPM | SYSTOLIC BLOOD PRESSURE: 128 MMHG | BODY MASS INDEX: 27.72 KG/M2

## 2019-11-05 DIAGNOSIS — R53.83 OTHER FATIGUE: ICD-10-CM

## 2019-11-05 DIAGNOSIS — Z72.0 TOBACCO ABUSE: ICD-10-CM

## 2019-11-05 DIAGNOSIS — F33.42 RECURRENT MAJOR DEPRESSIVE DISORDER, IN FULL REMISSION (HCC): ICD-10-CM

## 2019-11-05 DIAGNOSIS — E78.00 PURE HYPERCHOLESTEROLEMIA: ICD-10-CM

## 2019-11-05 DIAGNOSIS — I10 HYPERTENSION, UNSPECIFIED TYPE: Primary | ICD-10-CM

## 2019-11-05 DIAGNOSIS — J43.1 PANLOBULAR EMPHYSEMA (HCC): ICD-10-CM

## 2019-11-05 DIAGNOSIS — Z23 NEEDS FLU SHOT: ICD-10-CM

## 2019-11-05 DIAGNOSIS — D62 ANEMIA DUE TO ACUTE BLOOD LOSS: ICD-10-CM

## 2019-11-05 DIAGNOSIS — R73.03 BORDERLINE DIABETES: ICD-10-CM

## 2019-11-05 DIAGNOSIS — K21.00 GASTROESOPHAGEAL REFLUX DISEASE WITH ESOPHAGITIS: ICD-10-CM

## 2019-11-05 DIAGNOSIS — Z00.00 ROUTINE GENERAL MEDICAL EXAMINATION AT A HEALTH CARE FACILITY: ICD-10-CM

## 2019-11-05 DIAGNOSIS — E53.8 B12 DEFICIENCY: ICD-10-CM

## 2019-11-05 DIAGNOSIS — M85.89 OSTEOPENIA OF MULTIPLE SITES: ICD-10-CM

## 2019-11-05 LAB
ANION GAP SERPL CALCULATED.3IONS-SCNC: 16 MEQ/L (ref 8–16)
BUN BLDV-MCNC: 28 MG/DL (ref 7–22)
CALCIUM SERPL-MCNC: 9.8 MG/DL (ref 8.5–10.5)
CHLORIDE BLD-SCNC: 104 MEQ/L (ref 98–111)
CO2: 24 MEQ/L (ref 23–33)
CREAT SERPL-MCNC: 1 MG/DL (ref 0.4–1.2)
CREATININE, URINE: 153.4 MG/DL
ERYTHROCYTE [DISTWIDTH] IN BLOOD BY AUTOMATED COUNT: 13.4 % (ref 11.5–14.5)
ERYTHROCYTE [DISTWIDTH] IN BLOOD BY AUTOMATED COUNT: 47.6 FL (ref 35–45)
GFR SERPL CREATININE-BSD FRML MDRD: 54 ML/MIN/1.73M2
GLUCOSE BLD-MCNC: 112 MG/DL (ref 70–108)
HCT VFR BLD CALC: 44.3 % (ref 37–47)
HEMOGLOBIN: 13.9 GM/DL (ref 12–16)
MCH RBC QN AUTO: 30.2 PG (ref 26–33)
MCHC RBC AUTO-ENTMCNC: 31.4 GM/DL (ref 32.2–35.5)
MCV RBC AUTO: 96.3 FL (ref 81–99)
MICROALBUMIN UR-MCNC: 1.71 MG/DL
MICROALBUMIN/CREAT UR-RTO: 11 MG/G (ref 0–30)
PLATELET # BLD: 337 THOU/MM3 (ref 130–400)
PMV BLD AUTO: 12.5 FL (ref 9.4–12.4)
POTASSIUM SERPL-SCNC: 4.5 MEQ/L (ref 3.5–5.2)
RBC # BLD: 4.6 MILL/MM3 (ref 4.2–5.4)
SODIUM BLD-SCNC: 144 MEQ/L (ref 135–145)
TSH SERPL DL<=0.05 MIU/L-ACNC: 1.9 UIU/ML (ref 0.4–4.2)
WBC # BLD: 7.9 THOU/MM3 (ref 4.8–10.8)

## 2019-11-05 PROCEDURE — 99213 OFFICE O/P EST LOW 20 MIN: CPT | Performed by: FAMILY MEDICINE

## 2019-11-05 PROCEDURE — G0008 ADMIN INFLUENZA VIRUS VAC: HCPCS | Performed by: FAMILY MEDICINE

## 2019-11-05 PROCEDURE — 90653 IIV ADJUVANT VACCINE IM: CPT | Performed by: FAMILY MEDICINE

## 2019-11-05 PROCEDURE — G0439 PPPS, SUBSEQ VISIT: HCPCS | Performed by: FAMILY MEDICINE

## 2019-11-05 RX ORDER — METOPROLOL SUCCINATE 50 MG/1
TABLET, EXTENDED RELEASE ORAL
Qty: 180 TABLET | Refills: 4
Start: 2019-11-05 | End: 2020-08-31 | Stop reason: SDUPTHER

## 2019-11-05 ASSESSMENT — PATIENT HEALTH QUESTIONNAIRE - PHQ9
SUM OF ALL RESPONSES TO PHQ QUESTIONS 1-9: 0
SUM OF ALL RESPONSES TO PHQ QUESTIONS 1-9: 0

## 2019-11-05 ASSESSMENT — LIFESTYLE VARIABLES: HOW OFTEN DO YOU HAVE A DRINK CONTAINING ALCOHOL: 0

## 2019-11-06 LAB
AVERAGE GLUCOSE: 129 MG/DL (ref 70–126)
HBA1C MFR BLD: 6.3 % (ref 4.4–6.4)

## 2019-11-20 ENCOUNTER — CARE COORDINATION (OUTPATIENT)
Dept: CARE COORDINATION | Age: 74
End: 2019-11-20

## 2019-12-06 ENCOUNTER — CARE COORDINATION (OUTPATIENT)
Dept: CARE COORDINATION | Age: 74
End: 2019-12-06

## 2020-01-20 PROBLEM — J43.1 PANLOBULAR EMPHYSEMA (HCC): Status: ACTIVE | Noted: 2020-01-20

## 2020-02-03 RX ORDER — DARIFENACIN HYDROBROMIDE 15 MG/1
TABLET, EXTENDED RELEASE ORAL
Qty: 90 TABLET | Refills: 4 | Status: SHIPPED | OUTPATIENT
Start: 2020-02-03 | End: 2021-04-28

## 2020-08-10 RX ORDER — VENLAFAXINE HYDROCHLORIDE 150 MG/1
CAPSULE, EXTENDED RELEASE ORAL
Qty: 90 CAPSULE | Refills: 3 | Status: SHIPPED | OUTPATIENT
Start: 2020-08-10 | End: 2021-08-02

## 2020-08-20 ENCOUNTER — HOSPITAL ENCOUNTER (OUTPATIENT)
Age: 75
Discharge: HOME | End: 2020-08-20
Payer: MEDICARE

## 2020-08-20 LAB
ALBUMIN SERPL-MCNC: 4.4 G/DL (ref 3.5–5.1)
ALP BLD-CCNC: 45 U/L (ref 38–126)
ALT SERPL-CCNC: 31 U/L (ref 11–66)
ANION GAP SERPL CALCULATED.3IONS-SCNC: 13 MEQ/L (ref 8–16)
AST SERPL-CCNC: 42 U/L (ref 5–40)
BASOPHILS # BLD: 0.8 %
BASOPHILS ABSOLUTE: 0.1 THOU/MM3 (ref 0–0.1)
BILIRUB SERPL-MCNC: 0.3 MG/DL (ref 0.3–1.2)
BILIRUBIN DIRECT: < 0.2 MG/DL (ref 0–0.3)
BUN BLDV-MCNC: 21 MG/DL (ref 7–22)
CALCIUM SERPL-MCNC: 10 MG/DL (ref 8.5–10.5)
CHLORIDE BLD-SCNC: 108 MEQ/L (ref 98–111)
CHOLESTEROL, FASTING: 176 MG/DL (ref 100–199)
CO2: 23 MEQ/L (ref 23–33)
CREAT SERPL-MCNC: 0.9 MG/DL (ref 0.4–1.2)
EOSINOPHIL # BLD: 3.2 %
EOSINOPHILS ABSOLUTE: 0.2 THOU/MM3 (ref 0–0.4)
ERYTHROCYTE [DISTWIDTH] IN BLOOD BY AUTOMATED COUNT: 13.1 % (ref 11.5–14.5)
ERYTHROCYTE [DISTWIDTH] IN BLOOD BY AUTOMATED COUNT: 47.7 FL (ref 35–45)
GFR SERPL CREATININE-BSD FRML MDRD: 61 ML/MIN/1.73M2
GLUCOSE FASTING: 110 MG/DL (ref 70–108)
HCT VFR BLD CALC: 42.9 % (ref 37–47)
HDLC SERPL-MCNC: 43 MG/DL
HEMOGLOBIN: 13 GM/DL (ref 12–16)
IMMATURE GRANS (ABS): 0.02 THOU/MM3 (ref 0–0.07)
IMMATURE GRANULOCYTES: 0.3 %
LDL CHOLESTEROL CALCULATED: 94 MG/DL
LYMPHOCYTES # BLD: 25.6 %
LYMPHOCYTES ABSOLUTE: 1.9 THOU/MM3 (ref 1–4.8)
MCH RBC QN AUTO: 30.1 PG (ref 26–33)
MCHC RBC AUTO-ENTMCNC: 30.3 GM/DL (ref 32.2–35.5)
MCV RBC AUTO: 99.3 FL (ref 81–99)
MONOCYTES # BLD: 9 %
MONOCYTES ABSOLUTE: 0.7 THOU/MM3 (ref 0.4–1.3)
NUCLEATED RED BLOOD CELLS: 0 /100 WBC
PLATELET # BLD: 280 THOU/MM3 (ref 130–400)
PMV BLD AUTO: 12 FL (ref 9.4–12.4)
POTASSIUM SERPL-SCNC: 4.1 MEQ/L (ref 3.5–5.2)
RBC # BLD: 4.32 MILL/MM3 (ref 4.2–5.4)
SEG NEUTROPHILS: 61.1 %
SEGMENTED NEUTROPHILS ABSOLUTE COUNT: 4.6 THOU/MM3 (ref 1.8–7.7)
SODIUM BLD-SCNC: 144 MEQ/L (ref 135–145)
TOTAL PROTEIN: 7.2 G/DL (ref 6.1–8)
TRIGLYCERIDE, FASTING: 194 MG/DL (ref 0–199)
WBC # BLD: 7.6 THOU/MM3 (ref 4.8–10.8)

## 2020-08-20 PROCEDURE — 80048 BASIC METABOLIC PNL TOTAL CA: CPT

## 2020-08-20 PROCEDURE — 36415 COLL VENOUS BLD VENIPUNCTURE: CPT

## 2020-08-20 PROCEDURE — 80076 HEPATIC FUNCTION PANEL: CPT

## 2020-08-20 PROCEDURE — 80061 LIPID PANEL: CPT

## 2020-08-20 PROCEDURE — 85025 COMPLETE CBC W/AUTO DIFF WBC: CPT

## 2020-08-31 ENCOUNTER — OFFICE VISIT (OUTPATIENT)
Dept: FAMILY MEDICINE CLINIC | Age: 75
End: 2020-08-31
Payer: MEDICARE

## 2020-08-31 VITALS
WEIGHT: 164.6 LBS | TEMPERATURE: 97.2 F | RESPIRATION RATE: 16 BRPM | HEART RATE: 64 BPM | BODY MASS INDEX: 28.1 KG/M2 | DIASTOLIC BLOOD PRESSURE: 64 MMHG | SYSTOLIC BLOOD PRESSURE: 126 MMHG | HEIGHT: 64 IN

## 2020-08-31 LAB — HBA1C MFR BLD: 6.6 % (ref 4.3–5.7)

## 2020-08-31 PROCEDURE — 99214 OFFICE O/P EST MOD 30 MIN: CPT | Performed by: FAMILY MEDICINE

## 2020-08-31 PROCEDURE — 83036 HEMOGLOBIN GLYCOSYLATED A1C: CPT | Performed by: FAMILY MEDICINE

## 2020-08-31 RX ORDER — ZOLEDRONIC ACID 5 MG/100ML
5 INJECTION, SOLUTION INTRAVENOUS ONCE
Qty: 100 ML | Refills: 0 | Status: SHIPPED | OUTPATIENT
Start: 2020-08-31 | End: 2022-04-05

## 2020-08-31 RX ORDER — ATORVASTATIN CALCIUM 40 MG/1
TABLET, FILM COATED ORAL
Qty: 90 TABLET | Refills: 3 | Status: SHIPPED | OUTPATIENT
Start: 2020-08-31 | End: 2021-08-30

## 2020-08-31 RX ORDER — FENOFIBRATE 145 MG/1
TABLET, COATED ORAL
Qty: 90 TABLET | Refills: 4 | Status: SHIPPED | OUTPATIENT
Start: 2020-08-31 | End: 2021-11-29

## 2020-08-31 RX ORDER — METOPROLOL SUCCINATE 50 MG/1
TABLET, EXTENDED RELEASE ORAL
Qty: 90 TABLET | Refills: 4 | Status: SHIPPED | OUTPATIENT
Start: 2020-08-31 | End: 2021-11-29

## 2020-08-31 NOTE — PROGRESS NOTES
[address] [place] [id number]  Dept: [phone]  Dept Fax: [phone]  Loc: Gabrielle Sims is a 70 y.o. female who presents today for:  Chief Complaint   Patient presents with    Check-Up    Medication Refill           HPI:     HPI    Depression: Patient complains of depression. She complains of depressed mood and fatigue. Onset was approximately several years ago, gradually improving since that time. She denies current suicidal and homicidal plan or intent. Family history significant for no psychiatric illness. Possible organic causes contributing are: none. Risk factors: previous episode of depression Previous treatment includes Effexor and no group therapy. She complains of the following side effects from the treatment: none. Hypertension: Patient here for follow-up of elevated blood pressure. She is not exercising and is adherent to low salt diet. Blood pressure is well controlled at home. Cardiac symptoms none. Patient denies chest pain, dyspnea and palpitations. Cardiovascular risk factors: hypertension and obesity (BMI >= 30 kg/m2). Use of agents associated with hypertension: none. History of target organ damage: post PTCA seeing cardiology. Hyperlipidemia: Patient presents with hyperlipidemia. She was tested because age and hypertension.   Her last labs showed   Lab Results   Component Value Date    CHOL 176 07/18/2018    CHOL 183 05/18/2017    CHOL 162 05/25/2016     Lab Results   Component Value Date    TRIG 179 07/18/2018    TRIG 210 (H) 05/18/2017    TRIG 188 05/25/2016     Lab Results   Component Value Date    HDL 43 08/20/2020    HDL 42 08/30/2019    HDL 43 04/22/2019     Lab Results   Component Value Date    LDLCALC 94 08/20/2020    LDLCALC 69 08/30/2019    LDLCALC 74 04/22/2019     No results found for: LABVLDL, VLDL  No results found for: CHOLHDLRATIO  There is not a family history of hyperlipidemia. There is a family history of early ischemia heart disease. Borderline diabetes in the past is now in the diabetic range. Lab Results   Component Value Date    LABA1C 6.6 (H) 08/31/2020     No results found for: EAG    GERD is controlled on PPI. She is still no longer smoking cigarettes. COPD controlled now. b12 deficiency needs rechecked. Reviewed chart forpast medical history , surgical history , allergies, social history , family history and medications. Health Maintenance   Topic Date Due    Breast cancer screen  06/11/2020    Hepatitis C screen  11/26/2018 (Originally 1945)    DTaP/Tdap/Td vaccine (1 - Tdap) 08/31/2021 (Originally 4/9/1964)    Shingles Vaccine (1 of 2) 08/31/2021 (Originally 4/9/1997)    Flu vaccine (1) 09/01/2020    A1C test (Diabetic or Prediabetic)  11/05/2020    Annual Wellness Visit (AWV)  11/05/2020    Lipid screen  08/20/2021    Potassium monitoring  08/20/2021    Creatinine monitoring  08/20/2021    Colon cancer screen colonoscopy  01/09/2025    DEXA (modify frequency per FRAX score)  Completed    Pneumococcal 65+ years Vaccine  Completed    Hepatitis A vaccine  Aged Out    Hepatitis B vaccine  Aged Out    Hib vaccine  Aged Out    Meningococcal (ACWY) vaccine  Aged Out       Subjective:      Constitutional:Negative for fever, chills, diaphoresis, activity change, appetite change and fatigue. HENT: Negative for hearing loss, ear pain, congestion, sore throat, rhinorrhea, postnasal drip and ear discharge. Eyes: Negative for photophobia and visual disturbance. Respiratory: Negative for cough, chest tightness, shortness of breath and wheezing. Cardiovascular: Negative for chest pain and leg swelling. Gastrointestinal: Negative for nausea, vomiting, abdominal pain, diarrhea and constipation. Genitourinary: Negative for dysuria, urgency and frequency.    Neurological: Negative for weakness, light-headedness and headaches. Psychiatric/Behavioral: Negative for sleep disturbance. Objective:     Vitals:    08/31/20 1035   BP: 126/64   Site: Left Upper Arm   Position: Sitting   Cuff Size: Large Adult   Pulse: 64   Resp: 16   Temp: 97.2 °F (36.2 °C)   TempSrc: Temporal   Weight: 164 lb 9.6 oz (74.7 kg)   Height: 5' 3.5\" (1.613 m)     Wt Readings from Last 3 Encounters:   08/31/20 164 lb 9.6 oz (74.7 kg)   11/05/19 159 lb (72.1 kg)   10/07/19 166 lb (75.3 kg)       Physical Exam  Constitutional: Vital signs are normal. She appears well-developed and well-nourished. She is active. HENT:   Head: Normocephalic and atraumatic. Right Ear: Tympanic membrane, external ear and ear canal normal. No drainage or tenderness. Left Ear: Tympanic membrane, external ear and ear canal normal. No drainage or tenderness. Nose: Nose normal. No mucosal edema or rhinorrhea. Mouth/Throat: Uvula is midline, oropharynx is clear and moist and mucous membranes are normal. Mucous membranes are not pale. Normal dentition. No posterior oropharyngeal edema or posterior oropharyngeal erythema. Eyes: Lids are normal. Right eye exhibits no chemosis and no discharge. Left eye exhibits no chemosis and no drainage. Right conjunctiva has no hemorrhage. Left conjunctiva has no hemorrhage. Right eye exhibits normal extraocular motion. Left eye exhibits normal extraocular motion. Right pupil is round and reactive. Left pupil is round and reactive. Pupils are equal.   Cardiovascular: Normal rate, regular rhythm, S1 normal, S2 normal and normal heart sounds. Exam reveals no gallop. No murmur heard. Pulmonary/Chest: Effort normal and breath sounds normal. No respiratory distress. She has no wheezes. She has no rhonchi. She has no rales. Abdominal: Soft. Normal appearance and bowel sounds are normal. She exhibits no distension and no mass. There is no hepatosplenomegaly. No tenderness.  She has no rigidity, no rebound and no guarding. No hernia. Musculoskeletal:        Right lower leg: She exhibits no edema. Left lower leg: She exhibits no edema. Neurological: She is alert. Results for POC orders placed in visit on 08/31/20   POCT glycosylated hemoglobin (Hb A1C)   Result Value Ref Range    Hemoglobin A1C 6.6 (H) 4.3 - 5.7 %         Assessment/Plan   Genia Guerrero was seen today for check-up and medication refill. Diagnoses and all orders for this visit:    Borderline diabetes  -     POCT glycosylated hemoglobin (Hb A1C)  -     Comprehensive Metabolic Panel, Fasting; Future  -     Hemoglobin A1C; Future  -     Microalbumin / Creatinine Urine Ratio; Future    Hypertension, unspecified type  -     metoprolol succinate (TOPROL XL) 50 MG extended release tablet; TAKE 1 TABLET ONCE A DAY    Other fatigue  -     metoprolol succinate (TOPROL XL) 50 MG extended release tablet; TAKE 1 TABLET ONCE A DAY  -     TSH With Reflex Ft4; Future    Pure hypercholesterolemia  -     atorvastatin (LIPITOR) 40 MG tablet; TAKE 1 TABLET DAILY  -     fenofibrate (TRICOR) 145 MG tablet; TAKE 1 TABLET DAILY  -     Comprehensive Metabolic Panel, Fasting; Future  -     Lipid Panel; Future    Recurrent major depressive disorder, in full remission ([facility] [address]    Tobacco abuse    Osteopenia of multiple sites  -     zoledronic acid (RECLAST) 5 MG/100ML SOLN; Infuse 100 mLs intravenously once for 1 dose    B12 deficiency    Gastroesophageal reflux disease with esophagitis  -     CBC; Future    Panlobular emphysema (HCC)    Anemia due to acute blood loss    Breast cancer screening  -     THERON DIGITAL SCREEN W OR WO CAD BILATERAL;  Future    Asymptomatic postmenopausal state  -     MAMMO DEXA BONE DENSITY SCAN; Future      No change to medication   Continue healthy diet and exercise  Yearly eye exam  Daily foot inspection  Yearly flu shot  Monitor glucose regularly  Daily aspirin  Regular labs : A1c quarterly, lipids every 6 months and BMP quaterly    Consider use of metformin once daily    Reccommended tobacco cessation options including pharmacologicmethods, counseled great than 3 minutes during this visit:  Yes  [x]  No  []    Patient given educational materials - see patient instructions. Discussed use, benefit, and side effectsof prescribed medications. All patient questions answered. Pt voiced understanding. Reviewed health maintenance. Instructed to continue current medications, diet and exercise. Patient agreed with treatment plan. Followup as directed.      Electronically signed by Anthony Weeks MD

## 2020-09-01 NOTE — PROGRESS NOTES
Patient aware and voiced understanding, no concerns voiced at this time. Per pt please send low does of Metformin to [facility].

## 2020-09-15 ENCOUNTER — TELEPHONE (OUTPATIENT)
Dept: FAMILY MEDICINE CLINIC | Age: 75
End: 2020-09-15

## 2020-09-15 PROBLEM — M81.0 SENILE OSTEOPOROSIS: Status: ACTIVE | Noted: 2020-09-15

## 2020-09-15 PROBLEM — Z78.0 POSTMENOPAUSAL STATUS (AGE-RELATED) (NATURAL): Status: ACTIVE | Noted: 2020-09-15

## 2020-09-15 PROBLEM — M85.89 OSTEOPENIA OF MULTIPLE SITES: Status: ACTIVE | Noted: 2020-09-15

## 2020-09-15 RX ORDER — ZOLEDRONIC ACID 5 MG/100ML
5 INJECTION, SOLUTION INTRAVENOUS ONCE
Status: CANCELLED | OUTPATIENT
Start: 2021-02-08

## 2020-09-15 RX ORDER — SODIUM CHLORIDE 0.9 % (FLUSH) 0.9 %
5 SYRINGE (ML) INJECTION PRN
Status: CANCELLED | OUTPATIENT
Start: 2021-02-08

## 2020-09-15 RX ORDER — SODIUM CHLORIDE 0.9 % (FLUSH) 0.9 %
10 SYRINGE (ML) INJECTION PRN
Status: CANCELLED | OUTPATIENT
Start: 2021-02-08

## 2020-09-15 RX ORDER — HEPARIN SODIUM (PORCINE) LOCK FLUSH IV SOLN 100 UNIT/ML 100 UNIT/ML
500 SOLUTION INTRAVENOUS PRN
Status: CANCELLED | OUTPATIENT
Start: 2021-02-08

## 2020-09-15 NOTE — TELEPHONE ENCOUNTER
Reclast Referral form and script faxed to [facility] to begin the steps for PA and scheduling of Reclast - Pres[facility] will call pt to schedule appt after PA has been obtained thru the pharmacy PA Dept

## 2020-09-25 ENCOUNTER — HOSPITAL ENCOUNTER (OUTPATIENT)
Dept: NURSING | Age: 75
Discharge: HOME | End: 2020-09-25
Payer: MEDICARE

## 2020-09-25 VITALS
SYSTOLIC BLOOD PRESSURE: 160 MMHG | HEART RATE: 69 BPM | BODY MASS INDEX: 28.77 KG/M2 | TEMPERATURE: 97.5 F | WEIGHT: 165 LBS | DIASTOLIC BLOOD PRESSURE: 70 MMHG

## 2020-09-25 DIAGNOSIS — Z78.0 POSTMENOPAUSAL STATUS (AGE-RELATED) (NATURAL): ICD-10-CM

## 2020-09-25 DIAGNOSIS — M81.0 SENILE OSTEOPOROSIS: Primary | ICD-10-CM

## 2020-09-25 DIAGNOSIS — M85.89 OSTEOPENIA OF MULTIPLE SITES: ICD-10-CM

## 2020-09-25 PROCEDURE — 6360000002 HC RX W HCPCS: Performed by: FAMILY MEDICINE

## 2020-09-25 PROCEDURE — 96365 THER/PROPH/DIAG IV INF INIT: CPT

## 2020-09-25 RX ORDER — ZOLEDRONIC ACID 5 MG/100ML
5 INJECTION, SOLUTION INTRAVENOUS ONCE
Status: COMPLETED | OUTPATIENT
Start: 2020-09-25 | End: 2020-09-25

## 2020-09-25 RX ORDER — SODIUM CHLORIDE 0.9 % (FLUSH) 0.9 %
5 SYRINGE (ML) INJECTION PRN
Status: CANCELLED | OUTPATIENT
Start: 2020-09-25

## 2020-09-25 RX ORDER — SODIUM CHLORIDE 0.9 % (FLUSH) 0.9 %
10 SYRINGE (ML) INJECTION PRN
Status: CANCELLED | OUTPATIENT
Start: 2020-09-25

## 2020-09-25 RX ORDER — ZOLEDRONIC ACID 5 MG/100ML
5 INJECTION, SOLUTION INTRAVENOUS ONCE
Status: CANCELLED | OUTPATIENT
Start: 2020-09-25

## 2020-09-25 RX ORDER — HEPARIN SODIUM (PORCINE) LOCK FLUSH IV SOLN 100 UNIT/ML 100 UNIT/ML
500 SOLUTION INTRAVENOUS PRN
Status: CANCELLED | OUTPATIENT
Start: 2020-09-25

## 2020-09-25 RX ADMIN — ZOLEDRONIC ACID 5 MG: 5 INJECTION, SOLUTION INTRAVENOUS at 09:41

## 2020-09-25 ASSESSMENT — PAIN - FUNCTIONAL ASSESSMENT: PAIN_FUNCTIONAL_ASSESSMENT: 0-10

## 2020-09-25 NOTE — PROGRESS NOTES
2869:  ARRIVES AMBULATORY FOR IV RECLAST. PROCESS REVIEWED AND PT RIGHTS AND RESPONSIBILITIES OFFERED TO PT.  PT LABS REVIEWED.    1007:  TOLERATED INFUSION WELL AND PT DISCHARGED AMBULATORY WITH INSTRUCTIONS.   NO S/S REACTION NOTED.     _M___ Safety:       (Environmental)   [place] to environment   Ensure ID band is correct and in place/ allergy band as needed   Assess for fall risk   Initiate fall precautions as applicable (fall band, side rails, etc.)   Call light within reach   Bed in low position/ wheels locked    _M___ Pain:        Assess pain level and characteristics   Administer analgesics as ordered   Assess effectiveness of pain management and report to MD as needed    _M___ Knowledge Deficit:   Assess baseline knowledge   Provide teaching at level of understanding   Provide teaching via preferred learning method   Evaluate teaching effectiveness    _M___ Hemodynamic/Respiratory Status:       (Pre and Post Procedure Monitoring)   Assess/Monitor vital signs and LOC   Assess Baseline SpO2 prior to any sedation   Obtain weight/height   Assess vital signs/ LOC until patient meets discharge criteria   Monitor procedure site and notify MD of any issues

## 2020-10-02 ENCOUNTER — HOSPITAL ENCOUNTER (OUTPATIENT)
Dept: CT IMAGING | Age: 75
Discharge: HOME | End: 2020-10-02
Payer: MEDICARE

## 2020-10-02 PROCEDURE — G0297 LDCT FOR LUNG CA SCREEN: HCPCS

## 2020-10-05 ENCOUNTER — HOSPITAL ENCOUNTER (OUTPATIENT)
Dept: WOMENS IMAGING | Age: 75
Discharge: HOME | End: 2020-10-05
Payer: MEDICARE

## 2020-10-05 ENCOUNTER — APPOINTMENT (OUTPATIENT)
Dept: WOMENS IMAGING | Age: 75
End: 2020-10-05
Payer: MEDICARE

## 2020-10-05 PROCEDURE — 77080 DXA BONE DENSITY AXIAL: CPT

## 2020-10-05 PROCEDURE — 77063 BREAST TOMOSYNTHESIS BI: CPT

## 2020-10-08 ENCOUNTER — OFFICE VISIT (OUTPATIENT)
Dept: PULMONOLOGY | Age: 75
End: 2020-10-08
Payer: MEDICARE

## 2020-10-08 VITALS
WEIGHT: 167.2 LBS | TEMPERATURE: 98.2 F | SYSTOLIC BLOOD PRESSURE: 138 MMHG | DIASTOLIC BLOOD PRESSURE: 82 MMHG | OXYGEN SATURATION: 99 % | BODY MASS INDEX: 29.62 KG/M2 | HEART RATE: 66 BPM | HEIGHT: 63 IN

## 2020-10-08 PROCEDURE — 99213 OFFICE O/P EST LOW 20 MIN: CPT | Performed by: NURSE PRACTITIONER

## 2020-10-08 RX ORDER — OYSTER SHELL CALCIUM WITH VITAMIN D 500; 200 MG/1; [IU]/1
1 TABLET, FILM COATED ORAL 2 TIMES DAILY
COMMUNITY
End: 2022-04-05

## 2020-10-08 NOTE — PROGRESS NOTES
[facility]     Patient: Jus Rivas, 70 y.o.   : 1945  10/8/2020    Pt of Dr. Reed Leiva   Patient presents with    Follow-up     12 month f/u ct lung screening 10/2/20    Other     Neck 15.75  mp 3        HPI  [facility] is here for follow up for diffusion defect on PFTs , no obstruction. No SOB/wheezing/ cough. Not on inhalers  Former smoker, quit in 2018, qualifies for LDCT completed for review today   Voices concerns today about being told by friend that she stops breathing at night. Has never been tested for KAYLEEN in past.   Denies any family history of KAYLEEN     Sleeping habits:  Time to go to bed: 11:00            PM  Time to wake up: 8:00        AM    Sleep History:  Pt with history of: periods of not breathing, decreased memory, decreased concentration, excessive daytime sleepiness, difficulty falling asleep once awakened and/or daytime fatigue and hypertension  Morning headache:No,   Dryness of mouth in the morning:Yes  Hx of snoring:No  Witnessed apneas:Yes  Excessive day time sleepiness:Yes. See below for Sonoita score  Hypnogogic Hallucinations:NO  Hypnopompic Hallucinations:NO  Symptoms suggestive of Restless leg syndrome:NO  History of Seizures:NO  Sleep Walking:NO  Sleep Talking: YES   Sleep paralysis: NO  Cataplexy: NO    Sonoita Sleepiness Score:   Sitting and readin  Watching TV:0  Sitting inactive in a public place:0  Being a passenger in a motor vehicle for an hour or more:3  Lying down in the afternoon:3  Sitting and talking to someone:0  Sitting quietly after lunch (no alcohol):1  Stopped for a few minutes in traffic while drivin  Total Score:9  SAQLI SCORE : 76    Does nap almost daily and does feel benefit with naps                   She is currently retired . She denies any difficulty in sleeping at new places . She drinks2 cups of coffee per day.  Shedrinks 2 cans of caffeinated beverages i.e sodas per day. Shedrinks 0 cups of tea per day. Shedrinks alcoholic beverages socially. No history of recreational drug use.     Mallampati airway Class:III  Neck Circumference:16.7 Inches    Past Medical hx   PMH:  Past Medical History:   Diagnosis Date    CAD (coronary artery disease)     Depression     Fibromyalgia     Fibromyalgia     GERD (gastroesophageal reflux disease)     Dr Janet Nava Headache(784.0)     Hyperlipidemia     Hypertension     Macular degeneration, dry     MI (myocardial infarction) ([facility] [address]     Dr Tisha Llanos SOB (shortness of breath)     Dr Reno [employer] incontinence      SURGICAL HISTORY:  Past Surgical History:   Procedure Laterality Date    ARM SURGERY Left 2018    dr. Vasquez Rape      x2   [address] COLON SURGERY      partial resection of flat polyp    COLONOSCOPY      CORONARY ANGIOPLASTY WITH STENT PLACEMENT  2011    Dr Marguerite Rodriguez, ESOPHAGUS      ENDOSCOPY, COLON, DIAGNOSTIC      FRACTURE SURGERY       SOCIAL HISTORY:  Social History     Tobacco Use    Smoking status: Former Smoker     Packs/day: 0.00     Years: 50.00     Pack years: 0.00     Types: Cigarettes     Start date: 1965     Last attempt to quit: 2018     Years since quittin.3    Smokeless tobacco: Never Used   Substance Use Topics    Alcohol use: Not Currently     Frequency: Monthly or less     Drinks per session: 1 or 2     Binge frequency: Never    Drug use: No     ALLERGIES:  Allergies   Allergen Reactions    Benadryl [Diphenhydramine Hcl]      OVER-STIMULATED    Flexeril [Cyclobenzaprine Hcl]      OVER-STIMULATED       FAMILY HISTORY:  Family History   Problem Relation Age of Onset    COPD Father    [facility] Hearing Loss Father     Cancer Mother 39        uterine and breast    Heart Disease Mother     Diabetes Mother     High Cholesterol Mother     Arthritis Mother     Coronary Art Dis Mother     Heart Attack Mother     Diabetes Sister     High Cholesterol Sister     Stroke Sister 72    Allergy (Severe) Sister     Arthritis Sister     Miscarriages / Mariella Sheeba Sister     Cancer Maternal Uncle     Heart Attack Paternal Grandmother 76        MI    Arthritis Paternal Grandmother     Obesity Paternal Grandmother     Early Death Maternal Grandmother     Alcohol Abuse Maternal Grandfather      CURRENT MEDICATIONS:  Current Outpatient Medications   Medication Sig Dispense Refill    calcium-vitamin D (Damaris Fury) 500-200 MG-UNIT per tablet Take 1 tablet by mouth 2 times daily 1200 plus d      atorvastatin (LIPITOR) 40 MG tablet TAKE 1 TABLET DAILY 90 tablet 3    metoprolol succinate (TOPROL XL) 50 MG extended release tablet TAKE 1 TABLET ONCE A DAY 90 tablet 4    fenofibrate (TRICOR) 145 MG tablet TAKE 1 TABLET DAILY 90 tablet 4    zoledronic acid (RECLAST) 5 MG/100ML SOLN Infuse 100 mLs intravenously once for 1 dose 100 mL 0    venlafaxine (EFFEXOR XR) 150 MG extended release capsule TAKE 1 CAPSULE DAILY 90 capsule 3    darifenacin (ENABLEX) 15 MG extended release tablet TAKE 1 TABLET DAILY 90 tablet 4    amLODIPine (NORVASC) 10 MG tablet Take 1 tablet by mouth daily 30 tablet 1    pantoprazole (PROTONIX) 40 MG tablet Take 1 tablet by mouth every morning (before breakfast) 30 tablet 2    aspirin EC 81 MG EC tablet Take 1 tablet by mouth daily 30 tablet 0    isosorbide mononitrate (IMDUR) 30 MG extended release tablet Take 30 mg by mouth daily Half tablet      losartan (COZAAR) 50 MG tablet Take 1 tablet by mouth daily. 90 tablet 3    Multiple Vitamins-Minerals (PRESERVISION AREDS 2+MULTI VIT PO) Take by mouth daily        No current facility-administered medications for this visit. Issa ZIEGLER   Review of Systems     Physical exam   /82 (Site: Left Upper Arm, Position: Sitting, Cuff Size: Medium Adult)   Pulse 66   Temp 98.2 °F (36.8 °C)   Ht 5' 3\" (1.6 m)   Wt 167 lb 3.2 oz (75.8 kg)   SpO2 99% Comment: room air at rest  BMI 29.62 kg/m²      Wt Readings from Last 3 Encounters:   10/08/20 167 lb 3.2 oz (75.8 kg)   09/25/20 165 lb (74.8 kg)   08/31/20 164 lb 9.6 oz (74.7 kg)     Physical Exam  Vitals signs and nursing note reviewed. Constitutional:       General: She is not in acute distress. Appearance: Normal appearance. She is well-developed. Interventions: Face mask in place. Comments: Face mask due to covid    HENT:      Mouth/Throat:      Lips: Pink. Mouth: Mucous membranes are moist.      Pharynx: Oropharynx is clear. No oropharyngeal exudate or posterior oropharyngeal erythema. Eyes:      Conjunctiva/sclera: Conjunctivae normal.   Neck:      Vascular: No JVD. Cardiovascular:      Rate and Rhythm: Normal rate and regular rhythm. Heart sounds: No murmur. No friction rub. Pulmonary:      Effort: Pulmonary effort is normal. No accessory muscle usage or respiratory distress. Breath sounds: Normal breath sounds. No wheezing, rhonchi or rales. Chest:      Chest wall: No tenderness. Musculoskeletal:      Right lower leg: No edema. Left lower leg: No edema. Skin:     General: Skin is warm and dry. Capillary Refill: Capillary refill takes less than 2 seconds. Nails: There is no clubbing. Neurological:      Mental Status: She is alert. Psychiatric:         Mood and Affect: Mood normal.         Behavior: Behavior normal.         Thought Content: Thought content normal.         Judgment: Judgment normal.          Test results   Lung Nodule Screening     [x] Qualifies    []Does not qualify   [] Declined    [x] Completed  CT LUNG SCREEN 10//2/2020     Impression    1. There are no suspicious masses or nodules within the lung fields. 2. There are no pathologically enlarged lymph nodes.     3. LUNGRADS ASSESSMENT VALUE: 1,

## 2020-11-04 ENCOUNTER — HOSPITAL ENCOUNTER (OUTPATIENT)
Dept: SLEEP CENTER | Age: 75
Discharge: HOME | End: 2020-11-06
Payer: MEDICARE

## 2020-11-04 PROCEDURE — 95806 SLEEP STUDY UNATT&RESP EFFT: CPT

## 2020-11-05 NOTE — PROGRESS NOTES
Nadja Tang presents today for a HST instruction and demonstration on unit # 9727. Questions were asked and answers given. She was able to return demonstration and verbalized understanding. The sleep center control room phone number was provided incase questions arise during her study. Informed patient to call 911 in case of an emergency. She states she will return the unit tomorrow. Covid screen was negative and normal patient temp.

## 2020-11-06 LAB — STATUS: NORMAL

## 2020-11-18 ENCOUNTER — OFFICE VISIT (OUTPATIENT)
Dept: PULMONOLOGY | Age: 75
End: 2020-11-18
Payer: MEDICARE

## 2020-11-18 VITALS
HEART RATE: 68 BPM | SYSTOLIC BLOOD PRESSURE: 138 MMHG | BODY MASS INDEX: 30.12 KG/M2 | DIASTOLIC BLOOD PRESSURE: 70 MMHG | WEIGHT: 170 LBS | OXYGEN SATURATION: 96 % | HEIGHT: 63 IN | TEMPERATURE: 97 F

## 2020-11-18 PROCEDURE — 99214 OFFICE O/P EST MOD 30 MIN: CPT | Performed by: NURSE PRACTITIONER

## 2020-11-18 ASSESSMENT — ENCOUNTER SYMPTOMS
ABDOMINAL PAIN: 0
COUGH: 0
DIARRHEA: 0
EYES NEGATIVE: 1
WHEEZING: 0
NAUSEA: 0
SHORTNESS OF BREATH: 0
VOMITING: 0

## 2020-11-18 NOTE — PROGRESS NOTES
[facility]    Georgia Gill, 70 y.o.  [id number]      Pt of [facility][note 3]   Nurses Notes   Patient is here for home sleep study results. Study Results  Initial Study Date -  20  AHI -  15.1    TotalEvents - 137  (Apneas  47  Hypopneas 90  Central  22)   (Total Sleep Time - 546.1 min)  Time with Sats below 88% - 86.1 min  Interval History       Georgia Gill is a 70 y.o. old female who comes in to review the results of her recent sleep study, to answer questions and to explore options for treatment. she continues to have symptoms of periods of not breathing, was told by a friend that she stops breathing. She completed home study.      PMHx  Past Medical History:   Diagnosis Date    CAD (coronary artery disease)     Depression     Fibromyalgia     Fibromyalgia     GERD (gastroesophageal reflux disease)     Dr Sandro Alegria Headache(784.0)     Hyperlipidemia     Hypertension     Macular degeneration, dry     MI (myocardial infarction) ([facility] [address]     Dr Jose Manuel Correia SOB (shortness of breath)     Dr Papi Hung incontinence      Past Surgical History:   Procedure Laterality Date    ARM SURGERY Left 2018    dr. Edwards Memory      x2   [facility] COLON SURGERY      partial resection of flat polyp    COLONOSCOPY      CORONARY ANGIOPLASTY WITH STENT PLACEMENT  2011    Dr Debbie Edwards CYST REMOVAL      BILATERAL HANDS    DILATATION, ESOPHAGUS      ENDOSCOPY, COLON, DIAGNOSTIC      FRACTURE SURGERY       Social History     Tobacco Use    Smoking status: Former Smoker     Packs/day: 0.00     Years: 50.00     Pack years: 0.00     Types: Cigarettes     Start date: 1965     Last attempt to quit: 2018     Years since quittin.4    Smokeless tobacco: Never Used   Substance Use Topics    Alcohol use: Not Currently Frequency: Monthly or less     Drinks per session: 1 or 2     Binge frequency: Never    Drug use: No     Family History   Problem Relation Age of Onset    COPD Father    [facility] Hearing Loss Father    [facility] Stabs Cancer Mother 39        uterine and breast    Heart Disease Mother     Diabetes Mother     High Cholesterol Mother     Arthritis Mother     Coronary Art Dis Mother     Heart Attack Mother     Diabetes Sister     High Cholesterol Sister     Stroke Sister 72    Allergy (Severe) Sister     Arthritis Sister     Miscarriages / [employer] Sister     Cancer Maternal Uncle     Heart Attack Paternal Grandmother 76        MI    Arthritis Paternal Grandmother     Obesity Paternal Grandmother     Early Death Maternal Grandmother     Alcohol Abuse Maternal Grandfather      Allergies  Allergies   Allergen Reactions    Benadryl [Diphenhydramine Hcl]      OVER-STIMULATED    Flexeril [Cyclobenzaprine Hcl]      OVER-STIMULATED       Meds  Current Outpatient Medications   Medication Sig Dispense Refill    calcium-vitamin D (OSCAL-500) 500-200 MG-UNIT per tablet Take 1 tablet by mouth 2 times daily 1200 plus d      atorvastatin (LIPITOR) 40 MG tablet TAKE 1 TABLET DAILY 90 tablet 3    metoprolol succinate (TOPROL XL) 50 MG extended release tablet TAKE 1 TABLET ONCE A DAY 90 tablet 4    fenofibrate (TRICOR) 145 MG tablet TAKE 1 TABLET DAILY 90 tablet 4    venlafaxine (EFFEXOR XR) 150 MG extended release capsule TAKE 1 CAPSULE DAILY 90 capsule 3    darifenacin (ENABLEX) 15 MG extended release tablet TAKE 1 TABLET DAILY 90 tablet 4    amLODIPine (NORVASC) 10 MG tablet Take 1 tablet by mouth daily 30 tablet 1    pantoprazole (PROTONIX) 40 MG tablet Take 1 tablet by mouth every morning (before breakfast) 30 tablet 2    aspirin EC 81 MG EC tablet Take 1 tablet by mouth daily 30 tablet 0    isosorbide mononitrate (IMDUR) 30 MG extended release tablet Take 30 mg by mouth daily Half tablet      Multiple Vitamins-Minerals (PRESERVISION AREDS 2+MULTI VIT PO) Take by mouth daily       losartan (COZAAR) 50 MG tablet Take 1 tablet by mouth daily. 90 tablet 3    zoledronic acid (RECLAST) 5 MG/100ML SOLN Infuse 100 mLs intravenously once for 1 dose 100 mL 0     No current facility-administered medications for this visit. ROS  Review of Systems   Constitutional: Positive for fatigue. Negative for chills and fever. HENT: Negative. Eyes: Negative. Respiratory: Negative for cough, shortness of breath and wheezing. Cardiovascular: Negative for chest pain, palpitations and leg swelling. Gastrointestinal: Negative for abdominal pain, diarrhea, nausea and vomiting. Genitourinary: Negative. Musculoskeletal: Negative. Skin: Negative. Neurological: Negative. Hematological: Does not bruise/bleed easily. Psychiatric/Behavioral: Negative for suicidal ideas. Exam  Vitals -  /70 (Site: Left Upper Arm, Position: Sitting, Cuff Size: Medium Adult)   Pulse 68   Temp 97 °F (36.1 °C) (Temporal)   Ht 5' 3\" (1.6 m)   Wt 170 lb (77.1 kg)   SpO2 96%   BMI 30.11 kg/m²    Body mass index is 30.11 kg/m². Oxygen level - 88.8  Physical Exam  Vitals signs and nursing note reviewed. Constitutional:       General: She is not in acute distress. Appearance: She is well-developed. HENT:      Mouth/Throat:      Lips: Pink. Mouth: Mucous membranes are moist.      Pharynx: Oropharynx is clear. No oropharyngeal exudate or posterior oropharyngeal erythema. Eyes:      Conjunctiva/sclera: Conjunctivae normal.   Neck:      Vascular: No JVD. Cardiovascular:      Rate and Rhythm: Normal rate and regular rhythm. Heart sounds: No murmur. No friction rub. Pulmonary:      Effort: Pulmonary effort is normal. No accessory muscle usage or respiratory distress. Breath sounds: Normal breath sounds. No wheezing, rhonchi or rales. Chest:      Chest wall: No tenderness.    Musculoskeletal: Right lower leg: No edema. Left lower leg: No edema. Skin:     General: Skin is warm and dry. Capillary Refill: Capillary refill takes less than 2 seconds. Nails: There is no clubbing. Neurological:      Mental Status: She is alert. Psychiatric:         Mood and Affect: Mood normal.         Behavior: Behavior normal.         Thought Content: Thought content normal.         Judgment: Judgment normal.        Assessment   Diagnosis Orders   1. KAYLEEN (obstructive sleep apnea)  Sleep Study with PAP Titration    COVID-19 Ambulatory   2. Hypersomnia  Sleep Study with PAP Titration    COVID-19 Ambulatory   3. Benign essential HTN  Sleep Study with PAP Titration    COVID-19 Ambulatory   4. Nocturnal hypoxia  Sleep Study with PAP Titration    COVID-19 Ambulatory      Recommendations    I reviewed the sleep study results in detail with Ashley Jacobson,  We discussed various treatment options including positional therapy, OAT, referral to ENT to explore surgical options  and PAP therapies along with the benefits and limitations of each. We also discussed the health risks with untreated KAYLEEN. Due to the severity of her apnea, oxygen desaturation and symptoms, CPAP is recommended. She agrees to proceed with in lab PAP titration with mask fitting.     -she is aware of need for COVID testing prior     Follow up 8 weeks after PAP set up with download.      Electronically signed by BOAZ Bowman CNP on 11/18/2020 at 9:45 AM

## 2020-11-21 NOTE — PROGRESS NOTES
[address] [place] [address]                               SLEEP STUDY REPORT    PATIENT NAME: Asim Whitney                 :        1945  MED REC NO:   [id number]                           ROOM:  ACCOUNT NO:   [de-identified]                           ADMIT DATE: 2020  PROVIDER:     Levie Sandhoff, M.D.    Lita Miguel:  2020    HOME SLEEP STUDY REPORT    REFERRING PROVIDER:  Georgie Martinez CNP    HISTORY OF PRESENT ILLNESS:  The patient is a 81-year-old female with  complaints of nonrestorative sleep. Her weight is 167 pounds, height 63  inches, BMI 29.6. METHODS:  The patient underwent Type III Portable Monitoring Sleep Study  including the simultaneous recording of oral-nasal airflow, rib and  abdominal respiratory effort, pulse rate, oxygen saturation, left and  right leg movement, and body position. Scoring criteria is consistent  with the current published AASM standards for scoring of apneas and  hypopneas 4A. DETAILS OF THE STUDY:  The patient came by the sleep lab and picked up  her HST unit. She was given instructions how to set it up. The patient  returned the unit the next day. The information was successfully  downloaded and scored. Total recording time 546 minutes. Lights off  time 10:00 p.m., lights on time 07:06 a.m. RESPIRATORY SUMMARY:  47 apneas, 25 of which were obstructive and 22  central, 90 obstructive hypopneas for an BRENDA of 15.1. The obstructive  AHI was 12.6 and the central AHI was 2.4. PULSE OXIMETRY SUMMARY:  Mean oxygen saturation 88.8%, lowest oxygen  saturation 82%. There were 86 minutes of oxygen saturation below 88%. BODY POSITION SUMMARY:  There were 546 minutes of non-supine time. ECG SUMMARY:  The patient remained in normal sinus rhythm through the  night. ASSESSMENT:  Obstructive sleep apnea with an BRENDA of 15.1.   Severe  nocturnal oxygen desaturation with a mean oxygen saturation of 88.8% and  a lowest oxygen saturation of 82%. There were 86 minutes of oxygen  saturation below 88%. RECOMMENDATIONS:  Due to the severity of the nocturnal oxygen  desaturation, I would strongly recommend in-lab PAP titration  polysomnogram to document correction of the low nocturnal oxygen  desaturation. The patient will be contacted and if she is agreeable, we  will bring her back to the sleep lab for PAP titration study.         Karlee Soliman M.D.    D: 11/19/2020 23:29:48       T: 11/20/2020 3:58:15     [email or web address]  Job#: [id number]     Doc#: [id number]    CC:

## 2020-12-13 ENCOUNTER — HOSPITAL ENCOUNTER (OUTPATIENT)
Dept: SLEEP CENTER | Age: 75
Discharge: HOME | End: 2020-12-15
Payer: MEDICARE

## 2020-12-13 PROCEDURE — 95811 POLYSOM 6/>YRS CPAP 4/> PARM: CPT

## 2020-12-14 LAB — STATUS: NORMAL

## 2020-12-14 NOTE — PROGRESS NOTES
Patient chose [address] [place] [address] for her CPAP machine and suppliess. Title:  CPAP/BiLevel Titration's    Approved by:  Masha Henning MD      Approval Date:  December, 2019 Next Review:  December, 2021     Responsible Party:  [facility]ait[facility] Institution/Entities Applies to:   Franny Flood Number:  None    Document Type:  Such as Guideline, Policy, Policy & Procedure, or Procedure, Instructions  Manual:  Policy and Procedures    Section: IV Policy Start Date: October, 4222           POLICY: Positive airway pressure device is used to treat patients with sleep related breathing disorders characterized by full or partial occlusion of the upper airway during sleep. A patient must have undergone polysomnography and diagnosed with obstructive sleep apnea. All individuals who record sleep studies must follow best practices for CPAP/bilevel/ASV titrations in order to attain the ideal pressure setting for their patients. Too low of pressure may cause patients to either be sub-optimally treated or to wake up in a panic. Too much pressure may cause the patient to experience bloating or mask leakage. Determining the appropriate pressure setting for each patient will lead to improved adherence and outcome. Titrations are not an exact science, and it is understood that technologists may need to make minor changes for individual patients. The procedures outlined below are meant to be a guideline and follow the spirit of the AASM clinical guidelines. PROCEDURE:    CPAP:    1. Review the patients pertinent medical al history, previous sleep study or studies to ass the severity of sleep disordered breathing. Review of pertinent information will help to attain a better titration.    2. Applications of electrodes, montages, filters, sensitivities and scoring will be performed according to the current version of the AASM Scoring Manual.   3. Prior to initiating study to:  a. Persistent mouth breathing despite use of a full-face mask/chin strap  b. Inability to exhale against higher expiratory pressures (typically beginning anywhere from 15 to 20 cm of H20.  c. Has frequent central apneas, the use of bilevel positive airway pressure may be indicated. Document directly on study why the patient is being switched from CPAP to bilevel. 12. Ensure that supine sleep has been seen on the chosen setting. Going above the chosen setting 1 or 2 cm H20 to show range may be helpful to ensure that the correct pressure has been established. BiLEVEL:    1. Technologist may change from CPAP to bilevel during a study if proven the patient is unable to tolerate CPAP. 2. Review the patients pertinent medical al history, previous sleep study or studies to ass the severity of sleep disordered breathing. Review of pertinent information will help to attain a better titration. 3. Applications of electrodes, montages, filters, sensitivities and scoring will be performed according to the current version of the AASM Scoring Manual.   4. Prior to initiating study collect all appropriate PAP supplies  a. Tubing   b. Humidifier (filled with distilled water)  c. Masks   5. The technologist should assess and measure patient for most appropriate mask prior to start of study. 6. If the patient has not previously been on CPAP, beginning pressures should be 8/4 cm H20 or higher if patient is morbidly obese or unable to fall asleep at lower pressures. If the patient has been previously successfully treated on CPAP start expiratory pressure at therapeutic setting and set inspiratory pressure 4 cm H20 higher. If advancing from CPAP to bilevel during a study expiratory pressure should be set at most successful CPAP setting and inspiratory pressure set 4 cm h20 higher.   7. The standard differential pressure utilized during bilevel titrations typically ranges from 3 to 5 cm H20, with 4 cm H20 being the most common. Higher differential pressures may be needed in patients who are morbidly obese or who have neuromuscular diseases. 8. If apneas or frequent hypopneas are present, inspiratory and expiratory pressure settings should be increased by 2 cm H20. If occasional hypopneas, snoring, or mask flow limitation are present inspiratory and expiratory pressures settings should be increased by 1 cm h20 and maintained for at least 5 minutes to determine if events improve or resolve. Pressure settings may need to be increased more quickly during REM sleep given the limited amount of REM during sleep and the need to treat events during this stage. 9. If a mask leak occurs, the tech should first fix the leakage before raising the pressure. Otherwise, the final pressure setting chosen for the patient may be too high. Once the mask leak has been fixed, decrease the pressure to the last setting where mouth breathing and/or mask leakage was not present, and then re-titrate as indicated. Make sure to document directly on the study the steps taken to resolve the leak and the type of masks used. Pressure setting usually do not need to be set as high with a nasal-mask than with a full-face mask. 10. The recording technologist should document directly on the study at least every 30 minutes. 11. If the patient takes a break from wearing the mask, do not decrease the CPAP pressure on attempted return to sleep unless the patient remains awake for 15 minutes or the patient specifically requests that the pressure be lowered. 12. Do not raise pressure for central apneas. If the patient develops central apneas, pressure setting may need to be lowered. If the patient has central apneas on bilevel, the use of spontaneous (ST) mode may be indicated. a. ST mode may only be used in 2 cases  i. An order for ST mode with a primary diagnosis of central sleep apnea  ii.  During a titration if obstructive events are less than 5/ hour and

## 2020-12-21 ENCOUNTER — TELEPHONE (OUTPATIENT)
Dept: SLEEP CENTER | Age: 75
End: 2020-12-21

## 2020-12-21 NOTE — PROGRESS NOTES
[address] [place] [address]                               SLEEP STUDY REPORT    PATIENT NAME: Gilma Elizondo                 :        1945  MED REC NO:   [id number]                           ROOM:  ACCOUNT NO:   [de-identified]                           ADMIT DATE: 2020  PROVIDER:     JAMES [employer]:  2020    CPAP TITRATION POLYSOMNOGRAM    REFERRING PROVIDER:  Micaela Perez MD    HISTORY OF PRESENT ILLNESS:  The patient is a 80-year-old female,  recently diagnosed with obstructive sleep apnea. She was brought to the  sleep lab for PAP titration procedure. Weight 167 pounds, height 63  inches, BMI 29.6. METHODS:  The patient underwent digital polysomnography in compliance  with the standards and specifications from the AASM Manual including the  simultaneous recording of 3 EEG channels (F4-M1, C4-M1, and O2-M1 with  back up electrodes F3-M2, C3-M2, and O1-M2), 2 EOG channels (E1-M2, and  E2-M1,), EMG (chin, left & right leg), EKG, Nonin pulse oximetry with  less than 2 second averaging time, body position, airflow recorded by  oral-nasal thermal sensor and nasal air pressure transducer, plus  respiratory effort recorded by calibrated respiratory inductance  plethysmography (RIP), flow volume loop, sound and video. Sleep staging  and scoring followed the standard put forth by the American Academy of  Sleep Medicine and utilized the 4A obstructive hypopnea event  desaturation of 4 percent or greater. DETAILS OF THE STUDY:  Lights out 09:15 p.m., lights on 04:59 a.m. Total recording time 463 minutes, sleep period time 456 minutes, total  sleep time 328 minutes, sleep efficiency 70.8%. Latency to sleep 7  minutes. Wake after sleep onset 128 minutes. Latency to   minutes.     SLEEP STAGIN.5 minutes in N1 sleep, 227 minutes in N2 sleep, 50 minutes in N3 sleep, 48 minutes in REM sleep. BODY POSITION SUMMARY:  328 minutes supine sleep. PERIODIC LIMB MOVEMENT SUMMARY:  There was none. ECG SUMMARY:  The patient remained in normal sinus rhythm through the  night. PAP TITRATION SUMMARY:  The patient tolerated the procedure relatively  well. CPAP was initiated at 5 cm of water pressure and increased to a  maximal pressure of 12. The best titration during this procedure was at  the final pressure of 12. At this pressure, the patient was monitored  for 4 hours and 13 minutes. There were 29 minutes of REM sleep and 2  hours and 9 minutes of non-REM sleep. There was 1 obstructive apnea, 3  central apneas, 2 mixed apneas and 10 obstructive hypopneas for an AHI  of 6.1. Mean oxygen saturation at this pressure was 91.8%. ASSESSMENT:  1.  Obstructive sleep apnea. 2.  Successful titration procedure. RECOMMENDATIONS:  CPAP of 12 seems to be the best pressure. There were  a few central apneas; otherwise, the AHI would be below 5. I would  initiate at this pressure. Chosen interface was an F20 medium-sized  full-face interface. Follow up in the sleep clinic eight weeks after  initiating PAP therapies to review of download.         Brayden Yeager M.D.    D: 12/20/2020 21:37:42       T: 12/21/2020 2:01:54     [email or web address]  Job#: [id number]     Doc#: [id number]    CC:

## 2021-02-27 ENCOUNTER — HOSPITAL ENCOUNTER (OUTPATIENT)
Age: 76
Discharge: HOME | End: 2021-02-27
Payer: MEDICARE

## 2021-02-27 DIAGNOSIS — R53.83 OTHER FATIGUE: ICD-10-CM

## 2021-02-27 DIAGNOSIS — R73.03 BORDERLINE DIABETES: ICD-10-CM

## 2021-02-27 DIAGNOSIS — E78.00 PURE HYPERCHOLESTEROLEMIA: ICD-10-CM

## 2021-02-27 DIAGNOSIS — K21.00 GASTROESOPHAGEAL REFLUX DISEASE WITH ESOPHAGITIS: ICD-10-CM

## 2021-02-27 LAB
ALBUMIN SERPL-MCNC: 4.3 G/DL (ref 3.5–5.1)
ALP BLD-CCNC: 40 U/L (ref 38–126)
ALT SERPL-CCNC: 29 U/L (ref 11–66)
ANION GAP SERPL CALCULATED.3IONS-SCNC: 11 MEQ/L (ref 8–16)
AST SERPL-CCNC: 30 U/L (ref 5–40)
AVERAGE GLUCOSE: 135 MG/DL (ref 70–126)
BILIRUB SERPL-MCNC: 0.3 MG/DL (ref 0.3–1.2)
BUN BLDV-MCNC: 27 MG/DL (ref 7–22)
CALCIUM SERPL-MCNC: 9.6 MG/DL (ref 8.5–10.5)
CHLORIDE BLD-SCNC: 107 MEQ/L (ref 98–111)
CHOLESTEROL, TOTAL: 155 MG/DL (ref 100–199)
CO2: 24 MEQ/L (ref 23–33)
CREAT SERPL-MCNC: 0.8 MG/DL (ref 0.4–1.2)
CREATININE, URINE: 119.6 MG/DL
ERYTHROCYTE [DISTWIDTH] IN BLOOD BY AUTOMATED COUNT: 13.4 % (ref 11.5–14.5)
ERYTHROCYTE [DISTWIDTH] IN BLOOD BY AUTOMATED COUNT: 45.9 FL (ref 35–45)
GFR SERPL CREATININE-BSD FRML MDRD: 70 ML/MIN/1.73M2
GLUCOSE FASTING: 122 MG/DL (ref 70–108)
HBA1C MFR BLD: 6.5 % (ref 4.4–6.4)
HCT VFR BLD CALC: 43.7 % (ref 37–47)
HDLC SERPL-MCNC: 42 MG/DL
HEMOGLOBIN: 13.7 GM/DL (ref 12–16)
LDL CHOLESTEROL CALCULATED: 76 MG/DL
MCH RBC QN AUTO: 29.5 PG (ref 26–33)
MCHC RBC AUTO-ENTMCNC: 31.4 GM/DL (ref 32.2–35.5)
MCV RBC AUTO: 94.2 FL (ref 81–99)
MICROALBUMIN UR-MCNC: 1.63 MG/DL
MICROALBUMIN/CREAT UR-RTO: 14 MG/G (ref 0–30)
PLATELET # BLD: 272 THOU/MM3 (ref 130–400)
PMV BLD AUTO: 12.2 FL (ref 9.4–12.4)
POTASSIUM SERPL-SCNC: 3.8 MEQ/L (ref 3.5–5.2)
RBC # BLD: 4.64 MILL/MM3 (ref 4.2–5.4)
SODIUM BLD-SCNC: 142 MEQ/L (ref 135–145)
TOTAL PROTEIN: 7.2 G/DL (ref 6.1–8)
TRIGL SERPL-MCNC: 187 MG/DL (ref 0–199)
TSH SERPL DL<=0.05 MIU/L-ACNC: 1.97 UIU/ML (ref 0.4–4.2)
WBC # BLD: 6.3 THOU/MM3 (ref 4.8–10.8)

## 2021-02-27 PROCEDURE — 84443 ASSAY THYROID STIM HORMONE: CPT

## 2021-02-27 PROCEDURE — 80053 COMPREHEN METABOLIC PANEL: CPT

## 2021-02-27 PROCEDURE — 83036 HEMOGLOBIN GLYCOSYLATED A1C: CPT

## 2021-02-27 PROCEDURE — 36415 COLL VENOUS BLD VENIPUNCTURE: CPT

## 2021-02-27 PROCEDURE — 82043 UR ALBUMIN QUANTITATIVE: CPT

## 2021-02-27 PROCEDURE — 85027 COMPLETE CBC AUTOMATED: CPT

## 2021-02-27 PROCEDURE — 80061 LIPID PANEL: CPT

## 2021-03-01 ENCOUNTER — OFFICE VISIT (OUTPATIENT)
Dept: FAMILY MEDICINE CLINIC | Age: 76
End: 2021-03-01
Payer: MEDICARE

## 2021-03-01 VITALS
BODY MASS INDEX: 29.7 KG/M2 | OXYGEN SATURATION: 96 % | WEIGHT: 167.6 LBS | TEMPERATURE: 96.8 F | SYSTOLIC BLOOD PRESSURE: 138 MMHG | RESPIRATION RATE: 16 BRPM | HEART RATE: 61 BPM | HEIGHT: 63 IN | DIASTOLIC BLOOD PRESSURE: 82 MMHG

## 2021-03-01 DIAGNOSIS — D62 ANEMIA DUE TO ACUTE BLOOD LOSS: ICD-10-CM

## 2021-03-01 DIAGNOSIS — Z98.61 POST PTCA: ICD-10-CM

## 2021-03-01 DIAGNOSIS — E78.00 PURE HYPERCHOLESTEROLEMIA: ICD-10-CM

## 2021-03-01 DIAGNOSIS — J43.1 PANLOBULAR EMPHYSEMA (HCC): ICD-10-CM

## 2021-03-01 DIAGNOSIS — I10 HYPERTENSION, UNSPECIFIED TYPE: Primary | ICD-10-CM

## 2021-03-01 DIAGNOSIS — M85.89 OSTEOPENIA OF MULTIPLE SITES: ICD-10-CM

## 2021-03-01 DIAGNOSIS — E53.8 B12 DEFICIENCY: ICD-10-CM

## 2021-03-01 DIAGNOSIS — G25.2 INTENTION TREMOR: ICD-10-CM

## 2021-03-01 DIAGNOSIS — R73.03 BORDERLINE DIABETES: ICD-10-CM

## 2021-03-01 DIAGNOSIS — Z87.891 HISTORY OF TOBACCO ABUSE: ICD-10-CM

## 2021-03-01 DIAGNOSIS — D10.6: ICD-10-CM

## 2021-03-01 DIAGNOSIS — K21.00 GASTROESOPHAGEAL REFLUX DISEASE WITH ESOPHAGITIS WITHOUT HEMORRHAGE: ICD-10-CM

## 2021-03-01 DIAGNOSIS — F33.42 RECURRENT MAJOR DEPRESSIVE DISORDER, IN FULL REMISSION (HCC): ICD-10-CM

## 2021-03-01 PROCEDURE — 99214 OFFICE O/P EST MOD 30 MIN: CPT | Performed by: FAMILY MEDICINE

## 2021-03-01 RX ORDER — PANTOPRAZOLE SODIUM 40 MG/1
40 TABLET, DELAYED RELEASE ORAL
Qty: 90 TABLET | Refills: 3 | Status: SHIPPED | OUTPATIENT
Start: 2021-03-01 | End: 2022-01-31

## 2021-03-01 RX ORDER — PRIMIDONE 50 MG/1
50 TABLET ORAL 2 TIMES DAILY
Qty: 60 TABLET | Refills: 3 | Status: SHIPPED | OUTPATIENT
Start: 2021-03-01 | End: 2021-05-13 | Stop reason: SDUPTHER

## 2021-03-01 NOTE — PROGRESS NOTES
[address] [place] [address] Omar Hankins  Dept: [phone]  Dept Fax: [phone]  Loc: Alphonse Moy is a 70 y.o. female who presents today for:  Chief Complaint   Patient presents with    6 Month Follow-Up    Shaking     bilat hands getting worse about 6 months            HPI:     HPI  Here for regular checkup but also has a hand tremor the past 6 months getting worse. Handwriting and fine motor movements are the worst.  Mom had essential tremor, not diagnosed with Parkinson's. Allergies starting usually controlled on OTC medications. Depression: Patient complains of depression. She complains of depressed mood and fatigue. Onset was approximately several years ago, gradually improving since that time. She denies current suicidal and homicidal plan or intent. Family history significant for no psychiatric illness. Possible organic causes contributing are: none. Risk factors: previous episode of depression Previous treatment includes Effexor and no group therapy. She complains of the following side effects from the treatment: none. Hypertension: Patient here for follow-up of elevated blood pressure. She is not exercising and is adherent to low salt diet. Blood pressure is well controlled at home. Cardiac symptoms none. Patient denies chest pain, dyspnea and palpitations. Cardiovascular risk factors: hypertension and obesity (BMI >= 30 kg/m2). Use of agents associated with hypertension: none. History of target organ damage: post PTCA seeing cardiology. Hyperlipidemia: Patient presents with hyperlipidemia. She was tested because age and hypertension.   Her last labs showed   Lab Results   Component Value Date    CHOL 155 02/27/2021    CHOL 176 07/18/2018    CHOL 183 05/18/2017     Lab Results   Component Value Date    TRIG 187 02/27/2021    TRIG 179 07/18/2018    TRIG 210 (H) 05/18/2017 activity change, appetite change and fatigue. HENT: Negative for hearing loss, ear pain, congestion, sore throat, rhinorrhea, postnasal drip and ear discharge. Eyes: Negative for photophobia and visual disturbance. Respiratory: Negative for cough, chest tightness, shortness of breath and wheezing. Cardiovascular: Negative for chest pain and leg swelling. Gastrointestinal: Negative for nausea, vomiting, abdominal pain, diarrhea and constipation. Genitourinary: Negative for dysuria, urgency and frequency. Neurological: Negative for weakness, light-headedness and headaches. Psychiatric/Behavioral: Negative for sleep disturbance.      :     Vitals:    03/01/21 1001   BP: 138/82   Site: Left Upper Arm   Position: Sitting   Cuff Size: Small Adult   Pulse: 61   Resp: 16   Temp: 96.8 °F (36 °C)   TempSrc: Temporal   SpO2: 96%   Weight: 167 lb 9.6 oz (76 kg)   Height: 5' 3\" (1.6 m)     Wt Readings from Last 3 Encounters:   03/01/21 167 lb 9.6 oz (76 kg)   11/18/20 170 lb (77.1 kg)   10/08/20 167 lb 3.2 oz (75.8 kg)       Physical Exam  Physical Exam   Constitutional: Vital signs are normal. She appears well-developed and well-nourished. She is active. HENT:   Head: Normocephalic and atraumatic. Right Ear: Tympanic membrane, external ear and ear canal normal. No drainage or tenderness. Left Ear: Tympanic membrane, external ear and ear canal normal. No drainage or tenderness. Nose: Nose normal. No mucosal edema or rhinorrhea. Mouth/Throat: Uvula is midline, oropharynx is clear and moist and mucous membranes are normal. Mucous membranes are not pale. Normal dentition. No posterior oropharyngeal edema or posterior oropharyngeal erythema. Eyes: Lids are normal. Right eye exhibits no chemosis and no discharge. Left eye exhibits no chemosis and no drainage. Right conjunctiva has no hemorrhage. Left conjunctiva has no hemorrhage. Right eye exhibits normal extraocular motion.  Left eye exhibits normal extraocular motion. Right pupil is round and reactive. Left pupil is round and reactive. Pupils are equal.   Cardiovascular: Normal rate, regular rhythm, S1 normal, S2 normal and normal heart sounds. Exam reveals no gallop. No murmur heard. Pulmonary/Chest: Effort normal and breath sounds normal. No respiratory distress. She has no wheezes. She has no rhonchi. She has no rales. Abdominal: Soft. Normal appearance and bowel sounds are normal. She exhibits no distension and no mass. There is no hepatosplenomegaly. No tenderness. She has no rigidity, no rebound and no guarding. No hernia. Musculoskeletal:        Right lower leg: She exhibits no edema. Left lower leg: She exhibits no edema. Neurological: She is alert. Assessment/Plan   Saurabh Larsen was seen today for 6 month follow-up and shaking. 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 All patient questions answered. Pt voiced understanding. Reviewed health maintenance. Instructed to continue current medications, diet and exercise. Patient agreed with treatment plan. Followup as directed.      Electronically signed by Fabiana Jones MD

## 2021-03-23 ENCOUNTER — HOSPITAL ENCOUNTER (OUTPATIENT)
Dept: MRI IMAGING | Age: 76
Discharge: HOME | End: 2021-03-23
Payer: MEDICARE

## 2021-03-23 DIAGNOSIS — D10.6: ICD-10-CM

## 2021-03-23 DIAGNOSIS — G25.2 INTENTION TREMOR: ICD-10-CM

## 2021-03-23 PROCEDURE — 6360000004 HC RX CONTRAST MEDICATION: Performed by: FAMILY MEDICINE

## 2021-03-23 PROCEDURE — A9579 GAD-BASE MR CONTRAST NOS,1ML: HCPCS | Performed by: FAMILY MEDICINE

## 2021-03-23 PROCEDURE — 70553 MRI BRAIN STEM W/O & W/DYE: CPT

## 2021-03-23 RX ADMIN — GADOTERIDOL 15 ML: 279.3 INJECTION, SOLUTION INTRAVENOUS at 12:59

## 2021-03-24 ENCOUNTER — TELEPHONE (OUTPATIENT)
Dept: FAMILY MEDICINE CLINIC | Age: 76
End: 2021-03-24

## 2021-03-24 DIAGNOSIS — G25.2 INTENTION TREMOR: Primary | ICD-10-CM

## 2021-04-23 ENCOUNTER — OFFICE VISIT (OUTPATIENT)
Dept: PULMONOLOGY | Age: 76
End: 2021-04-23
Payer: MEDICARE

## 2021-04-23 VITALS
OXYGEN SATURATION: 98 % | WEIGHT: 175 LBS | HEART RATE: 70 BPM | HEIGHT: 63 IN | DIASTOLIC BLOOD PRESSURE: 76 MMHG | SYSTOLIC BLOOD PRESSURE: 138 MMHG | TEMPERATURE: 97.3 F | BODY MASS INDEX: 31.01 KG/M2

## 2021-04-23 DIAGNOSIS — G47.33 OSA (OBSTRUCTIVE SLEEP APNEA): Primary | ICD-10-CM

## 2021-04-23 DIAGNOSIS — G47.10 HYPERSOMNIA: ICD-10-CM

## 2021-04-23 PROCEDURE — 99214 OFFICE O/P EST MOD 30 MIN: CPT | Performed by: NURSE PRACTITIONER

## 2021-04-23 ASSESSMENT — ENCOUNTER SYMPTOMS
DIARRHEA: 0
WHEEZING: 0
VOMITING: 0
NAUSEA: 0
SHORTNESS OF BREATH: 0
EYES NEGATIVE: 1
ABDOMINAL PAIN: 0
COUGH: 0

## 2021-04-23 NOTE — PROGRESS NOTES
[facility]      Lisa Ardon         [id number]  4/23/2021   Chief Complaint   Patient presents with    Follow-up     sleep f/u KAYLEEN, [facility]. Pt of Dr. Fco Davies    PAP Download:   Original or initial AHI: 15.1     Date of initial study: 11/4/20      Compliant  83%     Noncompliant 13 %     PAP Type CPAP Level  82dgW8F   Avg Hrs/Day 6hrs 28min   AHI: 4.1   Recorded compliance dates ,3.22.21-4.20.21  Machine/Mfg:   [x] ResMed    [] Respironics/Dreamstation   Interface:   [] Nasal    [] Nasal pillows   [x] FFM      Provider:      [] -SIMON     []Jeanne     [] Onel    [] [facility]    [] [place]               [] [facility]      [] [facility]    [x] [address]      [] Other    Neck Size: 15.75  Mallampati Mallampati 3  ESS:  8 ( same as prior )   SAQLI: 74  Here is a scan of the most recent download:            Presentation:   Pablo Anguiano presents for sleep medicine follow up for obstructive sleep apnea  Since the last visit, Pablo Anguiano is newly set up on CPAP. Doing fairly well , does have underlying claustrophobia and finds she will sometimes take it off at night feeling suffocated. , overall feels she is tolerating well. Not seeing much benefit with use, \" I'm still tired throughout the day \"     Equipment issues: The pressure is  acceptable, the mask is acceptable     Sleep issues:  Do you feel better? No  More rested? No   Better concentration? no    Progress History:   Since last visit any new medical issues? No  New ER or hospitlal visits? No  Any new or changes in medicines? No  Any new sleep medicines? No    Review of Systems -   Review of Systems   Constitutional: Positive for fatigue. Negative for chills and fever. HENT: Negative. Eyes: Negative. Respiratory: Negative for cough, shortness of breath and wheezing. Cardiovascular: Negative for chest pain, palpitations and leg swelling.    Gastrointestinal: Negative for abdominal pain, diarrhea, nausea and vomiting. Genitourinary: Negative. Musculoskeletal: Negative. Skin: Negative. Neurological: Negative. Hematological: Does not bruise/bleed easily. Psychiatric/Behavioral: Positive for sleep disturbance. Negative for suicidal ideas. The patient is nervous/anxious. Physical Exam:    BMI:  Body mass index is 31 kg/m². Wt Readings from Last 3 Encounters:   04/23/21 175 lb (79.4 kg)   03/01/21 167 lb 9.6 oz (76 kg)   11/18/20 170 lb (77.1 kg)     Weight stable / unchanged  Vitals: /76 (Site: Left Upper Arm, Position: Sitting, Cuff Size: Medium Adult)   Pulse 70   Temp 97.3 °F (36.3 °C) (Temporal)   Ht 5' 3\" (1.6 m)   Wt 175 lb (79.4 kg)   SpO2 98% Comment: r/a  BMI 31.00 kg/m²       Physical Exam  Constitutional:       Appearance: Normal appearance. HENT:      Head: Normocephalic and atraumatic. Eyes:      Conjunctiva/sclera: Conjunctivae normal.   Pulmonary:      Effort: Pulmonary effort is normal. No tachypnea, bradypnea or respiratory distress. Neurological:      Mental Status: She is alert and oriented to person, place, and time. Psychiatric:         Attention and Perception: Attention normal.         Mood and Affect: Mood normal.         Speech: Speech normal.         Behavior: Behavior normal.         Thought Content: Thought content normal.         Cognition and Memory: Cognition normal.         Judgment: Judgment normal.       ASSESSMENT/DIAGNOSIS     Diagnosis Orders   1. KAYLEEN (obstructive sleep apnea)     2. Hypersomnia              Plan   Do you need any equipment today? No    - Download reviewed and discussed with patient  - She  was advised to continue current positive airway pressure therapy with above described pressure.    - She  advised to keep good compliance with current recommended pressure to get optimal results and clinical improvement  - Recommend 7-9 hours of sleep with PAP  -for her uncontrolled hypersomnia, recommend persisent use of CPAP, anti-histamines for allergies may be contributing. Discussed sleep hygiene  - She was advised to call [employer] regarding supplies if needed.   -She call my office for earlier appointment if needed for worsening of sleep symptoms.   - She was instructed on weight loss  - Jovanny Sheriff was educated about my impression and plan. Patient verbalizesunderstanding.       We will see Marlena Hess back in: 6 months with download    Information added by my medical assistant/LPN was reviewed today  Electronically signed by BOAZ Siegel CNP on 4/23/2021 at 10:49 AM

## 2021-04-28 RX ORDER — DARIFENACIN HYDROBROMIDE 15 MG/1
TABLET, EXTENDED RELEASE ORAL
Qty: 90 TABLET | Refills: 3 | Status: SHIPPED | OUTPATIENT
Start: 2021-04-28 | End: 2022-01-31

## 2021-05-13 ENCOUNTER — INITIAL CONSULT (OUTPATIENT)
Dept: NEUROLOGY | Age: 76
End: 2021-05-13
Payer: MEDICARE

## 2021-05-13 VITALS
HEART RATE: 64 BPM | WEIGHT: 174 LBS | BODY MASS INDEX: 30.83 KG/M2 | HEIGHT: 63 IN | DIASTOLIC BLOOD PRESSURE: 72 MMHG | SYSTOLIC BLOOD PRESSURE: 152 MMHG

## 2021-05-13 DIAGNOSIS — G25.2 INTENTION TREMOR: ICD-10-CM

## 2021-05-13 DIAGNOSIS — G25.0 BENIGN ESSENTIAL TREMOR: Primary | ICD-10-CM

## 2021-05-13 PROCEDURE — 99204 OFFICE O/P NEW MOD 45 MIN: CPT | Performed by: PSYCHIATRY & NEUROLOGY

## 2021-05-13 RX ORDER — PRIMIDONE 50 MG/1
50 TABLET ORAL 2 TIMES DAILY
Qty: 180 TABLET | Refills: 1 | Status: SHIPPED | OUTPATIENT
Start: 2021-05-13 | End: 2021-06-28

## 2021-05-13 NOTE — PATIENT INSTRUCTIONS
1. Change Primidone to 50 mg two times a day, take at 8am and 2pm  2. Vitamin B12, folate  3. Vitamin B6 level  4. Call with any new symptoms or concerns. 5. Follow up in 6 weeks.

## 2021-05-14 LAB
FOLATE: >25 NG/ML
VITAMIN B-12: 406 PG/ML (ref 180–914)
VITAMIN B6: 116 NMOL/L (ref 20–125)

## 2021-05-17 ENCOUNTER — HOSPITAL ENCOUNTER (OUTPATIENT)
Age: 76
Discharge: HOME | End: 2021-05-17
Payer: MEDICARE

## 2021-05-17 LAB
ANION GAP SERPL CALCULATED.3IONS-SCNC: 10 MEQ/L (ref 8–16)
BUN BLDV-MCNC: 17 MG/DL (ref 7–22)
CALCIUM SERPL-MCNC: 9.8 MG/DL (ref 8.5–10.5)
CHLORIDE BLD-SCNC: 104 MEQ/L (ref 98–111)
CO2: 26 MEQ/L (ref 23–33)
CREAT SERPL-MCNC: 0.8 MG/DL (ref 0.4–1.2)
ERYTHROCYTE [DISTWIDTH] IN BLOOD BY AUTOMATED COUNT: 13.1 % (ref 11.5–14.5)
ERYTHROCYTE [DISTWIDTH] IN BLOOD BY AUTOMATED COUNT: 46 FL (ref 35–45)
GFR SERPL CREATININE-BSD FRML MDRD: 70 ML/MIN/1.73M2
GLUCOSE FASTING: 108 MG/DL (ref 70–108)
HCT VFR BLD CALC: 43.4 % (ref 37–47)
HEMOGLOBIN: 13.2 GM/DL (ref 12–16)
MCH RBC QN AUTO: 29.2 PG (ref 26–33)
MCHC RBC AUTO-ENTMCNC: 30.4 GM/DL (ref 32.2–35.5)
MCV RBC AUTO: 96 FL (ref 81–99)
PLATELET # BLD: 291 THOU/MM3 (ref 130–400)
PMV BLD AUTO: 12.2 FL (ref 9.4–12.4)
POTASSIUM SERPL-SCNC: 4.9 MEQ/L (ref 3.5–5.2)
RBC # BLD: 4.52 MILL/MM3 (ref 4.2–5.4)
SODIUM BLD-SCNC: 140 MEQ/L (ref 135–145)
WBC # BLD: 9.1 THOU/MM3 (ref 4.8–10.8)

## 2021-05-17 PROCEDURE — 36415 COLL VENOUS BLD VENIPUNCTURE: CPT

## 2021-05-17 PROCEDURE — 80048 BASIC METABOLIC PNL TOTAL CA: CPT

## 2021-05-17 PROCEDURE — 85027 COMPLETE CBC AUTOMATED: CPT

## 2021-05-25 NOTE — PROGRESS NOTES
tablet TAKE 1 TABLET DAILY 90 tablet 3    pantoprazole (PROTONIX) 40 MG tablet Take 1 tablet by mouth every morning (before breakfast) 90 tablet 3    primidone (MYSOLINE) 50 MG tablet Take 1 tablet by mouth 2 times daily 60 tablet 3    atorvastatin (LIPITOR) 40 MG tablet TAKE 1 TABLET DAILY 90 tablet 3    metoprolol succinate (TOPROL XL) 50 MG extended release tablet TAKE 1 TABLET ONCE A DAY 90 tablet 4    fenofibrate (TRICOR) 145 MG tablet TAKE 1 TABLET DAILY 90 tablet 4    venlafaxine (EFFEXOR XR) 150 MG extended release capsule TAKE 1 CAPSULE DAILY 90 capsule 3    amLODIPine (NORVASC) 10 MG tablet Take 1 tablet by mouth daily 30 tablet 1    aspirin EC 81 MG EC tablet Take 1 tablet by mouth daily 30 tablet 0    Multiple Vitamins-Minerals (PRESERVISION AREDS 2+MULTI VIT PO) Take by mouth daily       losartan (COZAAR) 50 MG tablet Take 1 tablet by mouth daily. 90 tablet 3    calcium-vitamin D (OSCAL-500) 500-200 MG-UNIT per tablet Take 1 tablet by mouth 2 times daily 1200 plus d      zoledronic acid (RECLAST) 5 MG/100ML SOLN Infuse 100 mLs intravenously once for 1 dose 100 mL 0    isosorbide mononitrate (IMDUR) 30 MG extended release tablet Take 30 mg by mouth daily        No current facility-administered medications for this visit. Social History     Socioeconomic History    Marital status:       Spouse name: None    Number of children: 3    Years of education: 15    Highest education level: Associate degree: academic program   Occupational History    Occupation:      Comment: [address] [employer] resource strain: Not hard at all   [place] insecurity     Worry: Never true     Inability: Never true    Transportation needs     Medical: No     Non-medical: No   Tobacco Use    Smoking status: Former Smoker     Packs/day: 0.00     Years: 50.00     Pack years: 0.00     Types: Cigarettes     Start date: 5/24/1965     Quit date: 6/1/2018     Years since quitting: 2.9    Smokeless tobacco: Never Used   Substance and Sexual Activity    Alcohol use: Not Currently     Frequency: Monthly or less     Drinks per session: 1 or 2     Binge frequency: Never    Drug use: No    Sexual activity: Not Currently     Partners: Male   Lifestyle    Physical activity     Days per week: 6 days     Minutes per session: 20 min    Stress: Not at all   Relationships    Social connections     Talks on phone: Three times a week     Gets together: Once a week     Attends Denominational service: More than 4 times per year     Active member of club or organization: No     Attends meetings of clubs or organizations: Never     Relationship status:     Intimate partner violence     Fear of current or ex partner: None     Emotionally abused: None     Physically abused: None     Forced sexual activity: None   Other Topics Concern    None   Social History Narrative    None       Family History   Problem Relation Age of Onset    COPD Father    Marybeth Latin Hearing Loss Father     Cancer Mother 39        uterine and breast    Heart Disease Mother     Diabetes Mother     High Cholesterol Mother     Arthritis Mother     Coronary Art Dis Mother     Heart Attack Mother     Diabetes Sister     High Cholesterol Sister     Stroke Sister 72    Allergy (Severe) Sister     Arthritis Sister     Miscarriages / Royal Berliner Sister     Cancer Maternal Uncle     Heart Attack Paternal Grandmother 76        MI    Arthritis Paternal Grandmother     Obesity Paternal Grandmother     Early Death Maternal Grandmother     Alcohol Abuse Maternal Grandfather          I reviewed the past medical history, allergies, medications, social history and family history.    Review of Systems   All systems reviewed, and are all negative, except what is mentioned in HPI      Vitals:    05/13/21 1253   BP: (!) 152/72   Site: Right Upper Arm   Position: Sitting   Cuff Size: Large Adult   Pulse: 64   Weight: 174 lb (78.9 kg) Height: 5' 3\" (1.6 m)       Physical Examination:  General appearance - alert, well appearing, and in no distress, oriented to person, place, and time and over weight  Mental status- Level of Alertness: awake  Orientation: person, place, time  Memory: normal  Fund of Knowledge: normal  Attention/Concentration: normal  Language: normal. Mood is normal.   Neck - supple, no significant adenopathy, carotids upstroke normal bilaterally. There is no axillary lymphadenopathy. There is no carotid bruit. No neck lymphadenopathy. No thyroid enlargement   Neurological -   Cranial Epiqgw-PB-[id number]:.   Cranial nerve II: Normal   Cranial nerve III: Pupils: equal, round, reactive to light  Cranial nerves III, IV, VI: Extraocular Movements: intact   Cranial nerve V: Facial sensation: intact   Cranial nerve VII:Facial strength: intact   Cranial nerve VIII: Hearing: intact   Cranial nerve IX: Palate Elevation intact bilaterally  Cranial nerve XI: Shoulder shrug intact bilaterally  Cranial nerve XII: Tongue midline   neck supple without rigidity, there is no limitation of range of motion of the neck. DTR's are decreased distal and symmetric  Babinski sign negative  Motor exam is 5/5 in the upper and lower extremities. Normal muscle tone. There is no muscle atrophy. Sensory is intact for light touch. Coordination: finger to nose, intact  Gait and station intact   Abnormal movement there is mild symmetric action tremor in bilateral hands, vibration reduced distally  Skin - warm, dry to touch, normal coloration, no rashes, no suspicious skin lesions  Superficial temporal artery pulses are normal.   There is no limitation of range of motion of the neck. There is no resting tremor, no pin rolling, no bradykinesia, no Hypohonia, normal blink rate. Musculoskeletal: Has no hand arthritis, no limitation of ROM in any of the four extremities. There is no leg edema. The Heart was regular in rate and rhythm.  No heart murmur  Chest Clear, with  good effort. Abdomen soft, intact bowel sounds. Results for orders placed during the hospital encounter of 03/31/15   MRI Brain WO Contrast    Narrative PROCEDURE: MRI BRAIN WO CONTRAST    CLINICAL INFORMATIONTremor    COMPARISON: None available. TECHNIQUE: Multiplanar multisequence MR images of the brain without contrast.        FINDINGS:    Diffusion weighted images and apparent diffusion coefficient maps demonstrate no areas of restricted diffusion. Brain volume is within normal limits. Mild bilateral cerebral white matter T2 hyperintensity, nonspecific but most often due to chronic small vessel ischemic disease and which is not age prominent. Intact cerebral cortex. No acute intracranial hemorrhage, mass effect, midline shift or obstructive hydrocephalus. No abnormal extra axial fluid collections. Unremarkable sellar contents. Craniocervical junction appears unremarkable without significant cerebellar tonsillar ectopia. Small right mastoid effusion. Impression IMPRESSION:   Essentially unremarkable MR appearance of the brain. Mild chronic small vessel ischemic changes which are not age prominent. Final report electronically signed by Dr. Cole Martinez on 3/31/2015 10:33 AM      I reviewed the MRI brain and agree with interpretation. 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 Bilateral, right greater than left, hand tremors. COMPARISON: MR brain dated 10/24/2016. TECHNIQUE: Multiplanar and multiple spin echo T1 and T2-weighted images were obtained through the brain before and after the administration of intravenous contrast. 15 mL ProHance was injected in the right AC.     FINDINGS:  There is mild to moderate parietal predominant volume loss which has mildly progressed in the interval. There are moderate patchy areas of T2/FLAIR prolongation in the periventricular, subcortical deep white matter which have also progressed in the   interval. There is a partially empty sella. No intra or extra-axial mass is identified. No focal areas of restricted diffusion are present. Following contrast administration, there are no focal areas of abnormal parenchymal or meningeal enhancement identified. The major vascular flow voids appear patent. Orbits are unremarkable. Paranasal sinuses are clear. Mastoid air cells are clear. Impression    1. No evidence of acute intracranial abnormality. 2. Mild to moderate severity chronic microvascular angiopathy which has progressed in the interval superimposed on mildly progressed volume loss. **This report has been created using voice recognition software. It may contain minor errors which are inherent in voice recognition technology. **      Final report electronically signed by Dr. Indira Maynard MD on 3/23/2021 4:46 PM       We reviewed the patient records from referring provider and available information in the EHR       ASSESSMENT:      Diagnosis Orders   1. Benign essential tremor        This is a 72-year-old female who presents with bilateral hand tremor worse on the right than the left that has been ongoing for the last 2 years that is progressively gotten worse. Her symptoms are worse with activity and when she is nervous tired or anxious. I reviewed the patient pertinent labs and records in the EHR and from other providers. She has a family history is significant for tremor in her mother. She is currently on primidone. MRI brain done 3/23/2021 showed no acute intracranial abnormalities,  I reviewed the MRI brain and agree with interpretation. We shared with the patient that her symptoms are most consistent with benign essential tremor, not Parkinson's disease.   We will change her primidone to 50 mg 2 times a day to be taken at 8 AM and 2 PM.  We will also order lab work checking vitamin levels. After detailed discussion with the patient we agreed on the following plan. Plan    1. Change Primidone to 50 mg two times a day, take at 8am and 2pm  2. Vitamin B12, folate  3. Vitamin B6 level  4. Call with any new symptoms or concerns. 5. Follow up in 6 weeks.        Total time 54 min      Jeny Reddy MD

## 2021-06-01 ENCOUNTER — OFFICE VISIT (OUTPATIENT)
Dept: FAMILY MEDICINE CLINIC | Age: 76
End: 2021-06-01
Payer: MEDICARE

## 2021-06-01 VITALS
HEART RATE: 56 BPM | OXYGEN SATURATION: 99 % | SYSTOLIC BLOOD PRESSURE: 124 MMHG | RESPIRATION RATE: 20 BRPM | BODY MASS INDEX: 30.97 KG/M2 | DIASTOLIC BLOOD PRESSURE: 76 MMHG | WEIGHT: 174.8 LBS | HEIGHT: 63 IN

## 2021-06-01 DIAGNOSIS — L98.9 SKIN LESION OF RIGHT LEG: ICD-10-CM

## 2021-06-01 DIAGNOSIS — Z00.00 ROUTINE GENERAL MEDICAL EXAMINATION AT A HEALTH CARE FACILITY: Primary | ICD-10-CM

## 2021-06-01 DIAGNOSIS — Z87.891 HISTORY OF TOBACCO ABUSE: ICD-10-CM

## 2021-06-01 DIAGNOSIS — Z98.61 POST PTCA: ICD-10-CM

## 2021-06-01 DIAGNOSIS — G25.2 INTENTION TREMOR: ICD-10-CM

## 2021-06-01 DIAGNOSIS — I10 HYPERTENSION, UNSPECIFIED TYPE: ICD-10-CM

## 2021-06-01 DIAGNOSIS — F33.42 RECURRENT MAJOR DEPRESSIVE DISORDER, IN FULL REMISSION (HCC): ICD-10-CM

## 2021-06-01 DIAGNOSIS — E11.9 TYPE 2 DIABETES MELLITUS WITHOUT COMPLICATION, WITHOUT LONG-TERM CURRENT USE OF INSULIN (HCC): ICD-10-CM

## 2021-06-01 DIAGNOSIS — R22.31 ARM MASS, RIGHT: ICD-10-CM

## 2021-06-01 DIAGNOSIS — M85.89 OSTEOPENIA OF MULTIPLE SITES: ICD-10-CM

## 2021-06-01 DIAGNOSIS — E78.00 PURE HYPERCHOLESTEROLEMIA: ICD-10-CM

## 2021-06-01 DIAGNOSIS — J43.1 PANLOBULAR EMPHYSEMA (HCC): ICD-10-CM

## 2021-06-01 DIAGNOSIS — E53.8 B12 DEFICIENCY: ICD-10-CM

## 2021-06-01 DIAGNOSIS — K21.00 GASTROESOPHAGEAL REFLUX DISEASE WITH ESOPHAGITIS WITHOUT HEMORRHAGE: ICD-10-CM

## 2021-06-01 DIAGNOSIS — L57.8 SUN-DAMAGED SKIN: ICD-10-CM

## 2021-06-01 DIAGNOSIS — D62 ANEMIA DUE TO ACUTE BLOOD LOSS: ICD-10-CM

## 2021-06-01 LAB — HBA1C MFR BLD: 6.6 % (ref 4.3–5.7)

## 2021-06-01 PROCEDURE — 83036 HEMOGLOBIN GLYCOSYLATED A1C: CPT | Performed by: FAMILY MEDICINE

## 2021-06-01 PROCEDURE — G0439 PPPS, SUBSEQ VISIT: HCPCS | Performed by: FAMILY MEDICINE

## 2021-06-01 SDOH — ECONOMIC STABILITY: FOOD INSECURITY: WITHIN THE PAST 12 MONTHS, THE FOOD YOU BOUGHT JUST DIDN'T LAST AND YOU DIDN'T HAVE MONEY TO GET MORE.: NEVER TRUE

## 2021-06-01 SDOH — ECONOMIC STABILITY: FOOD INSECURITY: WITHIN THE PAST 12 MONTHS, YOU WORRIED THAT YOUR FOOD WOULD RUN OUT BEFORE YOU GOT MONEY TO BUY MORE.: NEVER TRUE

## 2021-06-01 ASSESSMENT — LIFESTYLE VARIABLES
AUDIT TOTAL SCORE: 0
HAS A RELATIVE, FRIEND, DOCTOR, OR ANOTHER HEALTH PROFESSIONAL EXPRESSED CONCERN ABOUT YOUR DRINKING OR SUGGESTED YOU CUT DOWN: 0
HOW OFTEN DO YOU HAVE SIX OR MORE DRINKS ON ONE OCCASION: 0
HOW MANY STANDARD DRINKS CONTAINING ALCOHOL DO YOU HAVE ON A TYPICAL DAY: ONE OR TWO
HOW OFTEN DO YOU HAVE SIX OR MORE DRINKS ON ONE OCCASION: NEVER
HOW OFTEN DURING THE LAST YEAR HAVE YOU BEEN UNABLE TO REMEMBER WHAT HAPPENED THE NIGHT BEFORE BECAUSE YOU HAD BEEN DRINKING: 0
HAS A RELATIVE, FRIEND, DOCTOR, OR ANOTHER HEALTH PROFESSIONAL EXPRESSED CONCERN ABOUT YOUR DRINKING OR SUGGESTED YOU CUT DOWN: NO
AUDIT TOTAL SCORE: 1
AUDIT-C TOTAL SCORE: 1
HOW OFTEN DURING THE LAST YEAR HAVE YOU FAILED TO DO WHAT WAS NORMALLY EXPECTED FROM YOU BECAUSE OF DRINKING: 0
HAVE YOU OR SOMEONE ELSE BEEN INJURED AS A RESULT OF YOUR DRINKING: NO
HOW OFTEN DURING THE LAST YEAR HAVE YOU NEEDED AN ALCOHOLIC DRINK FIRST THING IN THE MORNING TO GET YOURSELF GOING AFTER A NIGHT OF HEAVY DRINKING: NEVER
HOW OFTEN DURING THE LAST YEAR HAVE YOU BEEN UNABLE TO REMEMBER WHAT HAPPENED THE NIGHT BEFORE BECAUSE YOU HAD BEEN DRINKING: NEVER
HOW OFTEN DURING THE LAST YEAR HAVE YOU FAILED TO DO WHAT WAS NORMALLY EXPECTED FROM YOU BECAUSE OF DRINKING: NEVER
HOW OFTEN DURING THE LAST YEAR HAVE YOU HAD A FEELING OF GUILT OR REMORSE AFTER DRINKING: 0
HOW OFTEN DO YOU HAVE A DRINK CONTAINING ALCOHOL: MONTHLY OR LESS
HOW MANY STANDARD DRINKS CONTAINING ALCOHOL DO YOU HAVE ON A TYPICAL DAY: 0
HOW OFTEN DURING THE LAST YEAR HAVE YOU FOUND THAT YOU WERE NOT ABLE TO STOP DRINKING ONCE YOU HAD STARTED: NEVER
HAVE YOU OR SOMEONE ELSE BEEN INJURED AS A RESULT OF YOUR DRINKING: 0
HOW OFTEN DURING THE LAST YEAR HAVE YOU HAD A FEELING OF GUILT OR REMORSE AFTER DRINKING: NEVER
HOW OFTEN DURING THE LAST YEAR HAVE YOU NEEDED AN ALCOHOLIC DRINK FIRST THING IN THE MORNING TO GET YOURSELF GOING AFTER A NIGHT OF HEAVY DRINKING: 0
HOW OFTEN DURING THE LAST YEAR HAVE YOU FOUND THAT YOU WERE NOT ABLE TO STOP DRINKING ONCE YOU HAD STARTED: 0
AUDIT-C TOTAL SCORE: 0
HOW OFTEN DO YOU HAVE A DRINK CONTAINING ALCOHOL: 1

## 2021-06-01 ASSESSMENT — PATIENT HEALTH QUESTIONNAIRE - PHQ9
SUM OF ALL RESPONSES TO PHQ QUESTIONS 1-9: 0
2. FEELING DOWN, DEPRESSED OR HOPELESS: 0
SUM OF ALL RESPONSES TO PHQ QUESTIONS 1-9: 0
SUM OF ALL RESPONSES TO PHQ9 QUESTIONS 1 & 2: 0
SUM OF ALL RESPONSES TO PHQ QUESTIONS 1-9: 0
1. LITTLE INTEREST OR PLEASURE IN DOING THINGS: 0

## 2021-06-01 ASSESSMENT — SOCIAL DETERMINANTS OF HEALTH (SDOH): HOW HARD IS IT FOR YOU TO PAY FOR THE VERY BASICS LIKE FOOD, HOUSING, MEDICAL CARE, AND HEATING?: NOT HARD AT ALL

## 2021-06-01 NOTE — PATIENT INSTRUCTIONS
Learning About Medical Power of   What is a medical power of ? A medical power of , also called a durable power of  for health care, is one type of the legal forms called advance directives. It lets you name the person you want to make treatment decisions for you if you can't speak or decide for yourself. The person you choose is called your health care agent. This person is also called a health care proxy or health care surrogate. A medical power of  may be called something else in your state. How do you choose a health care agent? Choose your health care agent carefully. This person may or may not be a family member. Talk to the person before you make your final decision. Make sure he or she is comfortable with this responsibility. It's a good idea to choose someone who:  · Is at least 25years old. · Knows you well and understands what makes life meaningful for you. · Understands your Latter-day and moral values. · Will do what you want, not what he or she wants. · Will be able to make difficult choices at a stressful time. · Will be able to refuse or stop treatment, if that is what you would want, even if you could die. · Will be firm and confident with health professionals if needed. · Will ask questions to get needed information. · Lives near you or agrees to travel to you if needed. Your family may help you make medical decisions while you can still be part of that process. But it's important to choose one person to be your health care agent in case you aren't able to make decisions for yourself. If you don't fill out the legal form and name a health care agent, the decisions your family can make may be limited. A health care agent may be called something else in your state. Who will make decisions for you if you don't have a health care agent? If you don't have a health care agent or a living will, you may not get the care you want. Decisions may be made by family members who disagree about your medical care. Or decisions may be made by a medical professional who doesn't know you well. In some cases, a  makes the decisions. When you name a health care agent, it is very clear who has the power to make health decisions for you. How do you name a health care agent? You name your health care agent on a legal form. This form is usually called a medical power of . Ask your hospital, state bar association, or office on aging where to find these forms. You must sign the form to make it legal. Some states require you to get the form notarized. This means that a person called a  watches you sign the form and then he or she signs the form. Some states also require that two or more witnesses sign the form. Be sure to tell your family members and doctors who your health care agent is. Where can you learn more? Go to https://[email or web address]. org and sign in to your [employer] account. Enter [id number] in the [employer] box to learn more about \"Learning About [address]. \"     If you do not have an account, please click on the \"Sign Up Now\" link. Current as of: July 17, 2020               Content Version: 12.8  © 3704-2017 Healthwise, Incorporated. Care instructions adapted under license by [facility] ([facility]). If you have questions about a medical condition or this instruction, always ask your healthcare professional. [place] [address] any warranty or liability for your use of this information. Personalized Preventive Plan for [place] - 6/1/2021  Medicare offers a range of preventive health benefits. Some of the tests and screenings are paid in full while other may be subject to a deductible, co-insurance, and/or copay.     Some of these benefits include a comprehensive review of your medical history including lifestyle, illnesses that may run in your family, and various assessments and screenings as appropriate. After reviewing your medical record and screening and assessments performed today your provider may have ordered immunizations, labs, imaging, and/or referrals for you. A list of these orders (if applicable) as well as your Preventive Care list are included within your After Visit Summary for your review. Other Preventive Recommendations:    · A preventive eye exam performed by an eye specialist is recommended every 1-2 years to screen for glaucoma; cataracts, macular degeneration, and other eye disorders. · A preventive dental visit is recommended every 6 months. · Try to get at least 150 minutes of exercise per week or 10,000 steps per day on a pedometer . · Order or download the FREE \"Exercise & Physical Activity: Your Everyday Guide\" from [employer]. Call 5-183.404.3842 or search [employer] online. · You need 4061-6429 mg of calcium and 4285-8270 IU of vitamin D per day. It is possible to meet your calcium requirement with diet alone, but a vitamin D supplement is usually necessary to meet this goal.  · When exposed to the sun, use a sunscreen that protects against both UVA and UVB radiation with an SPF of 30 or greater. Reapply every 2 to 3 hours or after sweating, drying off with a towel, or swimming. · Always wear a seat belt when traveling in a car. Always wear a helmet when riding a bicycle or motorcycle.

## 2021-06-01 NOTE — PROGRESS NOTES
Medicare Annual Wellness Visit  Name: Shalini Ochoa Date: 2021   MRN: [id number] Sex: Female   Age: 78 y.o. Ethnicity: Non-/Non    : 1945 Race: Avi Landis is here for Medicare AWV    Screenings for behavioral, psychosocial and functional/safety risks, and cognitive dysfunction are all negative except as indicated below. These results, as well as other patient data from the [address] form, are documented in Flowsheets linked to this Encounter. Here for regular checkup but also has a hand tremor the past 6 months getting worse. Handwriting and fine motor movements are the worst.  Mom had essential tremor, not diagnosed with Parkinson's. This is somewhat better after starting primidone but adjusting with neurology    Allergies starting usually controlled on OTC medications. Depression: Patient complains of depression. She complains of depressed mood and fatigue. Onset was approximately several years ago, gradually improving since that time. She denies current suicidal and homicidal plan or intent. Family history significant for no psychiatric illness. Possible organic causes contributing are: none. Risk factors: previous episode of depression Previous treatment includes Effexor and no group therapy. She complains of the following side effects from the treatment: none. Hypertension: Patient here for follow-up of elevated blood pressure. She is not exercising and is adherent to low salt diet. Blood pressure is well controlled at home. Cardiac symptoms none. Patient denies chest pain, dyspnea and palpitations. Cardiovascular risk factors: hypertension and obesity (BMI >= 30 kg/m2). Use of agents associated with hypertension: none. History of target organ damage: post PTCA seeing cardiology. Hyperlipidemia: Patient presents with hyperlipidemia. She was tested because age and hypertension.   Her last labs showed   Lab Results Component Value Date    CHOL 155 02/27/2021    CHOL 176 07/18/2018    CHOL 183 05/18/2017     Lab Results   Component Value Date    TRIG 187 02/27/2021    TRIG 179 07/18/2018    TRIG 210 (H) 05/18/2017     Lab Results   Component Value Date    HDL 42 02/27/2021    HDL 43 08/20/2020    HDL 42 08/30/2019     Lab Results   Component Value Date    LDLCALC 76 02/27/2021    [address] 94 08/20/2020    LDLCALC 69 08/30/2019     No results found for: LABVLDL, VLDL  No results found for: CHOLHDLRATIO  There is not a family history of hyperlipidemia. There is a family history of early ischemia heart disease. Borderline diabetes in the past is now in the diabetic range. Lab Results   Component Value Date    LABA1C 6.5 (H) 02/27/2021     No results found for: EAG    GERD is controlled on PPI. She is still no longer smoking cigarettes. COPD controlled now. b12 deficiency needs rechecked. Also has a pulsatile bump on the right forearm after her most recent lab draw. Allergies   Allergen Reactions    Benadryl [Diphenhydramine Hcl]      OVER-STIMULATED    Flexeril [Cyclobenzaprine Hcl]      OVER-STIMULATED           Prior to Visit Medications    Medication Sig Taking?  Authorizing Provider   primidone (MYSOLINE) 50 MG tablet Take 1 tablet by mouth 2 times daily Yes BOAZ Simpson - CNP   darifenacin (ENABLEX) 15 MG extended release tablet TAKE 1 TABLET DAILY Yes Emiliano Weller MD   pantoprazole (PROTONIX) 40 MG tablet Take 1 tablet by mouth every morning (before breakfast) Yes Emiliano Weller MD   calcium-vitamin D (OSCAL-500) 500-200 MG-UNIT per tablet Take 1 tablet by mouth 2 times daily 1200 plus d Yes Historical Provider, MD   atorvastatin (LIPITOR) 40 MG tablet TAKE 1 TABLET DAILY Yes Emiliano Weller MD   metoprolol succinate (TOPROL XL) 50 MG extended release tablet TAKE 1 TABLET ONCE A DAY Yes Emiliano Weller MD   fenofibrate (TRICOR) 145 MG tablet TAKE 1 TABLET DAILY Yes Emiliano Weller MD venlafaxine (EFFEXOR XR) 150 MG extended release capsule TAKE 1 CAPSULE DAILY Yes Danish Reina MD   amLODIPine (NORVASC) 10 MG tablet Take 1 tablet by mouth daily Yes James Li MD   aspirin EC 81 MG EC tablet Take 1 tablet by mouth daily Yes James Li MD   isosorbide mononitrate (IMDUR) 30 MG extended release tablet Take 30 mg by mouth daily  Yes Historical Provider, MD   Multiple Vitamins-Minerals (PRESERVISION AREDS 2+MULTI VIT PO) Take by mouth daily  Yes Historical Provider, MD   losartan (COZAAR) 50 MG tablet Take 1 tablet by mouth daily. Yes Danish Reina MD   zoledronic acid (RECLAST) 5 MG/100ML SOLN Infuse 100 mLs intravenously once for 1 dose  Danish Reina MD   darifenacin (ENABLEX) 15 MG SR tablet Take 1 tablet by mouth daily.   Danish Reina MD         Past Medical History:   Diagnosis Date    CAD (coronary artery disease)     Depression     Fibromyalgia     Fibromyalgia     GERD (gastroesophageal reflux disease)     Dr Haven Rosen Headache(784.0)     Hyperlipidemia     Hypertension     Macular degeneration, dry     MI (myocardial infarction) ([address]     Dr Nena Headley SOB (shortness of breath)     Dr Adrianna Evans Type 2 diabetes mellitus without complication, without long-term current use of insulin ([address]     Urinary incontinence        Past Surgical History:   Procedure Laterality Date    ARM SURGERY Left 11/20/2018    dr. Gabriel Umanzor      x2   [address] COLON SURGERY  2010    partial resection of flat polyp    COLONOSCOPY      CORONARY ANGIOPLASTY WITH STENT PLACEMENT  07/2011    Dr Garcia Pillar CYST REMOVAL      BILATERAL HANDS    DILATATION, ESOPHAGUS      ENDOSCOPY, COLON, DIAGNOSTIC      FRACTURE SURGERY           Family History   Problem Relation Age of Onset    COPD Father     Hearing Loss Father     Cancer Mother 39        uterine you have a Living Will?: Yes  Advance Directives     Power of [address] Will ACP-Advance Directive ACP-Power of     Not on File Not on File Not on File Not on File      General Health Risk Interventions:  · No Living Will: Advance Care Planning addressed with patient today    Health Habits/Nutrition:  Health Habits/Nutrition  Do you exercise for at least 20 minutes 2-3 times per week?: (!) No  Have you lost any weight without trying in the past 3 months?: No  Do you eat only one meal per day?: No  Have you seen the dentist within the past year?: (!) No  Body mass index: (!) 30.96  Health Habits/Nutrition Interventions:  · Inadequate physical activity:  patient agrees to exercise for at least 150 minutes/week  · Dental exam overdue:  patient encouraged to make appointment with his/her dentist       Personalized Preventive Plan   Current Health Maintenance Status  Immunization History   Administered Date(s) Administered    COVID-19, Quiles Peter, PF, 30mcg/0.3mL 01/30/2021, 02/20/2021    Influenza Vaccine, unspecified formulation 11/19/2015    Influenza, High Dose (Fluzone 65 yrs and older) 11/26/2018    Influenza, Triv, inactivated, subunit, adjuvanted, IM (Fluad 65 yrs and older) 11/05/2019    Pneumococcal Conjugate 13-valent ([id number]) 03/18/2015    Pneumococcal Polysaccharide ([id number]) 11/26/2018        Health Maintenance   Topic Date Due    Hepatitis C screen  Never done    Diabetic retinal exam  Never done    Annual Wellness Visit (AWV)  Never done    Diabetic foot exam  11/05/2020    DTaP/Tdap/Td vaccine (1 - Tdap) 08/31/2021 (Originally [birth year]9/1964)    Shingles Vaccine (1 of 2) 08/31/2021 (Originally 4/9/1997)    Flu vaccine (Season Ended) 09/01/2021    A1C test (Diabetic or Prediabetic)  02/27/2022    Diabetic microalbuminuria test  02/27/2022    Lipid screen  02/27/2022    Potassium monitoring  05/17/2022    Creatinine monitoring  05/17/2022    Breast cancer screen  10/05/2022

## 2021-06-02 ENCOUNTER — HOSPITAL ENCOUNTER (OUTPATIENT)
Dept: ULTRASOUND IMAGING | Age: 76
Discharge: HOME | End: 2021-06-02
Payer: MEDICARE

## 2021-06-02 DIAGNOSIS — R22.31 ARM MASS, RIGHT: ICD-10-CM

## 2021-06-02 PROCEDURE — 76882 US LMTD JT/FCL EVL NVASC XTR: CPT

## 2021-06-09 ENCOUNTER — CLINICAL DOCUMENTATION (OUTPATIENT)
Dept: SPIRITUAL SERVICES | Facility: CLINIC | Age: 76
End: 2021-06-09

## 2021-06-09 NOTE — ACP (ADVANCE CARE PLANNING)
Advance Care Planning    Ambulatory ACP Specialist Patient Outreach    Date:  6/9/2021  ACP Specialist:  Cody Morocho    Outreach call to patient in follow-up to ACP Specialist referral from: Dr. Frantz Simon; Jerry Michael - ACP Manager  [x] PCP  [] Provider   [] Ambulatory Care Management [] Other for Reason:    [x] Continued Conversation for ACP decision making / Goals of Care  [] Code Status Discussion  [x] Completion of Adv Directive  [] Completion of Portable DNR order  [] Other (Specify)    Date Referral Received: 6/1/2021    Today's Outreach:  [x] First   [] Second  [] Third                               Third outreach made by []  phone  [] email []   [facility]     Intervention:  [x] Spoke with Patient  [] Left VM requesting return call      Outcome: Cheryl Kaminski made an initial attempt to contact Karie Odonnell to offer support, if desired, for the completion of Advance Directives documents as part of an [address] conversation. Amauri Camacho expressed interest in the completion of documents. *  explained documents for clarity and greater understanding, as well as information needed for completion. * An appointment is scheduled for completion of documents on 6/17 at [facility]. Next Step:   [x] ACP scheduled conversation  [] Outreach again in one week               [] Email / Mail ACP Info Sheets  [] Email / Mail Advance Directive            [] Close Referral. Routing closure to referring provider/staff and to ACP Specialist .      Thank you for this referral.

## 2021-06-17 ENCOUNTER — CLINICAL DOCUMENTATION (OUTPATIENT)
Dept: SPIRITUAL SERVICES | Facility: CLINIC | Age: 76
End: 2021-06-17

## 2021-06-28 ENCOUNTER — OFFICE VISIT (OUTPATIENT)
Dept: NEUROLOGY | Age: 76
End: 2021-06-28
Payer: MEDICARE

## 2021-06-28 VITALS
BODY MASS INDEX: 31.18 KG/M2 | HEIGHT: 63 IN | SYSTOLIC BLOOD PRESSURE: 138 MMHG | DIASTOLIC BLOOD PRESSURE: 82 MMHG | WEIGHT: 176 LBS | HEART RATE: 69 BPM | OXYGEN SATURATION: 95 %

## 2021-06-28 DIAGNOSIS — G25.0 BENIGN ESSENTIAL TREMOR: Primary | ICD-10-CM

## 2021-06-28 PROCEDURE — 99213 OFFICE O/P EST LOW 20 MIN: CPT | Performed by: NURSE PRACTITIONER

## 2021-06-28 RX ORDER — PRIMIDONE 50 MG/1
TABLET ORAL
Qty: 270 TABLET | Refills: 1 | Status: SHIPPED | OUTPATIENT
Start: 2021-06-28 | End: 2021-11-01

## 2021-06-28 NOTE — PROGRESS NOTES
0    I reviewed the past medical history, allergies, medications, social history and family history. PE:   Vitals:    06/28/21 1450   BP: 138/82   Site: Left Upper Arm   Position: Sitting   Cuff Size: Medium Adult   Pulse: 69   SpO2: 95%   Weight: 176 lb (79.8 kg)   Height: 5' 3\" (1.6 m)     General Appearance:  awake, alert, oriented, in no acute distress  Gen: NAD, Language is Intact. Skin: no rash, lesion, dry t to touch. warm  Head: no rash, no icterus  Neck: There is no carotid bruits. The Neck is supple. There is no neck lymphadenopathy. Neuro: CN 2-12 grossly intact with no focal deficits. Power 5/5 Throughout symmetric, Reflexes are symmetric. Long tracts are intact. Cerebellar exam is Intact. Sensory exam is intact to light touch. Gait is intact. There is mild symmetric action tremor noted in bilateral hands. Musculoskeletal:  Has no hand arthritis, no limitation of ROM in any of the four extremities,. Lower extremities no edema  The abdomen is soft,  intact bowel sounds. DATA:      Results for orders placed or performed in visit on 06/01/21   POCT glycosylated hemoglobin (Hb A1C)   Result Value Ref Range    Hemoglobin A1C 6.6 (H) 4.3 - 5.7 %        Results for orders placed during the hospital encounter of 03/31/15    MRI Brain WO Contrast    Narrative  PROCEDURE: MRI BRAIN WO CONTRAST    CLINICAL INFORMATIONTremor    COMPARISON: None available. TECHNIQUE: Multiplanar multisequence MR images of the brain without contrast.    FINDINGS:    Diffusion weighted images and apparent diffusion coefficient maps demonstrate no areas of restricted diffusion. Brain volume is within normal limits. Mild bilateral cerebral white matter T2 hyperintensity, nonspecific but most often due to chronic small vessel ischemic disease and which is not age prominent. Intact cerebral cortex. No acute intracranial hemorrhage, mass effect, midline shift or obstructive hydrocephalus.   No abnormal extra axial fluid collections. Unremarkable sellar contents. Craniocervical junction appears unremarkable without significant cerebellar tonsillar ectopia. Small right mastoid effusion. Impression  Essentially unremarkable MR appearance of the brain. Mild chronic small vessel ischemic changes which are not age prominent. Final report electronically signed by Dr. 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 Bilateral, right greater than left, hand tremors. COMPARISON: MR brain dated 10/24/2016. TECHNIQUE: Multiplanar and multiple spin echo T1 and T2-weighted images were obtained through the brain before and after the administration of intravenous contrast. 15 mL ProHance was injected in the right AC. FINDINGS:  There is mild to moderate parietal predominant volume loss which has mildly progressed in the interval. There are moderate patchy areas of T2/FLAIR prolongation in the periventricular, subcortical deep white matter which have also progressed in the  interval. There is a partially empty sella. No intra or extra-axial mass is identified. No focal areas of restricted diffusion are present. Following contrast administration, there are no focal areas of abnormal parenchymal or meningeal enhancement identified. The major vascular flow voids appear patent. Orbits are unremarkable. Paranasal sinuses are clear. Mastoid air cells are clear. Impression  1. No evidence of acute intracranial abnormality. 2. Mild to moderate severity chronic microvascular angiopathy which has progressed in the interval superimposed on mildly progressed volume loss. **This report has been created using voice recognition software.  It may contain minor errors which are inherent in voice recognition technology. **      Final report electronically signed by Dr. Aylin Puri MD on 3/23/2021 4:46 PM             Assessment:     Diagnosis Orders   1. Benign essential tremor          She is doing the same with her tremor. She can have good days and bad days. She is on primidone and reports she can at times forget to take her 2pm dose. She typically takes it later in the day. She feels there is more room for improvement. She denies any side effects to the primidone. After detailed discussion with patient we agreed on the following plan. Plan:  1. Change Primidone to 100 mg in the morning and 50 mg at night  2. Call with any new symptoms or concerns. 3. Follow up in 3-4 months. Time spent 21 min.     BOAZ Flores - CNP

## 2021-08-02 RX ORDER — VENLAFAXINE HYDROCHLORIDE 150 MG/1
CAPSULE, EXTENDED RELEASE ORAL
Qty: 90 CAPSULE | Refills: 3 | Status: SHIPPED | OUTPATIENT
Start: 2021-08-02 | End: 2022-07-28

## 2021-08-28 DIAGNOSIS — E78.00 PURE HYPERCHOLESTEROLEMIA: ICD-10-CM

## 2021-08-30 RX ORDER — ATORVASTATIN CALCIUM 40 MG/1
TABLET, FILM COATED ORAL
Qty: 90 TABLET | Refills: 3 | Status: SHIPPED | OUTPATIENT
Start: 2021-08-30 | End: 2022-08-11 | Stop reason: ALTCHOICE

## 2021-09-23 ENCOUNTER — HOSPITAL ENCOUNTER (OUTPATIENT)
Age: 76
Discharge: HOME | End: 2021-09-23
Payer: MEDICARE

## 2021-09-23 DIAGNOSIS — E11.9 TYPE 2 DIABETES MELLITUS WITHOUT COMPLICATION, WITHOUT LONG-TERM CURRENT USE OF INSULIN (HCC): ICD-10-CM

## 2021-09-23 LAB
ANION GAP SERPL CALCULATED.3IONS-SCNC: 13 MEQ/L (ref 8–16)
BUN BLDV-MCNC: 15 MG/DL (ref 7–22)
CALCIUM SERPL-MCNC: 9.2 MG/DL (ref 8.5–10.5)
CHLORIDE BLD-SCNC: 105 MEQ/L (ref 98–111)
CO2: 24 MEQ/L (ref 23–33)
CREAT SERPL-MCNC: 0.8 MG/DL (ref 0.4–1.2)
GFR SERPL CREATININE-BSD FRML MDRD: 70 ML/MIN/1.73M2
GLUCOSE BLD-MCNC: 121 MG/DL (ref 70–108)
POTASSIUM SERPL-SCNC: 4.2 MEQ/L (ref 3.5–5.2)
SODIUM BLD-SCNC: 142 MEQ/L (ref 135–145)

## 2021-09-23 PROCEDURE — 83036 HEMOGLOBIN GLYCOSYLATED A1C: CPT

## 2021-09-23 PROCEDURE — 36415 COLL VENOUS BLD VENIPUNCTURE: CPT

## 2021-09-23 PROCEDURE — 80048 BASIC METABOLIC PNL TOTAL CA: CPT

## 2021-09-24 LAB
AVERAGE GLUCOSE: 138 MG/DL (ref 70–126)
HBA1C MFR BLD: 6.6 % (ref 4.4–6.4)

## 2021-09-29 ENCOUNTER — OFFICE VISIT (OUTPATIENT)
Dept: FAMILY MEDICINE CLINIC | Age: 76
End: 2021-09-29
Payer: MEDICARE

## 2021-09-29 VITALS
SYSTOLIC BLOOD PRESSURE: 138 MMHG | OXYGEN SATURATION: 97 % | RESPIRATION RATE: 16 BRPM | DIASTOLIC BLOOD PRESSURE: 72 MMHG | HEIGHT: 63 IN | WEIGHT: 173.4 LBS | BODY MASS INDEX: 30.72 KG/M2 | HEART RATE: 62 BPM

## 2021-09-29 DIAGNOSIS — K21.00 GASTROESOPHAGEAL REFLUX DISEASE WITH ESOPHAGITIS WITHOUT HEMORRHAGE: ICD-10-CM

## 2021-09-29 DIAGNOSIS — Z98.61 POST PTCA: ICD-10-CM

## 2021-09-29 DIAGNOSIS — J43.1 PANLOBULAR EMPHYSEMA (HCC): ICD-10-CM

## 2021-09-29 DIAGNOSIS — Z23 NEED FOR SHINGLES VACCINE: ICD-10-CM

## 2021-09-29 DIAGNOSIS — M85.89 OSTEOPENIA OF MULTIPLE SITES: ICD-10-CM

## 2021-09-29 DIAGNOSIS — I10 HYPERTENSION, UNSPECIFIED TYPE: ICD-10-CM

## 2021-09-29 DIAGNOSIS — E78.00 PURE HYPERCHOLESTEROLEMIA: ICD-10-CM

## 2021-09-29 DIAGNOSIS — F33.42 RECURRENT MAJOR DEPRESSIVE DISORDER, IN FULL REMISSION (HCC): ICD-10-CM

## 2021-09-29 DIAGNOSIS — L57.8 SUN-DAMAGED SKIN: ICD-10-CM

## 2021-09-29 DIAGNOSIS — R35.0 URINARY FREQUENCY: ICD-10-CM

## 2021-09-29 DIAGNOSIS — E53.8 B12 DEFICIENCY: ICD-10-CM

## 2021-09-29 DIAGNOSIS — D10.6: ICD-10-CM

## 2021-09-29 DIAGNOSIS — Z72.0 TOBACCO ABUSE: ICD-10-CM

## 2021-09-29 DIAGNOSIS — D62 ANEMIA DUE TO ACUTE BLOOD LOSS: ICD-10-CM

## 2021-09-29 DIAGNOSIS — H61.22 CERUMEN DEBRIS ON TYMPANIC MEMBRANE, LEFT: ICD-10-CM

## 2021-09-29 DIAGNOSIS — J30.2 SEASONAL ALLERGIES: ICD-10-CM

## 2021-09-29 DIAGNOSIS — G25.2 INTENTION TREMOR: ICD-10-CM

## 2021-09-29 DIAGNOSIS — E11.9 TYPE 2 DIABETES MELLITUS WITHOUT COMPLICATION, WITHOUT LONG-TERM CURRENT USE OF INSULIN (HCC): Primary | ICD-10-CM

## 2021-09-29 PROCEDURE — 69209 REMOVE IMPACTED EAR WAX UNI: CPT | Performed by: FAMILY MEDICINE

## 2021-09-29 PROCEDURE — 99214 OFFICE O/P EST MOD 30 MIN: CPT | Performed by: FAMILY MEDICINE

## 2021-09-29 NOTE — PROGRESS NOTES
[address] [place] [address] Omar Hankins  Dept: [phone]  Dept Fax: [phone]  Loc: Cathy Chadwick is a 78 y.o. female who presents today for:  Chief Complaint   Patient presents with    Follow-up     4 mo           HPI:     HPI    Here for regular checkup but also has a hand tremor since 1/2021 getting worse. Handwriting and fine motor movements are the worst.  Mom had essential tremor, not diagnosed with Parkinson's. This is somewhat better after starting primidone but adjusting with neurology. Staying stable with dosing changes. Allergies starting usually controlled on OTC medications. Depression: Patient complains of depression. She complains of depressed mood and fatigue. Onset was approximately several years ago, gradually improving since that time. She denies current suicidal and homicidal plan or intent. Family history significant for no psychiatric illness. Possible organic causes contributing are: none. Risk factors: previous episode of depression Previous treatment includes Effexor and no group therapy. She complains of the following side effects from the treatment: none. Hypertension: Patient here for follow-up of elevated blood pressure. She is not exercising and is adherent to low salt diet. Blood pressure is well controlled at home. Cardiac symptoms none. Patient denies chest pain, dyspnea and palpitations. Cardiovascular risk factors: hypertension and obesity (BMI >= 30 kg/m2). Use of agents associated with hypertension: none. History of target organ damage: post PTCA seeing cardiology. Hyperlipidemia: Patient presents with hyperlipidemia. She was tested because age and hypertension.   Her last labs showed   Lab Results   Component Value Date    CHOL 155 02/27/2021    CHOL 176 07/18/2018    CHOL 183 05/18/2017     Lab Results   Component Value Date    TRIG 187 02/27/2021    TRIG 179 07/18/2018    TRIG 210 (H) 05/18/2017     Lab Results   Component Value Date    HDL 42 02/27/2021    HDL 43 08/20/2020    HDL 42 08/30/2019     Lab Results   Component Value Date    LDLCALC 76 02/27/2021    LDLCALC 94 08/20/2020    LDLCALC 69 08/30/2019     No results found for: LABVLDL, VLDL  No results found for: CHOLHDLRATIO  There is not a family history of hyperlipidemia. There is a family history of early ischemia heart disease. Borderline diabetes in the past is now in the diabetic range. Lab Results   Component Value Date    LABA1C 6.6 (H) 09/23/2021     No results found for: EAG    GERD is controlled on PPI. She is still no longer smoking cigarettes. COPD controlled now. b12 deficiency needs rechecked. Urinary frequency and urgency getting worse. enablex is not helping as much any more. Reviewed chart forpast medical history , surgical history , allergies, social history , family history and medications. Health Maintenance   Topic Date Due    Hepatitis C screen  Never done    Diabetic retinal exam  Never done    DTaP/Tdap/Td vaccine (1 - Tdap) Never done    Shingles Vaccine (1 of 2) Never done    Diabetic foot exam  11/05/2020    Diabetic microalbuminuria test  02/27/2022    Lipid screen  02/27/2022    Annual Wellness Visit (AWV)  06/02/2022    A1C test (Diabetic or Prediabetic)  09/23/2022    Potassium monitoring  09/23/2022    Creatinine monitoring  09/23/2022    Breast cancer screen  10/05/2022    Colon cancer screen colonoscopy  01/09/2025    DEXA (modify frequency per FRAX score)  Completed    Flu vaccine  Completed    Pneumococcal 65+ years Vaccine  Completed    COVID-19 Vaccine  Completed    Hepatitis A vaccine  Aged Out    Hib vaccine  Aged Out    Meningococcal (ACWY) vaccine  Aged Out       Subjective:      Constitutional:Negative for fever, chills, diaphoresis, activity change, appetite change and fatigue.    HENT: Negative for hearing loss, ear pain, congestion, sore throat, rhinorrhea, postnasal drip and ear discharge. Eyes: Negative for photophobia and visual disturbance. Respiratory: Negative for cough, chest tightness, shortness of breath and wheezing. Cardiovascular: Negative for chest pain and leg swelling. Gastrointestinal: Negative for nausea, vomiting, abdominal pain, diarrhea and constipation. Genitourinary: Negative for dysuria, urgency and frequency. Neurological: Negative for weakness, light-headedness and headaches. Psychiatric/Behavioral: Negative for sleep disturbance.      :     Vitals:    09/29/21 0908   BP: 138/72   Site: Left Upper Arm   Position: Sitting   Cuff Size: Medium Adult   Pulse: 62   Resp: 16   SpO2: 97%   Weight: 173 lb 6.4 oz (78.7 kg)   Height: 5' 3\" (1.6 m)     Wt Readings from Last 3 Encounters:   09/29/21 173 lb 6.4 oz (78.7 kg)   06/28/21 176 lb (79.8 kg)   06/01/21 174 lb 12.8 oz (79.3 kg)       Physical Exam  Physical Exam   Constitutional: Vital signs are normal. She appears well-developed and well-nourished. She is active. HENT:   Head: Normocephalic and atraumatic. Right Ear: Tympanic membrane, external ear and ear canal normal. No drainage or tenderness. Left Ear: Tympanic membrane, external ear and ear canal normal. No drainage or tenderness. Nose: Nose normal. No mucosal edema or rhinorrhea. Mouth/Throat: Uvula is midline, oropharynx is clear and moist and mucous membranes are normal. Mucous membranes are not pale. Normal dentition. No posterior oropharyngeal edema or posterior oropharyngeal erythema. Eyes: Lids are normal. Right eye exhibits no chemosis and no discharge. Left eye exhibits no chemosis and no drainage. Right conjunctiva has no hemorrhage. Left conjunctiva has no hemorrhage. Right eye exhibits normal extraocular motion. Left eye exhibits normal extraocular motion. Right pupil is round and reactive. Left pupil is round and reactive.  Pupils are equal.   Cardiovascular: Normal rate, regular rhythm, S1 normal, S2 normal and normal heart sounds. Exam reveals no gallop. No murmur heard. Pulmonary/Chest: Effort normal and breath sounds normal. No respiratory distress. She has no wheezes. She has no rhonchi. She has no rales. Abdominal: Soft. Normal appearance and bowel sounds are normal. She exhibits no distension and no mass. There is no hepatosplenomegaly. No tenderness. She has no rigidity, no rebound and no guarding. No hernia. Musculoskeletal:        Right lower leg: She exhibits no edema. Left lower leg: She exhibits no edema. Neurological: She is alert. Ceruminosis is noted. Wax is removed by syringing and manual debridement by macarena. Instructions for home care to prevent wax buildup are given. Assessment/Plan   Ac Lee was seen today for follow-up. Diagnoses and all orders for this visit:    Type 2 diabetes mellitus without complication, without long-term current use of insulin ([address]  -     Basic Metabolic Panel; Future  -     Hemoglobin A1C; Future  -     Insulin, Fasting;  Future    Hypertension, unspecified type    Pure hypercholesterolemia    Gastroesophageal reflux disease with esophagitis without hemorrhage    Post PTCA    Intention tremor    Osteopenia of multiple sites    Panlobular emphysema (HCC)    Tobacco abuse    Recurrent major depressive disorder, in full remission ([address]    B12 deficiency    Anemia due to acute blood loss    Sun-damaged skin    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 All patient questions answered. Pt voiced understanding. Reviewed health maintenance. Instructed to continue current medications, diet and exercise. Patient agreed with treatment plan. Followup as directed.      Electronically signed by Shirley Pineda MD

## 2021-09-29 NOTE — PROGRESS NOTES
Explained ear wax removal procedure to patient with patient verbalizing understanding. Unilateral rrigation performed with warm irrigation fluid, noting  large amount of cerumen flushed from left ear successfully. Patient tolerated well. Ear canals now clear, tympanic membranes visible.

## 2021-10-05 ENCOUNTER — HOSPITAL ENCOUNTER (OUTPATIENT)
Dept: CT IMAGING | Age: 76
Discharge: HOME | End: 2021-10-05
Payer: MEDICARE

## 2021-10-05 ENCOUNTER — HOSPITAL ENCOUNTER (OUTPATIENT)
Dept: PULMONOLOGY | Age: 76
Discharge: HOME | End: 2021-10-05
Payer: MEDICARE

## 2021-10-05 DIAGNOSIS — R94.2 DIFFUSION CAPACITY OF LUNG (DL), DECREASED: ICD-10-CM

## 2021-10-05 DIAGNOSIS — Z87.891 PERSONAL HISTORY OF TOBACCO USE: ICD-10-CM

## 2021-10-05 PROCEDURE — 94060 EVALUATION OF WHEEZING: CPT

## 2021-10-05 PROCEDURE — 71271 CT THORAX LUNG CANCER SCR C-: CPT

## 2021-10-05 PROCEDURE — 94726 PLETHYSMOGRAPHY LUNG VOLUMES: CPT

## 2021-10-05 PROCEDURE — 94729 DIFFUSING CAPACITY: CPT

## 2021-10-05 NOTE — PROGRESS NOTES
Prescreening performed prior to testing. The following symptoms may indicate COVID-19 infection:        One of the following criteria:   Temperature taken, patient temperature was 97.5 F. Fever greater 100.0 F -no  New onset cough -  no  New onset shortness of breath -no  New onset difficulty breathing -no        And/or   Two or more of the following criteria:  New onset muscle aches -no  New onset headache -no  New onset sore throat -no  New onset loss of smell/taste -no  New onset diarrhea -no    Patient's screening was negative. PFT will be performed.

## 2021-10-08 ENCOUNTER — OFFICE VISIT (OUTPATIENT)
Dept: PULMONOLOGY | Age: 76
End: 2021-10-08
Payer: MEDICARE

## 2021-10-08 VITALS
HEART RATE: 61 BPM | DIASTOLIC BLOOD PRESSURE: 82 MMHG | SYSTOLIC BLOOD PRESSURE: 120 MMHG | OXYGEN SATURATION: 95 % | TEMPERATURE: 96.1 F

## 2021-10-08 DIAGNOSIS — R94.2 DIFFUSION CAPACITY OF LUNG (DL), DECREASED: Primary | ICD-10-CM

## 2021-10-08 DIAGNOSIS — Z87.891 PERSONAL HISTORY OF TOBACCO USE: ICD-10-CM

## 2021-10-08 PROCEDURE — 99213 OFFICE O/P EST LOW 20 MIN: CPT | Performed by: NURSE PRACTITIONER

## 2021-10-08 ASSESSMENT — ENCOUNTER SYMPTOMS
EYE DISCHARGE: 1
RHINORRHEA: 1
SHORTNESS OF BREATH: 0
NAUSEA: 0
COUGH: 0
WHEEZING: 0
EYE ITCHING: 1
DIARRHEA: 0
ABDOMINAL PAIN: 0
VOMITING: 0

## 2021-10-08 NOTE — PROGRESS NOTES
extended release tablet TAKE 1 TABLET ONCE A DAY 90 tablet 4    fenofibrate (TRICOR) 145 MG tablet TAKE 1 TABLET DAILY 90 tablet 4    amLODIPine (NORVASC) 10 MG tablet Take 1 tablet by mouth daily 30 tablet 1    aspirin EC 81 MG EC tablet Take 1 tablet by mouth daily 30 tablet 0    isosorbide mononitrate (IMDUR) 30 MG extended release tablet Take 30 mg by mouth daily       Multiple Vitamins-Minerals (PRESERVISION AREDS 2+MULTI VIT PO) Take by mouth daily       losartan (COZAAR) 50 MG tablet Take 1 tablet by mouth daily. 90 tablet 3    zoledronic acid (RECLAST) 5 MG/100ML SOLN Infuse 100 mLs intravenously once for 1 dose 100 mL 0     No current facility-administered medications for this visit. Lisbeth ZIEGLER   Review of Systems   Constitutional: Negative for activity change, appetite change, chills, fatigue and fever. HENT: Positive for rhinorrhea. Eyes: Positive for discharge and itching. Respiratory: Negative for cough, shortness of breath and wheezing. Cardiovascular: Negative for chest pain, palpitations and leg swelling. Gastrointestinal: Negative for abdominal pain, diarrhea, nausea and vomiting. Genitourinary: Negative. Musculoskeletal: Negative. Skin: Negative. Neurological: Negative. Hematological: Does not bruise/bleed easily. Psychiatric/Behavioral: Positive for sleep disturbance (trouble tolerting CPAP ). Physical exam   /82 (Site: Right Upper Arm, Position: Sitting, Cuff Size: Medium Adult)   Pulse 61   Temp 96.1 °F (35.6 °C)   SpO2 95% Comment: on room air     Wt Readings from Last 3 Encounters:   09/29/21 173 lb 6.4 oz (78.7 kg)   06/28/21 176 lb (79.8 kg)   06/01/21 174 lb 12.8 oz (79.3 kg)     Physical Exam  Vitals and nursing note reviewed. Constitutional:       General: She is not in acute distress. Appearance: She is well-developed. HENT:      Mouth/Throat:      Lips: Pink.       Mouth: Mucous membranes are moist.      Pharynx: Oropharynx is clear. No oropharyngeal exudate or posterior oropharyngeal erythema. Eyes:      Conjunctiva/sclera: Conjunctivae normal.   Neck:      Vascular: No JVD. Cardiovascular:      Rate and Rhythm: Normal rate and regular rhythm. Heart sounds: No murmur heard. No friction rub. Pulmonary:      Effort: Pulmonary effort is normal. No accessory muscle usage or respiratory distress. Breath sounds: Normal breath sounds. No wheezing, rhonchi or rales. Chest:      Chest wall: No tenderness. Musculoskeletal:      Right lower leg: No edema. Left lower leg: No edema. Skin:     General: Skin is warm and dry. Capillary Refill: Capillary refill takes less than 2 seconds. Nails: There is no clubbing. Neurological:      Mental Status: She is alert. Psychiatric:         Mood and Affect: Mood normal.         Behavior: Behavior normal.         Thought Content: Thought content normal.         Judgment: Judgment normal.          Test results   Lung Nodule Screening     [x] Qualifies    []Does not qualify   [] Declined    [x] Completed    FULL PFT 10/5/2021- slightly declined DLCO stable compared to 2018           CT lung screen 10/5/2021     Impression   1. There are no suspicious masses or nodules within the lung fields. 2. There are no pathologically enlarged lymph nodes. 3. LUNGRADS ASSESSMENT VALUE: 1,      Assessment      Diagnosis Orders   1. Diffusion capacity of lung (dl), decreased     2. Personal history of tobacco use       Plan    -no evidence of obstruction on PFT, stable minimally declined diffusion of uncertain etiology- is asymptomatic , follow PFT in future on PRN basis for symptoms change   -CT lung screen stable, continue annual screenings. F/u in pulmonary clinic on PRN basis, recommend PCP to take over annual LDCT screenings   Pt agreeable.    Copy of note sent to Dr Shellie Perera MD     Total time spent on encounter was 25 minutes  Electronically signed by BOAZ Dejesus - CNP on 10/8/2021 at 10:22 AM

## 2021-10-08 NOTE — PATIENT INSTRUCTIONS
Follow up with family doctor at least yearly, she can order annual screenings, I will send my note and recommendations from today's appt. Follow up in our pulmonary clinic as needed.     Keep scheduled appointments with our sleep clinic

## 2021-10-29 ENCOUNTER — OFFICE VISIT (OUTPATIENT)
Dept: PULMONOLOGY | Age: 76
End: 2021-10-29
Payer: MEDICARE

## 2021-10-29 VITALS
HEIGHT: 63 IN | OXYGEN SATURATION: 94 % | DIASTOLIC BLOOD PRESSURE: 68 MMHG | WEIGHT: 174 LBS | BODY MASS INDEX: 30.83 KG/M2 | TEMPERATURE: 96.7 F | SYSTOLIC BLOOD PRESSURE: 132 MMHG | HEART RATE: 63 BPM

## 2021-10-29 DIAGNOSIS — Z99.89 OSA ON CPAP: Primary | ICD-10-CM

## 2021-10-29 DIAGNOSIS — G47.33 OSA ON CPAP: Primary | ICD-10-CM

## 2021-10-29 DIAGNOSIS — I10 BENIGN ESSENTIAL HTN: ICD-10-CM

## 2021-10-29 DIAGNOSIS — E66.9 OBESITY (BMI 30-39.9): ICD-10-CM

## 2021-10-29 PROCEDURE — 99213 OFFICE O/P EST LOW 20 MIN: CPT | Performed by: NURSE PRACTITIONER

## 2021-10-29 ASSESSMENT — ENCOUNTER SYMPTOMS
COUGH: 0
EYES NEGATIVE: 1
ABDOMINAL PAIN: 0
DIARRHEA: 0
WHEEZING: 0
NAUSEA: 0
APNEA: 0
VOMITING: 0
SHORTNESS OF BREATH: 0

## 2021-10-29 NOTE — PROGRESS NOTES
[facility] [address]         [id number]  10/29/2021   Chief Complaint   Patient presents with    Follow-up     KAYLEEN  6 month  sleep follow up with [facility]         Pt of Dr. Wilber DILLON Download:   Original or initial AHI: 15.1     Date of initial study: 11/4/2020      Compliant  63%     Noncompliant 27 %     PAP Type airsense 10 Level  12 cmh2o    Avg Hrs/Day 6:12  AHI: 3.6   Recorded compliance dates ,9/26/21-10/25/21  Machine/Mfg:   [x] ResMed    [] Respironics/Dreamstation   Interface:   [] Nasal    [] Nasal pillows   [x] FFM      Provider:      [] -[facility]     []Jeanne     [] Candelarioco    [] Emily Crump    [] [facility]               [] [facility]      [] Adaptive    [x] [address]      [] Other    Neck Size: 15.75 Mallampati Mallampati 3  ESS:  2  SAQLI:  95    Here is a scan of the most recent download:            Presentation:   Nj Lazo presents for 6 month  sleep medicine follow up for obstructive sleep apnea  Since the last visit, Nj Lazo is struggling with current mask , leaking . Feels air up at bridge of nose, mask sliding and finds \" mouth popped \" is still using nightly with good compliance and does see benefit with use , not as tired     Equipment issues: The pressure is  acceptable, the mask is somewhat acceptable      Sleep issues:  Do you feel better? Yes  More rested? Sometimes   Better concentration? yes    Progress History:   Since last visit any new medical issues? No  New ER or hospital visits? No  Any new or changes in medicines? No  Any new sleep medicines? No    Review of Systems -   Review of Systems   Constitutional: Negative for activity change, appetite change, chills, fatigue, fever and unexpected weight change. HENT: Negative. Eyes: Negative. Respiratory: Negative for apnea, cough, shortness of breath and wheezing. Cardiovascular: Negative for chest pain, palpitations and leg swelling.    Gastrointestinal: Negative for She was advised to call DME company regarding supplies if needed.   -She call my office for earlier appointment if needed for worsening of sleep symptoms.   - She was instructed on weight loss  - Ash Chowdhury was educated about my impression and plan. Patient verbalizesunderstanding.     We will see An Alva back in: 6 months with download  Total time on encounter: 20 min   Information added by my medical assistant/LPN was reviewed today  Electronically signed by BOAZ Kumar CNP on 10/29/2021 at 10:47 AM

## 2021-11-01 ENCOUNTER — OFFICE VISIT (OUTPATIENT)
Dept: NEUROLOGY | Age: 76
End: 2021-11-01
Payer: MEDICARE

## 2021-11-01 VITALS
DIASTOLIC BLOOD PRESSURE: 78 MMHG | SYSTOLIC BLOOD PRESSURE: 144 MMHG | HEART RATE: 64 BPM | OXYGEN SATURATION: 97 % | WEIGHT: 176 LBS | HEIGHT: 63 IN | BODY MASS INDEX: 31.18 KG/M2

## 2021-11-01 DIAGNOSIS — G25.0 BENIGN ESSENTIAL TREMOR: Primary | ICD-10-CM

## 2021-11-01 PROCEDURE — 99213 OFFICE O/P EST LOW 20 MIN: CPT | Performed by: PSYCHIATRY & NEUROLOGY

## 2021-11-01 RX ORDER — TOPIRAMATE 50 MG/1
TABLET, FILM COATED ORAL
Qty: 30 TABLET | Refills: 4 | Status: SHIPPED | OUTPATIENT
Start: 2021-11-01 | End: 2022-02-03

## 2021-11-01 NOTE — PATIENT INSTRUCTIONS
1. Stop Primidone due to lack of benefit. 2. Topamax 25 mg tablet daily for one week then 50 mg daily there after. Take with plenty of fluids. 3. Stay active.    4. Follow up in 2-3 months

## 2021-11-01 NOTE — PROGRESS NOTES
NEUROLOGY OUT PATIENT FOLLOW UP NOTE:  11/1/20219:57 AM    Karly Oliva is here for follow up for action tremor. She is here to discuss the plan . Allergies   Allergen Reactions    Benadryl [Diphenhydramine Hcl]      OVER-STIMULATED    Flexeril [Cyclobenzaprine Hcl]      OVER-STIMULATED         Current Outpatient Medications:     mirabegron (MYRBETRIQ) 50 MG TB24, Take 50 mg by mouth daily, Disp: 30 tablet, Rfl: 3    atorvastatin (LIPITOR) 40 MG tablet, TAKE 1 TABLET DAILY, Disp: 90 tablet, Rfl: 3    venlafaxine (EFFEXOR XR) 150 MG extended release capsule, TAKE 1 CAPSULE DAILY, Disp: 90 capsule, Rfl: 3    primidone (MYSOLINE) 50 MG tablet, Take 100 mg in the morning and 50 mg at night, Disp: 270 tablet, Rfl: 1    calcium-vitamin D (OSCAL-500) 500-200 MG-UNIT per tablet, Take 1 tablet by mouth 2 times daily 1200 plus d, Disp: , Rfl:     metoprolol succinate (TOPROL XL) 50 MG extended release tablet, TAKE 1 TABLET ONCE A DAY, Disp: 90 tablet, Rfl: 4    fenofibrate (TRICOR) 145 MG tablet, TAKE 1 TABLET DAILY, Disp: 90 tablet, Rfl: 4    amLODIPine (NORVASC) 10 MG tablet, Take 1 tablet by mouth daily, Disp: 30 tablet, Rfl: 1    aspirin EC 81 MG EC tablet, Take 1 tablet by mouth daily, Disp: 30 tablet, Rfl: 0    isosorbide mononitrate (IMDUR) 30 MG extended release tablet, Take 30 mg by mouth daily , Disp: , Rfl:     Multiple Vitamins-Minerals (PRESERVISION AREDS 2+MULTI VIT PO), Take by mouth daily , Disp: , Rfl:     losartan (COZAAR) 50 MG tablet, Take 1 tablet by mouth daily. , Disp: 90 tablet, Rfl: 3    darifenacin (ENABLEX) 15 MG extended release tablet, TAKE 1 TABLET DAILY (Patient not taking: Reported on 11/1/2021), Disp: 90 tablet, Rfl: 3    pantoprazole (PROTONIX) 40 MG tablet, Take 1 tablet by mouth every morning (before breakfast) (Patient not taking: Reported on 11/1/2021), Disp: 90 tablet, Rfl: 3    zoledronic acid (RECLAST) 5 MG/100ML SOLN, Infuse 100 mLs intravenously once for 1 multisequence MR images of the brain without contrast.    FINDINGS:    Diffusion weighted images and apparent diffusion coefficient maps demonstrate no areas of restricted diffusion. Brain volume is within normal limits. Mild bilateral cerebral white matter T2 hyperintensity, nonspecific but most often due to chronic small vessel ischemic disease and which is not age prominent. Intact cerebral cortex. No acute intracranial hemorrhage, mass effect, midline shift or obstructive hydrocephalus. No abnormal extra axial fluid collections. Unremarkable sellar contents. Craniocervical junction appears unremarkable without significant cerebellar tonsillar ectopia. Small right mastoid effusion. Impression  Essentially unremarkable MR appearance of the brain. Mild chronic small vessel ischemic changes which are not age prominent. Final report electronically signed by Dr. 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 Bilateral, right greater than left, hand tremors. COMPARISON: MR brain dated 10/24/2016. TECHNIQUE: Multiplanar and multiple spin echo T1 and T2-weighted images were obtained through the brain before and after the administration of intravenous contrast. 15 mL ProHance was injected in the right AC. FINDINGS:  There is mild to moderate parietal predominant volume loss which has mildly progressed in the interval. There are moderate patchy areas of T2/FLAIR prolongation in the periventricular, subcortical deep white matter which have also progressed in the  interval. There is a partially empty sella. No intra or extra-axial mass is identified. No focal areas of restricted diffusion are present. Following contrast administration, there are no focal areas of abnormal parenchymal or meningeal enhancement identified.   The major vascular flow voids appear patent. Orbits are unremarkable. Paranasal sinuses are clear. Mastoid air cells are clear. Impression  1. No evidence of acute intracranial abnormality. 2. Mild to moderate severity chronic microvascular angiopathy which has progressed in the interval superimposed on mildly progressed volume loss. **This report has been created using voice recognition software. It may contain minor errors which are inherent in voice recognition technology. **  Final report electronically signed by Dr. Dhaval Jones MD on 3/23/2021 4:46 PM             Assessment:     Diagnosis Orders   1. Benign essential tremor          Follow-up for benign essential tremor. The patient reports her tremor is the same. She does not feel the difference if taking the primidone or not and when she misses it feels the same. She is concerned about weight gain on the primidone. Will start Topamax as off label use of the medication. No side effects to medication. She has symmetric action tremor. No head tremor, no voice tremor. She can have good days and bad days. After detailed discussion with patient we agreed on the following plan. .       Plan:  1. Stop Primidone due to lack of benefit. 2. Topamax 25 mg tablet daily for one week then 50 mg daily there after. Take with plenty of fluids. 3. Stay active. 4. Follow up in 2-3 months       Time spent 21 min.     Og Becerra MD

## 2021-11-26 DIAGNOSIS — R53.83 OTHER FATIGUE: ICD-10-CM

## 2021-11-26 DIAGNOSIS — E78.00 PURE HYPERCHOLESTEROLEMIA: ICD-10-CM

## 2021-11-26 DIAGNOSIS — I10 HYPERTENSION, UNSPECIFIED TYPE: ICD-10-CM

## 2021-11-29 RX ORDER — METOPROLOL SUCCINATE 50 MG/1
TABLET, EXTENDED RELEASE ORAL
Qty: 90 TABLET | Refills: 3 | Status: SHIPPED | OUTPATIENT
Start: 2021-11-29 | End: 2022-05-18 | Stop reason: SDUPTHER

## 2021-11-29 RX ORDER — FENOFIBRATE 145 MG/1
TABLET, COATED ORAL
Qty: 90 TABLET | Refills: 3 | Status: SHIPPED | OUTPATIENT
Start: 2021-11-29

## 2021-12-06 ENCOUNTER — TELEPHONE (OUTPATIENT)
Dept: FAMILY MEDICINE CLINIC | Age: 76
End: 2021-12-06

## 2021-12-06 NOTE — TELEPHONE ENCOUNTER
Pt called stating she started 12/1 with congested cough, HA, body aches and SOB. Pt denies respiratory distress.   Pt started with fever 100.7 12/4    Pt did drive thru covid testing at [employer] 12/5    Pt is using cough drops, vicks vapor rub and Tylenol 1000 mg once daily    Pt requesting appt

## 2021-12-07 ENCOUNTER — HOSPITAL ENCOUNTER (EMERGENCY)
Age: 76
Discharge: HOME | End: 2021-12-07
Attending: EMERGENCY MEDICINE
Payer: MEDICARE

## 2021-12-07 ENCOUNTER — APPOINTMENT (OUTPATIENT)
Dept: GENERAL RADIOLOGY | Age: 76
End: 2021-12-07
Payer: MEDICARE

## 2021-12-07 VITALS
HEART RATE: 72 BPM | TEMPERATURE: 97.5 F | RESPIRATION RATE: 19 BRPM | BODY MASS INDEX: 29.02 KG/M2 | SYSTOLIC BLOOD PRESSURE: 138 MMHG | HEIGHT: 64 IN | DIASTOLIC BLOOD PRESSURE: 72 MMHG | OXYGEN SATURATION: 91 % | WEIGHT: 170 LBS

## 2021-12-07 DIAGNOSIS — R55 PRE-SYNCOPE: ICD-10-CM

## 2021-12-07 DIAGNOSIS — U07.1 COVID-19: Primary | ICD-10-CM

## 2021-12-07 LAB
ANION GAP SERPL CALCULATED.3IONS-SCNC: 15 MEQ/L (ref 8–16)
BASOPHILS # BLD: 0.6 %
BASOPHILS ABSOLUTE: 0 THOU/MM3 (ref 0–0.1)
BUN BLDV-MCNC: 18 MG/DL (ref 7–22)
C-REACTIVE PROTEIN: 2.82 MG/DL (ref 0–1)
CALCIUM SERPL-MCNC: 10 MG/DL (ref 8.5–10.5)
CHLORIDE BLD-SCNC: 105 MEQ/L (ref 98–111)
CO2: 21 MEQ/L (ref 23–33)
CREAT SERPL-MCNC: 1.3 MG/DL (ref 0.4–1.2)
EKG ATRIAL RATE: 63 BPM
EKG P AXIS: -2 DEGREES
EKG P-R INTERVAL: 156 MS
EKG Q-T INTERVAL: 434 MS
EKG QRS DURATION: 74 MS
EKG QTC CALCULATION (BAZETT): 444 MS
EKG R AXIS: -37 DEGREES
EKG T AXIS: 74 DEGREES
EKG VENTRICULAR RATE: 63 BPM
EOSINOPHIL # BLD: 0.8 %
EOSINOPHILS ABSOLUTE: 0.1 THOU/MM3 (ref 0–0.4)
ERYTHROCYTE [DISTWIDTH] IN BLOOD BY AUTOMATED COUNT: 13.6 % (ref 11.5–14.5)
ERYTHROCYTE [DISTWIDTH] IN BLOOD BY AUTOMATED COUNT: 48.3 FL (ref 35–45)
FERRITIN: 396 NG/ML (ref 10–291)
GFR SERPL CREATININE-BSD FRML MDRD: 40 ML/MIN/1.73M2
GLUCOSE BLD-MCNC: 236 MG/DL (ref 70–108)
HCT VFR BLD CALC: 44.7 % (ref 37–47)
HEMOGLOBIN: 14 GM/DL (ref 12–16)
IMMATURE GRANS (ABS): 0.06 THOU/MM3 (ref 0–0.07)
IMMATURE GRANULOCYTES: 0.8 %
LACTIC ACID: 1.7 MMOL/L (ref 0.5–2)
LD: 202 U/L (ref 100–190)
LYMPHOCYTES # BLD: 12.4 %
LYMPHOCYTES ABSOLUTE: 0.9 THOU/MM3 (ref 1–4.8)
MCH RBC QN AUTO: 29.9 PG (ref 26–33)
MCHC RBC AUTO-ENTMCNC: 31.3 GM/DL (ref 32.2–35.5)
MCV RBC AUTO: 95.5 FL (ref 81–99)
MONOCYTES # BLD: 9.5 %
MONOCYTES ABSOLUTE: 0.7 THOU/MM3 (ref 0.4–1.3)
NUCLEATED RED BLOOD CELLS: 0 /100 WBC
OSMOLALITY CALCULATION: 290.8 MOSMOL/KG (ref 275–300)
PLATELET # BLD: 270 THOU/MM3 (ref 130–400)
PMV BLD AUTO: 11.3 FL (ref 9.4–12.4)
POTASSIUM SERPL-SCNC: 4.4 MEQ/L (ref 3.5–5.2)
PRO-BNP: 420 PG/ML (ref 0–900)
RBC # BLD: 4.68 MILL/MM3 (ref 4.2–5.4)
SEG NEUTROPHILS: 75.9 %
SEGMENTED NEUTROPHILS ABSOLUTE COUNT: 5.5 THOU/MM3 (ref 1.8–7.7)
SODIUM BLD-SCNC: 141 MEQ/L (ref 135–145)
TROPONIN T: < 0.01 NG/ML
TROPONIN T: < 0.01 NG/ML
WBC # BLD: 7.3 THOU/MM3 (ref 4.8–10.8)

## 2021-12-07 PROCEDURE — 83880 ASSAY OF NATRIURETIC PEPTIDE: CPT

## 2021-12-07 PROCEDURE — 2580000003 HC RX 258: Performed by: STUDENT IN AN ORGANIZED HEALTH CARE EDUCATION/TRAINING PROGRAM

## 2021-12-07 PROCEDURE — 82728 ASSAY OF FERRITIN: CPT

## 2021-12-07 PROCEDURE — 80048 BASIC METABOLIC PNL TOTAL CA: CPT

## 2021-12-07 PROCEDURE — 83615 LACTATE (LD) (LDH) ENZYME: CPT

## 2021-12-07 PROCEDURE — 71045 X-RAY EXAM CHEST 1 VIEW: CPT

## 2021-12-07 PROCEDURE — 36415 COLL VENOUS BLD VENIPUNCTURE: CPT

## 2021-12-07 PROCEDURE — 86140 C-REACTIVE PROTEIN: CPT

## 2021-12-07 PROCEDURE — 84484 ASSAY OF TROPONIN QUANT: CPT

## 2021-12-07 PROCEDURE — 85025 COMPLETE CBC W/AUTO DIFF WBC: CPT

## 2021-12-07 PROCEDURE — 99285 EMERGENCY DEPT VISIT HI MDM: CPT

## 2021-12-07 PROCEDURE — 6370000000 HC RX 637 (ALT 250 FOR IP): Performed by: STUDENT IN AN ORGANIZED HEALTH CARE EDUCATION/TRAINING PROGRAM

## 2021-12-07 PROCEDURE — 93005 ELECTROCARDIOGRAM TRACING: CPT | Performed by: EMERGENCY MEDICINE

## 2021-12-07 PROCEDURE — 93010 ELECTROCARDIOGRAM REPORT: CPT | Performed by: NUCLEAR MEDICINE

## 2021-12-07 PROCEDURE — 83605 ASSAY OF LACTIC ACID: CPT

## 2021-12-07 RX ORDER — ASPIRIN 81 MG/1
324 TABLET, CHEWABLE ORAL ONCE
Status: COMPLETED | OUTPATIENT
Start: 2021-12-07 | End: 2021-12-07

## 2021-12-07 RX ORDER — 0.9 % SODIUM CHLORIDE 0.9 %
1000 INTRAVENOUS SOLUTION INTRAVENOUS ONCE
Status: COMPLETED | OUTPATIENT
Start: 2021-12-07 | End: 2021-12-07

## 2021-12-07 RX ADMIN — SODIUM CHLORIDE 1000 ML: 9 INJECTION, SOLUTION INTRAVENOUS at 15:59

## 2021-12-07 RX ADMIN — ASPIRIN 81 MG CHEWABLE TABLET 243 MG: 81 TABLET CHEWABLE at 15:56

## 2021-12-07 ASSESSMENT — ENCOUNTER SYMPTOMS
GASTROINTESTINAL NEGATIVE: 1
EYES NEGATIVE: 1
RESPIRATORY NEGATIVE: 1

## 2021-12-07 ASSESSMENT — HEART SCORE: ECG: 1

## 2021-12-07 NOTE — ED PROVIDER NOTES
[place] ENCOUNTER          Pt Name: Xavier Pickens  MRN: [id number]  [place] 1945  Date of evaluation: 12/7/2021  Treating Resident Physician: Kenzie Hernandez MD  Supervising Physician: Malena Adair COMPLAINT       Chief Complaint   Patient presents with    Chest Pain    Positive For Covid-19     History obtained from the patient. HISTORY OF PRESENT ILLNESS    HPI  Xavier Pickens is a 78 y.o. female who presents to the emergency department for evaluation of almost pain. Patient diagnosed with COVID-19 2 days ago, symptoms started 10 days ago referred has generalized weakness, fatigue, dry cough. Today in the morning patient was taking a shower when she had an episode of dizziness, nausea, bilateral tingling sensation in arms, feeling fuzzy why patient took 2 tablets of nitroglycerin relieving symptoms. Patient refers she has had previous myocardial infarction with the same symptoms. Additional symptoms chills, no abdominal pain, no urinary symptoms, no S OB, no headache. The patient has no other acute complaints at this time. REVIEW OF SYSTEMS   Review of Systems   Constitutional: Negative. HENT: Negative. Eyes: Negative. Respiratory: Negative. Cardiovascular: Negative. Gastrointestinal: Negative. Genitourinary: Negative. Musculoskeletal: Negative. Neurological: Negative. Psychiatric/Behavioral: Negative.           PAST MEDICAL AND SURGICAL HISTORY     Past Medical History:   Diagnosis Date    CAD (coronary artery disease)     Depression     Fibromyalgia     Fibromyalgia     GERD (gastroesophageal reflux disease)     Dr Carola Mejias [id number](490.2)     Hyperlipidemia     Hypertension     Macular degeneration, dry     [facility]     Dr Jose Bernstein SOB (shortness of breath)     Dr Graciela Cameron Type 2 diabetes mellitus without complication, without long-term current use of insulin ([facility]     Urinary incontinence      Past Surgical History:   Procedure Laterality Date    ARM SURGERY Left 11/20/2018    dr. Hawley Needs      x2    CHOLECYSTECTOMY  1975    COLON SURGERY  2010    partial resection of flat polyp    COLONOSCOPY      CORONARY ANGIOPLASTY WITH STENT PLACEMENT  07/2011    Dr Edmond Ray CYST REMOVAL      BILATERAL HANDS    DILATATION, ESOPHAGUS      ENDOSCOPY, COLON, DIAGNOSTIC      FRACTURE SURGERY           MEDICATIONS   No current facility-administered medications for this encounter. Current Outpatient Medications:     fenofibrate (TRICOR) 145 MG tablet, TAKE 1 TABLET DAILY, Disp: 90 tablet, Rfl: 3    metoprolol succinate (TOPROL XL) 50 MG extended release tablet, TAKE 1 TABLET DAILY, Disp: 90 tablet, Rfl: 3    topiramate (TOPAMAX) 50 MG tablet, Topamax 25 mg tablet daily for one week then 50 mg daily there after. Take with plenty of fluids. , Disp: 30 tablet, Rfl: 4    mirabegron (MYRBETRIQ) 50 MG TB24, Take 50 mg by mouth daily, Disp: 30 tablet, Rfl: 3    atorvastatin (LIPITOR) 40 MG tablet, TAKE 1 TABLET DAILY, Disp: 90 tablet, Rfl: 3    venlafaxine (EFFEXOR XR) 150 MG extended release capsule, TAKE 1 CAPSULE DAILY, Disp: 90 capsule, Rfl: 3    darifenacin (ENABLEX) 15 MG extended release tablet, TAKE 1 TABLET DAILY (Patient not taking: Reported on 11/1/2021), Disp: 90 tablet, Rfl: 3    pantoprazole (PROTONIX) 40 MG tablet, Take 1 tablet by mouth every morning (before breakfast) (Patient not taking: Reported on 11/1/2021), Disp: 90 tablet, Rfl: 3    calcium-vitamin D (OSCAL-500) 500-200 MG-UNIT per tablet, Take 1 tablet by mouth 2 times daily 1200 plus d, Disp: , Rfl:     zoledronic acid (RECLAST) 5 MG/100ML SOLN, Infuse 100 mLs intravenously once for 1 dose, Disp: 100 mL, Rfl: 0    amLODIPine (NORVASC) 10 MG tablet, Take 1 tablet by mouth daily, Disp: 30 tablet, Rfl: 1    aspirin EC 81 MG EC tablet, Take 1 tablet by mouth daily, Disp: 30 tablet, Rfl: 0    isosorbide mononitrate (IMDUR) 30 MG extended release tablet, Take 30 mg by mouth daily , Disp: , Rfl:     Multiple Vitamins-Minerals (PRESERVISION AREDS 2+MULTI VIT PO), Take by mouth daily , Disp: , Rfl:     losartan (COZAAR) 50 MG tablet, Take 1 tablet by mouth daily. , Disp: 90 tablet, Rfl: 3      SOCIAL HISTORY     Social History     Social History Narrative    Not on file     Social History     Tobacco Use    Smoking status: Former Smoker     Packs/day: 0.00     Years: 50.00     Pack years: 0.00     Types: Cigarettes     Start date: 5/24/1965     Quit date: 6/1/2018     Years since quitting: 3.5    Smokeless tobacco: Never Used   Vaping Use    Vaping Use: Never used   Substance Use Topics    Alcohol use: Yes     Comment: 3 beers a week     Drug use: No         ALLERGIES     Allergies   Allergen Reactions    Benadryl [Diphenhydramine Hcl]      OVER-STIMULATED    Flexeril [Cyclobenzaprine Hcl]      OVER-STIMULATED           FAMILY HISTORY     Family History   Problem Relation Age of Onset    COPD Father     Hearing Loss Father     Cancer Mother 39        uterine and breast    Heart Disease Mother     Diabetes Mother     High Cholesterol Mother     Arthritis Mother     Coronary Art Dis Mother     Heart Attack Mother     Diabetes Sister     High Cholesterol Sister     Stroke Sister 72    Allergy (Severe) Sister     Arthritis Sister     Miscarriages / [place] Sister     Cancer Maternal Uncle     Heart Attack Paternal Grandmother 76        MI    Arthritis Paternal Grandmother     Obesity Paternal Grandmother     Early Death Maternal Grandmother     Alcohol Abuse Maternal Grandfather          PREVIOUS RECORDS   Previous records reviewed: Medical history of COPD, CAD, 4 stents, hypertension, diabetes. 19 vaccination +3 shots.   No recent trauma, no recent surgeries, no new allergies. PHYSICAL EXAM     ED Triage Vitals [12/07/21 1424]   BP Temp Temp src Pulse Resp SpO2 Height Weight   (!) 102/58 97.5 °F (36.4 °C) -- 67 16 94 % 5' 3.5\" (1.613 m) 170 lb (77.1 kg)     Initial vital signs and nursing assessment reviewed and normal. Body mass index is 29.64 kg/m². Pulsoximetry is normal per my interpretation. Additional Vital Signs:  Vitals:    12/07/21 1655   BP: 126/63   Pulse: 72   Resp: 17   Temp:    SpO2: 90%       Physical Exam  Constitutional:       Appearance: Normal appearance. HENT:      Head: Normocephalic. Nose: Nose normal.      Mouth/Throat:      Mouth: Mucous membranes are moist.   Eyes:      Pupils: Pupils are equal, round, and reactive to light. Cardiovascular:      Rate and Rhythm: Normal rate and regular rhythm. Pulses: Normal pulses. Heart sounds: Normal heart sounds. Pulmonary:      Effort: Pulmonary effort is normal.      Breath sounds: Rhonchi (Right hemithorax) present. No wheezing or rales. Abdominal:      General: Abdomen is flat. Palpations: Abdomen is soft. Musculoskeletal:         General: No swelling or tenderness. Normal range of motion. Cervical back: Normal range of motion and neck supple. Skin:     General: Skin is warm. Capillary Refill: Capillary refill takes less than 2 seconds. Neurological:      General: No focal deficit present. Mental Status: She is alert and oriented to person, place, and time. MEDICAL DECISION MAKING   Initial Assessment:   3 19-year-old patient with previous medical history of CA   multiple stents, 19+, complaining of syncopal episode, relieved by nitroglycerin, similar symptoms with previous myocardial infarction.   Physical examination with rhonchi right hemithorax, initial EKG shows dynamic changes given by ST depression in 4 V5 V6, we will start initially aspirin IV fluids, troponin CBC BMP has well has acute inflammatory reactants for COVID-19 infection chest x-ray will be added as per recall of chest pain has well has rule out COVID-19 pneumonia. Clinical impression    Syncope/cardiac   COVID-19 infection   plan:    BC, BMP, EKG, chest x-ray, ferritin, LDH, CRP  IV fluids, aspirin  New EKG 1 hour/ Troponin  Reassessment        ED RESULTS   Laboratory results:  Labs Reviewed   CBC WITH AUTO DIFFERENTIAL - Abnormal; Notable for the following components:       Result Value    MCHC 31.3 (*)     RDW-SD 48.3 (*)     Lymphocytes Absolute 0.9 (*)     All other components within normal limits   BASIC METABOLIC PANEL - Abnormal; Notable for the following components:    CO2 21 (*)     Glucose 236 (*)     CREATININE 1.3 (*)     All other components within normal limits   C-REACTIVE PROTEIN - Abnormal; Notable for the following components:    CRP 2.82 (*)     All other components within normal limits   FERRITIN - Abnormal; Notable for the following components:    Ferritin 396 (*)     All other components within normal limits   LACTATE DEHYDROGENASE - Abnormal; Notable for the following components:     (*)     All other components within normal limits   GLOMERULAR FILTRATION RATE, ESTIMATED - Abnormal; Notable for the following components:    Est, Glom Filt Rate 40 (*)     All other components within normal limits   TROPONIN   BRAIN NATRIURETIC PEPTIDE   LACTIC ACID, PLASMA   ANION GAP   OSMOLALITY   TROPONIN       Radiologic studies results:  XR CHEST PORTABLE   Final Result   Limited assessment at the lung bases due to low lung volumes. The left lung base is not well assessed. No acute intrathoracic process. **This report has been created using voice recognition software. It may contain minor errors which are inherent in voice recognition technology. **      Final report electronically signed by Dr Geovanna Grace on 12/7/2021 3:23 PM          ED Medications administered this visit:   Medications   0.9 % sodium chloride bolus (1,000 mLs IntraVENous New Bag 12/7/21 1559)   aspirin chewable tablet 324 mg (243 mg Oral Given 12/7/21 1556)         ED COURSE     ED Course as of 12/07/21 1929   Tue Dec 07, 2021   1827 Ambulating pulse ox 96% [CR]   0499 Repeat troponin WNL [CR]   9974 Paged Dr Marden Denver [CR]      ED Course User Index  [CR] Brian Dailey MD     Reassessment  78year-old patient, complaining of presyncopal episodes after 3 hours of arriving to the emergency department. It is not clear if symptoms are related with dehydration given that patient she has not had enough food and drink due to COVID-19 infection or may be related with current event given that patient has had previous myocardial infarction with similar symptoms. We will order second troponin given few hours of presyncopal episode, new EKG in order to rule out coronary event. Patient will be signing off Dr. Farrukh Alvarado  . MEDICATION CHANGES     Current Discharge Medication List            FINAL DISPOSITION     Final diagnoses:   COVID-19   Pre-syncope     Condition: condition: good  Dispo: Signed off Dr. Farrukh Alvarado      This transcription was electronically signed. Parts of this transcriptions may have been dictated by use of voice recognition software and electronically transcribed, and parts may have been transcribed with the assistance of an ED scribe. The transcription may contain errors not detected in proofreading. Please refer to my supervising physician's documentation if my documentation differs.     Electronically Signed: Amy Singh MD, 12/07/21, 7:29 PM         Amy Singh MD  Resident  12/07/21 3714

## 2021-12-07 NOTE — ED NOTES
Bed: 039A  Expected date:   Expected time:   Means of arrival: Nicki  Comments:     Alisha Oseguera RN  12/07/21 2422

## 2021-12-07 NOTE — ED NOTES
Pt resting on cot; denies needs at this time. Will continue to monitor for safety and comfort.       Aldo Marshall RN  12/07/21 9715

## 2021-12-07 NOTE — TELEPHONE ENCOUNTER
Pt tested positive and wants regeneron but the Ambulance was there while on the phone with her. Pt states she was having heart trouble. Told pt to keep us informed and that I wouldn't schedule the regeneron until we see what hosp does.

## 2021-12-07 NOTE — ED PROVIDER NOTES
Transfer of Care Note:   Physician Signing out: Dr. Adarsh Ruby  Receiving Physician: Lorin Arechiga MD  Sign out time: 5      Brief history:  72-year-old female with history of hypertension diabetes CAD who had a presyncopal event earlier today and came in to the ED for chest pain. She is also Covid positive. EKG showed nonspecific changes. Initial troponin within normal limits. Not hypoxic. Items pending that need to be checked:  Repeat troponin; pulse ox upon ambulation      Tentative Impression of patient:  1. Chest pain    Expected disposition of patient:  Pending results, discharged. Additional Assessment and results:   I have personally performed a face to face diagnostic evaluation on this patient. The patient's initial evaluation and plan have been discussed with the prior physician who initially evaluated the patient. Nursing Notes, Past Medical Hx, Past Surgical Hx, Social Hx, Allergies, vital signs and Family Hx were all reviewed. Vitals:    12/07/21 1925   BP: 138/72   Pulse: 72   Resp: 19   Temp:    SpO2: 91%     Physical Exam  Constitutional:       General: She is not in acute distress. Appearance: Normal appearance. She is not ill-appearing, toxic-appearing or diaphoretic. HENT:      Head: Normocephalic and atraumatic. Right Ear: External ear normal.      Left Ear: External ear normal.   Eyes:      General: No scleral icterus. Right eye: No discharge. Left eye: No discharge. Pulmonary:      Effort: Pulmonary effort is normal.   Abdominal:      General: Abdomen is flat. Musculoskeletal:         General: Normal range of motion. Cervical back: Neck supple. Neurological:      Mental Status: She is alert and oriented to person, place, and time. Mental status is at baseline. Psychiatric:         Mood and Affect: Mood normal.         Behavior: Behavior normal.         Thought Content:  Thought content normal.         Judgment: Judgment normal.           Labs Reviewed   CBC WITH AUTO DIFFERENTIAL - Abnormal; Notable for the following components:       Result Value    MCHC 31.3 (*)     RDW-SD 48.3 (*)     Lymphocytes Absolute 0.9 (*)     All other components within normal limits   BASIC METABOLIC PANEL - Abnormal; Notable for the following components:    CO2 21 (*)     Glucose 236 (*)     CREATININE 1.3 (*)     All other components within normal limits   C-REACTIVE PROTEIN - Abnormal; Notable for the following components:    CRP 2.82 (*)     All other components within normal limits   FERRITIN - Abnormal; Notable for the following components:    Ferritin 396 (*)     All other components within normal limits   LACTATE DEHYDROGENASE - Abnormal; Notable for the following components:     (*)     All other components within normal limits   GLOMERULAR FILTRATION RATE, ESTIMATED - Abnormal; Notable for the following components:    Est, Glom Filt Rate 40 (*)     All other components within normal limits   TROPONIN   BRAIN NATRIURETIC PEPTIDE   LACTIC ACID, PLASMA   ANION GAP   OSMOLALITY   TROPONIN         Medications   0.9 % sodium chloride bolus (1,000 mLs IntraVENous New Bag 12/7/21 1559)   aspirin chewable tablet 324 mg (243 mg Oral Given 12/7/21 1556)         XR CHEST PORTABLE   Final Result   Limited assessment at the lung bases due to low lung volumes. The left lung base is not well assessed. No acute intrathoracic process. **This report has been created using voice recognition software. It may contain minor errors which are inherent in voice recognition technology. **      Final report electronically signed by Dr Eran Red on 12/7/2021 3:23 PM            ED Course as of 12/07/21 2011   Tue Dec 07, 2021   1827 Ambulating pulse ox 96% [CR]   7893 Repeat troponin WNL [CR]   4862 Paged Dr Benita Archibald [CR]   4395 Sent message to Dr Benita Archibald [CR]      ED Swathi Fang  [CR] Laci Sosa MD         Further MDM and disposition: Assessment:   3. 79 yo female with cardiac history who is COVID + presenting to ED for chest pain. Plan:    Repeat troponin was negative. I tried to contact patient's cardiologist multiple times with no success. Given patient's repeat 3-hour troponin that was still negative appears low suspicion for significant cardiac etiology. Patient wanted to leave and we felt comfortable discharging patient with cardiology follow-up. Her symptoms were more than 10 days ago so she is outside of therapy window for monoclonal antibody therapy. Final diagnoses:   COVID-19   Pre-syncope     Current Discharge Medication List            Condition: condition: stable  Dispo: Discharge to home    This transcription was electronically signed. It was dictated by use of voice recognition software and electronically transcribed. The transcription may contain errors not detected in proofreading.       Sandie Leon MD  Resident  12/07/21 2011

## 2021-12-07 NOTE — ED NOTES
Pt ambulated to and from and around nurse's station w/o difficulty. Oxygen level 96% on RA w/HR of 72. Pt somewhat staggering, but denies dizziness.       Samra Lozano RN  12/07/21 2031

## 2021-12-07 NOTE — ED TRIAGE NOTES
Pt to ED w/rprts chest pain today at 1100, treated w/nitro x2 and 81 mg aspirin. Pt rprts pain resolved, but was still experiencing dizziness, and diaphoretic. Pt rprts diagnosed w/covid +. EMS rprt treating nausea on route w/ zofran oral. Pt alert and oriented x4. Breathing easy and unlabored on RA; 94%. Pt rprts previously received covid vaccine and booster. Pt placed on cardiac monitor and in gown. EKG obtained. Protocols initiated.

## 2021-12-08 ENCOUNTER — CARE COORDINATION (OUTPATIENT)
Dept: CARE COORDINATION | Age: 76
End: 2021-12-08

## 2021-12-08 DIAGNOSIS — U07.1 COVID-19: ICD-10-CM

## 2021-12-08 RX ORDER — SODIUM CHLORIDE 0.9 % (FLUSH) 0.9 %
5-40 SYRINGE (ML) INJECTION PRN
Status: CANCELLED | OUTPATIENT
Start: 2021-12-08

## 2021-12-08 RX ORDER — ACETAMINOPHEN 325 MG/1
650 TABLET ORAL
Status: CANCELLED | OUTPATIENT
Start: 2021-12-08

## 2021-12-08 RX ORDER — SODIUM CHLORIDE 9 MG/ML
INJECTION, SOLUTION INTRAVENOUS CONTINUOUS
Status: CANCELLED | OUTPATIENT
Start: 2021-12-08

## 2021-12-08 RX ORDER — ONDANSETRON 2 MG/ML
8 INJECTION INTRAMUSCULAR; INTRAVENOUS
Status: CANCELLED | OUTPATIENT
Start: 2021-12-08

## 2021-12-08 RX ORDER — HEPARIN SODIUM (PORCINE) LOCK FLUSH IV SOLN 100 UNIT/ML 100 UNIT/ML
500 SOLUTION INTRAVENOUS PRN
Status: CANCELLED | OUTPATIENT
Start: 2021-12-08

## 2021-12-08 RX ORDER — SODIUM CHLORIDE 9 MG/ML
25 INJECTION, SOLUTION INTRAVENOUS PRN
Status: CANCELLED | OUTPATIENT
Start: 2021-12-08

## 2021-12-08 RX ORDER — ALBUTEROL SULFATE 90 UG/1
4 AEROSOL, METERED RESPIRATORY (INHALATION) PRN
Status: CANCELLED | OUTPATIENT
Start: 2021-12-08

## 2021-12-08 RX ORDER — EPINEPHRINE 1 MG/ML
0.3 INJECTION, SOLUTION, CONCENTRATE INTRAVENOUS PRN
Status: CANCELLED | OUTPATIENT
Start: 2021-12-08

## 2021-12-08 RX ORDER — DIPHENHYDRAMINE HYDROCHLORIDE 50 MG/ML
50 INJECTION INTRAMUSCULAR; INTRAVENOUS
Status: CANCELLED | OUTPATIENT
Start: 2021-12-08

## 2021-12-08 NOTE — ACP (ADVANCE CARE PLANNING)
Advance Care Planning   Healthcare Decision Maker:    Primary Decision Maker: Jesusita Yanez - Child - [phone]    Secondary Decision Maker: Faby Elder - Child - [phone]    Supplemental (Other) Decision Maker: Roman Harada - Child - [phone]    Click here to complete Healthcare Decision Makers including selection of the Healthcare Decision Maker Relationship (ie \"Primary\"). Today we documented Decision Maker(s) consistent with Legal Next of Kin hierarchy.

## 2021-12-08 NOTE — TELEPHONE ENCOUNTER
Scheduled for [facility] 12/9/21 @ 1400. Patient aware and voiced understanding, no concerns voiced at this time.

## 2021-12-08 NOTE — CARE COORDINATION
Patient contacted regarding COVID-19 diagnosis. Discussed COVID-19 related testing which was available at this time. Test results were positive. Patient informed of results, if available? Yes. Ambulatory Care Manager contacted the patient by telephone to perform post discharge assessment. Call within 2 business days of discharge: Yes. Verified name and  with patient as identifiers. Provided introduction to self, and explanation of the CTN/ACM role, and reason for call due to risk factors for infection and/or exposure to COVID-19. Symptoms reviewed with patient who verbalized the following symptoms: cough, no new symptoms, no worsening symptoms and nasal congestion. Due to no new or worsening symptoms encounter was not routed to provider for escalation. Discussed follow-up appointments. If no appointment was previously scheduled, appointment scheduling offered: Yes. [place] [address] [place] up appointment(s):   Future Appointments   Date Time Provider Harvinder Ramon   2022  9:00 AM Joshua Khalil MD [facility]   2022 11:15 AM Abdi Kingston MD [facility]   2022 10:00 AM BOAZ Ghosh - CNP [facility]     Non-[facility] follow up appointment(s): n/a    Non-face-to-face services provided:  Obtained and reviewed discharge summary and/or continuity of care documents     Advance Care Planning:   Does patient have an Advance Directive:       Educated patient about risk for severe COVID-19 due to risk factors according to CDC guidelines. ACM reviewed discharge instructions, medical action plan and red flag symptoms with the patient who verbalized understanding. Discussed COVID vaccination status: Yes. Education provided on COVID-19 vaccination as appropriate. Discussed exposure protocols and quarantine with CDC Guidelines.  Patient was given an opportunity to verbalize any questions and concerns and agrees to contact ACM or health care provider for questions related to their healthcare. Reviewed and educated patient on any new and changed medications related to discharge diagnosis     Was patient discharged with a pulse oximeter? No Discussed and confirmed pulse oximeter discharge instructions and when to notify provider or seek emergency care. ACM provided contact information. Plan for follow-up call in 5-7 days based on severity of symptoms and risk factors.

## 2021-12-09 ENCOUNTER — HOSPITAL ENCOUNTER (OUTPATIENT)
Dept: GENERAL RADIOLOGY | Age: 76
Discharge: HOME | End: 2021-12-09
Payer: MEDICARE

## 2021-12-09 VITALS
TEMPERATURE: 97.9 F | RESPIRATION RATE: 16 BRPM | DIASTOLIC BLOOD PRESSURE: 74 MMHG | HEART RATE: 72 BPM | OXYGEN SATURATION: 96 % | SYSTOLIC BLOOD PRESSURE: 128 MMHG

## 2021-12-09 DIAGNOSIS — U07.1 COVID-19: Primary | ICD-10-CM

## 2021-12-09 PROCEDURE — 96365 THER/PROPH/DIAG IV INF INIT: CPT

## 2021-12-09 PROCEDURE — 2580000003 HC RX 258: Performed by: FAMILY MEDICINE

## 2021-12-09 PROCEDURE — 6360000002 HC RX W HCPCS: Performed by: FAMILY MEDICINE

## 2021-12-09 RX ORDER — ALBUTEROL SULFATE 90 UG/1
4 AEROSOL, METERED RESPIRATORY (INHALATION) PRN
Status: CANCELLED | OUTPATIENT
Start: 2021-12-09

## 2021-12-09 RX ORDER — SODIUM CHLORIDE 9 MG/ML
INJECTION, SOLUTION INTRAVENOUS CONTINUOUS
Status: CANCELLED | OUTPATIENT
Start: 2021-12-09

## 2021-12-09 RX ORDER — SODIUM CHLORIDE 9 MG/ML
25 INJECTION, SOLUTION INTRAVENOUS PRN
Status: CANCELLED | OUTPATIENT
Start: 2021-12-09

## 2021-12-09 RX ORDER — ACETAMINOPHEN 325 MG/1
650 TABLET ORAL
Status: ACTIVE | OUTPATIENT
Start: 2021-12-09 | End: 2021-12-09

## 2021-12-09 RX ORDER — SODIUM CHLORIDE 0.9 % (FLUSH) 0.9 %
5-40 SYRINGE (ML) INJECTION PRN
Status: CANCELLED | OUTPATIENT
Start: 2021-12-09

## 2021-12-09 RX ORDER — DIPHENHYDRAMINE HYDROCHLORIDE 50 MG/ML
50 INJECTION INTRAMUSCULAR; INTRAVENOUS
Status: CANCELLED | OUTPATIENT
Start: 2021-12-09

## 2021-12-09 RX ORDER — HEPARIN SODIUM (PORCINE) LOCK FLUSH IV SOLN 100 UNIT/ML 100 UNIT/ML
500 SOLUTION INTRAVENOUS PRN
Status: CANCELLED | OUTPATIENT
Start: 2021-12-09

## 2021-12-09 RX ORDER — DIPHENHYDRAMINE HYDROCHLORIDE 50 MG/ML
50 INJECTION INTRAMUSCULAR; INTRAVENOUS
Status: ACTIVE | OUTPATIENT
Start: 2021-12-09 | End: 2021-12-09

## 2021-12-09 RX ORDER — ACETAMINOPHEN 325 MG/1
650 TABLET ORAL
Status: CANCELLED | OUTPATIENT
Start: 2021-12-09

## 2021-12-09 RX ORDER — SODIUM CHLORIDE 9 MG/ML
INJECTION, SOLUTION INTRAVENOUS CONTINUOUS
Status: DISCONTINUED | OUTPATIENT
Start: 2021-12-09 | End: 2021-12-10 | Stop reason: HOSPADM

## 2021-12-09 RX ORDER — ONDANSETRON 2 MG/ML
8 INJECTION INTRAMUSCULAR; INTRAVENOUS
Status: ACTIVE | OUTPATIENT
Start: 2021-12-09 | End: 2021-12-09

## 2021-12-09 RX ORDER — ONDANSETRON 2 MG/ML
8 INJECTION INTRAMUSCULAR; INTRAVENOUS
Status: CANCELLED | OUTPATIENT
Start: 2021-12-09

## 2021-12-09 RX ORDER — ALBUTEROL SULFATE 90 UG/1
4 AEROSOL, METERED RESPIRATORY (INHALATION) PRN
Status: DISCONTINUED | OUTPATIENT
Start: 2021-12-09 | End: 2021-12-10 | Stop reason: HOSPADM

## 2021-12-09 RX ADMIN — CASIRIVIMAB AND IMDEVIMAB: 600; 600 INJECTION, SOLUTION, CONCENTRATE INTRAVENOUS at 14:04

## 2021-12-09 NOTE — PROGRESS NOTES
Pt arrives for Regeneron infusion. Gait steady. Respirations easy and unlabored. Skin warm and dry. Pt complaint of fatigue, headache and fever. Pt reviewed Regeneron EUA and agreeable with infusion.

## 2021-12-09 NOTE — PROGRESS NOTES
Regeneron infusion complete. Pt denies any new complaints. Respirations easy and unlabored. Skin warm and dry.

## 2021-12-09 NOTE — PROGRESS NOTES
Observation period complete. Pt denies any needs. Respirations easy and unlabored. Skin warm and dry. Pt left ambulatory per self. Pt in stable condition.

## 2021-12-09 NOTE — PROGRESS NOTES
Met: yes   Safety:         (Environmental)   [place] to environment  [facility] ID band is correct and in place/ allergy band as needed   Assess for fall risk   Initiate fall precautions as applicable (fall band, side rails, etc.)   Call light within reach   Bed in low position/ wheels locked    Met: yes   Pain:        Assess pain level and characteristics   Administer analgesics as ordered   Assess effectiveness of pain management and report to MD as needed    Met: yes   Knowledge Deficit:   Assess baseline knowledge   Provide teaching at level of understanding   Provide teaching via preferred learning method   Evaluate teaching effectiveness    Met: yes   Hemodynamic/Respiratory Status:       (Pre and Post Procedure Monitoring)   Assess/Monitor vital signs and LOC   Assess Baseline SpO2 prior to any sedation   Obtain weight/height   Assess vital signs/ LOC until patient meets discharge criteria   Monitor procedure site and notify MD of any issues    Met: yes   Infection-Risk of Central Venous Catheter:   Monitor for infection signs and symptoms (catheter site redness, temperature elevation, etc)   Assess for infection risks   Educate regarding infection prevention   Manage central venous catheter (flushes/ dressing changes per protocol)

## 2021-12-15 ENCOUNTER — CARE COORDINATION (OUTPATIENT)
Dept: CARE COORDINATION | Age: 76
End: 2021-12-15

## 2021-12-15 NOTE — CARE COORDINATION
You Patient resolved from the Care Transitions episode on 12/15/21  Discussed COVID-19 related testing which was available at this time. Test results were positive. Patient informed of results, if available? n/a    Patient/family has been provided the following resources and education related to COVID-19:                         Signs, symptoms and red flags related to COVID-19            [facility] exposure and quarantine guidelines            [employer] exposure contact - [phone]            Contact for their local Department of Health                 Patient currently reports that the following symptoms have improved:  no new/worsening symptoms. Mild nasal congestion, appetite slowly improving     No further outreach scheduled with this CTN/ACM. Episode of Care resolved. Patient has this CTN/ACM contact information if future needs arise.

## 2022-01-12 ENCOUNTER — TELEPHONE (OUTPATIENT)
Dept: FAMILY MEDICINE CLINIC | Age: 77
End: 2022-01-12

## 2022-01-12 NOTE — TELEPHONE ENCOUNTER
Patient has used Day time cold and flu from [employer], night time cold and flu, honey lemon cough drops, orange drops, hot toddies. Cough has been for over 1 week. Also has a runny nose and headache as well. She had a positive test from [employer] on 12/1/21. Please advise.

## 2022-01-12 NOTE — TELEPHONE ENCOUNTER
----- Message from Yoan Zavala sent at 1/12/2022 12:42 PM EST -----  Subject: Message to Provider    QUESTIONS  Information for Provider? pt is having a bad cough she cant seem to find   anything that helps break it up   ---------------------------------------------------------------------------  --------------  [address]  What is the best way for the office to contact you? OK to leave message on   voicemail  Preferred Call Back Phone Number? [phone]  ---------------------------------------------------------------------------  --------------  SCRIPT ANSWERS  Relationship to Patient?  Self

## 2022-01-13 ENCOUNTER — OFFICE VISIT (OUTPATIENT)
Dept: FAMILY MEDICINE CLINIC | Age: 77
End: 2022-01-13
Payer: MEDICARE

## 2022-01-13 VITALS
RESPIRATION RATE: 16 BRPM | DIASTOLIC BLOOD PRESSURE: 80 MMHG | WEIGHT: 167 LBS | BODY MASS INDEX: 29.12 KG/M2 | HEART RATE: 66 BPM | TEMPERATURE: 98.3 F | SYSTOLIC BLOOD PRESSURE: 124 MMHG

## 2022-01-13 DIAGNOSIS — J01.00 ACUTE NON-RECURRENT MAXILLARY SINUSITIS: Primary | ICD-10-CM

## 2022-01-13 DIAGNOSIS — J44.1 COPD EXACERBATION (HCC): ICD-10-CM

## 2022-01-13 PROCEDURE — 99214 OFFICE O/P EST MOD 30 MIN: CPT | Performed by: NURSE PRACTITIONER

## 2022-01-13 RX ORDER — METHYLPREDNISOLONE 4 MG/1
TABLET ORAL
Qty: 1 KIT | Refills: 0 | Status: SHIPPED | OUTPATIENT
Start: 2022-01-13 | End: 2022-01-19

## 2022-01-13 RX ORDER — CEFDINIR 300 MG/1
300 CAPSULE ORAL 2 TIMES DAILY
Qty: 20 CAPSULE | Refills: 0 | Status: SHIPPED | OUTPATIENT
Start: 2022-01-13 | End: 2022-01-23

## 2022-01-13 ASSESSMENT — ENCOUNTER SYMPTOMS
GASTROINTESTINAL NEGATIVE: 1
EYES NEGATIVE: 1
SINUS PRESSURE: 1
COUGH: 1

## 2022-01-13 NOTE — PROGRESS NOTES
Nancy Morales is a 78 y.o. female whopresents today for :  Chief Complaint   Patient presents with    Cough     patient states had COVID in Dec    Congestion       HPI:     HPI     pt had covid in December. Was beginning to better then a week ago more sinus, chest congestion, some sob.   Pain in back with cough    Patient Active Problem List   Diagnosis    Depression    Urinary frequency    Hypertension    Hyperlipidemia    Tobacco abuse    Post PTCA    Osteopenia    Leg swelling    Left leg pain    Impaired ambulation    Anemia    Panlobular emphysema (HCC)    Senile osteoporosis    Osteopenia of multiple sites    Postmenopausal status (age-related) (natural)    Type 2 diabetes mellitus without complication, without long-term current use of insulin ([address]    COVID-19        Past Medical History:   Diagnosis Date    CAD (coronary artery disease)     Depression     Fibromyalgia     Fibromyalgia     GERD (gastroesophageal reflux disease)     Dr Kandace HOWARDHIMANSHU([id number])     Hyperlipidemia     Hypertension     Macular degeneration, dry     MI (myocardial infarction) ([address]     Dr Gilmar Nicholas    Osteoarthritis     SOB (shortness of breath)     Dr Raeann Ramirez Type 2 diabetes mellitus without complication, without long-term current use of insulin ([address]     Urinary incontinence       Past Surgical History:   Procedure Laterality Date    ARM SURGERY Left 11/20/2018    dr. Parrish Areas      x2    CHOLECYSTECTOMY  1975    COLON SURGERY  2010    partial resection of flat polyp    COLONOSCOPY      CORONARY ANGIOPLASTY WITH STENT PLACEMENT  07/2011    Dr Perez Passsabrina CYST REMOVAL      BILATERAL HANDS    DILATATION, ESOPHAGUS      ENDOSCOPY, COLON, DIAGNOSTIC      FRACTURE SURGERY       Family History   Problem Relation Age of Onset    COPD Father     Hearing Loss Father     Cancer Mother 39 uterine and breast    Heart Disease Mother     Diabetes Mother     High Cholesterol Mother     Arthritis Mother     Coronary Art Dis Mother     Heart Attack Mother     Diabetes Sister     High Cholesterol Sister     Stroke Sister 72    Allergy (Severe) Sister     Arthritis Sister     Miscarriages / [place] Sister     Cancer Maternal Uncle     Heart Attack Paternal Grandmother 76        MI    Arthritis Paternal Grandmother     Obesity Paternal Grandmother     Early Death Maternal Grandmother     Alcohol Abuse Maternal Grandfather      Social History     Tobacco Use    Smoking status: Former Smoker     Packs/day: 0.00     Years: 50.00     Pack years: 0.00     Types: Cigarettes     Start date: 5/24/1965     Quit date: 6/1/2018     Years since quitting: 3.6    Smokeless tobacco: Never Used   Substance Use Topics    Alcohol use: Yes     Comment: 3 beers a week       Current Outpatient Medications   Medication Sig Dispense Refill    cefdinir (OMNICEF) 300 MG capsule Take 1 capsule by mouth 2 times daily for 10 days 20 capsule 0    methylPREDNISolone (MEDROL DOSEPACK) 4 MG tablet Take by mouth. 1 kit 0    fenofibrate (TRICOR) 145 MG tablet TAKE 1 TABLET DAILY 90 tablet 3    metoprolol succinate (TOPROL XL) 50 MG extended release tablet TAKE 1 TABLET DAILY 90 tablet 3    topiramate (TOPAMAX) 50 MG tablet Topamax 25 mg tablet daily for one week then 50 mg daily there after. Take with plenty of fluids.  30 tablet 4    mirabegron (MYRBETRIQ) 50 MG TB24 Take 50 mg by mouth daily 30 tablet 3    atorvastatin (LIPITOR) 40 MG tablet TAKE 1 TABLET DAILY 90 tablet 3    venlafaxine (EFFEXOR XR) 150 MG extended release capsule TAKE 1 CAPSULE DAILY 90 capsule 3    calcium-vitamin D (OSCAL-500) 500-200 MG-UNIT per tablet Take 1 tablet by mouth 2 times daily 1200 plus d      amLODIPine (NORVASC) 10 MG tablet Take 1 tablet by mouth daily 30 tablet 1    aspirin EC 81 MG EC tablet Take 1 tablet by mouth daily 30 tablet 0    isosorbide mononitrate (IMDUR) 30 MG extended release tablet Take 30 mg by mouth daily       Multiple Vitamins-Minerals (PRESERVISION AREDS 2+MULTI VIT PO) Take by mouth daily       losartan (COZAAR) 50 MG tablet Take 1 tablet by mouth daily. 90 tablet 3    darifenacin (ENABLEX) 15 MG extended release tablet TAKE 1 TABLET DAILY (Patient not taking: Reported on 11/1/2021) 90 tablet 3    pantoprazole (PROTONIX) 40 MG tablet Take 1 tablet by mouth every morning (before breakfast) (Patient not taking: Reported on 11/1/2021) 90 tablet 3    zoledronic acid (RECLAST) 5 MG/100ML SOLN Infuse 100 mLs intravenously once for 1 dose 100 mL 0     No current facility-administered medications for this visit. Allergies   Allergen Reactions    Benadryl [Diphenhydramine Hcl]      OVER-STIMULATED    Flexeril [Cyclobenzaprine Hcl]      OVER-STIMULATED       Health Maintenance   Topic Date Due    Hepatitis C screen  Never done    Diabetic retinal exam  Never done    DTaP/Tdap/Td vaccine (1 - Tdap) Never done    Diabetic foot exam  11/05/2020    Shingles Vaccine (2 of 2) 01/27/2022    Diabetic microalbuminuria test  02/27/2022    Lipid screen  02/27/2022    Depression Monitoring  06/01/2022    Annual Wellness Visit (AWV)  06/02/2022    A1C test (Diabetic or Prediabetic)  09/23/2022    Breast cancer screen  10/05/2022    Potassium monitoring  12/07/2022    Creatinine monitoring  12/07/2022    Colon cancer screen colonoscopy  01/09/2025    DEXA (modify frequency per FRAX score)  Completed    Flu vaccine  Completed    Pneumococcal 65+ years Vaccine  Completed    COVID-19 Vaccine  Completed    Hepatitis A vaccine  Aged Out    Hib vaccine  Aged Out    Meningococcal (ACWY) vaccine  Aged Out       Subjective:     Review of Systems   Constitutional: Negative. HENT: Positive for congestion and sinus pressure. Eyes: Negative. Respiratory: Positive for cough. Cardiovascular: Negative. Gastrointestinal: Negative. Musculoskeletal: Negative. Skin: Negative. Neurological: Negative. Objective:     Vitals:    01/13/22 0943   BP: 124/80   Site: Left Upper Arm   Position: Sitting   Cuff Size: Medium Adult   Pulse: 66   Resp: 16   Temp: 98.3 °F (36.8 °C)   TempSrc: Temporal   Weight: 167 lb (75.8 kg)       Physical Exam  Constitutional:       Appearance: She is well-developed. HENT:      Head: Normocephalic. Right Ear: Tympanic membrane and external ear normal.      Left Ear: Tympanic membrane and external ear normal.      Nose:      Left Sinus: Maxillary sinus tenderness present. Cardiovascular:      Rate and Rhythm: Normal rate and regular rhythm. Heart sounds: Normal heart sounds. No murmur heard. No friction rub. No gallop. Pulmonary:      Effort: Pulmonary effort is normal.      Breath sounds: Normal breath sounds. No wheezing or rales. Comments: Course sounds in both lower lobes  Abdominal:      General: Bowel sounds are normal.      Palpations: Abdomen is soft. Tenderness: There is no abdominal tenderness. There is no guarding. Musculoskeletal:         General: Normal range of motion. Cervical back: Normal range of motion and neck supple. Lymphadenopathy:      Cervical: No cervical adenopathy. Skin:     General: Skin is warm. Neurological:      Mental Status: She is alert and oriented to person, place, and time. Deep Tendon Reflexes: Reflexes are normal and symmetric. Assessment:      Diagnosis Orders   1. Acute non-recurrent maxillary sinusitis  cefdinir (OMNICEF) 300 MG capsule    methylPREDNISolone (MEDROL DOSEPACK) 4 MG tablet   2. COPD exacerbation (HCC)  cefdinir (OMNICEF) 300 MG capsule    methylPREDNISolone (MEDROL DOSEPACK) 4 MG tablet       Plan:      No follow-ups on file. No orders of the defined types were placed in this encounter.     Orders Placed This Encounter   Medications    cefdinir (OMNICEF) 300 MG capsule     Sig: Take 1 capsule by mouth 2 times daily for 10 days     Dispense:  20 capsule     Refill:  0    methylPREDNISolone (MEDROL DOSEPACK) 4 MG tablet     Sig: Take by mouth. Dispense:  1 kit     Refill:  0      See orders  Advised to call back directly if there are further questions, or if these symptoms fail to improve as anticipated or worsen. Patient given educational materials - seepatient instructions. Discussed use, benefit, and side effects of prescribed medications. All patient questions answered. Pt voiced understanding. Patient agreed withtreatment plan. Follow up as directed.      Electronically signed by BOAZ Oleary CNP on 1/13/2022 at 12:50 PM

## 2022-01-26 ENCOUNTER — HOSPITAL ENCOUNTER (OUTPATIENT)
Age: 77
Discharge: HOME | End: 2022-01-26
Payer: MEDICARE

## 2022-01-26 DIAGNOSIS — E11.9 TYPE 2 DIABETES MELLITUS WITHOUT COMPLICATION, WITHOUT LONG-TERM CURRENT USE OF INSULIN (HCC): ICD-10-CM

## 2022-01-26 LAB
ANION GAP SERPL CALCULATED.3IONS-SCNC: 12 MEQ/L (ref 8–16)
AVERAGE GLUCOSE: 153 MG/DL (ref 70–126)
BUN BLDV-MCNC: 28 MG/DL (ref 7–22)
CALCIUM SERPL-MCNC: 9.9 MG/DL (ref 8.5–10.5)
CHLORIDE BLD-SCNC: 107 MEQ/L (ref 98–111)
CO2: 22 MEQ/L (ref 23–33)
CREAT SERPL-MCNC: 1.1 MG/DL (ref 0.4–1.2)
GFR SERPL CREATININE-BSD FRML MDRD: 48 ML/MIN/1.73M2
GLUCOSE BLD-MCNC: 144 MG/DL (ref 70–108)
HBA1C MFR BLD: 7.1 % (ref 4.4–6.4)
POTASSIUM SERPL-SCNC: 4.7 MEQ/L (ref 3.5–5.2)
SODIUM BLD-SCNC: 141 MEQ/L (ref 135–145)

## 2022-01-26 PROCEDURE — 80048 BASIC METABOLIC PNL TOTAL CA: CPT

## 2022-01-26 PROCEDURE — 36415 COLL VENOUS BLD VENIPUNCTURE: CPT

## 2022-01-26 PROCEDURE — 83525 ASSAY OF INSULIN: CPT

## 2022-01-26 PROCEDURE — 83036 HEMOGLOBIN GLYCOSYLATED A1C: CPT

## 2022-01-27 LAB — INSULIN, RANDOM OR FASTING: 9.8 MU/L

## 2022-01-30 PROBLEM — F33.42 RECURRENT MAJOR DEPRESSIVE DISORDER, IN FULL REMISSION (HCC): Status: ACTIVE | Noted: 2022-01-30

## 2022-01-30 NOTE — PROGRESS NOTES
[address] [place] [id number]  Dept: [phone]  Dept Fax: [phone]  Loc: Concepción Varner is a 78 y.o. female who presents today for:  Chief Complaint   Patient presents with    3 Month Follow-Up    Follow-up     covid           HPI:     HPI    Here for regular checkup but also has a hand tremor since 1/2021 getting worse. Handwriting and fine motor movements are the worst.  Mom had essential tremor, not diagnosed with Parkinson's. This is somewhat better after starting primidone but adjusting with neurology. Staying stable with dosing changes. Allergies starting usually controlled on OTC medications. Depression: Patient complains of depression. She complains of depressed mood and fatigue. Onset was approximately several years ago, gradually improving since that time. She denies current suicidal and homicidal plan or intent. Family history significant for no psychiatric illness. Possible organic causes contributing are: none. Risk factors: previous episode of depression Previous treatment includes Effexor and no group therapy. She complains of the following side effects from the treatment: none. Hypertension: Patient here for follow-up of elevated blood pressure. She is not exercising and is adherent to low salt diet. Blood pressure is well controlled at home. Cardiac symptoms none. Patient denies chest pain, dyspnea and palpitations. Cardiovascular risk factors: hypertension and obesity (BMI >= 30 kg/m2). Use of agents associated with hypertension: none. History of target organ damage: post PTCA seeing cardiology. Hyperlipidemia: Patient presents with hyperlipidemia. She was tested because age and hypertension.   Her last labs showed   Lab Results   Component Value Date    CHOL 155 02/27/2021    CHOL 176 07/18/2018    CHOL 183 05/18/2017     Lab Results   Component Value Date    TRIG 187 02/27/2021    TRIG 179 07/18/2018    TRIG 210 (H) 05/18/2017     Lab Results   Component Value Date    HDL 42 02/27/2021    HDL 43 08/20/2020    HDL 42 08/30/2019     Lab Results   Component Value Date    LDLCALC 76 02/27/2021    [address] 08/20/2020    LDLCALC 69 08/30/2019     No results found for: LABVLDL, VLDL  No results found for: CHOLHDLRATIO  There is not a family history of hyperlipidemia. There is a family history of early ischemia heart disease. Borderline diabetes in the past is now in the diabetic range. Lab Results   Component Value Date    LABA1C 7.1 (H) 01/26/2022     No results found for: EAG    GERD is controlled on PPI. She is still no longer smoking cigarettes. COPD controlled now. b12 deficiency needs rechecked. Urinary frequency and urgency getting worse. enablex is not helping as much any more. Still having congestion since covid in December. .  Had regeneron on 12/9/21 and she felt great for a few days. She then developed a big head cold and it is lingering. She was given a medrol dose pack and omnicef on 1/19/22 which is some help but still lingering on. Headache mild, nasal congestion and chest congestion, butno fever and normal appetite. Tried; pushing water    Reviewed chart forpast medical history , surgical history , allergies, social history , family history and medications.     Health Maintenance   Topic Date Due    Hepatitis C screen  Never done    Diabetic retinal exam  Never done    DTaP/Tdap/Td vaccine (1 - Tdap) Never done    Diabetic foot exam  11/05/2020    Shingles Vaccine (2 of 2) 01/27/2022    Diabetic microalbuminuria test  02/27/2022    Lipid screen  02/27/2022    Depression Monitoring  06/01/2022    Annual Wellness Visit (AWV)  06/02/2022    Breast cancer screen  10/05/2022    A1C test (Diabetic or Prediabetic)  01/26/2023    Potassium monitoring  01/26/2023    Creatinine monitoring  01/26/2023    Colon cancer screen colonoscopy  01/09/2025    DEXA (modify frequency per FRAX score)  Completed    Flu vaccine  Completed    Pneumococcal 65+ years Vaccine  Completed    COVID-19 Vaccine  Completed    Hepatitis A vaccine  Aged Out    Hib vaccine  Aged Out    Meningococcal (ACWY) vaccine  Aged Out       Subjective:      Constitutional:Negative for fever, chills, diaphoresis, activity change, appetite change and fatigue. HENT: Negative for hearing loss, ear pain, congestion, sore throat, rhinorrhea, postnasal drip and ear discharge. Eyes: Negative for photophobia and visual disturbance. Respiratory: Negative for cough, chest tightness, shortness of breath and wheezing. Cardiovascular: Negative for chest pain and leg swelling. Gastrointestinal: Negative for nausea, vomiting, abdominal pain, diarrhea and constipation. Genitourinary: Negative for dysuria, urgency and frequency. Neurological: Negative for weakness, light-headedness and headaches. Psychiatric/Behavioral: Negative for sleep disturbance.      :     Vitals:    01/31/22 0910   BP: 138/66   Site: Left Upper Arm   Position: Sitting   Cuff Size: Large Adult   Pulse: 60   Resp: 16   Temp: 96.8 °F (36 °C)   TempSrc: Temporal   SpO2: 96%   Weight: 164 lb (74.4 kg)     Wt Readings from Last 3 Encounters:   01/31/22 164 lb (74.4 kg)   01/13/22 167 lb (75.8 kg)   12/07/21 170 lb (77.1 kg)       Physical Exam  Physical Exam   Constitutional: Vital signs are normal. She appears well-developed and well-nourished. She is active. HENT:   Head: Normocephalic and atraumatic. Right Ear: Tympanic membrane, external ear and ear canal normal. No drainage or tenderness. Left Ear: Tympanic membrane, external ear and ear canal normal. No drainage or tenderness. Nose: Nose normal. No mucosal edema or rhinorrhea. Mouth/Throat: Uvula is midline, oropharynx is clear and moist and mucous membranes are normal. Mucous membranes are not pale. Normal dentition.  No posterior oropharyngeal edema or posterior oropharyngeal erythema. Eyes: Lids are normal. Right eye exhibits no chemosis and no discharge. Left eye exhibits no chemosis and no drainage. Right conjunctiva has no hemorrhage. Left conjunctiva has no hemorrhage. Right eye exhibits normal extraocular motion. Left eye exhibits normal extraocular motion. Right pupil is round and reactive. Left pupil is round and reactive. Pupils are equal.   Cardiovascular: Normal rate, regular rhythm, S1 normal, S2 normal and normal heart sounds. Exam reveals no gallop. No murmur heard. Pulmonary/Chest: Effort normal and breath sounds normal. No respiratory distress. She has no wheezes. She has no rhonchi. She has no rales. Abdominal: Soft. Normal appearance and bowel sounds are normal. She exhibits no distension and no mass. There is no hepatosplenomegaly. No tenderness. She has no rigidity, no rebound and no guarding. No hernia. Musculoskeletal:        Right lower leg: She exhibits no edema. Left lower leg: She exhibits no edema. Neurological: She is alert. Assessment/Plan   Jose Roberto Agosto was seen today for 3 month follow-up and follow-up. Diagnoses and all orders for this visit:    Type 2 diabetes mellitus without complication, without long-term current use of insulin (HCC)  -     Comprehensive Metabolic Panel, Fasting; Future  -     Hemoglobin A1C; Future  -     Microalbumin / Creatinine Urine Ratio; Future    Recurrent major depressive disorder, in full remission ([facility]    Pure hypercholesterolemia  -     Comprehensive Metabolic Panel, Fasting; Future  -     Lipid Panel; Future    COPD exacerbation (HCC)  -     albuterol sulfate HFA (VENTOLIN HFA) 108 (90 Base) MCG/ACT inhaler; Inhale 2 puffs into the lungs 4 times daily as needed for Wheezing    Gastroesophageal reflux disease with esophagitis without hemorrhage  -     CBC;  Future    Other fatigue    Hypertension, unspecified type  -     TSH with Reflex; Future    Post PTCA    Intention tremor    Osteopenia of multiple sites    Tobacco abuse    B12 deficiency    Anemia due to acute blood loss    History of tobacco abuse    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    Seasonal allergies    Sun-damaged skin    Urinary frequency    No change to medication   Continue healthy diet and exercise  Yearly eye exam  Daily foot inspection  Yearly flu shot  Monitor glucose regularly  Daily aspirin  Regular labs : A1c quarterly, lipids every 6 months and BMP quaterly      Daily zinc, vitamin D and C  Push water and gatorade 60-80 oz daily  Eat 5-6 small meals daily with protein in all meals  Walk every 1.5 hours for 10-15 minutes  mucinex BID prn  flonase every morning 2 sprays each side and saline nasal spray 4-5 times daily    Call or return to clinic prn if these symptoms worsen or fail to improve as anticipated. Discussed use, benefit, and side effectsof prescribed medications. All patient questions answered. Pt voiced understanding. Reviewed health maintenance. Instructed to continue current medications, diet and exercise. Patient agreed with treatment plan. Followup as directed.      Electronically signed by Denver Rucks, MD

## 2022-01-31 ENCOUNTER — OFFICE VISIT (OUTPATIENT)
Dept: FAMILY MEDICINE CLINIC | Age: 77
End: 2022-01-31
Payer: MEDICARE

## 2022-01-31 VITALS
WEIGHT: 164 LBS | SYSTOLIC BLOOD PRESSURE: 138 MMHG | OXYGEN SATURATION: 96 % | HEART RATE: 60 BPM | TEMPERATURE: 96.8 F | DIASTOLIC BLOOD PRESSURE: 66 MMHG | BODY MASS INDEX: 28.6 KG/M2 | RESPIRATION RATE: 16 BRPM

## 2022-01-31 DIAGNOSIS — Z98.61 POST PTCA: ICD-10-CM

## 2022-01-31 DIAGNOSIS — L57.8 SUN-DAMAGED SKIN: ICD-10-CM

## 2022-01-31 DIAGNOSIS — D10.6: ICD-10-CM

## 2022-01-31 DIAGNOSIS — R35.0 URINARY FREQUENCY: ICD-10-CM

## 2022-01-31 DIAGNOSIS — Z87.891 HISTORY OF TOBACCO ABUSE: ICD-10-CM

## 2022-01-31 DIAGNOSIS — E78.00 PURE HYPERCHOLESTEROLEMIA: ICD-10-CM

## 2022-01-31 DIAGNOSIS — J30.2 SEASONAL ALLERGIES: ICD-10-CM

## 2022-01-31 DIAGNOSIS — E53.8 B12 DEFICIENCY: ICD-10-CM

## 2022-01-31 DIAGNOSIS — G25.2 INTENTION TREMOR: ICD-10-CM

## 2022-01-31 DIAGNOSIS — R53.83 OTHER FATIGUE: ICD-10-CM

## 2022-01-31 DIAGNOSIS — M85.89 OSTEOPENIA OF MULTIPLE SITES: ICD-10-CM

## 2022-01-31 DIAGNOSIS — I10 HYPERTENSION, UNSPECIFIED TYPE: ICD-10-CM

## 2022-01-31 DIAGNOSIS — J44.1 COPD EXACERBATION (HCC): ICD-10-CM

## 2022-01-31 DIAGNOSIS — Z72.0 TOBACCO ABUSE: ICD-10-CM

## 2022-01-31 DIAGNOSIS — F33.42 RECURRENT MAJOR DEPRESSIVE DISORDER, IN FULL REMISSION (HCC): ICD-10-CM

## 2022-01-31 DIAGNOSIS — D62 ANEMIA DUE TO ACUTE BLOOD LOSS: ICD-10-CM

## 2022-01-31 DIAGNOSIS — K21.00 GASTROESOPHAGEAL REFLUX DISEASE WITH ESOPHAGITIS WITHOUT HEMORRHAGE: ICD-10-CM

## 2022-01-31 DIAGNOSIS — E11.9 TYPE 2 DIABETES MELLITUS WITHOUT COMPLICATION, WITHOUT LONG-TERM CURRENT USE OF INSULIN (HCC): Primary | ICD-10-CM

## 2022-01-31 PROCEDURE — 3051F HG A1C>EQUAL 7.0%<8.0%: CPT | Performed by: FAMILY MEDICINE

## 2022-01-31 PROCEDURE — 99214 OFFICE O/P EST MOD 30 MIN: CPT | Performed by: FAMILY MEDICINE

## 2022-01-31 RX ORDER — ALBUTEROL SULFATE 90 UG/1
2 AEROSOL, METERED RESPIRATORY (INHALATION) 4 TIMES DAILY PRN
Qty: 54 G | Refills: 1 | Status: SHIPPED | OUTPATIENT
Start: 2022-01-31

## 2022-02-03 ENCOUNTER — VIRTUAL VISIT (OUTPATIENT)
Dept: NEUROLOGY | Age: 77
End: 2022-02-03
Payer: MEDICARE

## 2022-02-03 DIAGNOSIS — G25.0 BENIGN ESSENTIAL TREMOR: Primary | ICD-10-CM

## 2022-02-03 PROCEDURE — 99213 OFFICE O/P EST LOW 20 MIN: CPT | Performed by: PSYCHIATRY & NEUROLOGY

## 2022-02-03 RX ORDER — TOPIRAMATE 50 MG/1
50 TABLET, FILM COATED ORAL DAILY
Qty: 90 TABLET | Refills: 3 | Status: SHIPPED | OUTPATIENT
Start: 2022-02-03 | End: 2022-09-23 | Stop reason: SDUPTHER

## 2022-02-03 NOTE — PATIENT INSTRUCTIONS
1. Continue Topamax 50 mg tablet daily. Take with plenty of fluids. 2. Stay active.    3. Follow up in 7 months

## 2022-02-03 NOTE — PROGRESS NOTES
2/3/2022    TELEHEALTH EVALUATION -- Audio/Visual (During ARCWG-28 public health emergency)    HPI:    Reji Cutler (:  1945) has requested an audio/video evaluation for the following concern(s):    Follow up for essential tremor. She is evaluated via video link to go over plan. Prior to Visit Medications    Medication Sig Taking? Authorizing Provider   albuterol sulfate HFA (VENTOLIN HFA) 108 (90 Base) MCG/ACT inhaler Inhale 2 puffs into the lungs 4 times daily as needed for Wheezing  Amie Christina MD   fenofibrate (TRICOR) 145 MG tablet TAKE 1 TABLET DAILY  Amie Christina MD   metoprolol succinate (TOPROL XL) 50 MG extended release tablet TAKE 1 TABLET DAILY  Amie Christina MD   topiramate (TOPAMAX) 50 MG tablet Topamax 25 mg tablet daily for one week then 50 mg daily there after. Take with plenty of fluids. Jailene Roy MD   mirabegron (MYRBETRIQ) 50 MG TB24 Take 50 mg by mouth daily  Amie Christina MD   atorvastatin (LIPITOR) 40 MG tablet TAKE 1 TABLET DAILY  Amie Christina MD   venlafaxine (EFFEXOR XR) 150 MG extended release capsule TAKE 1 CAPSULE DAILY  Amie Christina MD   calcium-vitamin D (Gisel Alter) 500-200 MG-UNIT per tablet Take 1 tablet by mouth 2 times daily 1200 plus d  Historical Provider, MD   zoledronic acid (RECLAST) 5 MG/100ML SOLN Infuse 100 mLs intravenously once for 1 dose  Amie Christina MD   amLODIPine (NORVASC) 10 MG tablet Take 1 tablet by mouth daily  Xi Phoenix MD   aspirin EC 81 MG EC tablet Take 1 tablet by mouth daily  Xi Phoenix MD   isosorbide mononitrate (IMDUR) 30 MG extended release tablet Take 30 mg by mouth daily   Historical Provider, MD   Multiple Vitamins-Minerals (PRESERVISION AREDS 2+MULTI VIT PO) Take by mouth daily   Historical Provider, MD   losartan (COZAAR) 50 MG tablet Take 1 tablet by mouth daily. Amie Christina MD   darifenacin (ENABLEX) 15 MG SR tablet Take 1 tablet by mouth daily.   Amie Christina MD       Social History Tobacco Use    Smoking status: Former Smoker     Packs/day: 0.00     Years: 50.00     Pack years: 0.00     Types: Cigarettes     Start date: 5/24/1965     Quit date: 6/1/2018     Years since quitting: 3.6    Smokeless tobacco: Never Used   Vaping Use    Vaping Use: Never used   Substance Use Topics    Alcohol use: Yes     Comment: 3 beers a week     Drug use: No        Past Medical History:   Diagnosis Date    CAD (coronary artery disease)     Depression     Fibromyalgia     Fibromyalgia     GERD (gastroesophageal reflux disease)     Dr Vanessa Painting BVOLOLJM(247.5)     Hyperlipidemia     Hypertension     Macular degeneration, dry     MI (myocardial infarction) ([facility]     Dr Patria Guerrero SOB (shortness of breath)     Dr Eran Coe Type 2 diabetes mellitus without complication, without long-term current use of insulin [facility]     Urinary incontinence    ,   Past Surgical History:   Procedure Laterality Date    ARM SURGERY Left 11/20/2018    dr. Ana Cristina Carpio      x2   [facility] COLON SURGERY  2010    partial resection of flat polyp    COLONOSCOPY      CORONARY ANGIOPLASTY WITH STENT PLACEMENT  07/2011    Dr [facility], ESOPHAGUS      ENDOSCOPY, COLON, DIAGNOSTIC      FRACTURE SURGERY         PHYSICAL EXAMINATION:  [ INSTRUCTIONS:  \"[x]\" Indicates a positive item  \"[]\" Indicates a negative item  -- DELETE ALL ITEMS NOT EXAMINED]  Vital Signs: (As obtained by patient/caregiver or practitioner observation)    Blood pressure-  Heart rate-    Respiratory rate-    Temperature-  Pulse oximetry-     Constitutional: [x] Appears well-developed and well-nourished [x] No apparent distress      [] Abnormal-   Mental status  [x] Alert and awake  [x] Oriented to person/place/time [x]Able to follow commands      Eyes:  EOM    [x]  Normal [] Abnormal-  Sclera  [x]  Normal  [] Abnormal -         Discharge [x]  None visible  [] Abnormal -    HENT:   [x] Normocephalic, atraumatic. [] Abnormal   [x] Mouth/Throat: Mucous membranes are moist.     External Ears [x] Normal  [] Abnormal-     Neck: [x] No visualized mass     Pulmonary/Chest: [x] Respiratory effort normal.  [x] No visualized signs of difficulty breathing or respiratory distress        [] Abnormal-      Musculoskeletal:   [] Normal gait with no signs of ataxia         [x] Normal range of motion of neck        [] Abnormal-       Neurological:        [x] No Facial Asymmetry (Cranial nerve 7 motor function) (limited exam to video visit)          [x] No gaze palsy        [x] Abnormal- There is very mild action tremor, no head tremor, no voice tremor. Skin:        [x] No significant exanthematous lesions or discoloration noted on facial skin         [] Abnormal-            Psychiatric:       [x] Normal Affect [x] No Hallucinations        [] Abnormal-     Other pertinent observable physical exam findings-     ASSESSMENT/PLAN:  1. Benign essential tremor  Follow-up for benign essential tremor. The patient reports her tremor is well controlled. She is pleased with how she is doing. No side effects, has mild action, no voice or head tremor. She feels she is good where she is at. She has symmetric action tremor. No head tremor, no voice tremor. She can have good days and bad days. After detailed discussion with patient we agreed on the following plan. .     Plan:  1. Continue Topamax 50 mg tablet daily. Take with plenty of fluids. 2. Stay active. 3. Follow up in 7 months      Return in about 7 months (around 9/3/2022). Jeanette Sadler, was evaluated through a synchronous (real-time) audio-video encounter. The patient (or guardian if applicable) is aware that this is a billable service, which includes applicable co-pays.  This Virtual Visit was conducted with patient's (and/or legal guardian's) consent. The visit was conducted pursuant to the emergency declaration under the [address] [place] [address] authority and the [place] and [place] [employer] Act. Patient identification was verified, and a caregiver was present when appropriate. The patient was located at home in a state where the provider was licensed to provide care. Total time spent on this encounter: 21 min    --Venu Driscoll MD on 2/3/2022 at 9:52 AM    An electronic signature was used to authenticate this note.

## 2022-02-22 DIAGNOSIS — R35.0 URINARY FREQUENCY: ICD-10-CM

## 2022-04-05 ENCOUNTER — CARE COORDINATION (OUTPATIENT)
Dept: CARE COORDINATION | Age: 77
End: 2022-04-05

## 2022-04-05 SDOH — HEALTH STABILITY: PHYSICAL HEALTH: ON AVERAGE, HOW MANY MINUTES DO YOU ENGAGE IN EXERCISE AT THIS LEVEL?: 10 MIN

## 2022-04-05 SDOH — ECONOMIC STABILITY: HOUSING INSECURITY: IN THE LAST 12 MONTHS, HOW MANY PLACES HAVE YOU LIVED?: 1

## 2022-04-05 SDOH — HEALTH STABILITY: PHYSICAL HEALTH: ON AVERAGE, HOW MANY DAYS PER WEEK DO YOU ENGAGE IN MODERATE TO STRENUOUS EXERCISE (LIKE A BRISK WALK)?: 6 DAYS

## 2022-04-05 SDOH — ECONOMIC STABILITY: FOOD INSECURITY: WITHIN THE PAST 12 MONTHS, THE FOOD YOU BOUGHT JUST DIDN'T LAST AND YOU DIDN'T HAVE MONEY TO GET MORE.: NEVER TRUE

## 2022-04-05 SDOH — ECONOMIC STABILITY: HOUSING INSECURITY
IN THE LAST 12 MONTHS, WAS THERE A TIME WHEN YOU DID NOT HAVE A STEADY PLACE TO SLEEP OR SLEPT IN A SHELTER (INCLUDING NOW)?: NO

## 2022-04-05 SDOH — ECONOMIC STABILITY: FOOD INSECURITY: WITHIN THE PAST 12 MONTHS, YOU WORRIED THAT YOUR FOOD WOULD RUN OUT BEFORE YOU GOT MONEY TO BUY MORE.: NEVER TRUE

## 2022-04-05 SDOH — ECONOMIC STABILITY: INCOME INSECURITY: IN THE LAST 12 MONTHS, WAS THERE A TIME WHEN YOU WERE NOT ABLE TO PAY THE MORTGAGE OR RENT ON TIME?: NO

## 2022-04-05 ASSESSMENT — LIFESTYLE VARIABLES
HOW OFTEN DO YOU HAVE A DRINK CONTAINING ALCOHOL: MONTHLY OR LESS
HOW MANY STANDARD DRINKS CONTAINING ALCOHOL DO YOU HAVE ON A TYPICAL DAY: 1 OR 2

## 2022-04-05 ASSESSMENT — SOCIAL DETERMINANTS OF HEALTH (SDOH)
HOW HARD IS IT FOR YOU TO PAY FOR THE VERY BASICS LIKE FOOD, HOUSING, MEDICAL CARE, AND HEATING?: NOT HARD AT ALL
HOW OFTEN DO YOU ATTENT MEETINGS OF THE CLUB OR ORGANIZATION YOU BELONG TO?: MORE THAN 4 TIMES PER YEAR
HOW OFTEN DO YOU GET TOGETHER WITH FRIENDS OR RELATIVES?: MORE THAN THREE TIMES A WEEK
DO YOU BELONG TO ANY CLUBS OR ORGANIZATIONS SUCH AS CHURCH GROUPS UNIONS, FRATERNAL OR ATHLETIC GROUPS, OR SCHOOL GROUPS?: YES
IN A TYPICAL WEEK, HOW MANY TIMES DO YOU TALK ON THE PHONE WITH FAMILY, FRIENDS, OR NEIGHBORS?: MORE THAN THREE TIMES A WEEK
HOW OFTEN DO YOU ATTEND CHURCH OR RELIGIOUS SERVICES?: MORE THAN 4 TIMES PER YEAR

## 2022-04-05 NOTE — CARE COORDINATION
Ambulatory Care Coordination Note  4/5/2022  CM Risk Score: 3  Charlson 10 Year Mortality Risk Score: 79%     ACC: Yarelis Truong RN    Summary Note: Patient was called for Care Coordination enrollment s/p recent list referral for assistance with the management of her DM, COPD, and healthcare needs. Care Coordination program was briefly reviewed with patient and initial eval completed. Patient shared she is feeling and doing well as she is currently visiting her daughters in [place]. Patient denied any c/o increased SOB, or cough being present. Patient reported she does not monitor her BS but does continue to work to monitor and adhere to DM diet. Patient denied any need for additional dietician/DM clinic assistance at this time and she was instructed to call with any changes. Patient reported she is independent with all ADLs and she continues to drive as she has an active social life. Patient shared her only current DME is a CPAP machine that she uses faithfully. Patient manages all of her own medications and med rec was completed. Patient denied any questions or concerns re: her medications and she declines any need for pharmacy/med assistance referrals at this time. Patient was again encouraged to call with any changes. Patient denied any current complaints, concerns, questions, or needs and she was encouraged to call with any that may develop. Patient verbalized agreement to [facility] following up with her again in the future when she returns home.            Actions:   - Reviewed Care Coordination program   - Completed Initial eval/SDOH assessment   - Monitored for COPD/DM concerns  - Patient declines dietician/DM clinic referrals at this time   - Monitored for any ADL, DME, and/or transportation needs  - Monitored for medication questions/concerns - Declined pharmacy/med assistance referrals at this time  - Completed Med Rec   - Monitored for additional needs          Plan of Care:   - Continue with Care Coordination f/u calls for assistance with the management of her DM, COPD, and healthcare needs  - Complete DM education   - Complete COPD education   - Educate on the availability of same day appointments, urgent care/walk in clinic options, and after hours on call resources  - Review and verify Healthcare Decision Maker information   - Patient has Living Will  - Patient is current with COVID-19, Flu, and Pneumonia vaccines   - Patient is due for Medicare AWV - June 2022  - Continue to monitor for additional needs and readiness to discharge from Care Coordination       Ambulatory Care Coordination Assessment    Care Coordination Protocol  Program Enrollment: Complex Care  Referral from Primary Care Provider: No  Week 1 - Initial Assessment     Do you have all of your prescriptions and are they filled?: Yes (Comment: April 2022)  Barriers to medication adherence: None  Are you able to afford your medications?: Yes  How often do you have trouble taking your medications the way you have been told to take them?: I always take them as prescribed. Do you have Home O2 Therapy?: No      Ability to seek help/take action for Emergent Urgent situations i.e. fire, crime, inclement weather or health crisis. : Independent  Ability to ambulate to restroom: Independent  Ability handle personal hygeine needs (bathing/dressing/grooming): Independent  Ability to manage Medications: Independent  Ability to prepare Food Preparation: Independent  Ability to maintain home (clean home, laundry): Independent  Ability to drive and/or has transportation: Independent  Ability to do shopping: Independent  Ability to manage finances:  Independent  Is patient able to live independently?: Yes     Current Housing: Private Residence        Per the Fall Risk Screening, did the patient have 2 or more falls or 1 fall with injury in the past year?: No     Frequent urination at night?: No  Do you use rails/bars?: Yes  Do you have a non-slip tub mat?: Yes     Are you experiencing loss of meaning?: No  Are you experiencing loss of hope and peace?: No     Thinking about your patient's physical health needs, are there any symptoms or problems (risk indicators) you are unsure about that require further investigation?: No identified areas of uncertainly or problems already being investigated   Are the patients physical health problems impacting on their mental well-being?: No identified areas of concern   Are there any problems with your patients lifestyle behaviors (alcohol, drugs, diet, exercise) that are impacting on physical or mental well-being?: No identified areas of concern   Do you have any other concerns about your patients mental well-being? How would you rate their severity and impact on the patient?: No identified areas of concern   How would you rate their home environment in terms of safety and stability (including domestic violence, insecure housing, neighbor harassment)?: Consistently safe, supportive, stable, no identified problems   How do daily activities impact on the patient's well-being? (include current or anticipated unemployment, work, caregiving, access to transportation or other): No identified problems or perceived positive benefits   How would you rate their social network (family, work, friends)?: Good participation with social networks   How would you rate their financial resources (including ability to afford all required medical care)?: Financially secure, resources adequate, no identified problems   How wells does the patient now understand their health and well-being (symptoms, signs or risk factors) and what they need to do to manage their health?: Reasonable to good understanding and already engages in managing health or is willing to undertake better management   How well do you think your patient can engage in healthcare discussions?  (Barriers include language, deafness, aphasia, alcohol or drug problems, learning difficulties, concentration): Clear and open communication, no identified barriers   Do other services need to be involved to help this patient?: Other care/services not required at this time   Suggested Interventions and Community Resources  Diabetes Education: In Process (Comment: April 2022)    Disease Specific Clinic: Declined (Comment: April 2022)   Other Services or Interventions: Follows with [facility] and [facility] - April 2022   Medication Assistance Program: Declined   Medi Set or Pill Pack: Completed   Pharmacist: Declined   Registered Dietician: Declined   Social Work: Declined   Transportation Services: Declined   Zone Management Tools: In Process         Set up/Review an Education Plan, Set up/Review Goals              Prior to Admission medications    Medication Sig Start Date End Date Taking?  Authorizing Provider   Multiple Vitamin (MULTI VITAMIN DAILY PO) Take by mouth daily   Yes Historical Provider, MD   Loratadine (CLARITIN PO) Take by mouth as needed Takes Equate version as needed - April 2022   Yes Historical Provider, MD   Fluticasone Propionate (FLONASE NA) by Nasal route as needed   Yes Historical Provider, MD   topiramate (TOPAMAX) 50 MG tablet Take 1 tablet by mouth daily 2/3/22  Yes Jessica Olson MD   albuterol sulfate HFA (VENTOLIN HFA) 108 (90 Base) MCG/ACT inhaler Inhale 2 puffs into the lungs 4 times daily as needed for Wheezing 1/31/22  Yes Osmar Hamilton MD   fenofibrate (TRICOR) 145 MG tablet TAKE 1 TABLET DAILY 11/29/21  Yes Osmar Hamilton MD   metoprolol succinate (TOPROL XL) 50 MG extended release tablet TAKE 1 TABLET DAILY 11/29/21  Yes Osmar Hamilton MD   atorvastatin (LIPITOR) 40 MG tablet TAKE 1 TABLET DAILY 8/30/21  Yes Osmar Hamilton MD   venlafaxine (EFFEXOR XR) 150 MG extended release capsule TAKE 1 CAPSULE DAILY 8/2/21  Yes Osmar Hamilton MD   zoledronic acid (RECLAST) 5 MG/100ML SOLN Infuse 100 mLs intravenously once for 1 dose 8/31/20 4/5/22 Yes Gloria OLMOS Jimmy Hayes MD   amLODIPine (NORVASC) 10 MG tablet Take 1 tablet by mouth daily 4/30/19  Yes Wendy Zamudio MD   aspirin EC 81 MG EC tablet Take 1 tablet by mouth daily 4/30/19  Yes Wendy Zamudio MD   isosorbide mononitrate (IMDUR) 30 MG extended release tablet Take 30 mg by mouth daily    Yes Historical Provider, MD   losartan (COZAAR) 50 MG tablet Take 1 tablet by mouth daily. 3/18/15  Yes Bnoy Richards MD   darifenacin (ENABLEX) 15 MG SR tablet Take 1 tablet by mouth daily. 9/26/13 4/5/22 Yes Bony Richards MD   mirabegron (MYRBETRIQ) 50 MG TB24 Take 50 mg by mouth daily  Patient not taking: Reported on 4/5/2022 2/22/22   Bony Richards MD       Future Appointments   Date Time Provider Harvinder Ramon   4/28/2022 10:00 AM [facility] [place]   5/31/2022 10:30 [facility] [place]   9/9/2022  9:00 AM Juana Guo MD [facility] [place]     ,   Diabetes Assessment    Meal Planning: Avoidance of concentrated sweets, Plate Method   How often do you test your blood sugar?: No Testing (Comment: April 2022)   Do you have barriers with adherence to non-pharmacologic self-management interventions?  (Nutrition/Exercise/Self-Monitoring): No   Have you ever had to go to the ED for symptoms of low blood sugar?: No       No patient-reported symptoms   Do you have hyperglycemia symptoms?: No   Do you have hypoglycemia symptoms?: No   Blood Sugar Monitoring Regimen: Not Testing   Blood Sugar Trends: No Change      ,   COPD Assessment    Does the patient understand envrionmental exposure?: Yes  Is the patient able to verbalize Rescue vs. Long Acting medications?: No  Does the patient have a nebulizer?: No  Does the patient use a space with inhaled medications?: No     No patient-reported symptoms         Symptoms:     Symptom course: stable  Breathlessness: none  Increase use of rapid acting/rescue inhaled medications?: No  Change in chronic cough?: No/At Baseline  Change in sputum?: No/At Baseline     ,   General Assessment    Do you have any symptoms that are causing concern?: No      and Care Coordination Episodes    Type: Amb Care Coordination  Episode: Complex Care  Noted: 4/5/2022  Comments: List referral - DM/COPD

## 2022-04-13 ENCOUNTER — CARE COORDINATION (OUTPATIENT)
Dept: CARE COORDINATION | Age: 77
End: 2022-04-13

## 2022-04-19 ENCOUNTER — CARE COORDINATION (OUTPATIENT)
Dept: CARE COORDINATION | Age: 77
End: 2022-04-19

## 2022-04-19 ASSESSMENT — ENCOUNTER SYMPTOMS: DYSPNEA ASSOCIATED WITH: EXERTION

## 2022-04-19 NOTE — CARE COORDINATION
Ambulatory Care Coordination Note  4/19/2022  CM Risk Score: 3  Charlson 10 Year Mortality Risk Score: 79%     ACC: Drew Vee RN    Summary Note: Patient was called for continued Care Coordination follow up and education re: the management of her COPD, DM, and healthcare needs. Patient shared she is doing well s/p recently returning home from her daughter's home. Patient stated her breathing remains at her baseline and she denied any c/o worsening SOB or cough being present. Patient denied any symptoms of low/high BS readings being present. COPD and DM zone handouts were reviewed with patient. Patient was educated on signs/symptoms to report as well as the importance of early symptom recognition. ACM educated patient on the availability of same day appointments, urgent care/walk in clinic options, and after hours on call resources. Patient verbalized understanding. Patient denied any other questions, concerns, or needs and she was encouraged to call with any that may develop.           Actions:   - Reviewed DM and COPD education   - Educated on signs/symptoms to report   - Reviewed zone handouts   - Educated on the importance of early symptom recognition   - Educated on the availability of same day appointments, urgent care/walk in clinic options, and after hours on call resources   - Monitored for additional needs          Plan of Care:   - Continue with Care Coordination f/u calls for assistance with the management of her DM, COPD, and healthcare needs  - Complete DM education - In Process   - Complete COPD education - In Process  - Educate on the availability of same day appointments, urgent care/walk in clinic options, and after hours on call resources - Completed   - Review and verify Healthcare Decision Maker information   - Patient has Living Will  - Patient is current with COVID-19, Flu, and Pneumonia vaccines   - Patient is due for Medicare AWV - June 2022  - Continue to monitor for additional needs and readiness to discharge from [facility] Interventions    Program Enrollment: Complex Care  Referral from Primary Care Provider: No  Suggested Interventions and Community Resources  Diabetes Education: Completed (Comment: April 2022)  Disease Specific Clinic: Declined (Comment: April 2022)  Medication Assistance Program: Declined (Comment: Denied need - Instructed to call with changes - April 2022)  Medi Set or Pill Pack: Completed (Comment: April 2022)  Pharmacist: [address] (Comment: April 2022)  Registered Dietician: Declined (Comment: April 2022)  Social Work: Declined (Comment: Denied any resource needs - April 2022)  Transportation Support: Declined  Zone Management Tools: Completed (Comment: DM and COPD - April 2022)  Other Services or Interventions: Follows with [facility] - April 2022         Goals Addressed                 This Visit's Progress       Care Coordination     Conditions and Symptoms   Improving     I will schedule office visits, as directed by my provider. I will keep my appointment or reschedule if I have to cancel. I will notify my provider of any barriers to my plan of care. I will follow my Zone Management tool to seek urgent or emergent care. I will notify my provider of any symptoms that indicate a worsening of my condition. Barriers: lack of education  Plan for overcoming my barriers: Ongoing education and monitoring of resource needs  Confidence: 9/10  Anticipated Goal Completion Date: 7/10/2022                Prior to Admission medications    Medication Sig Start Date End Date Taking?  Authorizing Provider   Multiple Vitamin (MULTI VITAMIN DAILY PO) Take by mouth daily    Historical Provider, MD   Loratadine (CLARITIN PO) Take by mouth as needed [place] version as needed - April 2022    Historical Provider, MD   Fluticasone Propionate (FLONASE NA) by Nasal route as needed    Historical Provider, MD houser (MYRBETRIQ) 50 MG TB24 Take 50 mg by mouth daily  Patient not taking: Reported on 4/5/2022 2/22/22   Wolfgang Scanlon MD   topiramate (TOPAMAX) 50 MG tablet Take 1 tablet by mouth daily 2/3/22   Soledad Santamaria MD   albuterol sulfate HFA (VENTOLIN HFA) 108 (90 Base) MCG/ACT inhaler Inhale 2 puffs into the lungs 4 times daily as needed for Wheezing 1/31/22   Wolfgang Scanlon MD   fenofibrate (TRICOR) 145 MG tablet TAKE 1 TABLET DAILY 11/29/21   Wolfgang Scanlon MD   metoprolol succinate (TOPROL XL) 50 MG extended release tablet TAKE 1 TABLET DAILY 11/29/21   Wolfgang Scanlon MD   atorvastatin (LIPITOR) 40 MG tablet TAKE 1 TABLET DAILY 8/30/21   Wolfgang Scanlon MD   venlafaxine (EFFEXOR XR) 150 MG extended release capsule TAKE 1 CAPSULE DAILY 8/2/21   Wolfgang Scanlon MD   zoledronic acid (RECLAST) 5 MG/100ML SOLN Infuse 100 mLs intravenously once for 1 dose 8/31/20 4/5/22  Wolfgang Scanlon MD   amLODIPine (NORVASC) 10 MG tablet Take 1 tablet by mouth daily 4/30/19   Dilip Miller MD   aspirin EC 81 MG EC tablet Take 1 tablet by mouth daily 4/30/19   Dilip Miller MD   isosorbide mononitrate (IMDUR) 30 MG extended release tablet Take 30 mg by mouth daily     Historical Provider, MD   losartan (COZAAR) 50 MG tablet Take 1 tablet by mouth daily. 3/18/15   Wolfgang Scanlon MD   darifenacin (ENABLEX) 15 MG SR tablet Take 1 tablet by mouth daily.  9/26/13 4/5/22  Wolfgang Scanlon MD       Future Appointments   Date Time Provider Harvinder Ramon   4/28/2022 10:00 AM [facility] [place]   5/18/2022  9:30 AM Shayy Fuentes MD [facility] [place]   5/31/2022 10:30 AM MD Aleyda Alvarez TEETEE Lee   9/9/2022  9:00 AM MD REGINA Oates [facility] [place]     ,   Diabetes Assessment    Meal Planning: Avoidance of concentrated sweets, Plate Method   How often do you test your blood sugar?: No Testing (Comment: April 2022)   Do you have barriers with adherence to non-pharmacologic self-management interventions?  (Nutrition/Exercise/Self-Monitoring): No   Have you ever had to go to the ED for symptoms of low blood sugar?: No       No patient-reported symptoms   Do you have hyperglycemia symptoms?: No   Do you have hypoglycemia symptoms?: No   Blood Sugar Monitoring Regimen: Not Testing   Blood Sugar Trends: No Change      ,   COPD Assessment    Does the patient understand envrionmental exposure?: Yes  Is the patient able to verbalize Rescue vs. Long Acting medications?: No  Does the patient have a nebulizer?: No  Does the patient use a space with inhaled medications?: No     No patient-reported symptoms         Symptoms:  None: Yes      Symptom course: stable  Breathlessness: exertion  Increase use of rapid acting/rescue inhaled medications?: No  Change in chronic cough?: No/At Baseline  Change in sputum?: No/At Baseline     ,   General Assessment    Do you have any symptoms that are causing concern?: No      and Care Coordination Episodes    Type: Amb Care Coordination  Episode: Complex Care  Noted: 4/5/2022  Comments: List referral - DM/COPD

## 2022-04-25 RX ORDER — DARIFENACIN HYDROBROMIDE 15 MG/1
TABLET, EXTENDED RELEASE ORAL
Qty: 90 TABLET | Refills: 3 | Status: SHIPPED | OUTPATIENT
Start: 2022-04-25 | End: 2022-05-31

## 2022-04-26 ENCOUNTER — CARE COORDINATION (OUTPATIENT)
Dept: CARE COORDINATION | Age: 77
End: 2022-04-26

## 2022-04-26 ASSESSMENT — ENCOUNTER SYMPTOMS: DYSPNEA ASSOCIATED WITH: EXERTION

## 2022-04-26 NOTE — CARE COORDINATION
Ambulatory Care Coordination Note  4/26/2022  CM Risk Score: 3  Charlson 10 Year Mortality Risk Score: 79%     ACC: Lilia Lundberg RN    Summary Note: Patient was called for continued Care Coordination follow up and education re: the management of her DM, COPD, and healthcare needs. Patient shared she is doing well and she denied any current complaints or concerns. Patient stated her breathing remains at her baseline and she denied any c/o worsening SOB or cough being present. Patient denied any change to her sleep/activity habits d/t her breathing. COPD education and zone information was reviewed with patient. Patient was reminded of signs/symptom to report as well as importance of early symptom recognition and follow up. Patient denied any concerns of hypoglycemia/hyperglycemia. DM education and importance of keeping DM managed and controlled reviewed with patient. Patient was educated on signs/symptoms of hypoglycemia and hyperglycemia and the need to f/u with any ongoing symptoms. Patient verbalized understanding. Patient is scheduled to f/u with pulmonary later this week and appointment information was reviewed. Patient denied any questions, concerns, or needs and she was encouraged to call with any that may develop.           Actions:   - Reviewed COPD and DM education   - Reviewed zone information and signs/symptoms to report   - Reviewed importance of early symptom recognition and follow up   - Educated on hypoglycemia/hyperglycemia signs/symptoms   - Reviewed upcoming pulmonary appointment information   - Monitored for additional needs          Plan of Care:   - Continue with Care Coordination f/u calls for assistance with the management of her DM, COPD, and healthcare needs  - Complete DM education - In Process   - Complete COPD education - In Process  - Educate on the availability of same day appointments, urgent care/walk in clinic options, and after hours on call resources - Completed   - Review and verify Healthcare Decision Maker information   - Patient has Living Will  - Patient is current with COVID-19, Flu, and Pneumonia vaccines   - Patient is due for Medicare AWV - June 2022  - Continue to monitor for additional needs and readiness to discharge from [address] [place] [address] Interventions    Program Enrollment: Complex Care  Referral from Primary Care Provider: No  Suggested Interventions and Community Resources  Diabetes Education: Completed (Comment: April 2022)  Disease Specific Clinic: Declined (Comment: April 2022)  Medication Assistance Program: Declined (Comment: Denied need - Instructed to call with changes - April 2022)  Medi Set or Pill Pack: Completed (Comment: April 2022)  Pharmacist: Tyesha Alves (Comment: April 2022)  Registered Dietician: Declined (Comment: April 2022)  Social Work: Declined (Comment: Denied any resource needs - April 2022)  Transportation Support: Declined  Zone Management Tools: Completed (Comment: DM and COPD - April 2022)  Other Services or Interventions: Follows with [facility] - April 2022         Goals Addressed                 This Visit's Progress       Care Coordination     Conditions and Symptoms   Improving     I will schedule office visits, as directed by my provider. I will keep my appointment or reschedule if I have to cancel. I will notify my provider of any barriers to my plan of care. I will follow my Zone Management tool to seek urgent or emergent care. I will notify my provider of any symptoms that indicate a worsening of my condition. Barriers: lack of education  Plan for overcoming my barriers: Ongoing education and monitoring of resource needs  Confidence: 9/10  Anticipated Goal Completion Date: 7/10/2022                Prior to Admission medications    Medication Sig Start Date End Date Taking?  Authorizing Provider   darifenacin (ENABLEX) 15 MG extended release tablet TAKE 1 TABLET DAILY 4/25/22 Lisa Stevenson MD   Multiple Vitamin (MULTI VITAMIN DAILY PO) Take by mouth daily    Historical Provider, MD   Loratadine (CLARITIN PO) Take by mouth as needed Pamla Rouleau version as needed - April 2022    Historical Provider, MD   Fluticasone Propionate (FLONASE NA) by Nasal route as needed    Historical Provider, MD   mirabegron (MYRBETRIQ) 50 MG TB24 Take 50 mg by mouth daily  Patient not taking: Reported on 4/5/2022 2/22/22   Lisa Stevenson MD   topiramate (TOPAMAX) 50 MG tablet Take 1 tablet by mouth daily 2/3/22   Andreas Hussein MD   albuterol sulfate HFA (VENTOLIN HFA) 108 (90 Base) MCG/ACT inhaler Inhale 2 puffs into the lungs 4 times daily as needed for Wheezing 1/31/22   Lisa Stevenson MD   fenofibrate (TRICOR) 145 MG tablet TAKE 1 TABLET DAILY 11/29/21   Lisa Stevenson MD   metoprolol succinate (TOPROL XL) 50 MG extended release tablet TAKE 1 TABLET DAILY 11/29/21   Lisa Stevenson MD   atorvastatin (LIPITOR) 40 MG tablet TAKE 1 TABLET DAILY 8/30/21   Lisa Stevenson MD   venlafaxine (EFFEXOR XR) 150 MG extended release capsule TAKE 1 CAPSULE DAILY 8/2/21   Lisa Stevenosn MD   zoledronic acid (RECLAST) 5 MG/100ML SOLN Infuse 100 mLs intravenously once for 1 dose 8/31/20 4/5/22  Lisa Stevenson MD   amLODIPine (NORVASC) 10 MG tablet Take 1 tablet by mouth daily 4/30/19   Safia Chávez MD   aspirin EC 81 MG EC tablet Take 1 tablet by mouth daily 4/30/19   Safia Chávez MD   isosorbide mononitrate (IMDUR) 30 MG extended release tablet Take 30 mg by mouth daily     Historical Provider, MD   losartan (COZAAR) 50 MG tablet Take 1 tablet by mouth daily. 3/18/15   Lisa Stevenson MD   darifenacin (ENABLEX) 15 MG SR tablet Take 1 tablet by mouth daily.  9/26/13 4/5/22  Lisa Stevenson MD       Future Appointments   Date Time Provider Harvinder Ramon   4/28/2022 10:00 AM BOAZ Orlando - CNP [facility] - [address] [facility]   5/18/2022  9:30 AM Shayy Black MD [facility] - [address] [facility]   5/31/2022 10:30 AM [facility] [place]   9/9/2022  9:00 AM MD REGINA Artis [facility] - [place]     ,   Diabetes Assessment    Meal Planning: Avoidance of concentrated sweets, Plate Method   How often do you test your blood sugar?: No Testing (Comment: April 2022)   Do you have barriers with adherence to non-pharmacologic self-management interventions?  (Nutrition/Exercise/Self-Monitoring): No   Have you ever had to go to the ED for symptoms of low blood sugar?: No       No patient-reported symptoms   Do you have hyperglycemia symptoms?: No   Do you have hypoglycemia symptoms?: No   Blood Sugar Monitoring Regimen: Not Testing   Blood Sugar Trends: No Change      ,   COPD Assessment    Does the patient understand envrionmental exposure?: Yes  Is the patient able to verbalize Rescue vs. Long Acting medications?: No  Does the patient have a nebulizer?: No  Does the patient use a space with inhaled medications?: No     No patient-reported symptoms         Symptoms:     Symptom course: stable  Breathlessness: exertion  Increase use of rapid acting/rescue inhaled medications?: No  Change in chronic cough?: No/At Baseline  Change in sputum?: No/At Baseline     ,   General Assessment    Do you have any symptoms that are causing concern?: No      and Care Coordination Episodes    Type: Amb Care Management  Episode: Complex Care  Noted: 4/5/2022  Comments: List referral - DM/COPD

## 2022-04-28 ENCOUNTER — OFFICE VISIT (OUTPATIENT)
Dept: PULMONOLOGY | Age: 77
End: 2022-04-28
Payer: MEDICARE

## 2022-04-28 VITALS
HEART RATE: 94 BPM | BODY MASS INDEX: 29.12 KG/M2 | HEIGHT: 64 IN | DIASTOLIC BLOOD PRESSURE: 84 MMHG | OXYGEN SATURATION: 96 % | TEMPERATURE: 97.8 F | WEIGHT: 170.6 LBS | SYSTOLIC BLOOD PRESSURE: 132 MMHG

## 2022-04-28 DIAGNOSIS — Z99.89 OSA ON CPAP: Primary | ICD-10-CM

## 2022-04-28 DIAGNOSIS — N18.30 STAGE 3 CHRONIC KIDNEY DISEASE, UNSPECIFIED WHETHER STAGE 3A OR 3B CKD (HCC): ICD-10-CM

## 2022-04-28 DIAGNOSIS — G47.33 OSA ON CPAP: Primary | ICD-10-CM

## 2022-04-28 DIAGNOSIS — E66.9 OBESITY (BMI 30-39.9): ICD-10-CM

## 2022-04-28 PROBLEM — R05.8 COUGH WITH CONGESTION OF PARANASAL SINUS: Status: ACTIVE | Noted: 2022-01-02

## 2022-04-28 PROBLEM — R09.81 COUGH WITH CONGESTION OF PARANASAL SINUS: Status: ACTIVE | Noted: 2022-01-02

## 2022-04-28 PROCEDURE — 99213 OFFICE O/P EST LOW 20 MIN: CPT | Performed by: NURSE PRACTITIONER

## 2022-04-28 ASSESSMENT — ENCOUNTER SYMPTOMS
VOMITING: 0
NAUSEA: 0
SHORTNESS OF BREATH: 0
ABDOMINAL PAIN: 0
DIARRHEA: 0
WHEEZING: 0
COUGH: 0
EYES NEGATIVE: 1

## 2022-04-28 NOTE — PROGRESS NOTES
[facility] and [address]         [id number]  4/28/2022   Chief Complaint   Patient presents with    Follow-up     KAYLEEN  6 month sleep follow up with Aparna Little         Pt of Dr. Zahra Morgan     PAP Download:   Original or initial AHI: 15.1     Date of initial study: 11/4/2020      Compliant  77%     Noncompliant 7 %     PAP Type airsense 10 Level  12 cmh2o    Avg Hrs/Day 7:15  AHI: 2.9   Recorded compliance dates , 3/26/22-4/24/22  Machine/Mfg:   [x] ResMed    [] Respironics/Dreamstation   Interface:   [] Nasal    [] Nasal pillows   [x] FFM      Provider:      [] [facility]     [][employer]     [] [employer]    [] [address]    [] [place]               [] [employer]      [] [employer]    [x] [address]      [] Other    Neck Size: 15.75 Mallampati Mallampati 3  ESS:  6  SAQLI: 88  Here is a scan of the most recent download:              Presentation:   Anh Carrion presents for 6 monthssleep medicine follow up for obstructive sleep apnea  Since the last visit, Anh Carrion just got a new mask. Has no complaints. Sleeping ok, does not take sleep aids     Equipment issues: The pressure is  acceptable, the mask is acceptable     Progress History:   Since last visit any new medical issues? Yes - seasonal allergies flared up. Sleep Related Issues? No  New ER or hospital visits? No  Any new or changes in medicines? Yes - taking OTC Claritin   Any new sleep medicines? No    Review of Systems -   Review of Systems   Constitutional: Negative for activity change, appetite change, chills, fatigue, fever and unexpected weight change. HENT: Positive for congestion. Eyes: Negative. Respiratory: Negative for cough, shortness of breath and wheezing. Cardiovascular: Negative for chest pain, palpitations and leg swelling. Gastrointestinal: Negative for abdominal pain, diarrhea, nausea and vomiting. Genitourinary: Negative. Musculoskeletal: Negative. Skin: Negative. Allergic/Immunologic: Positive for environmental allergies. Neurological: Negative. Hematological: Negative. Psychiatric/Behavioral: Negative. Negative for sleep disturbance. Physical Exam:    BMI:  Body mass index is 29.75 kg/m². Wt Readings from Last 3 Encounters:   04/28/22 170 lb 9.6 oz (77.4 kg)   01/31/22 164 lb (74.4 kg)   01/13/22 167 lb (75.8 kg)     Weight stable / unchanged  Vitals: /84 (Site: Left Upper Arm, Position: Sitting)   Pulse 94   Temp 97.8 °F (36.6 °C)   Ht 5' 3.5\" (1.613 m)   Wt 170 lb 9.6 oz (77.4 kg)   SpO2 96% Comment: on ra  BMI 29.75 kg/m²       Physical Exam  Vitals and nursing note reviewed. Constitutional:       Appearance: Normal appearance. She is overweight. HENT:      Head: Normocephalic and atraumatic. Mouth/Throat:      Pharynx: Oropharynx is clear. Eyes:      Conjunctiva/sclera: Conjunctivae normal.   Pulmonary:      Effort: Pulmonary effort is normal. No tachypnea, bradypnea or respiratory distress. Skin:     Findings: No erythema or rash. Neurological:      Mental Status: She is alert and oriented to person, place, and time. Psychiatric:         Attention and Perception: Attention normal.         Mood and Affect: Mood normal.         Speech: Speech normal.         Behavior: Behavior normal.         Thought Content: Thought content normal.         Cognition and Memory: Cognition normal.         Judgment: Judgment normal.           ASSESSMENT/DIAGNOSIS     Diagnosis Orders   1. KAYLEEN on CPAP  DME Order for CPAP as OP   2. Stage 3 chronic kidney disease, unspecified whether stage 3a or 3b CKD (HCC)     3. Obesity (BMI 30-39. 9)              Plan   Do you need any equipment today? Yes -printed rx for supplies, faxed to patient's DME     - Download reviewed and discussed with patient  - She  was advised to continue current positive airway pressure therapy with above described pressure.    - She  advised to keep good compliance with current recommended pressure to get optimal results and clinical improvement  - Recommend 7-9 hours of sleep with PAP  - She was advised to call [employer] company regarding supplies if needed.   -She call my office for earlier appointment if needed for worsening of sleep symptoms.   - She was instructed on weight loss  -is mouth breather, if persistent dry mouth, opening mouth at night consider chin strap   - Talon Crowe was educated about my impression and plan. Patient verbalizesunderstanding.   We will see Kyara Cruz back in: 1 year with download    Information added by my medical assistant/LPN was reviewed today    billing based on medical decision making     Early BOAZ Mobley-CNP   4/28/2022

## 2022-05-02 ENCOUNTER — CARE COORDINATION (OUTPATIENT)
Dept: CARE COORDINATION | Age: 77
End: 2022-05-02

## 2022-05-02 ASSESSMENT — ENCOUNTER SYMPTOMS: DYSPNEA ASSOCIATED WITH: EXERTION

## 2022-05-02 NOTE — CARE COORDINATION
Ambulatory Care Coordination Note  5/2/2022  CM Risk Score: 4  Charlson 10 Year Mortality Risk Score: 100%     ACC: Jorge Schwartz RN    Summary Note: Patient was called for continued Care Coordination follow up and education re: the management of her COPD, DM, and healthcare needs. Patient shared she has been doing well and feeling good. Patient was able to f/u with pulmonary recently and she denied any questions from her visit. Patient denied any c/o worsening SOB, or cough being present. COPD and DM zone education and sign/symptoms to report were reviewed. Patient verbalized understanding. Patient was also educated on the importance of continued diabetic diet adherence as well as routine activity/exercise to help keep BS controlled and DM managed. Patient verbalized understanding. Patient shared she plans to discuss possible BS monitoring with PCP at her upcoming appointment. ACM reviewed upcoming appointment information with patient and she verbalized understanding. Patient denied any other questions, concerns, or needs and she was encouraged to call with any that may develop.            Actions:   - Reviewed COPD and DM education   - Reviewed zone information and signs/symptoms to report   - Reviewed importance of early symptom recognition and follow up   - Reviewed importance of adhering to DM diet as well as routine activity/exercise  - Monitored for questions s/p recent pulmonary visit   - Reviewed upcoming appointment information and monitored for concerns   - Monitored for additional needs          Plan of Care:   - Continue with Care Coordination f/u calls for assistance with the management of her DM, COPD, and healthcare needs  - Complete DM education - In Process   - Complete COPD education - In Process  - Educate on the availability of same day appointments, urgent care/walk in clinic options, and after hours on call resources - Completed   - Review and verify [address] information   - Patient has Living Will  - Patient is current with COVID-19, Flu, and Pneumonia vaccines   - Patient is due for Medicare AWV - June 2022  - Continue to monitor for additional needs and readiness to discharge from [facility] Interventions    Program Enrollment: Complex Care  Referral from Primary Care Provider: No  Suggested Interventions and Community Resources  Diabetes Education: Completed (Comment: April 2022)  Disease Specific Clinic: Declined (Comment: April 2022)  Medication Assistance Program: Declined (Comment: Denied need - Instructed to call with changes - April 2022)  Medi Set or Pill Pack: Completed (Comment: April 2022)  Pharmacist: [facility] (Comment: April 2022)  Registered Dietician: Declined (Comment: April 2022)  Social Work: Declined (Comment: Denied any resource needs - April 2022)  Transportation Support: Declined  Zone Management Tools: Completed (Comment: DM and COPD - April 2022)  Other Services or Interventions: Follows with [facility] and [facility] - April 2022         Goals Addressed                 This Visit's Progress       Care Coordination     Conditions and Symptoms   Improving     I will schedule office visits, as directed by my provider. I will keep my appointment or reschedule if I have to cancel. I will notify my provider of any barriers to my plan of care. I will follow my Zone Management tool to seek urgent or emergent care. I will notify my provider of any symptoms that indicate a worsening of my condition. Barriers: lack of education  Plan for overcoming my barriers: Ongoing education and monitoring of resource needs  Confidence: 9/10  Anticipated Goal Completion Date: 7/10/2022                Prior to Admission medications    Medication Sig Start Date End Date Taking?  Authorizing Provider   darifenacin (ENABLEX) 15 MG extended release tablet TAKE 1 TABLET DAILY 4/25/22   Shae Marcum MD   Multiple Vitamin ([address] VITAMIN DAILY PO) Take by mouth daily    Historical Provider, MD   Loratadine (CLARITIN PO) Take by mouth as needed Takes Equate version as needed - April 2022    Historical Provider, MD   Fluticasone Propionate (FLONASE NA) by Nasal route as needed    Historical Provider, MD   mirabegron (MYRBETRIQ) 50 MG TB24 Take 50 mg by mouth daily 2/22/22   Jayna Khoury MD   topiramate (TOPAMAX) 50 MG tablet Take 1 tablet by mouth daily 2/3/22   Ike Whiting MD   albuterol sulfate HFA (VENTOLIN HFA) 108 (90 Base) MCG/ACT inhaler Inhale 2 puffs into the lungs 4 times daily as needed for Wheezing 1/31/22   Jayna Khoury MD   fenofibrate (TRICOR) 145 MG tablet TAKE 1 TABLET DAILY 11/29/21   Jayna Khoury MD   metoprolol succinate (TOPROL XL) 50 MG extended release tablet TAKE 1 TABLET DAILY 11/29/21   Jayna Khoury MD   atorvastatin (LIPITOR) 40 MG tablet TAKE 1 TABLET DAILY 8/30/21   Jayna Khoury MD   venlafaxine (EFFEXOR XR) 150 MG extended release capsule TAKE 1 CAPSULE DAILY 8/2/21   Jayna Khoury MD   zoledronic acid (RECLAST) 5 MG/100ML SOLN Infuse 100 mLs intravenously once for 1 dose 8/31/20 4/5/22  Jayna Khoury MD   amLODIPine (NORVASC) 10 MG tablet Take 1 tablet by mouth daily 4/30/19   Bennett Pinedo MD   aspirin EC 81 MG EC tablet Take 1 tablet by mouth daily 4/30/19   Bennett Pinedo MD   isosorbide mononitrate (IMDUR) 30 MG extended release tablet Take 30 mg by mouth daily     Historical Provider, MD   losartan (COZAAR) 50 MG tablet Take 1 tablet by mouth daily. 3/18/15   Jayna Khoury MD   darifenacin (ENABLEX) 15 MG SR tablet Take 1 tablet by mouth daily.  9/26/13 4/5/22  Jayna Khoury MD       Future Appointments   Date Time Provider Harvnider Ramon   5/18/2022  9:30 MD ASHLIE PattonX [facility]   5/31/2022 10:30 AM JaynaMD Aleyda Vasquez [facility] [place]   9/9/2022  9:00 AM I[facility] Whiting [facility] [place]   4/28/2023 10:00 AM BOAZ Da Silva - [facility]ER[facility] P - [place]     ,   Diabetes Assessment    Meal Planning: Avoidance of concentrated sweets, Plate Method   How often do you test your blood sugar?: No Testing (Comment: April 2022)   Do you have barriers with adherence to non-pharmacologic self-management interventions?  (Nutrition/Exercise/Self-Monitoring): No   Have you ever had to go to the ED for symptoms of low blood sugar?: No       No patient-reported symptoms   Do you have hyperglycemia symptoms?: No   Do you have hypoglycemia symptoms?: No   Blood Sugar Monitoring Regimen: Not Testing   Blood Sugar Trends: No Change      ,   COPD Assessment    Does the patient understand envrionmental exposure?: Yes  Is the patient able to verbalize Rescue vs. Long Acting medications?: No  Does the patient have a nebulizer?: No  Does the patient use a space with inhaled medications?: No     No patient-reported symptoms         Symptoms:     Symptom course: stable  Breathlessness: exertion  Increase use of rapid acting/rescue inhaled medications?: No  Change in chronic cough?: No/At Baseline  Change in sputum?: No/At Baseline     ,   General Assessment    Do you have any symptoms that are causing concern?: No      and Care Coordination Episodes    Type: Amb Care Management  Episode: Complex Care  Noted: 4/5/2022  Comments: List referral - DM/COPD

## 2022-05-14 ENCOUNTER — HOSPITAL ENCOUNTER (OUTPATIENT)
Age: 77
Discharge: HOME | End: 2022-05-14
Payer: MEDICARE

## 2022-05-14 LAB
ALBUMIN SERPL-MCNC: 4.4 G/DL (ref 3.5–5.1)
ALP BLD-CCNC: 45 U/L (ref 38–126)
ALT SERPL-CCNC: 27 U/L (ref 11–66)
ANION GAP SERPL CALCULATED.3IONS-SCNC: 12 MEQ/L (ref 8–16)
AST SERPL-CCNC: 33 U/L (ref 5–40)
BILIRUB SERPL-MCNC: 0.3 MG/DL (ref 0.3–1.2)
BILIRUBIN DIRECT: < 0.2 MG/DL (ref 0–0.3)
BUN BLDV-MCNC: 17 MG/DL (ref 7–22)
CALCIUM SERPL-MCNC: 9 MG/DL (ref 8.5–10.5)
CHLORIDE BLD-SCNC: 110 MEQ/L (ref 98–111)
CHOLESTEROL, FASTING: 173 MG/DL (ref 100–199)
CO2: 21 MEQ/L (ref 23–33)
CREAT SERPL-MCNC: 1 MG/DL (ref 0.4–1.2)
ERYTHROCYTE [DISTWIDTH] IN BLOOD BY AUTOMATED COUNT: 13.2 % (ref 11.5–14.5)
ERYTHROCYTE [DISTWIDTH] IN BLOOD BY AUTOMATED COUNT: 47.5 FL (ref 35–45)
GFR SERPL CREATININE-BSD FRML MDRD: 54 ML/MIN/1.73M2
GLUCOSE FASTING: 123 MG/DL (ref 70–108)
HCT VFR BLD CALC: 40.8 % (ref 37–47)
HDLC SERPL-MCNC: 40 MG/DL
HEMOGLOBIN: 12.9 GM/DL (ref 12–16)
LDL CHOLESTEROL CALCULATED: 88 MG/DL
MCH RBC QN AUTO: 30.7 PG (ref 26–33)
MCHC RBC AUTO-ENTMCNC: 31.6 GM/DL (ref 32.2–35.5)
MCV RBC AUTO: 97.1 FL (ref 81–99)
PLATELET # BLD: 260 THOU/MM3 (ref 130–400)
PMV BLD AUTO: 12.4 FL (ref 9.4–12.4)
POTASSIUM SERPL-SCNC: 3.5 MEQ/L (ref 3.5–5.2)
RBC # BLD: 4.2 MILL/MM3 (ref 4.2–5.4)
SODIUM BLD-SCNC: 143 MEQ/L (ref 135–145)
TOTAL PROTEIN: 7.2 G/DL (ref 6.1–8)
TRIGLYCERIDE, FASTING: 227 MG/DL (ref 0–199)
WBC # BLD: 6.7 THOU/MM3 (ref 4.8–10.8)

## 2022-05-14 PROCEDURE — 36415 COLL VENOUS BLD VENIPUNCTURE: CPT

## 2022-05-14 PROCEDURE — 85027 COMPLETE CBC AUTOMATED: CPT

## 2022-05-14 PROCEDURE — 80076 HEPATIC FUNCTION PANEL: CPT

## 2022-05-14 PROCEDURE — 80061 LIPID PANEL: CPT

## 2022-05-14 PROCEDURE — 80048 BASIC METABOLIC PNL TOTAL CA: CPT

## 2022-05-18 ENCOUNTER — OFFICE VISIT (OUTPATIENT)
Dept: CARDIOLOGY CLINIC | Age: 77
End: 2022-05-18
Payer: MEDICARE

## 2022-05-18 VITALS
SYSTOLIC BLOOD PRESSURE: 132 MMHG | HEART RATE: 64 BPM | HEIGHT: 64 IN | DIASTOLIC BLOOD PRESSURE: 70 MMHG | WEIGHT: 166.4 LBS | BODY MASS INDEX: 28.41 KG/M2 | RESPIRATION RATE: 14 BRPM

## 2022-05-18 DIAGNOSIS — I20.9 ANGINA PECTORIS, UNSPECIFIED (HCC): Primary | ICD-10-CM

## 2022-05-18 DIAGNOSIS — I10 HYPERTENSION, UNSPECIFIED TYPE: ICD-10-CM

## 2022-05-18 DIAGNOSIS — R53.83 OTHER FATIGUE: ICD-10-CM

## 2022-05-18 DIAGNOSIS — I35.0 NONRHEUMATIC AORTIC VALVE STENOSIS: ICD-10-CM

## 2022-05-18 PROBLEM — I25.119 ATHEROSCLEROTIC HEART DISEASE OF NATIVE CORONARY ARTERY WITH UNSPECIFIED ANGINA PECTORIS (HCC): Status: ACTIVE | Noted: 2022-05-18

## 2022-05-18 PROCEDURE — 93000 ELECTROCARDIOGRAM COMPLETE: CPT | Performed by: INTERNAL MEDICINE

## 2022-05-18 PROCEDURE — 99213 OFFICE O/P EST LOW 20 MIN: CPT | Performed by: INTERNAL MEDICINE

## 2022-05-18 RX ORDER — LOSARTAN POTASSIUM 50 MG/1
50 TABLET ORAL DAILY
Qty: 90 TABLET | Refills: 3 | Status: SHIPPED | OUTPATIENT
Start: 2022-05-18 | End: 2022-08-11

## 2022-05-18 RX ORDER — METOPROLOL SUCCINATE 50 MG/1
TABLET, EXTENDED RELEASE ORAL
Qty: 180 TABLET | Refills: 3 | Status: SHIPPED | OUTPATIENT
Start: 2022-05-18

## 2022-05-18 RX ORDER — ISOSORBIDE MONONITRATE 30 MG/1
30 TABLET, EXTENDED RELEASE ORAL DAILY
Qty: 90 TABLET | Refills: 3 | Status: SHIPPED | OUTPATIENT
Start: 2022-05-18 | End: 2022-05-31 | Stop reason: DRUGHIGH

## 2022-05-18 RX ORDER — AMLODIPINE BESYLATE 10 MG/1
10 TABLET ORAL DAILY
Qty: 90 TABLET | Refills: 3 | Status: SHIPPED | OUTPATIENT
Start: 2022-05-18 | End: 2022-05-31 | Stop reason: DRUGHIGH

## 2022-05-18 ASSESSMENT — ENCOUNTER SYMPTOMS
CHEST TIGHTNESS: 0
APNEA: 0
BLOOD IN STOOL: 0
NAUSEA: 0
ABDOMINAL DISTENTION: 0
ANAL BLEEDING: 0
COUGH: 0
ABDOMINAL PAIN: 0
COLOR CHANGE: 0
TROUBLE SWALLOWING: 0
VOICE CHANGE: 0
STRIDOR: 0
SHORTNESS OF BREATH: 0
WHEEZING: 0
CHOKING: 0
VOMITING: 0

## 2022-05-18 NOTE — PROGRESS NOTES
[address]  Dept: [address]  Loc: [phone]     5/18/2022       Rosette Crowley is here today for   Chief Complaint   Patient presents with    1 Year Follow Up    Discuss Medications     pt states she has been taking Metoprolol Succ 50 mg BID for years. Chart shows daily. Referring Physician:  No ref. provider found     Patient Active Problem List   Diagnosis    Depression    Urinary frequency    Hypertension    Hyperlipidemia    Tobacco abuse    Post PTCA    Osteopenia    Leg swelling    Left leg pain    Impaired ambulation    Anemia    Panlobular emphysema (HCC)    Senile osteoporosis    Osteopenia of multiple sites    Postmenopausal status (age-related) (natural)    Type 2 diabetes mellitus without complication, without long-term current use of insulin (HCC)    COVID-19    Recurrent major depressive disorder, in full remission ([facility] [address] [facility]    Chronic renal disease, stage III (HCC) [944866]    Cough with congestion of paranasal sinus    Angina pectoris, unspecified    Atherosclerotic heart disease of native coronary artery with unspecified angina pectoris       Review of Systems   Constitutional: Negative for activity change, appetite change, fatigue, fever and unexpected weight change. HENT: Negative for congestion, trouble swallowing and voice change. Eyes: Negative for visual disturbance. Respiratory: Negative for apnea, cough, choking, chest tightness, shortness of breath, wheezing and stridor. Cardiovascular: Positive for chest pain. Negative for palpitations and leg swelling. Gastrointestinal: Negative for abdominal distention, abdominal pain, anal bleeding, blood in stool, nausea and vomiting. Endocrine: Negative for cold intolerance and heat intolerance. Genitourinary: Negative for hematuria.    Musculoskeletal: Negative for arthralgias, gait problem, joint swelling and myalgias. Skin: Negative for color change and rash. Allergic/Immunologic: Negative for environmental allergies and food allergies. Neurological: Negative for dizziness, tremors, syncope, facial asymmetry, weakness, light-headedness, numbness and headaches. Hematological: Does not bruise/bleed easily. Psychiatric/Behavioral: Negative for agitation, behavioral problems and sleep disturbance.         Past Medical History:   Diagnosis Date    CAD (coronary artery disease)     Depression     Fibromyalgia     Fibromyalgia     GERD (gastroesophageal reflux disease)     Dr Fredo Dennison [id number](948.2)     Hyperlipidemia     Hypertension     Macular degeneration, dry     MI (myocardial infarction) ([facility] [address]     Dr Shelly Villafuerte SOB (shortness of breath)     Dr Kim Jones Type 2 diabetes mellitus without complication, without long-term current use of insulin ([facility])     Urinary incontinence        Allergies   Allergen Reactions    Benadryl [Diphenhydramine Hcl]      OVER-STIMULATED    Flexeril [Cyclobenzaprine Hcl]      OVER-STIMULATED         Current Outpatient Medications   Medication Sig Dispense Refill    amLODIPine (NORVASC) 10 MG tablet Take 1 tablet by mouth daily 90 tablet 3    isosorbide mononitrate (IMDUR) 30 MG extended release tablet Take 1 tablet by mouth daily 90 tablet 3    losartan (COZAAR) 50 MG tablet Take 1 tablet by mouth daily 90 tablet 3    metoprolol succinate (TOPROL XL) 50 MG extended release tablet TAKE 1 TABLET  Twice aday 180 tablet 3    darifenacin (ENABLEX) 15 MG extended release tablet TAKE 1 TABLET DAILY 90 tablet 3    Multiple Vitamin (MULTI VITAMIN DAILY PO) Take by mouth daily      Loratadine (CLARITIN PO) Take by mouth as needed Takes Equate version as needed - April 2022      Fluticasone Propionate (FLONASE NA) by Nasal route as needed      mirabegron (MYRBETRIQ) 50 MG TB24 Take 50 mg by mouth daily 30 tablet 3    topiramate (TOPAMAX) 50 MG tablet Take 1 tablet by mouth daily 90 tablet 3    fenofibrate (TRICOR) 145 MG tablet TAKE 1 TABLET DAILY 90 tablet 3    atorvastatin (LIPITOR) 40 MG tablet TAKE 1 TABLET DAILY 90 tablet 3    venlafaxine (EFFEXOR XR) 150 MG extended release capsule TAKE 1 CAPSULE DAILY 90 capsule 3    aspirin EC 81 MG EC tablet Take 1 tablet by mouth daily 30 tablet 0    albuterol sulfate HFA (VENTOLIN HFA) 108 (90 Base) MCG/ACT inhaler Inhale 2 puffs into the lungs 4 times daily as needed for Wheezing 54 g 1    zoledronic acid (RECLAST) 5 MG/100ML SOLN Infuse 100 mLs intravenously once for 1 dose 100 mL 0     No current facility-administered medications for this visit. Social History     Socioeconomic History    Marital status:       Spouse name: None    Number of children: 3    Years of education: 15    Highest education level: Associate degree: academic program   Occupational History    Occupation:      Comment: [employer]   Tobacco Use    Smoking status: Former Smoker     Packs/day: 0.00     Years: 50.00     Pack years: 0.00     Types: Cigarettes     Start date: 5/24/1965     Quit date: 6/1/2018     Years since quitting: 3.9    Smokeless tobacco: Never Used   Vaping Use    Vaping Use: Never used   Substance and Sexual Activity    Alcohol use: Yes     Comment: 3 beers a week     Drug use: No    Sexual activity: Not Currently     Partners: Male   Other Topics Concern    None   Social History Narrative    Patient denied any current needs/resources and was encouraged to call with any changes - April 2022     Social Determinants of Health     Financial Resource Strain: Low Risk     Difficulty of Paying Living Expenses: Not hard at all   Food Insecurity: No Food Insecurity    Worried About [address] [employer] in the Last Year: Never true    Kurt of Food in the Last Year: Never true   Transportation Needs: No Transportation Needs    Lack of Transportation (Medical): No    Lack of Transportation (Non-Medical): No   Physical Activity: Insufficiently Active    Days of Exercise per Week: 6 days    Minutes of Exercise per Session: 10 min   Stress: No Stress Concern Present    Feeling of Stress : Not at all   Social Connections: Moderately Integrated    Frequency of Communication with Friends and Family: More than three times a week    Frequency of Social Gatherings with Friends and Family: More than three times a week    Attends [employer] Services: More than 4 times per year    Active Member of [address] [place] [address] [employer] or Organizations: Yes    Attends Club or Organization Meetings: More than 4 times per year    Marital Status:    Intimate Partner Violence:     Fear of Current or Ex-Partner: Not on file    Emotionally Abused: Not on file    Physically Abused: Not on file    Sexually Abused: Not on file   Housing Stability: [address] Unable to Pay for Housing in the Last Year: No    Number of [place] in the Last Year: 1    Unstable Housing in the Last Year: No       Family History   Problem Relation Age of Onset    COPD Father     Hearing Loss Father     Cancer Mother 39        uterine and breast    Heart Disease Mother     Diabetes Mother     High Cholesterol Mother     Arthritis Mother     Coronary Art Dis Mother     Heart Attack Mother     Diabetes Sister     High Cholesterol Sister     Stroke Sister 72    Allergy (Severe) Sister     Arthritis Sister     Miscarriages / Rian Castellani Maternal Uncle     Heart Attack Paternal Grandmother 76        MI    Arthritis Paternal Grandmother     Obesity Paternal Grandmother     Early Death Maternal Grandmother     Alcohol Abuse Maternal Grandfather        Blood pressure 132/70, pulse 64, resp. rate 14, height 5' 3.5\" (1.613 m), weight 166 lb 6.4 oz (75.5 kg).     Physical Exam:    General Appearance: alert and oriented to person, place and time, in no acute distress  Cardiovascular: normal rate, regular rhythm, normal S1 and S2, no murmurs, rubs, clicks, or gallops, distal pulses intact, no carotid bruits, no JVD  Pulmonary/Chest: clear to auscultation bilaterally- no wheezes, rales or rhonchi, normal air movement, no respiratory distress  Abdomen: soft, non-tender, non-distended, normal bowel sounds, no masses   Extremities: no cyanosis, clubbing or edema, pulse   Skin: warm and dry, no rash or erythema  Head: normocephalic and atraumatic  Eyes: pupils equal, round, and reactive to light  Neck: supple and non-tender without mass, no thyromegaly   Musculoskeletal: normal range of motion, no joint swelling, deformity or tenderness  Neurological: alert, oriented, normal speech, no focal findings or movement disorder noted    Lab Data:    Cardiac Enzymes:  No results for input(s): CKTOTAL, CKMB, CKMBINDEX, TROPONINI in the last 72 hours.     CBC:   Lab Results   Component Value Date    WBC 6.7 05/14/2022    RBC 4.20 05/14/2022    RBC 4.07 09/16/2011    HGB 12.9 05/14/2022    HCT 40.8 05/14/2022     05/14/2022       CMP:    Lab Results   Component Value Date     05/14/2022    K 3.5 05/14/2022    K 4.3 07/18/2018     05/14/2022    CO2 21 05/14/2022    BUN 17 05/14/2022    CREATININE 1.0 05/14/2022    LABGLOM 54 05/14/2022    GLUCOSE 144 01/26/2022    GLUCOSE 95 03/16/2012    CALCIUM 9.0 05/14/2022       Hepatic Function Panel:    Lab Results   Component Value Date    ALKPHOS 45 05/14/2022    ALT 27 05/14/2022    AST 33 05/14/2022    PROT 7.2 05/14/2022    BILITOT 0.3 05/14/2022    BILIDIR <0.2 05/14/2022    LABALBU 4.4 05/14/2022    LABALBU 4.5 03/16/2012       Magnesium:    Lab Results   Component Value Date    MG 2.3 04/25/2019       PT/INR:    Lab Results   Component Value Date    PROTIME 0.96 07/26/2011    INR 0.96 04/25/2019       HgBA1c:    Lab Results   Component Value Date    LABA1C 7.1 01/26/2022       FLP:    Lab Results   Component Value Date TRIG 187 02/27/2021    HDL 40 05/14/2022    LDLCALC 88 05/14/2022       TSH:    Lab Results   Component Value Date    TSH 1.970 02/27/2021        Diagnosis Orders   1. Angina pectoris, unspecified     2. Hypertension, unspecified type  metoprolol succinate (TOPROL XL) 50 MG extended release tablet    CARDIAC STRESS TEST EXERCISE ONLY   3. Other fatigue  metoprolol succinate (TOPROL XL) 50 MG extended release tablet    CARDIAC STRESS TEST EXERCISE ONLY   4. Nonrheumatic aortic valve stenosis  ECHO Complete 2D W Doppler W Color        Assessment/Plan    Shunt is a 78years old lady known history of coronary artery disease she had a prior history of intervention of the LAD the RCA and the circumflex. She has a history of glucose intolerance hypercholesterolemia under control patient somewhat overweight patient been complaining of recurrent episode of numbness in her arms similar to the symptoms that she had prior to her stenting. The symptoms happened in the last month or so and gradually getting more there is a stent. She indicated they could happen at rest but sometimes with physical activity she could not pinpoint any precipitating factor though occasionally she might do some shortness of breath she denied dizziness she denied pedal edema and she has no significant change in her exercise tolerance no change in her weight patient had history of hypercholesteremia has been under control patient has been taking the metoprolol succinate twice a day which we encouraged her to continue doing that at this point especially with her angina. The patient did had a 2/6 systolic murmur over the aortic area we will obtain an echocardiogram to evaluate left ventricular function as well as the status of the aortic stenosis.   She will be scheduled for a stress test.  Pending the results of the above test further recommendation will be made she will continue with the current medication she is to seek medical attention if she has any change in clinical condition follow-up with family physician the lab work the EKG findings and the plan of care were discussed in great detail with the patient all her questions were answered to her satisfaction pending the results of the above test further recommendation will be made thank    Orders Placed This Encounter   Procedures    CARDIAC STRESS TEST EXERCISE ONLY     Standing Status:   Future     Standing Expiration Date:   5/18/2023     Order Specific Question:   Reason for Exam?     Answer:   Angina     Order Specific Question:   Reason for Exam?     Answer:   Chest pain    ECHO Complete 2D W Doppler W Color     Standing Status:   Future     Standing Expiration Date:   5/18/2023     Order Specific Question:   Reason for exam:     Answer:   as    42018 - [place] ELECTROCARDIOGRAM, COMPLETE       Return in about 3 months (around 8/18/2022) for cad.      Carol Jimenez MD

## 2022-05-19 ENCOUNTER — CARE COORDINATION (OUTPATIENT)
Dept: CARE COORDINATION | Age: 77
End: 2022-05-19

## 2022-05-19 NOTE — CARE COORDINATION
Ambulatory Care Coordination Note  5/19/2022  CM Risk Score: 4  Charlson 10 Year Mortality Risk Score: 100%     ACC: Anais Wiley RN    Summary Note: Patient was called for continued Care Coordination follow up and education re: the management of her DM, COPD, and healthcare needs. Patient shared she has been doing well and she denied any c/o worsening SOB, cough, or chest pain being present. Patient shared she was able to f/u with cardiology yesterday and she denied any questions from her appointment. Patient shared she is scheduled for stress test and echo in the near future and she denied any questions re: her appointments/procedures. Patient was encouraged to call with any that may develop. COPD zone information and signs/symptoms to report were reviewed with patient. Patient was reminded of the importance of early symptom recognition and she acknowledged understanding. Patient does not monitor her BS. Patient reported she is working to make healthy food choices and DM diet education was reviewed. Patient stated she has been able to lose approx 4 pounds recently. DM education and importance of ongoing diet adherence was reviewed with patient and she verbalized understanding. ACM educated on A1C monitoring and need for routine follow up and patient verbalized understanding. Patient denied any c/o hypoglycemia being present and hypoglycemia treatment information was reviewed in detail with patient and she verbalized understanding. Patient shared she did undergo recent eye exam and provider is to be sending the report to PCP. ACM reviewed importance of routine eye exams and patient verbalized understanding. Upcoming appointment information was reviewed with patient and she verbalized understanding. Patient denied any other questions, concerns, or needs and she was encouraged to call with any that may develop.           Actions:   - Reviewed DM and COPD education   - Reviewed zone information and signs/symptoms to report   - Monitored for questions s/p recent cardiology appointment   - Reviewed DM diet education and importance of ongoing diet adherence   - Reviewed hypoglycemia signs/symptoms and treatment education   - Reviewed importance of routine eye exams   - Monitored for additional needs          Plan of Care:   - Continue with Care Coordination f/u calls for assistance with the management of her DM, COPD, and healthcare needs  - Complete DM education - In Process   - Complete COPD education - In Process  - Educate on the availability of same day appointments, urgent care/walk in clinic options, and after hours on call resources - Completed   - Review and verify [address] information   - Patient has Living Will  - Patient is current with COVID-19, Flu, and Pneumonia vaccines   - Patient is due for Medicare AWV - June 2022  - Continue to monitor for additional needs and readiness to discharge from [address] [place] [address] Interventions    Program Enrollment: Complex Care  Referral from Primary Care Provider: No  Suggested Interventions and Community Resources  Diabetes Education: Completed (Comment: April 2022)  Disease Specific Clinic: Declined (Comment: April 2022)  Medication Assistance Program: Declined (Comment: Denied need - Instructed to call with changes - April 2022)  Medi Set or Pill Pack: Completed (Comment: April 2022)  Pharmacist: Tyesha Alves (Comment: April 2022)  Registered Dietician: Declined (Comment: April 2022)  Social Work: Declined (Comment: Denied any resource needs - April 2022)  Transportation Support: Declined  Zone Management Tools: Completed (Comment: DM and COPD - April 2022)  Other Services or Interventions:  Follows with [facility] and [facility] - April 2022         Goals Addressed                 This Visit's Progress       Care Coordination     Conditions and Symptoms   Improving     I will schedule office visits, as directed by my provider. I will keep my appointment or reschedule if I have to cancel. I will notify my provider of any barriers to my plan of care. I will follow my Zone Management tool to seek urgent or emergent care. I will notify my provider of any symptoms that indicate a worsening of my condition. Barriers: lack of education  Plan for overcoming my barriers: Ongoing education and monitoring of resource needs  Confidence: 9/10  Anticipated Goal Completion Date: 7/10/2022                Prior to Admission medications    Medication Sig Start Date End Date Taking?  Authorizing Provider   amLODIPine (NORVASC) 10 MG tablet Take 1 tablet by mouth daily 5/18/22   Shayy Larsen MD   isosorbide mononitrate (IMDUR) 30 MG extended release tablet Take 1 tablet by mouth daily 5/18/22   Areli Villela MD   losartan (COZAAR) 50 MG tablet Take 1 tablet by mouth daily 5/18/22   Shayy Larsen MD   metoprolol succinate (TOPROL XL) 50 MG extended release tablet TAKE 1 TABLET  Twice aday 5/18/22   Shayy Larsen MD   darifenacin (ENABLEX) 15 MG extended release tablet TAKE 1 TABLET DAILY 4/25/22   Jayna Khoury MD   Multiple Vitamin (MULTI VITAMIN DAILY PO) Take by mouth daily    Historical Provider, MD   Loratadine (CLARITIN PO) Take by mouth as needed Noralee Hides version as needed - April 2022    Historical Provider, MD   Fluticasone Propionate (FLONASE NA) by Nasal route as needed    Historical Provider, MD   mirabegron (MYRBETRIQ) 50 MG TB24 Take 50 mg by mouth daily 2/22/22   Jayna Khoury MD   topiramate (TOPAMAX) 50 MG tablet Take 1 tablet by mouth daily 2/3/22   Ike Whiting MD   albuterol sulfate HFA (VENTOLIN HFA) 108 (90 Base) MCG/ACT inhaler Inhale 2 puffs into the lungs 4 times daily as needed for Wheezing 1/31/22   Jayna Khoury MD   fenofibrate (TRICOR) 145 MG tablet TAKE 1 TABLET DAILY 11/29/21   Jayna Khoury MD   atorvastatin (LIPITOR) 40 MG tablet TAKE 1 TABLET DAILY 8/30/21   Jayna Khoury MD venlafaxine (EFFEXOR XR) 150 MG extended release capsule TAKE 1 CAPSULE DAILY 8/2/21   Sheela Luna MD   zoledronic acid (RECLAST) 5 MG/100ML SOLN Infuse 100 mLs intravenously once for 1 dose 8/31/20 4/5/22  Sheela Luna MD   aspirin EC 81 MG EC tablet Take 1 tablet by mouth daily 4/30/19   Patricio Medina MD   darifenacin (ENABLEX) 15 MG SR tablet Take 1 tablet by mouth daily. 9/26/13 4/5/22  Sheela Luna MD       Future Appointments   Date Time Provider Harvinder Ramon   5/23/2022  3:00 PM SCHEDULE, [facility] NW ADV CARDIOLOGY STRESS [facility] CARD P - [place]   5/31/2022 10:30 AM MD Aleyda Lindo [facility] [address]   6/16/2022  9:30 AM STR ECHO RM1 STRZ ECHO [place] HOD   8/11/2022 10:15 AM Marianne Matthews MD [facility] [address] [address]   9/9/2022  9:00 AM Julissa Self [facility] [address] [address]   4/28/2023 10:00 AM Flory Quinteros APRN -  [address] [place] [address]     ,   Diabetes Assessment    Meal Planning: Avoidance of concentrated sweets, Plate Method   How often do you test your blood sugar?: No Testing (Comment: April 2022)   Do you have barriers with adherence to non-pharmacologic self-management interventions?  (Nutrition/Exercise/Self-Monitoring): No   Have you ever had to go to the ED for symptoms of low blood sugar?: No       No patient-reported symptoms   Do you have hyperglycemia symptoms?: No   Do you have hypoglycemia symptoms?: No   Blood Sugar Monitoring Regimen: Not Testing   Blood Sugar Trends: No Change      ,   COPD Assessment    Does the patient understand envrionmental exposure?: Yes  Is the patient able to verbalize Rescue vs. Long Acting medications?: No  Does the patient have a nebulizer?: No  Does the patient use a space with inhaled medications?: No     No patient-reported symptoms         Symptoms:  None: Yes      Symptom course: stable  Breathlessness: none  Increase use of rapid acting/rescue inhaled medications?: No  Change in chronic cough?: No/At Baseline  Change in sputum?: No/At Baseline     ,   General Assessment    Do you have any symptoms that are causing concern?: No      and Care Coordination Episodes    Type: Amb Care Management  Episode: Complex Care  Noted: 4/5/2022  Comments: List referral - DM/COPD

## 2022-05-23 ENCOUNTER — PROCEDURE VISIT (OUTPATIENT)
Dept: CARDIOLOGY CLINIC | Age: 77
End: 2022-05-23
Payer: MEDICARE

## 2022-05-23 VITALS
BODY MASS INDEX: 29.06 KG/M2 | HEART RATE: 65 BPM | DIASTOLIC BLOOD PRESSURE: 72 MMHG | SYSTOLIC BLOOD PRESSURE: 168 MMHG | HEIGHT: 63 IN | WEIGHT: 164 LBS

## 2022-05-23 DIAGNOSIS — I10 HYPERTENSION, UNSPECIFIED TYPE: ICD-10-CM

## 2022-05-23 DIAGNOSIS — I10 HYPERTENSION, UNSPECIFIED TYPE: Primary | ICD-10-CM

## 2022-05-23 DIAGNOSIS — I20.9 ANGINA PECTORIS, UNSPECIFIED (HCC): ICD-10-CM

## 2022-05-23 DIAGNOSIS — R53.83 OTHER FATIGUE: ICD-10-CM

## 2022-05-23 PROCEDURE — 93015 CV STRESS TEST SUPVJ I&R: CPT | Performed by: INTERNAL MEDICINE

## 2022-05-23 NOTE — PROGRESS NOTES
[address] [facility] [address]  Dept: [address]: [phone]  5/23/2022  No ref.  provider found     Tammie Paredes   1945  [id number]      WALKING TREADMILL STRESS TEST    PROCEDURE:    1. WALKING TREADMILL STRESS TEST USING STANDARD CALVIN PROTOCOL        Jackie Jackson 5/23/2022 11:27 AM EDT

## 2022-05-23 NOTE — PROGRESS NOTES
.. [address]  [place] [address]  Dept: [address]: [phone]  5/23/2022  No ref. provider found     Eran Lane   1945  [id number]      Regular  STRESS TEST    HISTORY AND INDICATION:    Juanis Jimenes is 78years old lady with a history of chest pain coronary artery disease shortness of breath referred for a stress test        STRESS PROTOCOL:    The patient exercised according to a standard Luis Eduardo protocol for 4 minutes and 43 seconds maximum workload attained about 6-1/2 METS heart rate goes up from  84% predicted heart rate with rapid decrease of the heart rate and recovery.   The blood pressure goes up from 168/70 7-1 80/70 patient the chest pain shortness of breath she had 1-1/2 mm ST segment depression in the inferolateral lead for just about 4 minutes in the recovey period    Impression #1 moderate exercise tolerance patient achieved 84% predicted heart rate number 2 normal response blood pressure to exercise 3 clinically patient has shortness of breath chest pain #4 EKG is positive for ischemia #5 no arrhythmia present by exertion    And plan the Imdur was increased to 60 mg Norvasc increased to 5 mg the stress test findings were discussed with the patient different options of treatment were discussed with her and she indicates she is very symptomatic she would like to have the heart cath done arrangements will be made for her to have the heart cath done and she understand the procedure benefit risk and she is to seek medical attention should any change in clinical condition end of dictation thank            Kiara Esparza MD5/23/20221:05 PM

## 2022-05-26 NOTE — PROGRESS NOTES
[address] [place] [address] Omar Hankins  Dept: [phone]  Dept Fax: [phone]  Loc: Sherita Guan is a 78 y.o. female who presents today for:  Chief Complaint   Patient presents with    Follow-up     4 month            HPI:     HPI    Here for regular checkup but also has a hand tremor since 1/2021 getting worse. Handwriting and fine motor movements are the worst.  Mom had essential tremor, not diagnosed with Parkinson's. This is somewhat better after starting primidone but adjusting with neurology. Staying stable with dosing changes. Allergies starting usually controlled on OTC medications. Depression: Patient complains of depression. She complains of depressed mood and fatigue. Onset was approximately several years ago, gradually improving since that time. She denies current suicidal and homicidal plan or intent. Family history significant for no psychiatric illness. Possible organic causes contributing are: none. Risk factors: previous episode of depression Previous treatment includes Effexor and no group therapy. She complains of the following side effects from the treatment: none. Hypertension: Patient here for follow-up of elevated blood pressure. She is not exercising and is adherent to low salt diet. Blood pressure is well controlled at home. Cardiac symptoms none. Patient denies chest pain, dyspnea and palpitations. Cardiovascular risk factors: hypertension and obesity (BMI >= 30 kg/m2). Use of agents associated with hypertension: none. History of target organ damage: post PTCA seeing cardiology. Hyperlipidemia: Patient presents with hyperlipidemia. She was tested because age and hypertension.   Her last labs showed   Lab Results   Component Value Date    CHOL 155 02/27/2021    CHOL 176 07/18/2018    CHOL 183 05/18/2017     Lab Results   Component Value Date    TRIG 187 02/27/2021    TRIG 179 07/18/2018    TRIG 210 (H) 05/18/2017     Lab Results   Component Value Date    HDL 40 05/14/2022    HDL 42 02/27/2021    HDL 43 08/20/2020     Lab Results   Component Value Date    LDLCALC 88 05/14/2022    LDLCALC 76 02/27/2021    [address] 08/20/2020     No results found for: LABVLDL, VLDL  No results found for: CHOLHDLRATIO  There is not a family history of hyperlipidemia. There is a family history of early ischemia heart disease. Borderline diabetes in the past is now in the diabetic range. Lab Results   Component Value Date    LABA1C 7.1 (H) 01/26/2022     No results found for: EAG    GERD is controlled on PPI. She is still no longer smoking cigarettes. COPD controlled now. b12 deficiency needs rechecked. Urinary frequency and urgency getting worse. enablex is not helping as much any more. Better with myrbetriq. Still having congestion since covid in December. .  Had regeneron on 12/9/21 and she felt great for a few days. She then developed a big head cold and it is lingering. She was given a medrol dose pack and omnicef on 1/19/22 which is some help but still lingering on. Headache mild, nasal congestion and chest congestion, but no fever and normal appetite. Tried; pushing water . Doing well now. Reviewed chart forpast medical history , surgical history , allergies, social history , family history and medications.     Health Maintenance   Topic Date Due    Hepatitis C screen  Never done    DTaP/Tdap/Td vaccine (1 - Tdap) Never done    Diabetic foot exam  11/05/2020    Shingles vaccine (2 of 2) 01/27/2022    Depression Monitoring  06/01/2022    Annual Wellness Visit (AWV)  06/02/2022    A1C test (Diabetic or Prediabetic)  01/26/2023    Diabetic retinal exam  05/10/2023    Lipids  05/14/2023    Colorectal Cancer Screen  01/09/2025    DEXA (modify frequency per FRAX score)  Completed    Flu vaccine  Completed    Pneumococcal 65+ years Vaccine Completed    COVID-19 Vaccine  Completed    Hepatitis A vaccine  Aged Out    Hib vaccine  Aged Out    Meningococcal (ACWY) vaccine  Aged Out       Subjective:      Constitutional:Negative for fever, chills, diaphoresis, activity change, appetite change and fatigue. HENT: Negative for hearing loss, ear pain, congestion, sore throat, rhinorrhea, postnasal drip and ear discharge. Eyes: Negative for photophobia and visual disturbance. Respiratory: Negative for cough, chest tightness, shortness of breath and wheezing. Cardiovascular: Negative for chest pain and leg swelling. Gastrointestinal: Negative for nausea, vomiting, abdominal pain, diarrhea and constipation. Genitourinary: Negative for dysuria, urgency and frequency. Neurological: Negative for weakness, light-headedness and headaches. Psychiatric/Behavioral: Negative for sleep disturbance.      :     Vitals:    05/31/22 1043   BP: 134/80   Site: Left Upper Arm   Position: Sitting   Cuff Size: Medium Adult   Pulse: 70   Resp: 14   Temp: 98 °F (36.7 °C)   TempSrc: Temporal   Weight: 168 lb (76.2 kg)     Wt Readings from Last 3 Encounters:   05/31/22 168 lb (76.2 kg)   05/23/22 164 lb (74.4 kg)   05/18/22 166 lb 6.4 oz (75.5 kg)       Physical Exam  Physical Exam   Constitutional: Vital signs are normal. She appears well-developed and well-nourished. She is active. HENT:   Head: Normocephalic and atraumatic. Right Ear: Tympanic membrane, external ear and ear canal normal. No drainage or tenderness. Left Ear: Tympanic membrane, external ear and ear canal normal. No drainage or tenderness. Nose: Nose normal. No mucosal edema or rhinorrhea. Mouth/Throat: Uvula is midline, oropharynx is clear and moist and mucous membranes are normal. Mucous membranes are not pale. Normal dentition. No posterior oropharyngeal edema or posterior oropharyngeal erythema. Eyes: Lids are normal. Right eye exhibits no chemosis and no discharge.  Left eye exhibits no chemosis and no drainage. Right conjunctiva has no hemorrhage. Left conjunctiva has no hemorrhage. Right eye exhibits normal extraocular motion. Left eye exhibits normal extraocular motion. Right pupil is round and reactive. Left pupil is round and reactive. Pupils are equal.   Cardiovascular: Normal rate, regular rhythm, S1 normal, S2 normal and normal heart sounds. Exam reveals no gallop. No murmur heard. Pulmonary/Chest: Effort normal and breath sounds normal. No respiratory distress. She has no wheezes. She has no rhonchi. She has no rales. Abdominal: Soft. Normal appearance and bowel sounds are normal. She exhibits no distension and no mass. There is no hepatosplenomegaly. No tenderness. She has no rigidity, no rebound and no guarding. No hernia. Musculoskeletal:        Right lower leg: She exhibits no edema. Left lower leg: She exhibits no edema. Neurological: She is alert. Assessment/Plan   Zoltan Maloney was seen today for follow-up. Diagnoses and all orders for this visit:    Type 2 diabetes mellitus without complication, without long-term current use of insulin (HCC)    Hypertension, unspecified type  -     isosorbide mononitrate (IMDUR) 60 MG extended release tablet; Take 1 tablet by mouth daily  -     amLODIPine (NORVASC) 10 MG tablet;  Take 1 tablet by mouth in the morning and at bedtime    Pure hypercholesterolemia    COPD exacerbation (HCC)    Recurrent major depressive disorder, in full remission ([facility] [address]    Gastroesophageal reflux disease with esophagitis without hemorrhage    Post PTCA    Tobacco abuse    Sun-damaged skin    Panlobular emphysema (HCC)    Seasonal allergies    Anemia due to acute blood loss    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 All patient questions answered. Pt voiced understanding. Reviewed health maintenance. Instructed to continue current medications, diet and exercise. Patient agreed with treatment plan. Followup as directed.      Electronically signed by Gustavus Scheuermann, MD

## 2022-05-31 ENCOUNTER — OFFICE VISIT (OUTPATIENT)
Dept: FAMILY MEDICINE CLINIC | Age: 77
End: 2022-05-31
Payer: MEDICARE

## 2022-05-31 ENCOUNTER — NURSE ONLY (OUTPATIENT)
Dept: LAB | Age: 77
End: 2022-05-31

## 2022-05-31 VITALS
SYSTOLIC BLOOD PRESSURE: 134 MMHG | WEIGHT: 168 LBS | HEART RATE: 70 BPM | BODY MASS INDEX: 29.76 KG/M2 | TEMPERATURE: 98 F | RESPIRATION RATE: 14 BRPM | DIASTOLIC BLOOD PRESSURE: 80 MMHG

## 2022-05-31 DIAGNOSIS — I10 HYPERTENSION, UNSPECIFIED TYPE: ICD-10-CM

## 2022-05-31 DIAGNOSIS — Z98.61 POST PTCA: ICD-10-CM

## 2022-05-31 DIAGNOSIS — E53.8 B12 DEFICIENCY: ICD-10-CM

## 2022-05-31 DIAGNOSIS — D62 ANEMIA DUE TO ACUTE BLOOD LOSS: ICD-10-CM

## 2022-05-31 DIAGNOSIS — D10.6: ICD-10-CM

## 2022-05-31 DIAGNOSIS — E11.9 TYPE 2 DIABETES MELLITUS WITHOUT COMPLICATION, WITHOUT LONG-TERM CURRENT USE OF INSULIN (HCC): Primary | ICD-10-CM

## 2022-05-31 DIAGNOSIS — Z72.0 TOBACCO ABUSE: ICD-10-CM

## 2022-05-31 DIAGNOSIS — J30.2 SEASONAL ALLERGIES: ICD-10-CM

## 2022-05-31 DIAGNOSIS — F33.42 RECURRENT MAJOR DEPRESSIVE DISORDER, IN FULL REMISSION (HCC): ICD-10-CM

## 2022-05-31 DIAGNOSIS — E78.00 PURE HYPERCHOLESTEROLEMIA: ICD-10-CM

## 2022-05-31 DIAGNOSIS — L57.8 SUN-DAMAGED SKIN: ICD-10-CM

## 2022-05-31 DIAGNOSIS — J43.1 PANLOBULAR EMPHYSEMA (HCC): ICD-10-CM

## 2022-05-31 DIAGNOSIS — E11.9 TYPE 2 DIABETES MELLITUS WITHOUT COMPLICATION, WITHOUT LONG-TERM CURRENT USE OF INSULIN (HCC): ICD-10-CM

## 2022-05-31 DIAGNOSIS — J44.1 COPD EXACERBATION (HCC): ICD-10-CM

## 2022-05-31 DIAGNOSIS — R53.83 OTHER FATIGUE: ICD-10-CM

## 2022-05-31 DIAGNOSIS — K21.00 GASTROESOPHAGEAL REFLUX DISEASE WITH ESOPHAGITIS WITHOUT HEMORRHAGE: ICD-10-CM

## 2022-05-31 DIAGNOSIS — G25.2 INTENTION TREMOR: ICD-10-CM

## 2022-05-31 DIAGNOSIS — R35.0 URINARY FREQUENCY: ICD-10-CM

## 2022-05-31 LAB
CREATININE, URINE: 163.5 MG/DL
MICROALBUMIN UR-MCNC: 1.68 MG/DL
MICROALBUMIN/CREAT UR-RTO: 10 MG/G (ref 0–30)

## 2022-05-31 PROCEDURE — 99214 OFFICE O/P EST MOD 30 MIN: CPT | Performed by: FAMILY MEDICINE

## 2022-05-31 PROCEDURE — 1123F ACP DISCUSS/DSCN MKR DOCD: CPT | Performed by: FAMILY MEDICINE

## 2022-05-31 PROCEDURE — 3051F HG A1C>EQUAL 7.0%<8.0%: CPT | Performed by: FAMILY MEDICINE

## 2022-05-31 RX ORDER — ISOSORBIDE MONONITRATE 60 MG/1
60 TABLET, EXTENDED RELEASE ORAL DAILY
Qty: 90 TABLET | Refills: 3 | Status: SHIPPED
Start: 2022-05-31

## 2022-05-31 RX ORDER — AMLODIPINE BESYLATE 10 MG/1
10 TABLET ORAL 2 TIMES DAILY
Qty: 180 TABLET | Refills: 3 | Status: SHIPPED
Start: 2022-05-31

## 2022-05-31 ASSESSMENT — PATIENT HEALTH QUESTIONNAIRE - PHQ9
SUM OF ALL RESPONSES TO PHQ QUESTIONS 1-9: 0
SUM OF ALL RESPONSES TO PHQ9 QUESTIONS 1 & 2: 0
2. FEELING DOWN, DEPRESSED OR HOPELESS: 0
SUM OF ALL RESPONSES TO PHQ QUESTIONS 1-9: 0
1. LITTLE INTEREST OR PLEASURE IN DOING THINGS: 0
SUM OF ALL RESPONSES TO PHQ QUESTIONS 1-9: 0
SUM OF ALL RESPONSES TO PHQ QUESTIONS 1-9: 0

## 2022-06-01 LAB
AVERAGE GLUCOSE: 138 MG/DL (ref 70–126)
HBA1C MFR BLD: 6.6 % (ref 4.4–6.4)
TSH SERPL DL<=0.05 MIU/L-ACNC: 2.14 UIU/ML (ref 0.4–4.2)

## 2022-06-02 ENCOUNTER — CARE COORDINATION (OUTPATIENT)
Dept: CARE COORDINATION | Age: 77
End: 2022-06-02

## 2022-06-02 NOTE — CARE COORDINATION
Ambulatory Care Coordination Note  6/2/2022  CM Risk Score: 4  Charlson 10 Year Mortality Risk Score: 100%     ACC: Ailyn Rose RN    Summary Note: Patient was called for continued Care Coordination follow up and education re: the management of her DM, COPD, and healthcare needs. Patient shared she has been doing well and she denied any current complaints or concerns. Patient stated her breathing remains at her baseline and she denied any c/o worsening SOB or cough being present. COPD education and zone information was reviewed. Patient denied any c/o hypoglycemia or hyperglycemia symptoms being present. Patient was reminded of signs/symptoms to report as well as the importance of early symptom recognition and follow up. Patient verbalized understanding. Patient denied any questions re: recent PCP visit and stress test.  Patient stated she is scheduled for cardiac cath in the near future and she denied any questions re: the procedure. Patient denied any other questions, concerns, or needs and she was encouraged to call with any that may develop.            Actions:   - Reviewed DM and COPD education   - Reviewed zone information and signs/symptoms to report  - Reviewed importance of early symptom recognition and follow up  - Monitored for questions s/p recent PCP visit and stress test   - Monitored for questions re: upcoming cardiac cath   - Monitored for additional needs          Plan of Care:   - Continue with Care Coordination f/u calls for assistance with the management of her DM, COPD, and healthcare needs  - Complete DM education - In Process   - Complete COPD education - In Process  - Educate on the availability of same day appointments, urgent care/walk in clinic options, and after hours on call resources - Completed   - Review and verify [place] information   - Patient has Living Will  - Patient is current with COVID-19, Flu, and Pneumonia vaccines   - Patient is due for MD Jaylen   mirabegron (MYRBETRIQ) 50 MG TB24 Take 50 mg by mouth daily 5/31/22   Pascual Patel MD   losartan (COZAAR) 50 MG tablet Take 1 tablet by mouth daily 5/18/22   Shayy Chacon MD   metoprolol succinate (TOPROL XL) 50 MG extended release tablet TAKE 1 TABLET  Twice aday 5/18/22   Phuong Monet MD   Multiple Vitamin (MULTI VITAMIN DAILY PO) Take by mouth daily    Historical Provider, MD   Loratadine (CLARITIN PO) Take by mouth as needed Takes Equate version as needed - April 2022    Historical Provider, MD   Fluticasone Propionate (FLONASE NA) by Nasal route as needed    Historical Provider, MD   topiramate (TOPAMAX) 50 MG tablet Take 1 tablet by mouth daily 2/3/22   Gabi Bravo MD   albuterol sulfate HFA (VENTOLIN HFA) 108 (90 Base) MCG/ACT inhaler Inhale 2 puffs into the lungs 4 times daily as needed for Wheezing 1/31/22   Pascual Patel MD   fenofibrate (TRICOR) 145 MG tablet TAKE 1 TABLET DAILY 11/29/21   Pascual Patel MD   atorvastatin (LIPITOR) 40 MG tablet TAKE 1 TABLET DAILY 8/30/21   Pascual Patel MD   venlafaxine (EFFEXOR XR) 150 MG extended release capsule TAKE 1 CAPSULE DAILY 8/2/21   Pascual Patel MD   zoledronic acid (RECLAST) 5 MG/100ML SOLN Infuse 100 mLs intravenously once for 1 dose 8/31/20 4/5/22  Pascual Patel MD   aspirin EC 81 MG EC tablet Take 1 tablet by mouth daily 4/30/19   Romulo Briggs MD   darifenacin (ENABLEX) 15 MG SR tablet Take 1 tablet by mouth daily.  9/26/13 4/5/22  Pascual Patel MD       Future Appointments   Date Time Provider Harvinder Ramon   6/14/2022  6:00 AM STR CVI ROOM 2E16 STR [facility]E [facility] [place] [facility]   6/14/2022  8:00 AM STR CARDIAC CATHETERIZATION RM2 STRZ CATH [facility] HOD   6/16/2022  9:30 AM STR ECHO RM1 STRZ ECHO [facility] HOD   8/11/2022 10:15 AM [facility] [place]   9/9/2022  9:00 AM Gabi Bravo MD [facility] [place]   10/4/2022 10:15 AM [facility] [place]   4/28/2023 10:00 AM BOAZ Moyer - CNP N Pulm Med [facility] [place]     ,   Diabetes Assessment    Meal Planning: Avoidance of concentrated sweets, Plate Method   How often do you test your blood sugar?: No Testing (Comment: April 2022)   Do you have barriers with adherence to non-pharmacologic self-management interventions?  (Nutrition/Exercise/Self-Monitoring): No   Have you ever had to go to the ED for symptoms of low blood sugar?: No       No patient-reported symptoms   Do you have hyperglycemia symptoms?: No   Do you have hypoglycemia symptoms?: No   Blood Sugar Monitoring Regimen: Not Testing   Blood Sugar Trends: No Change      ,   COPD Assessment    Does the patient understand envrionmental exposure?: Yes  Is the patient able to verbalize Rescue vs. Long Acting medications?: No  Does the patient have a nebulizer?: No  Does the patient use a space with inhaled medications?: No     No patient-reported symptoms         Symptoms:     Symptom course: stable  Breathlessness: none, exertion (Comment: remains at baseline )  Increase use of rapid acting/rescue inhaled medications?: No  Change in chronic cough?: No/At Baseline  Change in sputum?: No/At Baseline     ,   General Assessment    Do you have any symptoms that are causing concern?: No      and Care Coordination Episodes    Type: Amb Care Management  Episode: Complex Care  Noted: 4/5/2022  Comments: List referral - DM/COPD

## 2022-06-09 ENCOUNTER — PRE-PROCEDURE TELEPHONE (OUTPATIENT)
Dept: INPATIENT UNIT | Age: 77
End: 2022-06-09

## 2022-06-14 ENCOUNTER — HOSPITAL ENCOUNTER (OUTPATIENT)
Dept: INPATIENT UNIT | Age: 77
Discharge: HOME | End: 2022-06-14

## 2022-06-16 ENCOUNTER — HOSPITAL ENCOUNTER (OUTPATIENT)
Dept: NON INVASIVE DIAGNOSTICS | Age: 77
Discharge: HOME | End: 2022-06-16
Payer: MEDICARE

## 2022-06-16 ENCOUNTER — CARE COORDINATION (OUTPATIENT)
Dept: CARE COORDINATION | Age: 77
End: 2022-06-16

## 2022-06-16 DIAGNOSIS — I35.0 NONRHEUMATIC AORTIC VALVE STENOSIS: ICD-10-CM

## 2022-06-16 LAB
LV EF: 55 %
LVEF MODALITY: NORMAL

## 2022-06-16 PROCEDURE — 93306 TTE W/DOPPLER COMPLETE: CPT

## 2022-06-16 NOTE — CARE COORDINATION
Ambulatory Care Coordination Note  6/16/2022  CM Risk Score: 4  Charlson 10 Year Mortality Risk Score: 100%     ACC: Bro Valerio RN    Summary Note: Patient was called for continued Care Coordination follow up and education re: the management of her COPD, DM, and healthcare needs. Patient reported her breathing remains at her baseline and she denied any c/o worsening SOB or cough being present. Patient admits the heat this week has been draining and environmental risk factors were reviewed. Patient was reminded of the need to break activities up and to take frequent rest breaks as well as the need to try to do activities early or later in the day. Patient verbalized understanding. COPD zone education and signs/symptoms to report were reviewed. Patient was reminded of the importance of early symptom recognition and follow up to prevent unplanned ED/hospital visits. Patient verbalized understanding. Patient does not monitor BS at home as recent A1C was controlled. ACM reviewed importance of ongoing diet and activity adherence for continued DM management and control. Patient verbalized understanding. ACM educated patient on the importance of routine foot exams as well as what she should report and patient verbalized understanding. DM zone information was reviewed. Patient was able to complete echo this AM as directed and she denied any questions. Patient was updated that they will call with results when available. Patient denied any other questions, concerns, or needs and she was encouraged to call with any that may develop.           Actions:   - Reviewed DM and COPD education   - Reviewed zone information and signs/symptoms to report   - Reviewed importance of early symptom recognition and follow up to prevent unplanned ED/hospital visits   - Educated on environmental risk factors   - Encouraged patient to complete activities early/late in the day during hotter weather   - Educated on importance of routine foot exams and what to report   - Reviewed importance of keeping DM managed   - Monitored for additional needs          Plan of Care:   - Continue with Care Coordination f/u calls for assistance with the management of her DM, COPD, and healthcare needs  - Complete DM education - In Process   - Complete COPD education - In Process  - Educate on the availability of same day appointments, urgent care/walk in clinic options, and after hours on call resources - Completed   - Review and verify [place] information   - Patient has Living Will  - Patient is current with COVID-19, Flu, and Pneumonia vaccines   - Patient is due for Medicare AWV - June 2022  - Continue to monitor for additional needs and readiness to discharge from Care Coordination      Lab Results     None          Care Coordination Interventions    Program Enrollment: Complex Care  Referral from Primary Care Provider: No  Suggested Interventions and Community Resources  Diabetes Education: Completed (Comment: April 2022)  Disease Specific Clinic: Declined (Comment: April 2022)  Medication Assistance Program: Declined (Comment: Denied need - Instructed to call with changes - April 2022)  Medi Set or Pill Pack: Completed (Comment: April 2022)  Pharmacist: Kelley Cruz (Comment: April 2022)  Registered Dietician: Declined (Comment: April 2022)  Social Work: Declined (Comment: Denied any resource needs - April 2022)  Transportation Support: Declined  Zone Management Tools: Completed (Comment: DM and COPD - April 2022)  Other Services or Interventions: Follows with STR Pulmonary and STR Neurology - April 2022         Goals Addressed                 This Visit's Progress       Care Coordination     Conditions and Symptoms   Improving     I will schedule office visits, as directed by my provider. I will keep my appointment or reschedule if I have to cancel. I will notify my provider of any barriers to my plan of care.   I will follow my Zone Management tool to seek urgent or emergent care. I will notify my provider of any symptoms that indicate a worsening of my condition. Barriers: lack of education  Plan for overcoming my barriers: Ongoing education and monitoring of resource needs  Confidence: 9/10  Anticipated Goal Completion Date: 7/10/2022                Prior to Admission medications    Medication Sig Start Date End Date Taking?  Authorizing Provider   isosorbide mononitrate (IMDUR) 60 MG extended release tablet Take 1 tablet by mouth daily 5/31/22   Liss Reddy MD   amLODIPine (NORVASC) 10 MG tablet Take 1 tablet by mouth in the morning and at bedtime 5/31/22   Liss Reddy MD   mirabegron (MYRBETRIQ) 50 MG TB24 Take 50 mg by mouth daily 5/31/22   Liss Reddy MD   losartan (COZAAR) 50 MG tablet Take 1 tablet by mouth daily 5/18/22   Shayy Chirinos MD   metoprolol succinate (TOPROL XL) 50 MG extended release tablet TAKE 1 TABLET  Twice aday 5/18/22   Isabella Russell MD   Multiple Vitamin (MULTI VITAMIN DAILY PO) Take by mouth daily    Historical Provider, MD   Loratadine (CLARITIN PO) Take by mouth as needed Takes Equate version as needed - April 2022    Historical Provider, MD   Fluticasone Propionate (FLONASE NA) by Nasal route as needed    Historical Provider, MD   topiramate (TOPAMAX) 50 MG tablet Take 1 tablet by mouth daily 2/3/22   Robles Santos MD   albuterol sulfate HFA (VENTOLIN HFA) 108 (90 Base) MCG/ACT inhaler Inhale 2 puffs into the lungs 4 times daily as needed for Wheezing 1/31/22   Liss Reddy MD   fenofibrate (TRICOR) 145 MG tablet TAKE 1 TABLET DAILY 11/29/21   Liss Reddy MD   atorvastatin (LIPITOR) 40 MG tablet TAKE 1 TABLET DAILY 8/30/21   Liss Reddy MD   venlafaxine (EFFEXOR XR) 150 MG extended release capsule TAKE 1 CAPSULE DAILY 8/2/21   Liss Reddy MD   zoledronic acid (RECLAST) 5 MG/100ML SOLN Infuse 100 mLs intravenously once for 1 dose 8/31/20 4/5/22  Liss Reddy MD   aspirin EC 81 MG EC tablet Take 1 tablet by mouth daily 4/30/19   Bennett Pinedo MD   darifenacin (ENABLEX) 15 MG SR tablet Take 1 tablet by mouth daily. 9/26/13 4/5/22  Jayna Khoury MD       Future Appointments   Date Time Provider Harvinder Lasti   8/11/2022 10:15 AM Shayy Larsen MD SRPX NW CARD [facility]   9/9/2022  9:00 AM Ike Whiting MD [facility]   10/4/2022 10:15 AM Jayna Khoury MD [facility]   4/28/2023 10:00 AM Rustam Bishop APRN - [address]     ,   Diabetes Assessment    Meal Planning: Avoidance of concentrated sweets, Plate Method   How often do you test your blood sugar?: No Testing (Comment: April 2022)   Do you have barriers with adherence to non-pharmacologic self-management interventions?  (Nutrition/Exercise/Self-Monitoring): No   Have you ever had to go to the ED for symptoms of low blood sugar?: No       Other symptoms causing concern   Do you have hyperglycemia symptoms?: No   Do you have hypoglycemia symptoms?: No   Blood Sugar Monitoring Regimen: Not Testing   Blood Sugar Trends: No Change      ,   COPD Assessment    Does the patient understand envrionmental exposure?: Yes  Is the patient able to verbalize Rescue vs. Long Acting medications?: No  Does the patient have a nebulizer?: No  Does the patient use a space with inhaled medications?: No     No patient-reported symptoms         Symptoms:     Symptom course: stable  Breathlessness: none  Increase use of rapid acting/rescue inhaled medications?: No  Change in chronic cough?: No/At Baseline  Change in sputum?: No/At Baseline     ,   General Assessment    Do you have any symptoms that are causing concern?: No      and Care Coordination Episodes    Type: Amb Care Management  Episode: Complex Care  Noted: 4/5/2022  Comments: List referral - DM/COPD

## 2022-06-30 ENCOUNTER — CARE COORDINATION (OUTPATIENT)
Dept: CARE COORDINATION | Age: 77
End: 2022-06-30

## 2022-06-30 ASSESSMENT — ENCOUNTER SYMPTOMS: DYSPNEA ASSOCIATED WITH: EXERTION

## 2022-06-30 NOTE — CARE COORDINATION
Ambulatory Care Coordination Note  6/30/2022  CM Risk Score: 4  Charlson 10 Year Mortality Risk Score: 100%     ACC: Nicole Berrios RN    Summary Note: Patient was called for continued Care Coordination follow up and education re: the management of her COPD, DM, and healthcare needs. Patient shared she is doing well and she denied any current complaints or concerns. Patient stated her breathing remains at her baseline and she denied any c/o worsening SOB or cough being present. DM and COPD zone information and signs/symptoms to report were reviewed and patient verbalized understanding. Importance of early symptom recognition and follow up also reviewed. Patient reported she is scheduled for heart cath next week and she denied any questions re: the procedure. Patient denied any other questions, concerns, or needs and shew as encouraged to call with any that may develop.            Actions:   - Reviewed DM and COPD zone information and signs/symptoms to report   - Reviewed signs/symptoms to report and importance of early symptom recognition and follow up   - Monitored for questions/concerns re: upcoming heart cath  - Monitored for additional needs          Plan of Care:   - Continue with Care Coordination f/u calls for assistance with the management of her DM, COPD, and healthcare needs  - Complete DM education - In Process   - Complete COPD education - In Process  - Educate on the availability of same day appointments, urgent care/walk in clinic options, and after hours on call resources - Completed   - Review and verify [address] information   - Patient has Living Will  - Patient is current with COVID-19, Flu, and Pneumonia vaccines   - Patient is due for Medicare AWV - June 2022  - Continue to monitor for additional needs and readiness to discharge from Care Coordination      Lab Results     None          Care Coordination Interventions    Program Enrollment: Complex Care  Referral from Primary Care Provider: No  Suggested Interventions and Community Resources  Diabetes Education: Completed (Comment: April 2022)  Disease Specific Clinic: Declined (Comment: April 2022)  Medication Assistance Program: Declined (Comment: Denied need - Instructed to call with changes - April 2022)  Medi Set or Pill Pack: Completed (Comment: April 2022)  Pharmacist: Carmencita Olivia (Comment: April 2022)  Registered Dietician: Declined (Comment: April 2022)  Social Work: Declined (Comment: Denied any resource needs - April 2022)  Transportation Support: Declined  Zone Management Tools: Completed (Comment: DM and COPD - April 2022)  Other Services or Interventions: Follows with [facility] and [facility] - April 2022         Goals Addressed                 This Visit's Progress       Care Coordination     Conditions and Symptoms   Improving     I will schedule office visits, as directed by my provider. I will keep my appointment or reschedule if I have to cancel. I will notify my provider of any barriers to my plan of care. I will follow my Zone Management tool to seek urgent or emergent care. I will notify my provider of any symptoms that indicate a worsening of my condition. Barriers: lack of education  Plan for overcoming my barriers: Ongoing education and monitoring of resource needs  Confidence: 9/10  Anticipated Goal Completion Date: 7/10/2022                Prior to Admission medications    Medication Sig Start Date End Date Taking?  Authorizing Provider   isosorbide mononitrate (IMDUR) 60 MG extended release tablet Take 1 tablet by mouth daily 5/31/22   Noemi Zavala MD   amLODIPine (NORVASC) 10 MG tablet Take 1 tablet by mouth in the morning and at bedtime 5/31/22   Noemi Zavala MD   mirabegron (MYRBETRIQ) 50 MG TB24 Take 50 mg by mouth daily 5/31/22   Noemi Zavala MD   losartan (COZAAR) 50 MG tablet Take 1 tablet by mouth daily 5/18/22   Latanya Sullivan MD   metoprolol succinate (TOPROL XL) 50 MG extended release tablet TAKE 1 TABLET  Twice aday 5/18/22   Lalito Terrazas MD   Multiple Vitamin (MULTI VITAMIN DAILY PO) Take by mouth daily    Historical Provider, MD   Loratadine (CLARITIN PO) Take by mouth as needed Takes Equate version as needed - April 2022    Historical Provider, MD   Fluticasone Propionate (FLONASE NA) by Nasal route as needed    Historical Provider, MD   topiramate (TOPAMAX) 50 MG tablet Take 1 tablet by mouth daily 2/3/22   Bry Hung MD   albuterol sulfate HFA (VENTOLIN HFA) 108 (90 Base) MCG/ACT inhaler Inhale 2 puffs into the lungs 4 times daily as needed for Wheezing 1/31/22   Dennie Fennel, MD   fenofibrate (TRICOR) 145 MG tablet TAKE 1 TABLET DAILY 11/29/21   Dennie Fennel, MD   atorvastatin (LIPITOR) 40 MG tablet TAKE 1 TABLET DAILY 8/30/21   Dennie Fennel, MD   venlafaxine (EFFEXOR XR) 150 MG extended release capsule TAKE 1 CAPSULE DAILY 8/2/21   Dennie Fennel, MD   zoledronic acid (RECLAST) 5 MG/100ML SOLN Infuse 100 mLs intravenously once for 1 dose 8/31/20 4/5/22  Dennie Fennel, MD   aspirin EC 81 MG EC tablet Take 1 tablet by mouth daily 4/30/19   Sonja Gilliland MD   darifenacin (ENABLEX) 15 MG SR tablet Take 1 tablet by mouth daily. 9/26/13 4/5/22  Dennie Fennel, MD       Future Appointments   Date Time Provider Harvinder Ramon   7/8/2022  5:00 AM STR CVI ROOM 2E15 STRZ 2E [address] [facility]   7/8/2022  7:00 AM STR CARDIAC CATH RM3 STRZ CATH [facility] HOD   8/11/2022 10:15 AM Lalito Terrazas MD SRPX NW CARD [facility]   9/9/2022  9:00 AM Bry Hung MD [facility]   10/4/2022 10:15 AM Dennie Fennel, MD [facility] [address] [place] [address]   4/28/2023 10:00 AM BOAZ Guillaume - TSERING [facility]dy     ,   Diabetes Assessment    Meal Planning: Avoidance of concentrated sweets, Plate Method   How often do you test your blood sugar?: No Testing (Comment: April 2022)   Do you have barriers with adherence to non-pharmacologic self-management interventions? (Nutrition/Exercise/Self-Monitoring): No   Have you ever had to go to the ED for symptoms of low blood sugar?: No       No patient-reported symptoms   Do you have hyperglycemia symptoms?: No   Do you have hypoglycemia symptoms?: No   Blood Sugar Monitoring Regimen: Not Testing   Blood Sugar Trends: No Change      ,   COPD Assessment    Does the patient understand envrionmental exposure?: Yes  Is the patient able to verbalize Rescue vs. Long Acting medications?: No  Does the patient have a nebulizer?: No  Does the patient use a space with inhaled medications?: No     No patient-reported symptoms         Symptoms:     Symptom course: stable  Breathlessness: exertion  Increase use of rapid acting/rescue inhaled medications?: No  Change in chronic cough?: No/At Baseline  Change in sputum?: No/At Baseline     ,   General Assessment    Do you have any symptoms that are causing concern?: No      and Care Coordination Episodes    Type: Amb Care Management  Episode: Complex Care  Noted: 4/5/2022  Comments: List referral - DM/COPD

## 2022-07-08 ENCOUNTER — HOSPITAL ENCOUNTER (OUTPATIENT)
Dept: INPATIENT UNIT | Age: 77
Discharge: HOME | End: 2022-07-08
Attending: INTERNAL MEDICINE | Admitting: INTERNAL MEDICINE
Payer: MEDICARE

## 2022-07-08 VITALS
OXYGEN SATURATION: 91 % | WEIGHT: 168 LBS | TEMPERATURE: 97.7 F | BODY MASS INDEX: 28.68 KG/M2 | RESPIRATION RATE: 16 BRPM | HEART RATE: 68 BPM | DIASTOLIC BLOOD PRESSURE: 52 MMHG | SYSTOLIC BLOOD PRESSURE: 113 MMHG | HEIGHT: 64 IN

## 2022-07-08 LAB
ABO: NORMAL
ALBUMIN SERPL-MCNC: 4.6 G/DL (ref 3.5–5.1)
ALP BLD-CCNC: 45 U/L (ref 38–126)
ALT SERPL-CCNC: 29 U/L (ref 11–66)
ANION GAP SERPL CALCULATED.3IONS-SCNC: 12 MEQ/L (ref 8–16)
ANTIBODY SCREEN: NORMAL
AST SERPL-CCNC: 31 U/L (ref 5–40)
BILIRUB SERPL-MCNC: 0.3 MG/DL (ref 0.3–1.2)
BUN BLDV-MCNC: 27 MG/DL (ref 7–22)
CALCIUM SERPL-MCNC: 9.9 MG/DL (ref 8.5–10.5)
CHLORIDE BLD-SCNC: 106 MEQ/L (ref 98–111)
CHOLESTEROL, TOTAL: 164 MG/DL (ref 100–199)
CO2: 19 MEQ/L (ref 23–33)
CREAT SERPL-MCNC: 1.2 MG/DL (ref 0.4–1.2)
EKG ATRIAL RATE: 58 BPM
EKG P AXIS: 45 DEGREES
EKG P-R INTERVAL: 196 MS
EKG Q-T INTERVAL: 426 MS
EKG QRS DURATION: 76 MS
EKG QTC CALCULATION (BAZETT): 418 MS
EKG R AXIS: -35 DEGREES
EKG T AXIS: 92 DEGREES
EKG VENTRICULAR RATE: 58 BPM
ERYTHROCYTE [DISTWIDTH] IN BLOOD BY AUTOMATED COUNT: 13.2 % (ref 11.5–14.5)
ERYTHROCYTE [DISTWIDTH] IN BLOOD BY AUTOMATED COUNT: 44.6 FL (ref 35–45)
GFR SERPL CREATININE-BSD FRML MDRD: 44 ML/MIN/1.73M2
GLUCOSE BLD-MCNC: 150 MG/DL (ref 70–108)
HCT VFR BLD CALC: 39 % (ref 37–47)
HDLC SERPL-MCNC: 44 MG/DL
HEMOGLOBIN: 12.6 GM/DL (ref 12–16)
INR BLD: 1 (ref 0.85–1.13)
LDL CHOLESTEROL CALCULATED: 81 MG/DL
MCH RBC QN AUTO: 30.1 PG (ref 26–33)
MCHC RBC AUTO-ENTMCNC: 32.3 GM/DL (ref 32.2–35.5)
MCV RBC AUTO: 93.1 FL (ref 81–99)
PLATELET # BLD: 278 THOU/MM3 (ref 130–400)
PMV BLD AUTO: 11.9 FL (ref 9.4–12.4)
POTASSIUM SERPL-SCNC: 3.5 MEQ/L (ref 3.5–5.2)
RBC # BLD: 4.19 MILL/MM3 (ref 4.2–5.4)
RH FACTOR: NORMAL
SODIUM BLD-SCNC: 137 MEQ/L (ref 135–145)
TOTAL PROTEIN: 7.3 G/DL (ref 6.1–8)
TRIGL SERPL-MCNC: 194 MG/DL (ref 0–199)
WBC # BLD: 8.6 THOU/MM3 (ref 4.8–10.8)

## 2022-07-08 PROCEDURE — 93458 L HRT ARTERY/VENTRICLE ANGIO: CPT

## 2022-07-08 PROCEDURE — 99152 MOD SED SAME PHYS/QHP 5/>YRS: CPT | Performed by: INTERNAL MEDICINE

## 2022-07-08 PROCEDURE — 85610 PROTHROMBIN TIME: CPT

## 2022-07-08 PROCEDURE — 85027 COMPLETE CBC AUTOMATED: CPT

## 2022-07-08 PROCEDURE — 93571 IV DOP VEL&/PRESS C FLO 1ST: CPT

## 2022-07-08 PROCEDURE — 36415 COLL VENOUS BLD VENIPUNCTURE: CPT

## 2022-07-08 PROCEDURE — 93571 IV DOP VEL&/PRESS C FLO 1ST: CPT | Performed by: INTERNAL MEDICINE

## 2022-07-08 PROCEDURE — C1769 GUIDE WIRE: HCPCS

## 2022-07-08 PROCEDURE — C1894 INTRO/SHEATH, NON-LASER: HCPCS

## 2022-07-08 PROCEDURE — 2580000003 HC RX 258: Performed by: INTERNAL MEDICINE

## 2022-07-08 PROCEDURE — 80061 LIPID PANEL: CPT

## 2022-07-08 PROCEDURE — 93005 ELECTROCARDIOGRAM TRACING: CPT | Performed by: INTERNAL MEDICINE

## 2022-07-08 PROCEDURE — 86901 BLOOD TYPING SEROLOGIC RH(D): CPT

## 2022-07-08 PROCEDURE — 6360000002 HC RX W HCPCS

## 2022-07-08 PROCEDURE — 86900 BLOOD TYPING SEROLOGIC ABO: CPT

## 2022-07-08 PROCEDURE — 93010 ELECTROCARDIOGRAM REPORT: CPT | Performed by: INTERNAL MEDICINE

## 2022-07-08 PROCEDURE — C1887 CATHETER, GUIDING: HCPCS

## 2022-07-08 PROCEDURE — 86850 RBC ANTIBODY SCREEN: CPT

## 2022-07-08 PROCEDURE — 6360000004 HC RX CONTRAST MEDICATION: Performed by: INTERNAL MEDICINE

## 2022-07-08 PROCEDURE — 80053 COMPREHEN METABOLIC PANEL: CPT

## 2022-07-08 PROCEDURE — 2500000003 HC RX 250 WO HCPCS

## 2022-07-08 PROCEDURE — 93458 L HRT ARTERY/VENTRICLE ANGIO: CPT | Performed by: INTERNAL MEDICINE

## 2022-07-08 RX ORDER — ONDANSETRON 2 MG/ML
4 INJECTION INTRAMUSCULAR; INTRAVENOUS EVERY 6 HOURS PRN
Status: DISCONTINUED | OUTPATIENT
Start: 2022-07-08 | End: 2022-07-08 | Stop reason: HOSPADM

## 2022-07-08 RX ORDER — ASPIRIN 325 MG
325 TABLET ORAL ONCE
Status: DISCONTINUED | OUTPATIENT
Start: 2022-07-08 | End: 2022-07-08 | Stop reason: HOSPADM

## 2022-07-08 RX ORDER — SODIUM CHLORIDE 9 MG/ML
INJECTION, SOLUTION INTRAVENOUS PRN
Status: DISCONTINUED | OUTPATIENT
Start: 2022-07-08 | End: 2022-07-08 | Stop reason: HOSPADM

## 2022-07-08 RX ORDER — ATROPINE SULFATE 0.4 MG/ML
0.5 AMPUL (ML) INJECTION
Status: DISCONTINUED | OUTPATIENT
Start: 2022-07-08 | End: 2022-07-08 | Stop reason: HOSPADM

## 2022-07-08 RX ORDER — SODIUM CHLORIDE 0.9 % (FLUSH) 0.9 %
5-40 SYRINGE (ML) INJECTION EVERY 12 HOURS SCHEDULED
Status: DISCONTINUED | OUTPATIENT
Start: 2022-07-08 | End: 2022-07-08 | Stop reason: HOSPADM

## 2022-07-08 RX ORDER — SODIUM CHLORIDE 9 MG/ML
100 INJECTION, SOLUTION INTRAVENOUS CONTINUOUS
Status: DISCONTINUED | OUTPATIENT
Start: 2022-07-08 | End: 2022-07-08 | Stop reason: HOSPADM

## 2022-07-08 RX ORDER — SODIUM CHLORIDE 9 MG/ML
INJECTION, SOLUTION INTRAVENOUS CONTINUOUS
Status: DISCONTINUED | OUTPATIENT
Start: 2022-07-08 | End: 2022-07-08 | Stop reason: HOSPADM

## 2022-07-08 RX ORDER — SODIUM CHLORIDE 0.9 % (FLUSH) 0.9 %
5-40 SYRINGE (ML) INJECTION PRN
Status: DISCONTINUED | OUTPATIENT
Start: 2022-07-08 | End: 2022-07-08 | Stop reason: HOSPADM

## 2022-07-08 RX ORDER — ACETAMINOPHEN 325 MG/1
650 TABLET ORAL EVERY 4 HOURS PRN
Status: DISCONTINUED | OUTPATIENT
Start: 2022-07-08 | End: 2022-07-08 | Stop reason: HOSPADM

## 2022-07-08 RX ADMIN — IOPAMIDOL 100 ML: 755 INJECTION, SOLUTION INTRAVENOUS at 07:52

## 2022-07-08 RX ADMIN — SODIUM CHLORIDE: 9 INJECTION, SOLUTION INTRAVENOUS at 06:05

## 2022-07-08 ASSESSMENT — LIFESTYLE VARIABLES
HOW MANY STANDARD DRINKS CONTAINING ALCOHOL DO YOU HAVE ON A TYPICAL DAY: 1 OR 2
HOW OFTEN DO YOU HAVE A DRINK CONTAINING ALCOHOL: NEVER

## 2022-07-08 NOTE — H&P
[address]   Sedation/Analgesia History and Physical    Pt Name: Karly Oliva  MRN: [id number]  YOB: 1945  Provider Performing Procedure: Steff Londono MD, MD  Primary Care Physician: Jorge Green MD      Pre-Procedure: Chest pain, possible coronary artery disease, abnormal stress test    Consent: I have discussed with the patient and/or the patient representative the indication, alternatives, and the possible risks and/or complications of the planned procedure and the anesthesia methods. The patient and/or representative appear to understand and agree to proceed. Medical History:   has a past medical history of CAD (coronary artery disease), Depression, Fibromyalgia, Fibromyalgia, GERD (gastroesophageal reflux disease), Headache(784.0), Hyperlipidemia, Hypertension, Macular degeneration, dry, MI (myocardial infarction) ([address] Osteoarthritis, SOB (shortness of breath), Type 2 diabetes mellitus without complication, without long-term current use of insulin ([address] and Urinary incontinence. .    Surgical History:     has a past surgical history that includes Cholecystectomy (1975); Carpal tunnel release; cyst removal; Coronary angioplasty with stent (07/2011); Colon surgery (2010); Coronary angioplasty with stent; Arm Surgery (Left, 11/20/2018); Colonoscopy; Endoscopy, colon, diagnostic; fracture surgery; Dilatation, esophagus; and Cardiac surgery. Allergies:    Allergies as of 07/08/2022 - Fully Reviewed 07/08/2022   Allergen Reaction Noted    Benadryl [diphenhydramine hcl]  09/19/2011    Flexeril [cyclobenzaprine hcl]  09/19/2011       Medications:   Coumadin use last 5 days:  No  Antiplatelet drug therapy use last 5 days:  Yes  Other anticoagulant use last 5 days:  No   sodium chloride flush  5-40 mL IntraVENous 2 times per day    aspirin  325 mg Oral Once    hydrocortisone sodium succinate PF  200 mg IntraVENous Once     Prior to Admission medications    Medication Sig Start Date End Date Taking? Authorizing Provider   isosorbide mononitrate (IMDUR) 60 MG extended release tablet Take 1 tablet by mouth daily 5/31/22   Jorge Green MD   amLODIPine (NORVASC) 10 MG tablet Take 1 tablet by mouth in the morning and at bedtime 5/31/22   Jorge Green MD   mirabegron (MYRBETRIQ) 50 MG TB24 Take 50 mg by mouth daily 5/31/22   Jorge Green MD   losartan (COZAAR) 50 MG tablet Take 1 tablet by mouth daily 5/18/22   Shayy Lozoya MD   metoprolol succinate (TOPROL XL) 50 MG extended release tablet TAKE 1 TABLET  Twice aday 5/18/22   Steff Londono MD   Multiple Vitamin (MULTI VITAMIN DAILY PO) Take by mouth daily    Historical Provider, MD   Loratadine (CLARITIN PO) Take by mouth as needed Takes Equate version as needed - April 2022    Historical Provider, MD   Fluticasone Propionate (FLONASE NA) by Nasal route as needed    Historical Provider, MD   topiramate (TOPAMAX) 50 MG tablet Take 1 tablet by mouth daily 2/3/22   Theresa Cockayne, MD   albuterol sulfate HFA (VENTOLIN HFA) 108 (90 Base) MCG/ACT inhaler Inhale 2 puffs into the lungs 4 times daily as needed for Wheezing 1/31/22   Jorge Green MD   fenofibrate (TRICOR) 145 MG tablet TAKE 1 TABLET DAILY 11/29/21   Jorge Green MD   atorvastatin (LIPITOR) 40 MG tablet TAKE 1 TABLET DAILY 8/30/21   Jorge Green MD   venlafaxine (EFFEXOR XR) 150 MG extended release capsule TAKE 1 CAPSULE DAILY 8/2/21   Jogre Green MD   zoledronic acid (RECLAST) 5 MG/100ML SOLN Infuse 100 mLs intravenously once for 1 dose 8/31/20 4/5/22  Jorge Green MD   aspirin EC 81 MG EC tablet Take 1 tablet by mouth daily 4/30/19   Peggy Arora MD   darifenacin (ENABLEX) 15 MG SR tablet Take 1 tablet by mouth daily.  9/26/13 4/5/22  Jorge Green MD       Vital Signs  Vitals:    07/08/22 0551   BP:    Pulse: 61   Resp:    Temp:    SpO2:        Physical:  Heart:  regular rate and rhythm  Lungs:  Clear  Abdomen:  Soft  Mental Status:  Alert and Oriented    Planned Procedure:  Left Heart Cath and Possible Percutaneous Coronary Intervention    Sedation/ Anesthesia Plan: Midazolam and Sublimaze    ASA Classification: Class 3 - A patient with severe systemic disease that limits activity but is not incapacitating    Mallampati Airway Classification: II (soft palate, uvula, fauces visible)    · Pre-procedure diagnostic studies complete and results available. · Previous sedation/anesthesia experiences assessed. · The patient is an appropriate candidate to undergo the planned procedure sedation and anesthesia. (Refer to nursing sedation/analgesia documentation record)  · Formulation and discussion of sedation/procedure plan, risks, and expectations with patient and/or responsible adult completed. · Patient examined immediately prior to the procedure.  (Refer to nursing sedation/analgesia documentation record)    Kimberly Grande MD, MD  Electronically signed 7/8/2022 at 6:54 AM  Patient Name: Judy Hess Record Number: [id number]  Date: 7/8/2022   Time: 6:54 AM   Room/Bed: 2E-15/015-A

## 2022-07-08 NOTE — PROCEDURES
[address] [place] [address]                            CARDIAC CATHETERIZATION    PATIENT NAME: Ashlie Ortega                 :        1945  MED REC NO:   [id number]                           ROOM:       0015  ACCOUNT NO:   [de-identified]                           ADMIT DATE: 2022  PROVIDER:     Swati Rodrigues. Merna Moreno M.D.    [facility]:  2022    CATH REPORT    BRIEF HISTORY:  This is a patient, who is a 60-year-old lady with  history of coronary artery disease. She had a history of prior  intervention of the RCA, LAD, and the circ. The patient had been  complaining of frequent shortness of breath and chest pain. She did  have the stress Cardiolite test, which was abnormal.  She was treated  medically, continued to be symptomatic, admitted for a heart cath,  possible intervention. She understands the procedure, the benefits, the  risks, the alternative methods of treatment, the possible complications,  and wants it to be done. ESTIMATED BLOOD LOSS:  15 mL. DESCRIPTION OF PROCEDURE:  1.  IV conscious sedation. The patient was given IV conscious sedation  in incremental dosage by the circulating cath lab RN, monitored by the  cath lab monitor tech under my supervision. Procedure was started at  07:10 and finished at 07:40. No acute complication from the procedure. 2.  Left heart cath: The right radial artery cannulated with a 6-[place]  radial sheath. Heparin, nitroglycerin, and verapamil were given through  the sheath per the protocol. Coronary angiogram showed the RCA is a  dominant artery. The stented area of the RCA is still patent, about 50%  narrowing at the ostium of the PDA of the RCA. RCA is a dominant  artery. Left main is short and patent, bifurcates to the LAD and circumflex,  stented area of the circumflex is still patent. Distal circumflex was  patent.     The stented area of the LAD was patent; however, distal to the stented  area in the mid LAD, there is an area about 60% narrowing. After that,  the LAD becomes a small caliber artery with diffuse disease. The diagonal artery was patent. The stented area of the diagonal artery  was patent, about 50% ostial narrowing, no change. The left ventriculogram showed hypertrophic cardiomyopathy. The  gradient from the apex to the left ventricular outflow is about 9 mmHg. No gradient across the aortic valve. No mitral regurgitation. Ejection  fraction about 65%. 3.  FFR of the LAD:  Because of the ambiguity of the _____ lesion, an  FFR was made. The left main cannulated with a 6 x 3.5 left [place] guide  catheter. The patient received IV heparin. After the appropriate  calibration, FFR wire was placed at the distal end of the LAD. The patient was given adenosine over 4 minutes. The average ratio was  0.88. These findings does not support the intervention of the LAD at  this point. The patient tolerated the procedure well. IMPRESSION:  In summary,  1. Preserved systolic function of the left ventricle, hyperdynamic left  ventricle, ejection fraction about 65%. 2.  Hypertrophic cardiomyopathy. The gradient from the apex to the left  ventricular outflow about 9 mmHg. 3.  No wall motion abnormalities. No mitral regurgitation. No gradient  across the aortic valve. 4.  Dominant RCA, stented area of the RCA is still patent, 50% narrowing  to the ostium of the PDA of the RCA. 5.  Patent short left main. 6.  Patent circumflex. The area of the prior intervention of the  circumflex is still patent. 7.  The stented area of the LAD is still patent. The mid LAD does have  60% narrowing, FFR of that area showed the ratio is 0.88, does not  support the need for intervention. 8.  50% narrowing of the ostium of the diagonal artery.     RECOMMENDATIONS:  At this point, we will continue with medical  treatment, risk factor modifications, periodic followup, close  monitoring, and consider cardiac rehab for chronic stable angina  pectoris and the patient will be seen in the office. Clyde Randhawa M.D.    D: 07/08/2022 8:26:02       T: 07/08/2022 9:49:07     [email or web address]  Job#: [id number]     Doc#: [id number]    CC:  Shayy Archibald M.D.

## 2022-07-08 NOTE — PROGRESS NOTES
Patient admitted to 2E15 Ambulatory for cardiac cath. Patient NPO. Patient accompanied by family. Vital signs obtained. Assessment and data collection intiated. Oriented to room. Policies and procedures for 2E explained. All questions answered with no further questions at this time. Fall prevention and safety precautions discussed with patient.

## 2022-07-08 NOTE — OP NOTE
Operative Note      Patient: [address]  Sedation/Analgesia Post Sedation Record      Pt Name: Fidelina Asencio  MRN: [id number]  YOB: 1945  Procedure Performed By: Louann Turner MD, MD  Primary Care Physician: Jose Beasley MD    POST-PROCEDURE    Physician: Louann Turner MD, MD    Procedure Performed:  FFR OF LAD and Left Heart Cath    Sedation/Anesthesia:  Local Anesthesia and IV Conscious Sedation with continuous O2 monitoring    Estimated Blood Loss:  Minimal    Specimens Removed:  None    Complications:  None     Post Procedure Diagnosis/Findings:  Coronary Artery Disease    Recommendations:  Medical treatment and review films.        Louann Turner MD, MD  Electronically signed 7/8/2022 at 7:53 AM

## 2022-07-11 ENCOUNTER — CARE COORDINATION (OUTPATIENT)
Dept: CARE COORDINATION | Age: 77
End: 2022-07-11

## 2022-07-11 NOTE — CARE COORDINATION
This covering Nohemi Cano with Francesca briefly today. Pt is s/p cardiac cath completed on 7/8. Pt reports that right wrist looks good. No bruising, redness, pain, or drainage. Advised to continue to monitor. Denies questions regarding d/c instructions. Reminded pt of f/u appt that is scheduled with Dr. Elyse Weinstein on 8/11. No new needs or concerns voice during call today.

## 2022-07-14 ENCOUNTER — CARE COORDINATION (OUTPATIENT)
Dept: CARE COORDINATION | Age: 77
End: 2022-07-14

## 2022-07-14 NOTE — CARE COORDINATION
Ambulatory Care Coordination Note  7/14/2022  CM Risk Score: 4  Charlson 10 Year Mortality Risk Score: 100%     ACC: Alfred Klinefelter, RN    Summary Note: spoke with patient for continued care coordination for COPD, DM, and healthcare needs  Reports is feeling well and doing fine  No needs or issues to report  DM - does not check Blood sugar but states no symptoms of hypo/hyperglycemia. Advised to report changes to PCP or ACM, voiced understanding  COPD - at baseline. Denies SOB, cough, or congestions. Does not check pulse ox. Heart cath last week, denies bruising or concerns at site. Follows up with Dr. Yash Fang on 8/11/22    Plan:  Continue with care coordination follow up calls for management of COPD, DM, and healthcare needs  Educated on importance of early symptom recognition and reporting to prevent ED/hospitalization  8/11/22 appointment with Dr. Yash Fang  9/9/22 appointment with Neurology       Lab Results     None          Care Coordination Interventions    Program Enrollment: Complex Care  Referral from Primary Care Provider: No  Suggested Interventions and Community Resources  Diabetes Education: Completed (Comment: April 2022)  Disease Specific Clinic: Declined (Comment: April 2022)  Medication Assistance Program: Declined (Comment: Denied need - Instructed to call with changes - April 2022)  Medi Set or Pill Pack: Completed (Comment: April 2022)  Pharmacist: Kandi Canseco (Comment: April 2022)  Registered Dietician: Declined (Comment: April 2022)  Social Work: Declined (Comment: Denied any resource needs - April 2022)  Transportation Support: Declined  Zone Management Tools: Completed (Comment: DM and COPD - April 2022)  Other Services or Interventions: Follows with [facility] and [facility] Neurology - April 2022         Goals Addressed                 This Visit's Progress     Conditions and Symptoms   On track     I will schedule office visits, as directed by my provider.   I will keep my appointment or reschedule if I have to cancel. I will notify my provider of any barriers to my plan of care. I will follow my Zone Management tool to seek urgent or emergent care. I will notify my provider of any symptoms that indicate a worsening of my condition. Barriers: lack of education  Plan for overcoming my barriers: Ongoing education and monitoring of resource needs  Confidence: 9/10  Anticipated Goal Completion Date: 7/10/2022                Prior to Admission medications    Medication Sig Start Date End Date Taking?  Authorizing Provider   isosorbide mononitrate (IMDUR) 60 MG extended release tablet Take 1 tablet by mouth daily 5/31/22   Manuela Norris MD   amLODIPine (NORVASC) 10 MG tablet Take 1 tablet by mouth in the morning and at bedtime 5/31/22   Manuela Norris MD   mirabegron (MYRBETRIQ) 50 MG TB24 Take 50 mg by mouth daily 5/31/22   Manuela Norris MD   losartan (COZAAR) 50 MG tablet Take 1 tablet by mouth daily 5/18/22   [place] Wes Kimball MD   metoprolol succinate (TOPROL XL) 50 MG extended release tablet TAKE 1 TABLET  Twice aday 5/18/22   Amara Garcia MD   Multiple Vitamin (MULTI VITAMIN DAILY PO) Take by mouth daily    Historical Provider, MD   Loratadine (CLARITIN PO) Take by mouth as needed Takes Equate version as needed - April 2022    Historical Provider, MD   Fluticasone Propionate (FLONASE NA) by Nasal route as needed    Historical Provider, MD   topiramate (TOPAMAX) 50 MG tablet Take 1 tablet by mouth daily 2/3/22   Arabella Francois MD   albuterol sulfate HFA (VENTOLIN HFA) 108 (90 Base) MCG/ACT inhaler Inhale 2 puffs into the lungs 4 times daily as needed for Wheezing 1/31/22   Manuela Norris MD   fenofibrate (TRICOR) 145 MG tablet TAKE 1 TABLET DAILY 11/29/21   Manuela Norris MD   atorvastatin (LIPITOR) 40 MG tablet TAKE 1 TABLET DAILY 8/30/21   Manuela Norris MD   venlafaxine (EFFEXOR XR) 150 MG extended release capsule TAKE 1 CAPSULE DAILY 8/2/21   Manuela Norris MD   zoledronic acid

## 2022-07-28 RX ORDER — VENLAFAXINE HYDROCHLORIDE 150 MG/1
CAPSULE, EXTENDED RELEASE ORAL
Qty: 90 CAPSULE | Refills: 3 | Status: SHIPPED | OUTPATIENT
Start: 2022-07-28

## 2022-08-04 ENCOUNTER — CARE COORDINATION (OUTPATIENT)
Dept: CARE COORDINATION | Age: 77
End: 2022-08-04

## 2022-08-04 ASSESSMENT — ENCOUNTER SYMPTOMS: DYSPNEA ASSOCIATED WITH: EXERTION

## 2022-08-04 NOTE — CARE COORDINATION
Ambulatory Care Coordination Note  8/4/2022    ACC: Messi Field, RN    Summary Note: Patient was called for continued Care Coordination follow up and education re: the management of her COPD, DM, and healthcare needs. Patient shared she has been doing well and she denied any current complaints or concerns. Patient denied any recent c/o chest pain being present. Patient stated her breathing remains at her baseline and she denied any c/o increased SOB or cough being present. COPD education and zone information reviewed with patient. ACM educated patient on environmental risk factors as well as signs/symptoms patient should call and report. Patient verbalized understanding. Importance of avoiding extreme outside heat during hot/humid days as well as need to break activities up and take rest breaks reviewed with patient and she acknowledged understanding. Patient denied any other questions, concerns, or needs and she was encouraged to call with any that may develop.           Actions:   - Reviewed COPD education and zone information   - Reviewed signs/symptoms to report and importance of early symptom recognition and follow up   - Reviewed environmental risk factors   - Monitored for any new/change of symptoms or new concerns   - Monitored for additional needs          Plan of Care:   - Continue with Care Coordination f/u calls for assistance with the management of her DM, COPD, and healthcare needs  - Complete DM education - In Process   - Complete COPD education - In Process  - Educate on the availability of same day appointments, urgent care/walk in clinic options, and after hours on call resources - Completed   - Review and verify Healthcare Decision Maker information  - Patient has Living Will  - Patient is current with COVID-19, Flu, and Pneumonia vaccines  - Patient is due for Medicare AWV - June 2022  - Monitor for additional needs   - If patient remains stable will consider discharge from Care Coordination in the near future       Lab Results       None            Care Coordination Interventions    Program Enrollment: Complex Care  Referral from Primary Care Provider: No  Suggested Interventions and Community Resources  Diabetes Education: Completed (Comment: April 2022)  Disease Specific Clinic: Declined (Comment: April 2022)  Medication Assistance Program: Declined (Comment: Denied need - Instructed to call with changes - April 2022)  Medi Set or Pill Pack: Completed (Comment: April 2022)  Pharmacist: Alva Pierce (Comment: April 2022)  Registered Dietician: Declined (Comment: April 2022)  Social Work: Declined (Comment: Denied any resource needs - April 2022)  Transportation Support: Declined  Zone Management Tools: Completed (Comment: DM and COPD - April 2022)  Other Services or Interventions: Follows with [facility] and [facility] - April 2022          Goals Addressed                   This Visit's Progress       Care Coordination     Conditions and Symptoms   Improving     I will schedule office visits, as directed by my provider. I will keep my appointment or reschedule if I have to cancel. I will notify my provider of any barriers to my plan of care. I will follow my Zone Management tool to seek urgent or emergent care. I will notify my provider of any symptoms that indicate a worsening of my condition. Barriers: lack of education  Plan for overcoming my barriers: Ongoing education and monitoring of resource needs  Confidence: 9/10  Anticipated Goal Completion Date: 7/10/2022                  Prior to Admission medications    Medication Sig Start Date End Date Taking?  Authorizing Provider   venlafaxine (EFFEXOR XR) 150 MG extended release capsule TAKE 1 CAPSULE DAILY 7/28/22   Louann Walls MD   isosorbide mononitrate (IMDUR) 60 MG extended release tablet Take 1 tablet by mouth daily 5/31/22   Louann Walls MD   amLODIPine (NORVASC) 10 MG tablet Take 1 tablet by mouth in the morning and at bedtime 5/31/22   Marianna Winter MD   mirabegron (MYRBETRIQ) 50 MG TB24 Take 50 mg by mouth daily 5/31/22   Marianna Winter MD   losartan (COZAAR) 50 MG tablet Take 1 tablet by mouth daily 5/18/22   Shayy Alcala MD   metoprolol succinate (TOPROL XL) 50 MG extended release tablet TAKE 1 TABLET  Twice aday 5/18/22   Jose Rosario MD   Multiple Vitamin (MULTI VITAMIN DAILY PO) Take by mouth daily    Historical Provider, MD   Loratadine (CLARITIN PO) Take by mouth as needed Takes Equate version as needed - April 2022    Historical Provider, MD   Fluticasone Propionate (FLONASE NA) by Nasal route as needed    Historical Provider, MD   topiramate (TOPAMAX) 50 MG tablet Take 1 tablet by mouth daily 2/3/22   Diana Oconnor MD   albuterol sulfate HFA (VENTOLIN HFA) 108 (90 Base) MCG/ACT inhaler Inhale 2 puffs into the lungs 4 times daily as needed for Wheezing 1/31/22   Marianna Winter MD   fenofibrate (TRICOR) 145 MG tablet TAKE 1 TABLET DAILY 11/29/21   Marianna Winter MD   atorvastatin (LIPITOR) 40 MG tablet TAKE 1 TABLET DAILY 8/30/21   Marianna Winter MD   zoledronic acid (RECLAST) 5 MG/100ML SOLN Infuse 100 mLs intravenously once for 1 dose 8/31/20 4/5/22  Marianna Winter MD   aspirin EC 81 MG EC tablet Take 1 tablet by mouth daily 4/30/19   Elisabet Perez MD   darifenacin (ENABLEX) 15 MG SR tablet Take 1 tablet by mouth daily.  9/26/13 4/5/22  Marianna Winter MD       Future Appointments   Date Time Provider Harvinder Ramon   8/11/2022 10:15 AM Shayy Alcala MD [facility]   9/9/2022  9:00 AM Diana Oconnor MD [facility] [place]   10/4/2022 10:15 AM MD Aleyda Georges [email or web address]   4/28/2023 10:00 AM Lazarus Gasmen, APRN -  [address] [place] [address]   ,   Diabetes Assessment      Meal Planning: Avoidance of concentrated sweets, Plate Method   How often do you test your blood sugar?: No Testing (Comment: April 2022)   Do you have barriers with adherence to non-pharmacologic self-management interventions?  (Nutrition/Exercise/Self-Monitoring): No   Have you ever had to go to the ED for symptoms of low blood sugar?: No       No patient-reported symptoms   Do you have hyperglycemia symptoms?: No   Do you have hypoglycemia symptoms?: No   Blood Sugar Monitoring Regimen: Not Testing   Blood Sugar Trends: No Change        ,   COPD Assessment    Does the patient understand envrionmental exposure?: Yes  Is the patient able to verbalize Rescue vs. Long Acting medications?: No  Does the patient have a nebulizer?: No  Does the patient use a space with inhaled medications?: No     No patient-reported symptoms         Symptoms:     Symptom course: stable  Breathlessness: exertion  Increase use of rapid acting/rescue inhaled medications?: No  Change in chronic cough?: No/At Baseline  Change in sputum?: No/At Baseline     ,   General Assessment    Do you have any symptoms that are causing concern?: No     , and Care Coordination Episodes    Type: Amb Care Management  Episode: Complex Care  Noted: 4/5/2022  Comments: List referral - DM/COPD

## 2022-08-11 ENCOUNTER — OFFICE VISIT (OUTPATIENT)
Dept: CARDIOLOGY CLINIC | Age: 77
End: 2022-08-11
Payer: MEDICARE

## 2022-08-11 VITALS
HEIGHT: 63 IN | BODY MASS INDEX: 29.73 KG/M2 | DIASTOLIC BLOOD PRESSURE: 62 MMHG | RESPIRATION RATE: 18 BRPM | SYSTOLIC BLOOD PRESSURE: 126 MMHG | WEIGHT: 167.8 LBS

## 2022-08-11 DIAGNOSIS — E78.5 DYSLIPIDEMIA (HIGH LDL; LOW HDL): Primary | ICD-10-CM

## 2022-08-11 PROCEDURE — 1123F ACP DISCUSS/DSCN MKR DOCD: CPT | Performed by: INTERNAL MEDICINE

## 2022-08-11 PROCEDURE — 99213 OFFICE O/P EST LOW 20 MIN: CPT | Performed by: INTERNAL MEDICINE

## 2022-08-11 RX ORDER — ATORVASTATIN CALCIUM 80 MG/1
80 TABLET, FILM COATED ORAL DAILY
Qty: 90 TABLET | Refills: 3 | Status: SHIPPED | OUTPATIENT
Start: 2022-08-11

## 2022-08-11 RX ORDER — LOSARTAN POTASSIUM 100 MG/1
TABLET ORAL
COMMUNITY
Start: 2022-05-13

## 2022-08-11 ASSESSMENT — ENCOUNTER SYMPTOMS
VOMITING: 0
BLOOD IN STOOL: 0
COUGH: 0
CHOKING: 0
STRIDOR: 0
VOICE CHANGE: 0
NAUSEA: 0
SHORTNESS OF BREATH: 1
CHEST TIGHTNESS: 0
ANAL BLEEDING: 0
APNEA: 0
TROUBLE SWALLOWING: 0
WHEEZING: 0
COLOR CHANGE: 0
ABDOMINAL DISTENTION: 0
ABDOMINAL PAIN: 0

## 2022-08-11 NOTE — PROGRESS NOTES
[address]  Dept: [address]  Loc: [phone]     8/11/2022       Kolby Dutta is here today for   Chief Complaint   Patient presents with    Follow-up           Referring Physician:  No ref. provider found     Patient Active Problem List   Diagnosis    Depression    Urinary frequency    Hypertension    Hyperlipidemia    Tobacco abuse    Post PTCA    Osteopenia    Leg swelling    Left leg pain    Impaired ambulation    Anemia    Panlobular emphysema (HCC)    Senile osteoporosis    Osteopenia of multiple sites    Postmenopausal status (age-related) (natural)    Type 2 diabetes mellitus without complication, without long-term current use of insulin (HCC)    COVID-19    Recurrent major depressive disorder, in full remission ([facility]    Chronic renal disease, stage III (HCC) [760300]    Cough with congestion of paranasal sinus    Angina pectoris, unspecified    Atherosclerotic heart disease of native coronary artery with unspecified angina pectoris    Angina pectoris syndrome (HCC)    Abnormal nuclear stress test    Coronary artery disease involving native coronary artery of native heart       Review of Systems   Constitutional:  Negative for activity change, appetite change, fatigue, fever and unexpected weight change. HENT:  Negative for congestion, trouble swallowing and voice change. Eyes:  Negative for visual disturbance. Respiratory:  Positive for shortness of breath. Negative for apnea, cough, choking, chest tightness, wheezing and stridor. Cardiovascular:  Negative for chest pain, palpitations and leg swelling. Gastrointestinal:  Negative for abdominal distention, abdominal pain, anal bleeding, blood in stool, nausea and vomiting. Endocrine: Negative for cold intolerance and heat intolerance. Genitourinary:  Negative for hematuria.    Musculoskeletal:  Negative for arthralgias, gait problem, joint swelling and myalgias. Skin:  Negative for color change and rash. Allergic/Immunologic: Negative for environmental allergies and food allergies. Neurological:  Negative for dizziness, tremors, syncope, facial asymmetry, weakness, light-headedness, numbness and headaches. Hematological:  Does not bruise/bleed easily. Psychiatric/Behavioral:  Negative for agitation, behavioral problems and sleep disturbance. Past Medical History:   Diagnosis Date    CAD (coronary artery disease)     Depression     Fibromyalgia     Fibromyalgia     GERD (gastroesophageal reflux disease)     Dr Francheska Gómez)     Hyperlipidemia     Hypertension     Macular degeneration, dry     MI (myocardial infarction) ([facility]     Dr Merline Seitz    Osteoarthritis     SOB (shortness of breath)     Dr Yury Majano    Type 2 diabetes mellitus without complication, without long-term current use of insulin ([facility])     Urinary incontinence        Allergies   Allergen Reactions    Benadryl [Diphenhydramine Hcl]      OVER-STIMULATED    Flexeril [Cyclobenzaprine Hcl]      OVER-STIMULATED         Current Outpatient Medications   Medication Sig Dispense Refill    atorvastatin (LIPITOR) 80 MG tablet Take 1 tablet by mouth in the morning.  90 tablet 3    venlafaxine (EFFEXOR XR) 150 MG extended release capsule TAKE 1 CAPSULE DAILY 90 capsule 3    isosorbide mononitrate (IMDUR) 60 MG extended release tablet Take 1 tablet by mouth daily 90 tablet 3    amLODIPine (NORVASC) 10 MG tablet Take 1 tablet by mouth in the morning and at bedtime 180 tablet 3    mirabegron (MYRBETRIQ) 50 MG TB24 Take 50 mg by mouth daily 90 tablet 3    metoprolol succinate (TOPROL XL) 50 MG extended release tablet TAKE 1 TABLET  Twice aday 180 tablet 3    Multiple Vitamin (MULTI VITAMIN DAILY PO) Take by mouth daily      Loratadine (CLARITIN PO) Take by mouth as needed Takes Equate version as needed - April 2022      Fluticasone Propionate Activity: Insufficiently Active    Days of Exercise per Week: 6 days    Minutes of Exercise per Session: 10 min   Stress: No Stress Concern Present    Feeling of Stress : Not at all   Social Connections: Moderately Integrated    Frequency of Communication with Friends and Family: More than three times a week    Frequency of Social Gatherings with Friends and Family: More than three times a week    Attends Restorationist Services: More than 4 times per year    Active Member of [address] [place] [address] trippiece or Organizations: Yes    Attends Club or Organization Meetings: More than 4 times per year    Marital Status:    Housing Stability: Low Risk     Unable to Pay for Housing in the Last Year: No    Number of Places Lived in the Last Year: 1    Unstable Housing in the Last Year: No       Family History   Problem Relation Age of Onset    COPD Father     Hearing Loss Father     Cancer Mother 39        uterine and breast    Heart Disease Mother     Diabetes Mother     High Cholesterol Mother     Arthritis Mother     Coronary Art Dis Mother     Heart Attack Mother     Diabetes Sister     High Cholesterol Sister     Stroke Sister 72    Allergy (Severe) Sister     Arthritis Sister     Miscarriages / Stillbirths Sister     Cancer Maternal Uncle     Heart Attack Paternal Grandmother 76        MI    Arthritis Paternal Grandmother     Obesity Paternal Grandmother     Early Death Maternal Grandmother     Alcohol Abuse Maternal Grandfather        Blood pressure 126/62, resp. rate 18, height 5' 3\" (1.6 m), weight 167 lb 12.8 oz (76.1 kg).     Physical Exam:    General Appearance: alert and oriented to person, place and time, in no acute distress  Cardiovascular: normal rate, regular rhythm, normal S1 and S2, no murmurs, rubs, clicks, or gallops, distal pulses intact, no carotid bruits, no JVD  Pulmonary/Chest: clear to auscultation bilaterally- no wheezes, rales or rhonchi, normal air movement, no respiratory distress  Abdomen: soft, non-tender, non-distended, normal bowel sounds, no masses   Extremities: no cyanosis, clubbing or edema, pulse   Skin: warm and dry, no rash or erythema  Head: normocephalic and atraumatic  Eyes: pupils equal, round, and reactive to light  Neck: supple and non-tender without mass, no thyromegaly   Musculoskeletal: normal range of motion, no joint swelling, deformity or tenderness  Neurological: alert, oriented, normal speech, no focal findings or movement disorder noted    Lab Data:    Cardiac Enzymes:  No results for input(s): CKTOTAL, CKMB, CKMBINDEX, TROPONINI in the last 72 hours.     CBC:   Lab Results   Component Value Date/Time    WBC 8.6 07/08/2022 05:44 AM    RBC 4.19 07/08/2022 05:44 AM    RBC 4.07 09/16/2011 10:04 AM    HGB 12.6 07/08/2022 05:44 AM    HCT 39.0 07/08/2022 05:44 AM     07/08/2022 05:44 AM       CMP:    Lab Results   Component Value Date/Time     07/08/2022 05:44 AM    K 3.5 07/08/2022 05:44 AM    K 4.3 07/18/2018 05:25 AM     07/08/2022 05:44 AM    CO2 19 07/08/2022 05:44 AM    BUN 27 07/08/2022 05:44 AM    CREATININE 1.2 07/08/2022 05:44 AM    LABGLOM 44 07/08/2022 05:44 AM    GLUCOSE 150 07/08/2022 05:44 AM    GLUCOSE 95 03/16/2012 11:40 AM    CALCIUM 9.9 07/08/2022 05:44 AM       Hepatic Function Panel:    Lab Results   Component Value Date/Time    ALKPHOS 45 07/08/2022 05:44 AM    ALT 29 07/08/2022 05:44 AM    AST 31 07/08/2022 05:44 AM    PROT 7.3 07/08/2022 05:44 AM    BILITOT 0.3 07/08/2022 05:44 AM    BILIDIR <0.2 05/14/2022 11:55 AM    LABALBU 4.6 07/08/2022 05:44 AM    LABALBU 4.5 03/16/2012 11:40 AM       Magnesium:    Lab Results   Component Value Date/Time    MG 2.3 04/25/2019 07:01 PM       PT/INR:    Lab Results   Component Value Date/Time    PROTIME 0.96 07/26/2011 03:10 PM    INR 1.00 07/08/2022 05:44 AM       HgBA1c:    Lab Results   Component Value Date/Time    LABA1C 6.6 05/31/2022 11:09 AM       FLP:    Lab Results   Component Value Date/Time    TRIG 194 07/08/2022 05:44 AM    HDL 44 07/08/2022 05:44 AM    LDLCALC 81 07/08/2022 05:44 AM       TSH:    Lab Results   Component Value Date/Time    TSH 2.140 05/31/2022 11:09 AM        Diagnosis Orders   1. Dyslipidemia (high LDL; low HDL)  CBC    Basic Metabolic Panel    Lipid Panel    Hepatic Function Panel           Assessment/Plan    LentnerELISABETH [facility] is 78years old lady who is known to have a history of coronary artery disease prior stenting    She did had a heart cath recently showed nonobstructive coronary artery disease and for the medical treatment would be the way to go. She is here for follow-up the cath findings were discussed with her plan of treatment discussed with her all her questions were answered to her satisfaction she will continue current medication. Her LDL is an on the high side and I increase her Lipitor to an 80 mg. This patient was somewhat overweight advised about diet and exercise she has history of hyper tension has been under control she was advised about diet and exercise. Overall cardiac wise she is stable continue current medication she will be seen annually and seek medical attention with any change in clinical condition as mentioned Lipitor increased from 40-80 risk medication the same    Orders Placed This Encounter   Procedures    CBC     Standing Status:   Future     Standing Expiration Date:   1/77/4853    Basic Metabolic Panel     Standing Status:   Future     Standing Expiration Date:   8/11/2023    Lipid Panel     Standing Status:   Future     Standing Expiration Date:   8/11/2023     Order Specific Question:   Is Patient Fasting?/# of Hours     Answer:   12 hours    Hepatic Function Panel     Standing Status:   Future     Standing Expiration Date:   8/11/2023       Return in about 1 year (around 8/11/2023) for cad.      Meeta Finnegan MD

## 2022-08-22 ENCOUNTER — HOSPITAL ENCOUNTER (OUTPATIENT)
Dept: WOMENS IMAGING | Age: 77
Discharge: HOME | End: 2022-08-22
Payer: MEDICARE

## 2022-08-22 DIAGNOSIS — Z12.31 VISIT FOR SCREENING MAMMOGRAM: ICD-10-CM

## 2022-08-22 PROCEDURE — 77063 BREAST TOMOSYNTHESIS BI: CPT

## 2022-08-23 ENCOUNTER — CARE COORDINATION (OUTPATIENT)
Dept: CARE COORDINATION | Age: 77
End: 2022-08-23

## 2022-08-23 ASSESSMENT — ENCOUNTER SYMPTOMS: DYSPNEA ASSOCIATED WITH: EXERTION

## 2022-08-23 NOTE — CARE COORDINATION
Ambulatory Care Coordination Note  8/23/2022    ACC: Jorge Ruiz RN    Summary Note: Patient was called for continued Care Coordination follow up and education re: the management of her COPD, DM, and healthcare needs. Patient shared she continues to do well and she denied any current complaints or concerns. Patient reported she recently f/u with cardiology and they adjusted her Lipitor. Med list was reviewed and Med Rec completed. ACM reviewed importance of taking medications as directed and alerting providers of any medication changes. Patient verbalized understanding. Patient reported her breathing remains at her baseline but admits she does have a slight cough. COPD education and zone information was reviewed with patient. Patient was educated on signs/symptoms to report and the importance of early symptom recognition to prevent unplanned ED/hospital visits. ACM reviewed availability of same day appointments as well as walk in clinic options and patient verbalized understanding. Patient denied any concerns with hypoglycemia/hyperglycemia and DM education and zone information was briefly reviewed. ACM reviewed on the importance of routine f/u as well as need to write down any questions/concerns and bring with her to her appointments. Upcoming AWV appointment information reviewed. Healthcare Decision Maker information reviewed and verified with patient and ACP note completed. Patient denied any other questions, concerns, or needs and she was encouraged to call with any that may develop. Patient has remained stable and is aware of the resources available to her so will plan to discharge from Care Coordination at this time. Patient verbalized understanding and agreement.           Actions:   - Reviewed COPD and DM education and zone information   - Reviewed signs/symptoms to report as well as the importance of early symptom recognition and follow up   - Reviewed availability of same day appointments, urgent care/walk in clinic options, and after hours on call resources   - Reviewed upcoming appointment information   - [place] information and completed ACP note   - Monitored for additional needs          Plan of Care:   - Discharge from Care Coordination        Lab Results       None            Care Coordination Interventions    Program Enrollment: Complex Care  Referral from Primary Care Provider: No  Suggested Interventions and Community Resources  Diabetes Education: Completed (Comment: April 2022)  Disease Specific Clinic: Declined (Comment: April 2022)  Medication Assistance Program: Declined (Comment: Denied need - Instructed to call with changes - April 2022)  Medi Set or Pill Pack: Completed (Comment: April 2022)  Pharmacist: Fernanda Ortiz (Comment: April 2022)  Registered Dietician: Declined (Comment: April 2022)  Social Work: Declined (Comment: Denied any resource needs - April 2022)  Transportation Support: Declined  Zone Management Tools: Completed (Comment: DM and COPD - April 2022)  Other Services or Interventions: Follows with [facility] and [facility] - April 2022          Goals Addressed                   This Visit's Progress       Care Coordination     Conditions and Symptoms   On track     I will schedule office visits, as directed by my provider. I will keep my appointment or reschedule if I have to cancel. I will notify my provider of any barriers to my plan of care. I will follow my Zone Management tool to seek urgent or emergent care. I will notify my provider of any symptoms that indicate a worsening of my condition. Barriers: lack of education  Plan for overcoming my barriers: Ongoing education and monitoring of resource needs  Confidence: 9/10  Anticipated Goal Completion Date: 7/10/2022                  Prior to Admission medications    Medication Sig Start Date End Date Taking?  Authorizing Provider   losartan (COZAAR) 100 MG tablet 5/13/22  Yes Historical Provider, MD   atorvastatin (LIPITOR) 80 MG tablet Take 1 tablet by mouth in the morning. 8/11/22  Yes Marian Dillard MD   venlafaxine (EFFEXOR XR) 150 MG extended release capsule TAKE 1 CAPSULE DAILY 7/28/22  Yes Iza Augustine MD   isosorbide mononitrate (IMDUR) 60 MG extended release tablet Take 1 tablet by mouth daily 5/31/22  Yes Iza Augustine MD   amLODIPine (NORVASC) 10 MG tablet Take 1 tablet by mouth in the morning and at bedtime 5/31/22  Yes Iza Augustine MD   mirabegron (MYRBETRIQ) 50 MG TB24 Take 50 mg by mouth daily 5/31/22  Yes Iza Augustine MD   metoprolol succinate (TOPROL XL) 50 MG extended release tablet TAKE 1 TABLET  Twice aday 5/18/22  Yes Marian Dillard MD   Multiple Vitamin (MULTI VITAMIN DAILY PO) Take by mouth daily   Yes Historical Provider, MD   Loratadine (CLARITIN PO) Take by mouth as needed Takes Equate version as needed - April 2022   Yes Historical Provider, MD   Fluticasone Propionate (FLONASE NA) by Nasal route as needed   Yes Historical Provider, MD   topiramate (TOPAMAX) 50 MG tablet Take 1 tablet by mouth daily 2/3/22  Yes Yessenia Jallho MD   albuterol sulfate HFA (VENTOLIN HFA) 108 (90 Base) MCG/ACT inhaler Inhale 2 puffs into the lungs 4 times daily as needed for Wheezing 1/31/22  Yes Iza Augustine MD   fenofibrate (TRICOR) 145 MG tablet TAKE 1 TABLET DAILY 11/29/21  Yes Iza Augustine MD   aspirin EC 81 MG EC tablet Take 1 tablet by mouth daily 4/30/19  Yes Carlos Cuellar MD   zoledronic acid (RECLAST) 5 MG/100ML SOLN Infuse 100 mLs intravenously once for 1 dose  Patient taking differently: Infuse 5 mg intravenously See Admin Instructions yearly 8/31/20 4/5/22  Iza Augustine MD   darifenacin (ENABLEX) 15 MG SR tablet Take 1 tablet by mouth daily.  9/26/13 4/5/22  Iza Augustine MD       Future Appointments   Date Time Provider Harvinder Ramon   9/9/2022  9:00 AM Yessenia Jalloh MD [facility] [address]   10/4/2022 10:15 AM Sable Beat A Josy Rodríguez MD [facility] [place]   4/28/2023 10:00 AM BOAZ Jensen - TSERING Frausto [facility] [place]   8/11/2023 10:45 AM Shayy Son MD [facility] [place] [facility]   ,   Diabetes Assessment      Meal Planning: Avoidance of concentrated sweets, Plate Method   How often do you test your blood sugar?: No Testing (Comment: April 2022)   Do you have barriers with adherence to non-pharmacologic self-management interventions?  (Nutrition/Exercise/Self-Monitoring): No   Have you ever had to go to the ED for symptoms of low blood sugar?: No       No patient-reported symptoms   Do you have hyperglycemia symptoms?: No   Do you have hypoglycemia symptoms?: No   Blood Sugar Monitoring Regimen: Not Testing   Blood Sugar Trends: No Change        ,   COPD Assessment    Does the patient understand envrionmental exposure?: Yes  Is the patient able to verbalize Rescue vs. Long Acting medications?: No  Does the patient have a nebulizer?: No  Does the patient use a space with inhaled medications?: No     No patient-reported symptoms         Symptoms:     Symptom course: stable  Breathlessness: exertion  Increase use of rapid acting/rescue inhaled medications?: No  Change in chronic cough?: No/At Baseline  Change in sputum?: No/At Baseline     ,   General Assessment    Do you have any symptoms that are causing concern?: No     , and Care Coordination Episodes    Type: Amb Care Management  Episode: Complex Care  Noted: 4/5/2022  Comments: List referral - DM/COPD

## 2022-08-23 NOTE — ACP (ADVANCE CARE PLANNING)
Advance Care Planning   Healthcare Decision Maker:    Primary Decision Maker: Tu Alberto - Child - [phone]    Secondary Decision Maker: Jj Sauceda - Child - [phone]    Supplemental (Other) Decision Maker: Viola Terrazas - Child - [phone]    Click here to complete Healthcare Decision Makers including selection of the Healthcare Decision Maker Relationship (ie \"Primary\"). Today we documented Decision Maker(s) consistent with ACP documents on file. Healthcare Decision Maker information and documents reviewed and verified with patient. Patient's chart updated that Ariana Mcgregor should be the primary and then Farideh Lacy is the secondary and then daughter Yumiko Angulo as listed on her ACP documents.

## 2022-09-23 ENCOUNTER — OFFICE VISIT (OUTPATIENT)
Dept: NEUROLOGY | Age: 77
End: 2022-09-23
Payer: MEDICARE

## 2022-09-23 VITALS
WEIGHT: 164 LBS | SYSTOLIC BLOOD PRESSURE: 118 MMHG | HEART RATE: 70 BPM | HEIGHT: 63 IN | BODY MASS INDEX: 29.06 KG/M2 | DIASTOLIC BLOOD PRESSURE: 56 MMHG | OXYGEN SATURATION: 98 %

## 2022-09-23 DIAGNOSIS — G25.0 BENIGN ESSENTIAL TREMOR: Primary | ICD-10-CM

## 2022-09-23 PROCEDURE — 99213 OFFICE O/P EST LOW 20 MIN: CPT | Performed by: PSYCHIATRY & NEUROLOGY

## 2022-09-23 PROCEDURE — 1123F ACP DISCUSS/DSCN MKR DOCD: CPT | Performed by: PSYCHIATRY & NEUROLOGY

## 2022-09-23 RX ORDER — TOPIRAMATE 50 MG/1
50 TABLET, FILM COATED ORAL DAILY
Qty: 90 TABLET | Refills: 3 | Status: SHIPPED | OUTPATIENT
Start: 2022-09-23

## 2022-09-23 NOTE — PROGRESS NOTES
NEUROLOGY OUT PATIENT FOLLOW UP NOTE:  9/23/202212:00 PM    Viktor Schuler is here for follow up for action tremor. She is here to discuss the plan .       Allergies   Allergen Reactions    Benadryl [Diphenhydramine Hcl]      OVER-STIMULATED    Flexeril [Cyclobenzaprine Hcl]      OVER-STIMULATED         Current Outpatient Medications:     losartan (COZAAR) 100 MG tablet, , Disp: , Rfl:     atorvastatin (LIPITOR) 80 MG tablet, Take 1 tablet by mouth in the morning., Disp: 90 tablet, Rfl: 3    venlafaxine (EFFEXOR XR) 150 MG extended release capsule, TAKE 1 CAPSULE DAILY, Disp: 90 capsule, Rfl: 3    isosorbide mononitrate (IMDUR) 60 MG extended release tablet, Take 1 tablet by mouth daily, Disp: 90 tablet, Rfl: 3    amLODIPine (NORVASC) 10 MG tablet, Take 1 tablet by mouth in the morning and at bedtime, Disp: 180 tablet, Rfl: 3    mirabegron (MYRBETRIQ) 50 MG TB24, Take 50 mg by mouth daily, Disp: 90 tablet, Rfl: 3    metoprolol succinate (TOPROL XL) 50 MG extended release tablet, TAKE 1 TABLET  Twice aday, Disp: 180 tablet, Rfl: 3    Multiple Vitamin (MULTI VITAMIN DAILY PO), Take by mouth daily, Disp: , Rfl:     Loratadine (CLARITIN PO), Take by mouth as needed Takes Equate version as needed - April 2022, Disp: , Rfl:     Fluticasone Propionate (FLONASE NA), by Nasal route as needed, Disp: , Rfl:     topiramate (TOPAMAX) 50 MG tablet, Take 1 tablet by mouth daily, Disp: 90 tablet, Rfl: 3    albuterol sulfate HFA (VENTOLIN HFA) 108 (90 Base) MCG/ACT inhaler, Inhale 2 puffs into the lungs 4 times daily as needed for Wheezing, Disp: 54 g, Rfl: 1    fenofibrate (TRICOR) 145 MG tablet, TAKE 1 TABLET DAILY, Disp: 90 tablet, Rfl: 3    aspirin EC 81 MG EC tablet, Take 1 tablet by mouth daily, Disp: 30 tablet, Rfl: 0    zoledronic acid (RECLAST) 5 MG/100ML SOLN, Infuse 100 mLs intravenously once for 1 dose (Patient taking differently: Infuse 5 mg intravenously See Admin Instructions yearly), Disp: 100 mL, Rfl: 0    I reviewed the past medical history, allergies, medications, social history and family history. PE:   Vitals:    09/23/22 1155   BP: (!) 118/56   Site: Left Upper Arm   Position: Sitting   Cuff Size: Medium Adult   Pulse: 70   SpO2: 98%   Weight: 164 lb (74.4 kg)   Height: 5' 3\" (1.6 m)     General Appearance:  awake, alert, oriented, in no acute distress, she is wearing mask. Gen: NAD, Language is Intact. Skin: no rash, lesion, dry t to touch. warm  Head: no rash, no icterus  Neck: There is no carotid bruits. The Neck is supple. There is no neck lymphadenopathy. Neuro: CN 2-12 grossly intact with no focal deficits. Power 5/5 Throughout symmetric, Reflexes are symmetric. Long tracts are intact. Cerebellar exam is Intact. Sensory exam is intact to light touch. Gait is intact. There is no tremor on exam today. No head tremor, no voice. Musculoskeletal:  Has no hand arthritis, no limitation of ROM in any of the four extremities,.   Lower extremities no edema          DATA:      Results for orders placed or performed during the hospital encounter of 07/08/22   CBC   Result Value Ref Range    WBC 8.6 4.8 - 10.8 thou/mm3    RBC 4.19 (L) 4.20 - 5.40 mill/mm3    Hemoglobin 12.6 12.0 - 16.0 gm/dl    Hematocrit 39.0 37.0 - 47.0 %    MCV 93.1 81.0 - 99.0 fL    MCH 30.1 26.0 - 33.0 pg    MCHC 32.3 32.2 - 35.5 gm/dl    RDW-CV 13.2 11.5 - 14.5 %    RDW-SD 44.6 35.0 - 45.0 fL    Platelets 551 434 - 255 thou/mm3    MPV 11.9 9.4 - 12.4 fL   Comprehensive Metabolic Panel   Result Value Ref Range    Glucose 150 (H) 70 - 108 mg/dL    Creatinine 1.2 0.4 - 1.2 mg/dL    BUN 27 (H) 7 - 22 mg/dL    Sodium 137 135 - 145 meq/L    Potassium 3.5 3.5 - 5.2 meq/L    Chloride 106 98 - 111 meq/L    CO2 19 (L) 23 - 33 meq/L    Calcium 9.9 8.5 - 10.5 mg/dL    AST 31 5 - 40 U/L    Alkaline Phosphatase 45 38 - 126 U/L    Total Protein 7.3 6.1 - 8.0 g/dL    Albumin 4.6 3.5 - 5.1 g/dL    Total Bilirubin 0.3 0.3 - 1.2 mg/dL    ALT 29 11 - 66 U/L   Lipid panel   Result Value Ref Range    Cholesterol, Total 164 100 - 199 mg/dL    Triglycerides 194 0 - 199 mg/dL    HDL 44 mg/dL    LDL Calculated 81 mg/dL   Protime-INR   Result Value Ref Range    INR 1.00 0.85 - 1.13   Anion Gap   Result Value Ref Range    Anion Gap 12.0 8.0 - 16.0 meq/L   Glomerular Filtration Rate, Estimated   Result Value Ref Range    Est, Glom Filt Rate 44 (A) ml/min/1.73m2   Electrocardiogram, 12-lead   Result Value Ref Range    Ventricular Rate 58 BPM    Atrial Rate 58 BPM    P-R Interval 196 ms    QRS Duration 76 ms    Q-T Interval 426 ms    QTc Calculation (Bazett) 418 ms    P Axis 45 degrees    R Axis -35 degrees    T Axis 92 degrees   TYPE AND SCREEN   Result Value Ref Range    ABO O     Rh Factor POS     Antibody Screen NEG         Results for orders placed during the hospital encounter of 03/31/15    MRI Brain WO Contrast    Narrative  PROCEDURE: MRI BRAIN WO CONTRAST    CLINICAL INFORMATIONTremor    COMPARISON: None available. TECHNIQUE: Multiplanar multisequence MR images of the brain without contrast.    FINDINGS:    Diffusion weighted images and apparent diffusion coefficient maps demonstrate no areas of restricted diffusion. Brain volume is within normal limits. Mild bilateral cerebral white matter T2 hyperintensity, nonspecific but most often due to chronic small vessel ischemic disease and which is not age prominent. Intact cerebral cortex. No acute intracranial hemorrhage, mass effect, midline shift or obstructive hydrocephalus. No abnormal extra axial fluid collections. Unremarkable sellar contents. Craniocervical junction appears unremarkable without significant cerebellar tonsillar ectopia. Small right mastoid effusion. Impression  Essentially unremarkable MR appearance of the brain. Mild chronic small vessel ischemic changes which are not age prominent.   Final report electronically signed by Dr. 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 Bilateral, right greater than left, hand tremors. COMPARISON: MR brain dated 10/24/2016. TECHNIQUE: Multiplanar and multiple spin echo T1 and T2-weighted images were obtained through the brain before and after the administration of intravenous contrast. 15 mL ProHance was injected in the right AC. FINDINGS:  There is mild to moderate parietal predominant volume loss which has mildly progressed in the interval. There are moderate patchy areas of T2/FLAIR prolongation in the periventricular, subcortical deep white matter which have also progressed in the  interval. There is a partially empty sella. No intra or extra-axial mass is identified. No focal areas of restricted diffusion are present. Following contrast administration, there are no focal areas of abnormal parenchymal or meningeal enhancement identified. The major vascular flow voids appear patent. Orbits are unremarkable. Paranasal sinuses are clear. Mastoid air cells are clear. Impression  1. No evidence of acute intracranial abnormality. 2. Mild to moderate severity chronic microvascular angiopathy which has progressed in the interval superimposed on mildly progressed volume loss. **This report has been created using voice recognition software. It may contain minor errors which are inherent in voice recognition technology. **  Final report electronically signed by Dr. Shun Redd MD on 3/23/2021 4:46 PM             Assessment:     Diagnosis Orders   1. Benign essential tremor             Follow-up for benign essential tremor. The patient reports her tremor is well controlled on her current medications. She is pleased with how she is doing. No side effects, has mild action, no voice or head tremor. She feels she is good where she is at. There is no tremor on exam today.  No head tremor, no voice tremor. She can have good days and bad days. After detailed discussion with patient we agreed on the following plan. .       Plan:  Continue Topamax 50 mg tablet daily. Take with plenty of fluids. Stay active. Follow up in 7 months    Time spent 22 min.     Cr Stern MD

## 2022-09-23 NOTE — PATIENT INSTRUCTIONS
Continue Topamax 50 mg tablet daily. Take with plenty of fluids. Stay active.    Follow up in 7 months

## 2022-09-29 NOTE — PROGRESS NOTES
[address] [place] [address] Omar Hankins  Dept: [phone]  Dept Fax: [phone]  Loc: Kaity Cuellar is a 78 y.o. female who presents today for:  No chief complaint on file. HPI:     HPI    Here for regular checkup but also has a hand tremor since 1/2021 getting worse. Handwriting and fine motor movements are the worst.  Mom had essential tremor, not diagnosed with Parkinson's. This is somewhat better after starting primidone but adjusting with neurology. Staying stable with dosing changes. ***    Allergies starting usually controlled on OTC medications. **8      Depression: Patient complains of depression. She complains of depressed mood and fatigue. Onset was approximately several years ago, gradually improving since that time. She denies current suicidal and homicidal plan or intent. Family history significant for no psychiatric illness. Possible organic causes contributing are: none. Risk factors: previous episode of depression Previous treatment includes Effexor and no group therapy. She complains of the following side effects from the treatment: none. ***    Hypertension: Patient here for follow-up of elevated blood pressure. She is not exercising and is adherent to low salt diet. Blood pressure is well controlled at home. Cardiac symptoms none. Patient denies chest pain, dyspnea and palpitations. Cardiovascular risk factors: hypertension and obesity (BMI >= 30 kg/m2). Use of agents associated with hypertension: none. History of target organ damage: post PTCA seeing cardiology. Hyperlipidemia: Patient presents with hyperlipidemia. She was tested because age and hypertension.   Her last labs showed   Lab Results   Component Value Date    CHOL 164 07/08/2022    CHOL 155 02/27/2021    CHOL 176 07/18/2018     Lab Results   Component Value Date    TRIG 194 07/08/2022    TRIG 187 02/27/2021    TRIG 179 07/18/2018     Lab Results   Component Value Date    HDL 44 07/08/2022    HDL 40 05/14/2022    HDL 42 02/27/2021     Lab Results   Component Value Date    LDLCALC 81 07/08/2022    [address] 88 05/14/2022    [address] 76 02/27/2021     No results found for: LABVLDL, VLDL  No results found for: CHOLHDLRATIO  There is not a family history of hyperlipidemia. There is a family history of early ischemia heart disease. Borderline diabetes in the past is now in the diabetic range. Lab Results   Component Value Date    LABA1C 6.6 (H) 05/31/2022     No results found for: EAG      GERD is controlled on PPI. ***    She is still no longer smoking cigarettes. ***    COPD controlled now. ***    b12 deficiency needs rechecked. ***    Urinary frequency and urgency getting worse. enablex is not helping as much any more. Better with myrbetriq. ***    Still having congestion since covid in December. .  Had regeneron on 12/9/21 and she felt great for a few days. She then developed a big head cold and it is lingering. She was given a medrol dose pack and omnicef on 1/19/22 which is some help but still lingering on. Headache mild, nasal congestion and chest congestion, but no fever and normal appetite. Tried; pushing water . Doing well now. ***      Reviewed chart forpast medical history , surgical history , allergies, social history , family history and medications.     Health Maintenance   Topic Date Due    Hepatitis C screen  Never done    DTaP/Tdap/Td vaccine (1 - Tdap) Never done    Diabetic foot exam  11/05/2020    Shingles vaccine (2 of 2) 01/27/2022    COVID-19 Vaccine (4 - Booster for Pfizer series) 04/02/2022    Annual Wellness Visit (AWV)  06/02/2022    Flu vaccine (1) 08/01/2022    Diabetic retinal exam  05/10/2023    A1C test (Diabetic or Prediabetic)  05/31/2023    Depression Monitoring  05/31/2023    Lipids  07/08/2023    Colorectal Cancer Screen  01/09/2025    DEXA (modify frequency per FRAX score)  Completed    Pneumococcal 65+ years Vaccine  Completed    Hepatitis A vaccine  Aged Out    Hib vaccine  Aged Out    Meningococcal (ACWY) vaccine  Aged Out       Subjective:      Constitutional:Negative for fever, chills, diaphoresis, activity change, appetite change and fatigue. HENT: Negative for hearing loss, ear pain, congestion, sore throat, rhinorrhea, postnasal drip and ear discharge. Eyes: Negative for photophobia and visual disturbance. Respiratory: Negative for cough, chest tightness, shortness of breath and wheezing. Cardiovascular: Negative for chest pain and leg swelling. Gastrointestinal: Negative for nausea, vomiting, abdominal pain, diarrhea and constipation. Genitourinary: Negative for dysuria, urgency and frequency. Neurological: Negative for weakness, light-headedness and headaches. Psychiatric/Behavioral: Negative for sleep disturbance.      :     There were no vitals filed for this visit.   Wt Readings from Last 3 Encounters:   09/23/22 164 lb (74.4 kg)   08/11/22 167 lb 12.8 oz (76.1 kg)   07/08/22 168 lb (76.2 kg)       Physical Exam        Assessment/Plan   Diagnoses and all orders for this visit:    Type 2 diabetes mellitus without complication, without long-term current use of insulin (HCC)    Hypertension, unspecified type    Pure hypercholesterolemia    Gastroesophageal reflux disease with esophagitis without hemorrhage    Recurrent major depressive disorder, in full remission ([facility] [address]    COPD exacerbation (HCC)    Post PTCA    Tobacco abuse    Sun-damaged skin    Panlobular emphysema (HCC)    Seasonal allergies    Anemia due to acute blood loss    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 Discussed use, benefit, and side effectsof prescribed medications. All patient questions answered. Pt voiced understanding. Reviewed health maintenance. Instructed to continue current medications, diet and exercise. Patient agreed with treatment plan. Followup as directed.      Electronically signed by Delores Beard MD

## 2022-10-04 ENCOUNTER — OFFICE VISIT (OUTPATIENT)
Dept: FAMILY MEDICINE CLINIC | Age: 77
End: 2022-10-04
Payer: MEDICARE

## 2022-10-04 ENCOUNTER — NURSE ONLY (OUTPATIENT)
Dept: LAB | Age: 77
End: 2022-10-04

## 2022-10-04 VITALS
BODY MASS INDEX: 29.77 KG/M2 | TEMPERATURE: 97.4 F | HEIGHT: 63 IN | RESPIRATION RATE: 16 BRPM | SYSTOLIC BLOOD PRESSURE: 132 MMHG | DIASTOLIC BLOOD PRESSURE: 58 MMHG | HEART RATE: 60 BPM | WEIGHT: 168 LBS | OXYGEN SATURATION: 99 %

## 2022-10-04 DIAGNOSIS — E11.9 TYPE 2 DIABETES MELLITUS WITHOUT COMPLICATION, WITHOUT LONG-TERM CURRENT USE OF INSULIN (HCC): ICD-10-CM

## 2022-10-04 DIAGNOSIS — L57.8 SUN-DAMAGED SKIN: ICD-10-CM

## 2022-10-04 DIAGNOSIS — D62 ANEMIA DUE TO ACUTE BLOOD LOSS: ICD-10-CM

## 2022-10-04 DIAGNOSIS — D10.6: ICD-10-CM

## 2022-10-04 DIAGNOSIS — J30.2 SEASONAL ALLERGIES: ICD-10-CM

## 2022-10-04 DIAGNOSIS — K21.00 GASTROESOPHAGEAL REFLUX DISEASE WITH ESOPHAGITIS WITHOUT HEMORRHAGE: ICD-10-CM

## 2022-10-04 DIAGNOSIS — J43.1 PANLOBULAR EMPHYSEMA (HCC): ICD-10-CM

## 2022-10-04 DIAGNOSIS — Z72.0 TOBACCO ABUSE: ICD-10-CM

## 2022-10-04 DIAGNOSIS — R35.0 URINARY FREQUENCY: ICD-10-CM

## 2022-10-04 DIAGNOSIS — R53.83 OTHER FATIGUE: ICD-10-CM

## 2022-10-04 DIAGNOSIS — E78.5 DYSLIPIDEMIA (HIGH LDL; LOW HDL): ICD-10-CM

## 2022-10-04 DIAGNOSIS — Z87.891 HISTORY OF TOBACCO ABUSE: ICD-10-CM

## 2022-10-04 DIAGNOSIS — G25.2 INTENTION TREMOR: ICD-10-CM

## 2022-10-04 DIAGNOSIS — F33.42 RECURRENT MAJOR DEPRESSIVE DISORDER, IN FULL REMISSION (HCC): ICD-10-CM

## 2022-10-04 DIAGNOSIS — Z00.00 MEDICARE ANNUAL WELLNESS VISIT, SUBSEQUENT: Primary | ICD-10-CM

## 2022-10-04 DIAGNOSIS — J44.1 COPD EXACERBATION (HCC): ICD-10-CM

## 2022-10-04 DIAGNOSIS — Z78.0 ASYMPTOMATIC POSTMENOPAUSAL STATE: ICD-10-CM

## 2022-10-04 DIAGNOSIS — E53.8 B12 DEFICIENCY: ICD-10-CM

## 2022-10-04 DIAGNOSIS — I10 HYPERTENSION, UNSPECIFIED TYPE: ICD-10-CM

## 2022-10-04 DIAGNOSIS — M85.89 OSTEOPENIA OF MULTIPLE SITES: ICD-10-CM

## 2022-10-04 DIAGNOSIS — Z98.61 POST PTCA: ICD-10-CM

## 2022-10-04 DIAGNOSIS — E78.00 PURE HYPERCHOLESTEROLEMIA: ICD-10-CM

## 2022-10-04 LAB
ALBUMIN SERPL-MCNC: 4.3 G/DL (ref 3.5–5.1)
ALP BLD-CCNC: 51 U/L (ref 38–126)
ALT SERPL-CCNC: 21 U/L (ref 11–66)
ANION GAP SERPL CALCULATED.3IONS-SCNC: 15 MEQ/L (ref 8–16)
AST SERPL-CCNC: 26 U/L (ref 5–40)
BILIRUB SERPL-MCNC: 0.4 MG/DL (ref 0.3–1.2)
BILIRUBIN DIRECT: < 0.2 MG/DL (ref 0–0.3)
BUN BLDV-MCNC: 26 MG/DL (ref 7–22)
CALCIUM SERPL-MCNC: 9.7 MG/DL (ref 8.5–10.5)
CHLORIDE BLD-SCNC: 106 MEQ/L (ref 98–111)
CHOLESTEROL, TOTAL: 179 MG/DL (ref 100–199)
CO2: 19 MEQ/L (ref 23–33)
CREAT SERPL-MCNC: 1.1 MG/DL (ref 0.4–1.2)
ERYTHROCYTE [DISTWIDTH] IN BLOOD BY AUTOMATED COUNT: 13.1 % (ref 11.5–14.5)
ERYTHROCYTE [DISTWIDTH] IN BLOOD BY AUTOMATED COUNT: 45.9 FL (ref 35–45)
GFR SERPL CREATININE-BSD FRML MDRD: 48 ML/MIN/1.73M2
GLUCOSE BLD-MCNC: 111 MG/DL (ref 70–108)
HCT VFR BLD CALC: 42.4 % (ref 37–47)
HDLC SERPL-MCNC: 41 MG/DL
HEMOGLOBIN: 13.2 GM/DL (ref 12–16)
LDL CHOLESTEROL CALCULATED: 88 MG/DL
MCH RBC QN AUTO: 29.8 PG (ref 26–33)
MCHC RBC AUTO-ENTMCNC: 31.1 GM/DL (ref 32.2–35.5)
MCV RBC AUTO: 95.7 FL (ref 81–99)
PLATELET # BLD: 337 THOU/MM3 (ref 130–400)
PMV BLD AUTO: 11.9 FL (ref 9.4–12.4)
POTASSIUM SERPL-SCNC: 4.4 MEQ/L (ref 3.5–5.2)
RBC # BLD: 4.43 MILL/MM3 (ref 4.2–5.4)
SODIUM BLD-SCNC: 140 MEQ/L (ref 135–145)
TOTAL PROTEIN: 7.1 G/DL (ref 6.1–8)
TRIGL SERPL-MCNC: 249 MG/DL (ref 0–199)
WBC # BLD: 8.1 THOU/MM3 (ref 4.8–10.8)

## 2022-10-04 PROCEDURE — G0439 PPPS, SUBSEQ VISIT: HCPCS | Performed by: FAMILY MEDICINE

## 2022-10-04 PROCEDURE — G0008 ADMIN INFLUENZA VIRUS VAC: HCPCS | Performed by: FAMILY MEDICINE

## 2022-10-04 PROCEDURE — 90694 VACC AIIV4 NO PRSRV 0.5ML IM: CPT | Performed by: FAMILY MEDICINE

## 2022-10-04 PROCEDURE — 1123F ACP DISCUSS/DSCN MKR DOCD: CPT | Performed by: FAMILY MEDICINE

## 2022-10-04 PROCEDURE — 3044F HG A1C LEVEL LT 7.0%: CPT | Performed by: FAMILY MEDICINE

## 2022-10-04 RX ORDER — QUINIDINE SULFATE 200 MG
1 TABLET ORAL NIGHTLY
Qty: 90 CAPSULE | Refills: 15 | COMMUNITY
Start: 2022-10-04

## 2022-10-04 ASSESSMENT — PATIENT HEALTH QUESTIONNAIRE - PHQ9
SUM OF ALL RESPONSES TO PHQ QUESTIONS 1-9: 1
4. FEELING TIRED OR HAVING LITTLE ENERGY: 0
3. TROUBLE FALLING OR STAYING ASLEEP: 0
8. MOVING OR SPEAKING SO SLOWLY THAT OTHER PEOPLE COULD HAVE NOTICED. OR THE OPPOSITE, BEING SO FIGETY OR RESTLESS THAT YOU HAVE BEEN MOVING AROUND A LOT MORE THAN USUAL: 0
6. FEELING BAD ABOUT YOURSELF - OR THAT YOU ARE A FAILURE OR HAVE LET YOURSELF OR YOUR FAMILY DOWN: 0
2. FEELING DOWN, DEPRESSED OR HOPELESS: 1
10. IF YOU CHECKED OFF ANY PROBLEMS, HOW DIFFICULT HAVE THESE PROBLEMS MADE IT FOR YOU TO DO YOUR WORK, TAKE CARE OF THINGS AT HOME, OR GET ALONG WITH OTHER PEOPLE: 0
9. THOUGHTS THAT YOU WOULD BE BETTER OFF DEAD, OR OF HURTING YOURSELF: 0
7. TROUBLE CONCENTRATING ON THINGS, SUCH AS READING THE NEWSPAPER OR WATCHING TELEVISION: 0
SUM OF ALL RESPONSES TO PHQ QUESTIONS 1-9: 1
5. POOR APPETITE OR OVEREATING: 0

## 2022-10-04 ASSESSMENT — LIFESTYLE VARIABLES
HOW OFTEN DO YOU HAVE A DRINK CONTAINING ALCOHOL: 2-4 TIMES A MONTH
HOW MANY STANDARD DRINKS CONTAINING ALCOHOL DO YOU HAVE ON A TYPICAL DAY: 1 OR 2

## 2022-10-04 NOTE — PATIENT INSTRUCTIONS
Personalized Preventive Plan for Caitlin Mcnair - 10/4/2022  Medicare offers a range of preventive health benefits. Some of the tests and screenings are paid in full while other may be subject to a deductible, co-insurance, and/or copay. Some of these benefits include a comprehensive review of your medical history including lifestyle, illnesses that may run in your family, and various assessments and screenings as appropriate. After reviewing your medical record and screening and assessments performed today your provider may have ordered immunizations, labs, imaging, and/or referrals for you. A list of these orders (if applicable) as well as your Preventive Care list are included within your After Visit Summary for your review. Other Preventive Recommendations:    A preventive eye exam performed by an eye specialist is recommended every 1-2 years to screen for glaucoma; cataracts, macular degeneration, and other eye disorders. A preventive dental visit is recommended every 6 months. Try to get at least 150 minutes of exercise per week or 10,000 steps per day on a pedometer . Order or download the FREE \"Exercise & Physical Activity: Your Everyday Guide\" from [employer]. Call 9-298.662.7531 or search [employer] online. You need 8393-3265 mg of calcium and 9281-3177 IU of vitamin D per day. It is possible to meet your calcium requirement with diet alone, but a vitamin D supplement is usually necessary to meet this goal.  When exposed to the sun, use a sunscreen that protects against both UVA and UVB radiation with an SPF of 30 or greater. Reapply every 2 to 3 hours or after sweating, drying off with a towel, or swimming. Always wear a seat belt when traveling in a car. Always wear a helmet when riding a bicycle or motorcycle.

## 2022-10-04 NOTE — PROGRESS NOTES
Immunizations Administered       Name Date Dose Route    Influenza, FLUAD, (age 72 y+), Adjuvanted, 0.5mL 10/4/2022 0.5 mL Intramuscular    Site: Deltoid- Left    Lot: [id number]    NDC: 47822-434-48            VIS GIVEN. CONSENT SIGNED  PATIENT TOLERATED WELL.

## 2022-10-04 NOTE — PROGRESS NOTES
Medicare Annual Wellness Visit    Cait Flowers is here for Medicare AWV    Assessment & Plan   Medicare annual wellness visit, subsequent  -     Influenza, FLUAD, (age 72 y+), IM, Preservative Free, 0.5 mL  Type 2 diabetes mellitus without complication, without long-term current use of insulin [facility]  -     Basic Metabolic Panel; Future  -     Hemoglobin A1C; Future  Hypertension, unspecified type  Pure hypercholesterolemia  -     Coenzyme Q10 (CO Q-10) 400 MG CAPS; Take 1 capsule by mouth at bedtime, Disp-90 capsule, R-[id number]  Gastroesophageal reflux disease with esophagitis without hemorrhage  Recurrent major depressive disorder, in full remission ([facility]  COPD exacerbation (HCC)  Post PTCA  Tobacco abuse  Sun-damaged skin  Panlobular emphysema (HCC)  Seasonal allergies  Anemia due to acute blood loss  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  Recommended screening schedule for the next 5-10 years is provided to the patient in written form: see Patient Instructions/AVS.     Return in 6 months (on 4/4/2023) for Medicare Annual Wellness Visit in 1 year. Subjective     Here for regular checkup but also has a hand tremor since 1/2021 getting worse. Handwriting and fine motor movements are the worst.  Mom had essential tremor, not diagnosed with Parkinson's. This is somewhat better after starting primidone but adjusting with neurology. Staying stable with dosing changes. Allergies starting usually controlled on OTC medications. Depression: Patient complains of depression. She complains of depressed mood and fatigue. Onset was approximately several years ago, gradually improving since that time.   She denies current suicidal and homicidal plan or intent. Family history significant for no psychiatric illness. Possible organic causes contributing are: none. Risk factors: previous episode of depression Previous treatment includes Effexor and no group therapy. She complains of the following side effects from the treatment: none. Hypertension: Patient here for follow-up of elevated blood pressure. She is not exercising and is adherent to low salt diet. Blood pressure is well controlled at home. Cardiac symptoms none. Patient denies chest pain, dyspnea and palpitations. Cardiovascular risk factors: hypertension and obesity (BMI >= 30 kg/m2). Use of agents associated with hypertension: none. History of target organ damage: post PTCA seeing cardiology. Hyperlipidemia: Patient presents with hyperlipidemia. She was tested because age and hypertension. Her last labs showed   Lab Results   Component Value Date    CHOL 164 07/08/2022    CHOL 155 02/27/2021    CHOL 176 07/18/2018     Lab Results   Component Value Date    TRIG 194 07/08/2022    TRIG 187 02/27/2021    TRIG 179 07/18/2018     Lab Results   Component Value Date    HDL 44 07/08/2022    HDL 40 05/14/2022    HDL 42 02/27/2021     Lab Results   Component Value Date    LDLCALC 81 07/08/2022    [address] 88 05/14/2022    [address] 76 02/27/2021     No results found for: LABVLDL, VLDL  No results found for: CHOLHDLRATIO  There is not a family history of hyperlipidemia. There is a family history of early ischemia heart disease. Borderline diabetes in the past is now in the diabetic range. Lab Results   Component Value Date    LABA1C 6.6 (H) 05/31/2022     No results found for: EAG      GERD is controlled on PPI. She is still no longer smoking cigarettes. COPD controlled now. b12 deficiency needs rechecked. Urinary frequency and urgency getting worse. enablex is not helping as much any more. Better with myrbetriq.        Still having congestion since covid in December. .  Had manuel on 12/9/21 and she felt great for a few days. She then developed a big head cold and it is lingering. She was given a medrol dose pack and omnicef on 1/19/22 which is some help but still lingering on. Headache mild, nasal congestion and chest congestion, but no fever and normal appetite. Tried; pushing water . Doing well now. The following acute and/or chronic problems were also addressed today:  See above    Patient's complete Health Risk Assessment and screening values have been reviewed and are found in Flowsheets. The following problems were reviewed today and where indicated follow up appointments were made and/or referrals ordered.     Positive Risk Factor Screenings with Interventions:             General Health and ACP:  General  In general, how would you say your health is?: Very Good  In the past 7 days, have you experienced any of the following: New or Increased Pain, New or Increased Fatigue, Loneliness, Social Isolation, Stress or Anger?: (!) Yes  Select all that apply: (!) Stress  Do you get the social and emotional support that you need?: Yes  Do you have a Living Will?: Yes    Advance Directives       Power of  Living Will ACP-Advance Directive ACP-Power of Dori Chavezkian on 06/21/21 Filed on 06/21/21 [address] [place] [address] Risk Interventions:  No Living Will: patient to bring in a copy    Health Habits/Nutrition:  Physical Activity: Sufficiently Active    Days of Exercise per Week: 1 day    Minutes of Exercise per Session: 150+ min     Have you lost any weight without trying in the past 3 months?: No  Body mass index: (!) 29.76  Have you seen the dentist within the past year?: (!) No  Health Habits/Nutrition Interventions:  Inadequate physical activity:  PT prn  Dental exam overdue:  patient encouraged to make appointment with his/her dentist             Objective   Vitals:    10/04/22 1013   BP: (!) 132/58   Site: Left Upper Arm   Position: Sitting   Cuff Size: Large Adult   Pulse: 60   Resp: 16   Temp: 97.4 °F (36.3 °C)   TempSrc: Temporal   SpO2: 99%   Weight: 168 lb (76.2 kg)   Height: 5' 2.99\" (1.6 m)      Body mass index is 29.77 kg/m². Physical Exam   Constitutional: Vital signs are normal. She appears well-developed and well-nourished. She is active. HENT:   Head: Normocephalic and atraumatic. Right Ear: Tympanic membrane, external ear and ear canal normal. No drainage or tenderness. Left Ear: Tympanic membrane, external ear and ear canal normal. No drainage or tenderness. Nose: Nose normal. No mucosal edema or rhinorrhea. Mouth/Throat: Uvula is midline, oropharynx is clear and moist and mucous membranes are normal. Mucous membranes are not pale. Normal dentition. No posterior oropharyngeal edema or posterior oropharyngeal erythema. Eyes: Lids are normal. Right eye exhibits no chemosis and no discharge. Left eye exhibits no chemosis and no drainage. Right conjunctiva has no hemorrhage. Left conjunctiva has no hemorrhage. Right eye exhibits normal extraocular motion. Left eye exhibits normal extraocular motion. Right pupil is round and reactive. Left pupil is round and reactive. Pupils are equal.   Cardiovascular: Normal rate, regular rhythm, S1 normal, S2 normal and normal heart sounds. Exam reveals no gallop. No murmur heard. Pulmonary/Chest: Effort normal and breath sounds normal. No respiratory distress. She has no wheezes. She has no rhonchi. She has no rales. Abdominal: Soft. Normal appearance and bowel sounds are normal. She exhibits no distension and no mass. There is no hepatosplenomegaly. No tenderness. She has no rigidity, no rebound and no guarding. No hernia. Musculoskeletal:        Right lower leg: She exhibits no edema. Left lower leg: She exhibits no edema. Neurological: She is alert.      MMSE:   not done      Allergies   Allergen Reactions    Benadryl [Diphenhydramine Hcl]      OVER-STIMULATED    Flexeril [Cyclobenzaprine Hcl]      OVER-STIMULATED       Prior to Visit Medications    Medication Sig Taking? Authorizing Provider   Coenzyme Q10 (CO Q-10) 400 MG CAPS Take 1 capsule by mouth at bedtime Yes Sarita Chadwick MD   topiramate (TOPAMAX) 50 MG tablet Take 1 tablet by mouth daily Yes Nevaeh Us MD   losartan (COZAAR) 100 MG tablet  Yes Historical Provider, MD   atorvastatin (LIPITOR) 80 MG tablet Take 1 tablet by mouth in the morning. Yes Jeremy Joiner MD   venlafaxine (EFFEXOR XR) 150 MG extended release capsule TAKE 1 CAPSULE DAILY Yes Sarita Chadwick MD   isosorbide mononitrate (IMDUR) 60 MG extended release tablet Take 1 tablet by mouth daily Yes Sarita Chadwick MD   amLODIPine (NORVASC) 10 MG tablet Take 1 tablet by mouth in the morning and at bedtime Yes Sarita Chadwick MD   mirabegron (MYRBETRIQ) 50 MG TB24 Take 50 mg by mouth daily Yes Sarita Chadwick MD   metoprolol succinate (TOPROL XL) 50 MG extended release tablet TAKE 1 TABLET  Twice aday Yes Jeremy Joiner MD   Multiple Vitamin (MULTI VITAMIN DAILY PO) Take by mouth daily Yes Historical Provider, MD   Loratadine (CLARITIN PO) Take by mouth as needed Leidy Sheeba version as needed - April 2022 Yes Historical Provider, MD   Fluticasone Propionate (FLONASE NA) by Nasal route as needed Yes Historical Provider, MD   albuterol sulfate HFA (VENTOLIN HFA) 108 (90 Base) MCG/ACT inhaler Inhale 2 puffs into the lungs 4 times daily as needed for Wheezing Yes Sarita Chadwick MD   fenofibrate (TRICOR) 145 MG tablet TAKE 1 TABLET DAILY Yes Sarita Chadwick MD   aspirin EC 81 MG EC tablet Take 1 tablet by mouth daily Yes Andrey Diaz MD   zoledronic acid (RECLAST) 5 MG/100ML SOLN Infuse 100 mLs intravenously once for 1 dose  Patient taking differently: Infuse 5 mg intravenously See Admin Instructions yearly  Sarita Chadwick MD   darifenacin (ENABLEX) 15 MG SR tablet Take 1 tablet by mouth daily.   Sarita Chadwick MD CareTeam (Including outside providers/suppliers regularly involved in providing care):   Patient Care Team:  Franky Awad MD as PCP - General (Family Medicine)  Franky Awad MD as PCP [facility]  Samanta Silva MD as Consulting Physician (Cardiology)     Reviewed and updated this visit:  Tobacco  Allergies  Meds  Med Hx  Surg Hx  Soc Hx  Fam Hx

## 2022-10-05 LAB
AVERAGE GLUCOSE: 141 MG/DL (ref 70–126)
HBA1C MFR BLD: 6.7 % (ref 4.4–6.4)

## 2022-10-06 ENCOUNTER — TELEPHONE (OUTPATIENT)
Dept: FAMILY MEDICINE CLINIC | Age: 77
End: 2022-10-06

## 2022-10-06 DIAGNOSIS — Z78.0 ASYMPTOMATIC POSTMENOPAUSAL STATE: Primary | ICD-10-CM

## 2022-10-13 ENCOUNTER — NURSE ONLY (OUTPATIENT)
Dept: FAMILY MEDICINE CLINIC | Age: 77
End: 2022-10-13

## 2022-10-25 NOTE — CONSULTS
[address] [place] [address]                                  CONSULTATION    PATIENT NAME: Karrie Leonardo                 :        1945  MED REC NO:   [id number]                           ROOM:       [id number]  ACCOUNT NO:   [de-identified]                           ADMIT DATE: 2019  PROVIDER:     Paulette Arthur. Rick Musa M.D.    Elvira Chandler:  2019    REASON FOR EVALUATION:  Advice about anticoagulation. HISTORY OF PRESENT ILLNESS:  This is a patient who is a 57-year-old very  nice lady. She is known to have a history of coronary artery disease  back in 2018. She did have a stent to the LAD, complex procedure,  intervention of the diagonal artery and she had a prior intervention of  the RCA. Cardiacwise, the patient has been doing very well. The  patient is moving out of her apartment and she has been moving a lot of  furniture up and down on the stairs and she did notice significant  swelling and pain in her left leg. She came to the emergency room and  she was found to have a hematoma in her leg. This patient was then  admitted to the hospital and her hemoglobin dropped by about 1 gm. She  has significant pain in the leg, but she denied loss of function. The patient had a venous Doppler which showed no evidence of DVT. The patient also had a CTA of the chest.  There was no PE and she had an  atelectasis with coronary artery calcification. REVIEW OF SYSTEMS:  The patient as mentioned had a history of coronary  artery disease and prior intervention. The patient had a history of  macular degenerative disease, history of dyslipidemia, hypertension, and  gastroesophageal reflux. She had a history of fibromyalgia, history of  depression, mild obesity. She had a history of cholecystectomy and she  has a history of arm surgery. She had a history of esophageal  dilatation.     ALLERGIES:  The patient is allergic to Mastoid Interpolation Flap Text: A decision was made to reconstruct the defect utilizing an interpolation axial flap and a staged reconstruction.  A telfa template was made of the defect.  This telfa template was then used to outline the mastoid interpolation flap.  The donor area for the pedicle flap was then injected with anesthesia.  The flap was excised through the skin and subcutaneous tissue down to the layer of the underlying musculature.  The pedicle flap was carefully excised within this deep plane to maintain its blood supply.  The edges of the donor site were undermined.   The donor site was closed in a primary fashion.  The pedicle was then rotated into position and sutured.  Once the tube was sutured into place, adequate blood supply was confirmed with blanching and refill.  The pedicle was then wrapped with xeroform gauze and dressed appropriately with a telfa and gauze bandage to ensure continued blood supply and protect the attached pedicle.

## 2022-10-26 ENCOUNTER — HOSPITAL ENCOUNTER (OUTPATIENT)
Dept: WOMENS IMAGING | Age: 77
Discharge: HOME | End: 2022-10-26
Payer: MEDICARE

## 2022-10-26 DIAGNOSIS — Z78.0 ASYMPTOMATIC POSTMENOPAUSAL STATE: ICD-10-CM

## 2022-10-26 PROCEDURE — 77080 DXA BONE DENSITY AXIAL: CPT

## 2022-10-27 ENCOUNTER — TELEPHONE (OUTPATIENT)
Dept: FAMILY MEDICINE CLINIC | Age: 77
End: 2022-10-27

## 2022-10-27 NOTE — TELEPHONE ENCOUNTER
Osteopenia on dexa scan  Continue calcium BID and reclast every 2 years.   Due now, please schedule    Call patient

## 2022-10-31 ENCOUNTER — TELEPHONE (OUTPATIENT)
Dept: FAMILY MEDICINE CLINIC | Age: 77
End: 2022-10-31

## 2022-11-01 RX ORDER — ZOLEDRONIC ACID 5 MG/100ML
5 INJECTION, SOLUTION INTRAVENOUS ONCE
Status: CANCELLED | OUTPATIENT
Start: 2022-11-04 | End: 2022-11-04

## 2022-11-11 ENCOUNTER — HOSPITAL ENCOUNTER (OUTPATIENT)
Dept: GENERAL RADIOLOGY | Age: 77
Discharge: HOME | End: 2022-11-11
Payer: MEDICARE

## 2022-11-11 VITALS
TEMPERATURE: 98.1 F | HEIGHT: 64 IN | BODY MASS INDEX: 28.68 KG/M2 | DIASTOLIC BLOOD PRESSURE: 56 MMHG | RESPIRATION RATE: 16 BRPM | OXYGEN SATURATION: 95 % | HEART RATE: 74 BPM | WEIGHT: 168 LBS | SYSTOLIC BLOOD PRESSURE: 115 MMHG

## 2022-11-11 DIAGNOSIS — M81.0 SENILE OSTEOPOROSIS: Primary | ICD-10-CM

## 2022-11-11 DIAGNOSIS — Z78.0 POSTMENOPAUSAL STATUS (AGE-RELATED) (NATURAL): ICD-10-CM

## 2022-11-11 PROCEDURE — 6360000002 HC RX W HCPCS: Performed by: FAMILY MEDICINE

## 2022-11-11 PROCEDURE — 96365 THER/PROPH/DIAG IV INF INIT: CPT

## 2022-11-11 RX ORDER — ZOLEDRONIC ACID 5 MG/100ML
5 INJECTION, SOLUTION INTRAVENOUS ONCE
Status: COMPLETED | OUTPATIENT
Start: 2022-11-11 | End: 2022-11-11

## 2022-11-11 RX ORDER — ZOLEDRONIC ACID 5 MG/100ML
5 INJECTION, SOLUTION INTRAVENOUS ONCE
Status: CANCELLED | OUTPATIENT
Start: 2022-11-11 | End: 2022-11-11

## 2022-11-11 RX ADMIN — ZOLEDRONIC ACID 5 MG: 0.05 INJECTION, SOLUTION INTRAVENOUS at 10:17

## 2022-11-11 NOTE — ED NOTES
IV reclast infused and discontinued, bandaid applied. Pulse regular. Extremities warm. Respirations regular and quiet. Mucous membranes pink & moist. Alert and oriented times 3. No nausea or vomiting. Range of motion within patient's limits. Skin pink, warm and dry. Calm and cooperative.  Met: yes   Safety:         (Environmental)  Mitchell to environment  Ensure ID band is correct and in place/ allergy band as needed  Assess for fall risk  Initiate fall precautions as applicable (fall band, side rails, etc.)  Call light within reach  Bed in low position/ wheels locked    Met: yes   Pain:       Assess pain level and characteristics  Administer analgesics as ordered  Assess effectiveness of pain management and report to MD as needed    Met: yes   Knowledge Deficit:  Assess baseline knowledge  Provide teaching at level of understanding  Provide teaching via preferred learning method  Evaluate teaching effectiveness    Met: yes   Hemodynamic/Respiratory Status:       (Pre and Post Procedure Monitoring)  Assess/Monitor vital signs and LOC  Assess Baseline SpO2 prior to any sedation  Obtain weight/height  Assess vital signs/ LOC until patient meets discharge criteria  Monitor procedure site and notify MD of any issues    Dash Valdez RN  11/11/22 8713

## 2022-11-11 NOTE — ED NOTES
Pt. Ambulatory to exam 3 for outpt. Reclast infusion. Pt. Denies any questions.      Colten France RN  11/11/22 5937

## 2022-11-21 DIAGNOSIS — E78.00 PURE HYPERCHOLESTEROLEMIA: ICD-10-CM

## 2022-11-21 RX ORDER — FENOFIBRATE 145 MG/1
TABLET, COATED ORAL
Qty: 90 TABLET | Refills: 0 | Status: SHIPPED | OUTPATIENT
Start: 2022-11-21

## 2022-12-01 ENCOUNTER — TELEPHONE (OUTPATIENT)
Dept: CARDIOLOGY CLINIC | Age: 77
End: 2022-12-01

## 2022-12-01 NOTE — TELEPHONE ENCOUNTER
Francesca called stating her legs are swelling. Dr. Rainey Friend stopped her Norvasc and to monitor her BP at home.

## 2022-12-12 ENCOUNTER — TELEPHONE (OUTPATIENT)
Dept: FAMILY MEDICINE CLINIC | Age: 77
End: 2022-12-12

## 2022-12-12 NOTE — TELEPHONE ENCOUNTER
Pt  called stating that she has had head congestion and sinus pressure for about a month now.    Pt has been taking mucinex and nasal spray with no relief   Please advise

## 2022-12-13 NOTE — PROGRESS NOTES
[address] [place] [address] Omar Hankins  Dept: [phone]  Dept Fax: [phone]  Loc: Kaity Cuellar is a 78 y.o. female who presents today for:  Chief Complaint   Patient presents with    Nasal Congestion     On and off for 1 month       HPI:     HPI  Here for persistent sinus pressure for over a month. Tried mucinex DM which helps but it starts right back . Nyquil helps at night. Associated with PND, runny nose, lower appetite, no fever. Phlegm is clear/cloudy. Still using flonase daily but not saline, claritin prn      Reviewed chart forpast medical history , surgical history , allergies, social history , family history and medications. Health Maintenance   Topic Date Due    Hepatitis C screen  Never done    DTaP/Tdap/Td vaccine (1 - Tdap) Never done    Diabetic foot exam  11/05/2020    COVID-19 Vaccine (4 - Booster for Pfizer series) 01/27/2022    Diabetic retinal exam  05/10/2023    A1C test (Diabetic or Prediabetic)  10/04/2023    Lipids  10/04/2023    Depression Monitoring  10/04/2023    Annual Wellness Visit (AWV)  10/05/2023    Colorectal Cancer Screen  01/09/2025    DEXA (modify frequency per FRAX score)  Completed    Flu vaccine  Completed    Shingles vaccine  Completed    Pneumococcal 65+ years Vaccine  Completed    Hepatitis A vaccine  Aged Out    Hib vaccine  Aged Out    Meningococcal (ACWY) vaccine  Aged Out       Subjective:      Constitutional:Negative for fever, chills, diaphoresis, activity change, appetite change and fatigue. HENT: Negative for hearing loss, ear pain, congestion, sore throat, rhinorrhea, postnasal drip and ear discharge. Eyes: Negative for photophobia and visual disturbance. Respiratory: Negative for cough, chest tightness, shortness of breath and wheezing. Cardiovascular: Negative for chest pain and leg swelling.    Gastrointestinal: Negative for nausea, vomiting, abdominal pain, diarrhea and constipation. Genitourinary: Negative for dysuria, urgency and frequency. Neurological: Negative for weakness, light-headedness and headaches. Psychiatric/Behavioral: Negative for sleep disturbance.      :     Vitals:    12/14/22 0915   BP: 132/74   Site: Right Upper Arm   Position: Sitting   Cuff Size: Medium Adult   Pulse: 68   Resp: 16   Temp: 97 °F (36.1 °C)   TempSrc: Temporal   SpO2: 98%   Weight: 169 lb (76.7 kg)     Wt Readings from Last 3 Encounters:   12/14/22 169 lb (76.7 kg)   11/11/22 168 lb (76.2 kg)   10/04/22 168 lb (76.2 kg)       Physical Exam  Constitutional: Vital signs are normal. She appears well-developed and well-nourished. She is active. HENT:   Head: Normocephalic and atraumatic. Right Ear: Tympanic membrane, external ear and ear canal normal. No drainage or tenderness. Left Ear: Tympanic membrane, external ear and ear canal normal. No drainage or tenderness. Nose: Nose normal. moderate mucosal edema mild rhinorrhea. Possible polyps. Mouth/Throat: Uvula is midline, oropharynx is clear and moist and mucous membranes are normal. Mucous membranes are not pale. Normal dentition. No posterior oropharyngeal edema or posterior oropharyngeal erythema. Eyes: Lids are normal. Right eye exhibits no chemosis and no discharge. Left eye exhibits no chemosis and no drainage. Right conjunctiva has no hemorrhage. Left conjunctiva has no hemorrhage. Right eye exhibits normal extraocular motion. Left eye exhibits normal extraocular motion. Right pupil is round and reactive. Left pupil is round and reactive. Pupils are equal.   Cardiovascular: Normal rate, regular rhythm, S1 normal, S2 normal and normal heart sounds. Exam reveals no gallop. No murmur heard. Pulmonary/Chest: Effort normal and breath sounds normal. No respiratory distress. She has no wheezes. She has no rhonchi. She has no rales. Abdominal: Soft.  Normal appearance and bowel sounds are normal. She exhibits no distension and no mass. There is no hepatosplenomegaly. No tenderness. She has no rigidity, no rebound and no guarding. No hernia. Musculoskeletal:        Right lower leg: She exhibits no edema. Left lower leg: She exhibits no edema. Neurological: She is alert. Assessment/Plan   Amauri Cameron was seen today for nasal congestion. Diagnoses and all orders for this visit:    Recurrent sinusitis  -     CT SINUS WO CONTRAST; Future      Daily claritin, flonase every morning 2 sprays each side and saline nasal spray 4-5 times daily  Call or return to clinic prn if these symptoms worsen or fail to improve as anticipated. Discussed use, benefit, and side effectsof prescribed medications. All patient questions answered. Pt voiced understanding. Reviewed health maintenance. Instructed to continue current medications, diet and exercise. Patient agreed with treatment plan. Followup as directed.      Electronically signed by Cira Peterson MD

## 2022-12-14 ENCOUNTER — OFFICE VISIT (OUTPATIENT)
Dept: FAMILY MEDICINE CLINIC | Age: 77
End: 2022-12-14

## 2022-12-14 VITALS
SYSTOLIC BLOOD PRESSURE: 132 MMHG | WEIGHT: 169 LBS | OXYGEN SATURATION: 98 % | DIASTOLIC BLOOD PRESSURE: 74 MMHG | BODY MASS INDEX: 29.24 KG/M2 | RESPIRATION RATE: 16 BRPM | HEART RATE: 68 BPM | TEMPERATURE: 97 F

## 2022-12-14 DIAGNOSIS — J32.9 RECURRENT SINUSITIS: Primary | ICD-10-CM

## 2022-12-19 ENCOUNTER — TELEPHONE (OUTPATIENT)
Dept: FAMILY MEDICINE CLINIC | Age: 77
End: 2022-12-19

## 2022-12-20 ENCOUNTER — TELEPHONE (OUTPATIENT)
Dept: FAMILY MEDICINE CLINIC | Age: 77
End: 2022-12-20

## 2022-12-20 RX ORDER — CEFDINIR 300 MG/1
300 CAPSULE ORAL 2 TIMES DAILY
Qty: 28 CAPSULE | Refills: 0 | Status: SHIPPED | OUTPATIENT
Start: 2022-12-20 | End: 2023-01-03

## 2022-12-20 RX ORDER — DOXYCYCLINE HYCLATE 100 MG
100 TABLET ORAL 2 TIMES DAILY
Qty: 28 TABLET | Refills: 0 | Status: SHIPPED | OUTPATIENT
Start: 2022-12-20 | End: 2023-01-03

## 2022-12-20 NOTE — TELEPHONE ENCOUNTER
CT denied unless we can document failed treatment    Start antibiotic and call back if not better in 2 weeks    Push fluids  Tylenol or ibuprofen prn fever  flonase every morning 2 sprays each side and saline nasal spray 4-5 times daily    Follow up if not better in 1 week or if symptoms get worse.     Call patient

## 2022-12-20 NOTE — TELEPHONE ENCOUNTER
Peer to peer scheduled for today, 12/20/2022 @ [phone]. They will call you. May be a 1800 number.  Case [id number]

## 2023-02-19 DIAGNOSIS — R35.0 URINARY FREQUENCY: ICD-10-CM

## 2023-02-19 DIAGNOSIS — R53.83 OTHER FATIGUE: ICD-10-CM

## 2023-02-19 DIAGNOSIS — I10 HYPERTENSION, UNSPECIFIED TYPE: ICD-10-CM

## 2023-02-20 RX ORDER — MIRABEGRON 50 MG/1
TABLET, FILM COATED, EXTENDED RELEASE ORAL
Qty: 90 TABLET | Refills: 3 | Status: SHIPPED | OUTPATIENT
Start: 2023-02-20

## 2023-02-20 RX ORDER — METOPROLOL SUCCINATE 50 MG/1
TABLET, EXTENDED RELEASE ORAL
Qty: 60 TABLET | Refills: 0 | Status: SHIPPED | OUTPATIENT
Start: 2023-02-20

## 2023-02-20 NOTE — TELEPHONE ENCOUNTER
METOPROLOL SUCC 50 MG TWICE DAILY    03/16/2023- NEW PATIENT DR. WEISS     SCHEDULED APPT WITH  [facility] 08/11/2023

## 2023-02-21 DIAGNOSIS — G25.0 BENIGN ESSENTIAL TREMOR: Primary | ICD-10-CM

## 2023-02-21 RX ORDER — TOPIRAMATE 50 MG/1
50 TABLET, FILM COATED ORAL DAILY
Qty: 90 TABLET | Refills: 3 | Status: SHIPPED | OUTPATIENT
Start: 2023-02-21

## 2023-03-27 ENCOUNTER — TELEPHONE (OUTPATIENT)
Dept: FAMILY MEDICINE CLINIC | Age: 78
End: 2023-03-27

## 2023-03-27 NOTE — TELEPHONE ENCOUNTER
Not sure what to send in. Highly encourage covid and flu and strep swabs since we are still in the window to treat    Push fluids  Tylenol or ibuprofen prn fever  Cool mist Humidifier in the bedroom  flonase every morning 2 sprays each side and saline nasal spray 4-5 times daily    Follow up if not better in 1 week or if symptoms get worse.

## 2023-03-27 NOTE — TELEPHONE ENCOUNTER
Patient has had sore throat, cough, congestion since Saturday. It is worsening daily. She is taking Mucinex OTC with not much relief. Cough is dry. States the congestion is mostly n her throat and not her chest yet. Advised to push fluids, tylenol/motrin for pain or fevers, cool mist humidifier, warm salt water gargles. Patient declined a nurse visit for swabs. She is asking for a script to be called in. Please advise.

## 2023-03-28 ENCOUNTER — NURSE ONLY (OUTPATIENT)
Dept: FAMILY MEDICINE CLINIC | Age: 78
End: 2023-03-28
Payer: MEDICARE

## 2023-03-28 VITALS — TEMPERATURE: 97.6 F

## 2023-03-28 DIAGNOSIS — J02.9 SORE THROAT: Primary | ICD-10-CM

## 2023-03-28 LAB
INFLUENZA VIRUS A RNA: NEGATIVE
INFLUENZA VIRUS B RNA: NEGATIVE
STREPTOCOCCUS A RNA: NEGATIVE

## 2023-03-28 PROCEDURE — 99211 OFF/OP EST MAY X REQ PHY/QHP: CPT | Performed by: FAMILY MEDICINE

## 2023-03-28 PROCEDURE — 87651 STREP A DNA AMP PROBE: CPT | Performed by: FAMILY MEDICINE

## 2023-03-28 PROCEDURE — 87502 INFLUENZA DNA AMP PROBE: CPT | Performed by: FAMILY MEDICINE

## 2023-03-28 RX ORDER — AZITHROMYCIN 250 MG/1
TABLET, FILM COATED ORAL
Qty: 1 PACKET | Refills: 0 | Status: SHIPPED | OUTPATIENT
Start: 2023-03-28

## 2023-03-28 NOTE — PROGRESS NOTES
Chief Complaint   Patient presents with    Cough    Congestion    Pharyngitis       Results for POC orders placed in visit on 03/28/23   POCT Rapid Strep A DNA (Alere i)   Result Value Ref Range    Streptococcus A RNA negative    POCT Influenza A/B DNA   Result Value Ref Range    Influenza virus A RNA negative     Influenza virus B RNA negative      Zpack sent to the pharmacy    Was covid done?     Call patient

## 2023-03-28 NOTE — PROGRESS NOTES
Patient c/o congestion, sore throat, cough x 4 days. Patient has been taking OTC mucinex and tylenol.     Temp: 97.6    Strep: NEGATIVE    Flu:  NEGATIVE

## 2023-03-30 DIAGNOSIS — I10 HYPERTENSION, UNSPECIFIED TYPE: ICD-10-CM

## 2023-03-30 DIAGNOSIS — R53.83 OTHER FATIGUE: ICD-10-CM

## 2023-03-30 RX ORDER — METOPROLOL SUCCINATE 50 MG/1
TABLET, EXTENDED RELEASE ORAL
Qty: 180 TABLET | Refills: 2 | Status: SHIPPED | OUTPATIENT
Start: 2023-03-30

## 2023-03-30 RX ORDER — LOSARTAN POTASSIUM 50 MG/1
TABLET ORAL
Qty: 90 TABLET | Refills: 3 | OUTPATIENT
Start: 2023-03-30

## 2023-03-30 RX ORDER — LOSARTAN POTASSIUM 100 MG/1
100 TABLET ORAL DAILY
Qty: 90 TABLET | Refills: 2 | Status: SHIPPED | OUTPATIENT
Start: 2023-03-30

## 2023-04-21 ENCOUNTER — OFFICE VISIT (OUTPATIENT)
Dept: NEUROLOGY | Age: 78
End: 2023-04-21
Payer: MEDICARE

## 2023-04-21 VITALS
HEART RATE: 73 BPM | SYSTOLIC BLOOD PRESSURE: 130 MMHG | BODY MASS INDEX: 29.19 KG/M2 | DIASTOLIC BLOOD PRESSURE: 70 MMHG | HEIGHT: 64 IN | OXYGEN SATURATION: 97 % | WEIGHT: 171 LBS

## 2023-04-21 DIAGNOSIS — G25.0 BENIGN ESSENTIAL TREMOR: Primary | ICD-10-CM

## 2023-04-21 PROCEDURE — 1123F ACP DISCUSS/DSCN MKR DOCD: CPT | Performed by: NURSE PRACTITIONER

## 2023-04-21 PROCEDURE — G8417 CALC BMI ABV UP PARAM F/U: HCPCS | Performed by: NURSE PRACTITIONER

## 2023-04-21 PROCEDURE — G8427 DOCREV CUR MEDS BY ELIG CLIN: HCPCS | Performed by: NURSE PRACTITIONER

## 2023-04-21 PROCEDURE — G8399 PT W/DXA RESULTS DOCUMENT: HCPCS | Performed by: NURSE PRACTITIONER

## 2023-04-21 PROCEDURE — 3075F SYST BP GE 130 - 139MM HG: CPT | Performed by: NURSE PRACTITIONER

## 2023-04-21 PROCEDURE — 1036F TOBACCO NON-USER: CPT | Performed by: NURSE PRACTITIONER

## 2023-04-21 PROCEDURE — 3078F DIAST BP <80 MM HG: CPT | Performed by: NURSE PRACTITIONER

## 2023-04-21 PROCEDURE — 1090F PRES/ABSN URINE INCON ASSESS: CPT | Performed by: NURSE PRACTITIONER

## 2023-04-21 PROCEDURE — 99213 OFFICE O/P EST LOW 20 MIN: CPT | Performed by: NURSE PRACTITIONER

## 2023-04-21 NOTE — PROGRESS NOTES
past medical history, allergies, medications, social history and family history. PE:   Vitals:    04/21/23 1100   BP: 130/70   Site: Left Upper Arm   Position: Sitting   Cuff Size: Large Adult   Pulse: 73   SpO2: 97%   Weight: 171 lb (77.6 kg)   Height: 5' 3.5\" (1.613 m)     General Appearance:  awake, alert, oriented   Gen: NAD, Language is Intact. Skin: no rash, lesion, dry t to touch. warm  Head: no rash, no icterus  Neck: There is no carotid bruits. The Neck is supple. Neuro: CN 2-12 grossly intact with no focal deficits. Power 5/5 Throughout symmetric, Reflexes are symmetric. Long tracts are intact. Cerebellar exam is Intact. Sensory exam is intact to light touch. Gait is intact. There is no tremor on exam today. No head tremor, no voice. Musculoskeletal:  Has no hand arthritis, no limitation of ROM in any of the four extremities,. Lower extremities no edema          DATA:         Results for orders placed or performed in visit on 03/28/23   POCT Rapid Strep A DNA (Alere i)   Result Value Ref Range    Streptococcus A RNA negative    POCT Influenza A/B DNA   Result Value Ref Range    Influenza virus A RNA negative     Influenza virus B RNA negative              Results for orders placed during the hospital encounter of 03/31/15    MRI Brain WO Contrast    Narrative  PROCEDURE: MRI BRAIN WO CONTRAST    CLINICAL INFORMATIONTremor    COMPARISON: None available. TECHNIQUE: Multiplanar multisequence MR images of the brain without contrast.    FINDINGS:    Diffusion weighted images and apparent diffusion coefficient maps demonstrate no areas of restricted diffusion. Brain volume is within normal limits. Mild bilateral cerebral white matter T2 hyperintensity, nonspecific but most often due to chronic small vessel ischemic disease and which is not age prominent. Intact cerebral cortex. No acute intracranial hemorrhage, mass effect, midline shift or obstructive hydrocephalus.   No abnormal

## 2023-04-21 NOTE — PATIENT INSTRUCTIONS
Continue Topamax 50 mg tablet daily. Take with plenty of fluids. Refills given. Stay active. Follow up in 8 months  Call if any questions or concerns.

## 2023-04-28 ENCOUNTER — OFFICE VISIT (OUTPATIENT)
Dept: PULMONOLOGY | Age: 78
End: 2023-04-28
Payer: MEDICARE

## 2023-04-28 VITALS
HEIGHT: 64 IN | OXYGEN SATURATION: 96 % | HEART RATE: 62 BPM | WEIGHT: 173 LBS | BODY MASS INDEX: 29.53 KG/M2 | DIASTOLIC BLOOD PRESSURE: 80 MMHG | TEMPERATURE: 97.5 F | SYSTOLIC BLOOD PRESSURE: 138 MMHG

## 2023-04-28 DIAGNOSIS — Z99.89 OSA ON CPAP: Primary | ICD-10-CM

## 2023-04-28 DIAGNOSIS — I10 BENIGN ESSENTIAL HTN: ICD-10-CM

## 2023-04-28 DIAGNOSIS — E66.9 OBESITY (BMI 30-39.9): ICD-10-CM

## 2023-04-28 DIAGNOSIS — N18.30 STAGE 3 CHRONIC KIDNEY DISEASE, UNSPECIFIED WHETHER STAGE 3A OR 3B CKD (HCC): ICD-10-CM

## 2023-04-28 DIAGNOSIS — G47.33 OSA ON CPAP: Primary | ICD-10-CM

## 2023-04-28 DIAGNOSIS — Z78.9 DIFFICULTY WITH CPAP USE: ICD-10-CM

## 2023-04-28 PROCEDURE — 1036F TOBACCO NON-USER: CPT | Performed by: NURSE PRACTITIONER

## 2023-04-28 PROCEDURE — 1123F ACP DISCUSS/DSCN MKR DOCD: CPT | Performed by: NURSE PRACTITIONER

## 2023-04-28 PROCEDURE — G8427 DOCREV CUR MEDS BY ELIG CLIN: HCPCS | Performed by: NURSE PRACTITIONER

## 2023-04-28 PROCEDURE — 3075F SYST BP GE 130 - 139MM HG: CPT | Performed by: NURSE PRACTITIONER

## 2023-04-28 PROCEDURE — 3079F DIAST BP 80-89 MM HG: CPT | Performed by: NURSE PRACTITIONER

## 2023-04-28 PROCEDURE — 99214 OFFICE O/P EST MOD 30 MIN: CPT | Performed by: NURSE PRACTITIONER

## 2023-04-28 PROCEDURE — 1090F PRES/ABSN URINE INCON ASSESS: CPT | Performed by: NURSE PRACTITIONER

## 2023-04-28 PROCEDURE — G8417 CALC BMI ABV UP PARAM F/U: HCPCS | Performed by: NURSE PRACTITIONER

## 2023-04-28 PROCEDURE — G8399 PT W/DXA RESULTS DOCUMENT: HCPCS | Performed by: NURSE PRACTITIONER

## 2023-04-28 ASSESSMENT — ENCOUNTER SYMPTOMS
COUGH: 0
DIARRHEA: 0
CHEST TIGHTNESS: 0
EYES NEGATIVE: 1
SHORTNESS OF BREATH: 0
NAUSEA: 0
VOMITING: 0
ABDOMINAL PAIN: 0
WHEEZING: 0

## 2023-04-28 NOTE — PROGRESS NOTES
equipment today? Yes -printed rx for supplies, faxed to patient's DME - will continue to use CPAP as much as she can for now, AHI is controlled, she is just struggling with 4 hour compliance night. Discussed other options including in lab titration to BiPAP , OAT, inspire device , UPPP -   She is very interested in [place]. She does not feel bipap would be beneficial , she struggles with the mask/ tubing   Will need repeat Baseline sleep study per insurance to be < 3years old to assess for candidacy- order placed for HST      Continue current pressure on CPAP   Get 7-9 hours of sleep nightly   Work on wt loss     Patient verbalizes understanding.   We will see Reinaldo Barnes back after HST     Information added by my medical assistant/LPN was reviewed today    billing based on medical decision making     BOAZ Pickard-CNP   4/28/2023

## 2023-05-15 ENCOUNTER — HOSPITAL ENCOUNTER (OUTPATIENT)
Dept: SLEEP CENTER | Age: 78
Discharge: HOME | End: 2023-05-17
Payer: MEDICARE

## 2023-05-15 DIAGNOSIS — I10 BENIGN ESSENTIAL HTN: ICD-10-CM

## 2023-05-15 DIAGNOSIS — Z78.9 DIFFICULTY WITH CPAP USE: ICD-10-CM

## 2023-05-15 DIAGNOSIS — E66.9 OBESITY (BMI 30-39.9): ICD-10-CM

## 2023-05-15 DIAGNOSIS — G47.33 OSA ON CPAP: ICD-10-CM

## 2023-05-15 DIAGNOSIS — Z99.89 OSA ON CPAP: ICD-10-CM

## 2023-05-15 PROCEDURE — 95806 SLEEP STUDY UNATT&RESP EFFT: CPT

## 2023-05-15 NOTE — PROGRESS NOTES
Nhi Nieto presents today for a HST instruction and demonstration on unit # [id number]. Questions were asked and answers given. She was able to return demonstration and verbalized understanding. The sleep center control room phone number was provided in case questions arise during the study. Informed patient to call 911 in case of an emergency. She states she will return the unit tomorrow before 1000. Title:  Home Sleep Apnea Testing (HSAT)     Approved by:  Gurinder Alvarado MD        Approval Date: December, 2021  Next Review: December, 2023       Responsible Party:  Jules Hdz  Institution/Entities Applies to:    [address] Number:  None      Document Type:  Such as Guideline, Policy,   Policy & Procedure, or Procedure, Instructions   Manual:  Policy and Procedures     Section: IV  Policy Start Date: June, 2011       HOME SLEEP APNEA TESTING (HSAT)      PURPOSE: To ensure home sleep apnea testing (HSAT) conducted by the sleep facility adheres to the current AASM practice parameters, clinical practice guidelines, best practice and clinical guidelines in regard to the diagnosis of KAYLEEN in adults. POLICY:      HSAT is a method of recording certain parameters which will target and measure, minimally, heart rate, oxygen saturation, respiratory airflow, respiratory effort and snoring for the purpose of evaluating a patient for KAYLEEN. HSAT will be performed in conjunction with a comprehensive sleep evaluation by an appropriately licensed sleep facility medical staff member. All portable monitoring equipment will be FDA-approved and appropriately maintained to ensure patient safety and efficiency of the test.       PROCEDURE:      An order from a licensed sleep physician along with appropriate medical history documenting the indication for HSAT that complies with the AASM practice parameters.     All tests will be performed, and records

## 2023-05-16 LAB — STATUS: NORMAL

## 2023-05-17 NOTE — PROGRESS NOTES
[address] [place] [address]                               SLEEP STUDY REPORT    PATIENT NAME: Franca Stock                 :        1945  MED REC NO:   [id number]                           ROOM:  ACCOUNT NO:   [de-identified]                           ADMIT DATE: 05/15/2023  PROVIDER:     Purnima Cordova. MD Deejay    DATE OF STUDY:  05/15/2023    PORTABLE/HOME SLEEP STUDY REPORT    REFERRING PROVIDER:  Niels Dewitt CNP    The patient's height is 63 inches, weight is 173 pounds with a BMI of  30.6. HISTORY:  The patient is a 51-year-old female diagnosed with moderately  severe obstructive sleep apnea by a respiratory event index of 15.1 by a  portable sleep study performed on 2020. The patient is currently  using a CPAP with pressure of 12 cm of water. She is using her CPAP  machine with poor compliance. The patient is interested in going for  inspire device as a treatment for her sleep apnea. The patient was  scheduled for repeat portable/home sleep study to evaluate for sleep  apnea status to consider referring the patient inspire device placement. METHODS:  The patient underwent Type III Portable Monitoring Sleep Study  including the simultaneous recording of oral-nasal airflow, rib and  abdominal respiratory effort, pulse rate, oxygen saturation, and body  position. Sleep staging and scoring followed the standard put forth by  the American Academy of Sleep Medicine and utilized the 1B obstructive  hypopnea event desaturation of 4 percent or greater. INTERPRETATION:  This is a portable/home sleep study. The study was  performed on 05/15/2023. The study was started at 09:00 p.m. and was  terminated at 04:39 a.m. with a total monitoring time of 459.7 minutes. RESPIRATORY EVENT ANALYSIS:  Revealed the patient had a total of 8  apneas. Out of 8, all of them were obstructive in nature.   The patient  also

## 2023-05-22 NOTE — PROGRESS NOTES
[facility]    Murry Goltz, 70 y.o.  [id number]    Nurses Notes   Reviewed   Study Results  Initial Study Date -  5/15/2023  AHI -  45.3    Total Events - 347  (Apneas  8  Hypopneas 339  Central  0)  Total Sleep Monitoring Time - 459.7 min  Time with Sats below 88% - 7.4 min      PAP Download:   Compliant  80%   []  Noncompliant 20 %     PAP Type CPAP     Level  12 cmH2O   Avg Hrs/Day 8hrs 19mins   Recorded compliance dates , 4/23/23  to 5/22/23   Total Hours: 4189  Machine/Mfg: Resmed Interface: FFM    Provider:  []-SIMON  []Jeanne  []Onel  []Kelli         [][facility] [x][facility]     Neck Size: 16  Mallampati 3            Interval History       Murry Goltz is a 70 y.o. old femalewho comes in to review the results of her recent sleep study, to answer questions and to explore options for treatment. Wants to discuss Inspire device   Has never felt benefit with CPAP use, feels she gets poor quality of sleep with use , is up every several hours to adjust mask, has skin irritation from mask. Mask constantly sliding off face. Has tried several masks, without benefit.    Continues to use CPAP nightly to help decrease cardiovascular risks but due to all the problems and lack of sleep wants alternative therapy     Meds  Current Outpatient Medications   Medication Sig Dispense Refill    moxifloxacin (VIGAMOX) 0.5 % ophthalmic solution       prednisoLONE acetate (PRED FORTE) 1 % ophthalmic suspension       metoprolol succinate (TOPROL XL) 50 MG extended release tablet TAKE 1 TABLET BY MOUTH TWICE  DAILY 180 tablet 2    losartan (COZAAR) 100 MG tablet Take 1 tablet by mouth daily 90 tablet 2    topiramate (TOPAMAX) 50 MG tablet Take 1 tablet by mouth daily 90 tablet 3    MYRBETRIQ 50 MG TB24 TAKE 1 TABLET BY MOUTH DAILY 90 tablet 3    fenofibrate (TRICOR) 145 MG tablet TAKE 1 TABLET DAILY 90 tablet 0    Coenzyme Q10 (CO Q-10) 400 MG CAPS Take 1 capsule by mouth

## 2023-05-23 ENCOUNTER — OFFICE VISIT (OUTPATIENT)
Dept: PULMONOLOGY | Age: 78
End: 2023-05-23
Payer: MEDICARE

## 2023-05-23 VITALS
SYSTOLIC BLOOD PRESSURE: 138 MMHG | BODY MASS INDEX: 30.22 KG/M2 | OXYGEN SATURATION: 95 % | DIASTOLIC BLOOD PRESSURE: 68 MMHG | WEIGHT: 177 LBS | HEART RATE: 68 BPM | TEMPERATURE: 97.8 F | HEIGHT: 64 IN

## 2023-05-23 DIAGNOSIS — Z78.9 DIFFICULTY WITH CPAP USE: Primary | ICD-10-CM

## 2023-05-23 DIAGNOSIS — I10 BENIGN ESSENTIAL HTN: ICD-10-CM

## 2023-05-23 DIAGNOSIS — Z99.89 OSA ON CPAP: ICD-10-CM

## 2023-05-23 DIAGNOSIS — E66.9 OBESITY (BMI 30-39.9): ICD-10-CM

## 2023-05-23 DIAGNOSIS — G47.33 OSA ON CPAP: ICD-10-CM

## 2023-05-23 PROCEDURE — 1123F ACP DISCUSS/DSCN MKR DOCD: CPT | Performed by: NURSE PRACTITIONER

## 2023-05-23 PROCEDURE — 1036F TOBACCO NON-USER: CPT | Performed by: NURSE PRACTITIONER

## 2023-05-23 PROCEDURE — 1090F PRES/ABSN URINE INCON ASSESS: CPT | Performed by: NURSE PRACTITIONER

## 2023-05-23 PROCEDURE — 3075F SYST BP GE 130 - 139MM HG: CPT | Performed by: NURSE PRACTITIONER

## 2023-05-23 PROCEDURE — 3078F DIAST BP <80 MM HG: CPT | Performed by: NURSE PRACTITIONER

## 2023-05-23 PROCEDURE — G8417 CALC BMI ABV UP PARAM F/U: HCPCS | Performed by: NURSE PRACTITIONER

## 2023-05-23 PROCEDURE — G8427 DOCREV CUR MEDS BY ELIG CLIN: HCPCS | Performed by: NURSE PRACTITIONER

## 2023-05-23 PROCEDURE — G8399 PT W/DXA RESULTS DOCUMENT: HCPCS | Performed by: NURSE PRACTITIONER

## 2023-05-23 PROCEDURE — 99214 OFFICE O/P EST MOD 30 MIN: CPT | Performed by: NURSE PRACTITIONER

## 2023-05-23 RX ORDER — MOXIFLOXACIN 5 MG/ML
SOLUTION/ DROPS OPHTHALMIC
COMMUNITY
Start: 2023-05-11

## 2023-05-23 RX ORDER — PREDNISOLONE ACETATE 10 MG/ML
SUSPENSION/ DROPS OPHTHALMIC
COMMUNITY
Start: 2023-05-11

## 2023-05-23 ASSESSMENT — ENCOUNTER SYMPTOMS
WHEEZING: 0
ABDOMINAL PAIN: 0
COUGH: 0
EYES NEGATIVE: 1
VOMITING: 0
SHORTNESS OF BREATH: 0
DIARRHEA: 0
NAUSEA: 0

## 2023-06-13 RX ORDER — ISOSORBIDE MONONITRATE 30 MG/1
TABLET, EXTENDED RELEASE ORAL
Qty: 90 TABLET | Refills: 3 | OUTPATIENT
Start: 2023-06-13

## 2023-06-13 RX ORDER — ATORVASTATIN CALCIUM 80 MG/1
TABLET, FILM COATED ORAL
Qty: 90 TABLET | Refills: 3 | OUTPATIENT
Start: 2023-06-13

## 2023-07-21 ENCOUNTER — HOSPITAL ENCOUNTER (OUTPATIENT)
Age: 78
Discharge: HOME | End: 2023-07-21
Payer: MEDICARE

## 2023-07-21 LAB
ALBUMIN SERPL BCG-MCNC: 4.5 G/DL (ref 3.5–5.1)
ALP SERPL-CCNC: 40 U/L (ref 38–126)
ALT SERPL W/O P-5'-P-CCNC: 21 U/L (ref 11–66)
ANION GAP SERPL CALC-SCNC: 10 MEQ/L (ref 8–16)
AST SERPL-CCNC: 29 U/L (ref 5–40)
BILIRUB CONJ SERPL-MCNC: < 0.2 MG/DL (ref 0–0.3)
BILIRUB SERPL-MCNC: 0.3 MG/DL (ref 0.3–1.2)
BUN SERPL-MCNC: 25 MG/DL (ref 7–22)
CALCIUM SERPL-MCNC: 9.4 MG/DL (ref 8.5–10.5)
CHLORIDE SERPL-SCNC: 111 MEQ/L (ref 98–111)
CHOLESTEROL, FASTING: 169 MG/DL (ref 100–199)
CO2 SERPL-SCNC: 21 MEQ/L (ref 23–33)
CREAT SERPL-MCNC: 1.1 MG/DL (ref 0.4–1.2)
DEPRECATED RDW RBC AUTO: 49.1 FL (ref 35–45)
ERYTHROCYTE [DISTWIDTH] IN BLOOD BY AUTOMATED COUNT: 13.8 % (ref 11.5–14.5)
GFR SERPL CREATININE-BSD FRML MDRD: 52 ML/MIN/1.73M2
GLUCOSE SERPL-MCNC: 139 MG/DL (ref 70–108)
HCT VFR BLD AUTO: 43.8 % (ref 37–47)
HDLC SERPL-MCNC: 39 MG/DL
HGB BLD-MCNC: 13.1 GM/DL (ref 12–16)
LDLC SERPL CALC-MCNC: 96 MG/DL
MCH RBC QN AUTO: 29.4 PG (ref 26–33)
MCHC RBC AUTO-ENTMCNC: 29.9 GM/DL (ref 32.2–35.5)
MCV RBC AUTO: 98.2 FL (ref 81–99)
PLATELET # BLD AUTO: 311 THOU/MM3 (ref 130–400)
PMV BLD AUTO: 12.4 FL (ref 9.4–12.4)
POTASSIUM SERPL-SCNC: 4.4 MEQ/L (ref 3.5–5.2)
PROT SERPL-MCNC: 7.1 G/DL (ref 6.1–8)
RBC # BLD AUTO: 4.46 MILL/MM3 (ref 4.2–5.4)
SODIUM SERPL-SCNC: 142 MEQ/L (ref 135–145)
TRIGLYCERIDE, FASTING: 172 MG/DL (ref 0–199)
WBC # BLD AUTO: 6.9 THOU/MM3 (ref 4.8–10.8)

## 2023-07-21 PROCEDURE — 82248 BILIRUBIN DIRECT: CPT

## 2023-07-21 PROCEDURE — 80053 COMPREHEN METABOLIC PANEL: CPT

## 2023-07-21 PROCEDURE — 85027 COMPLETE CBC AUTOMATED: CPT

## 2023-07-21 PROCEDURE — 36415 COLL VENOUS BLD VENIPUNCTURE: CPT

## 2023-07-21 PROCEDURE — 80061 LIPID PANEL: CPT

## 2023-08-01 NOTE — PROGRESS NOTES
[address] [place] [address]  Dept: [address]: [phone]           Chief Complaint   Patient presents with    Follow-up       Cardiologist:  Dr. Cal Campbell      Today's visit: 8/4/2023    Subjective:    Review of Systems   Constitutional: Negative. Respiratory: Negative. Cardiovascular: Negative. Gastrointestinal: Negative. Musculoskeletal: Negative. Neurological: Negative. Psychiatric/Behavioral: Negative.             Past Medical History:   Diagnosis Date    Atypical ductal hyperplasia of right breast 2006    ADH    CAD (coronary artery disease)     Depression     Fibromyalgia     Fibromyalgia     GERD (gastroesophageal reflux disease)     Dr Wild Duong)     Hyperlipidemia     Hypertension     Macular degeneration, dry     MI (myocardial infarction) ([address])     Dr Cal Campbell    Osteoarthritis     SOB (shortness of breath)     Dr Tammy Peace    Type 2 diabetes mellitus without complication, without long-term current use of insulin ([facility])     Urinary incontinence        Allergies   Allergen Reactions    Benadryl [Diphenhydramine Hcl]      OVER-STIMULATED    Flexeril [Cyclobenzaprine Hcl]      OVER-STIMULATED         Current Outpatient Medications   Medication Sig Dispense Refill    fenofibrate (TRICOR) 145 MG tablet Take 1 tablet by mouth daily 90 tablet 3    isosorbide mononitrate (IMDUR) 30 MG extended release tablet Take 1 tablet by mouth daily 90 tablet 3    atorvastatin (LIPITOR) 80 MG tablet TAKE 1 TABLET IN THE MORNING 90 tablet 3    metoprolol succinate (TOPROL XL) 50 MG extended release tablet TAKE 1 TABLET BY MOUTH TWICE  DAILY 180 tablet 3    losartan (COZAAR) 100 MG tablet Take 1 tablet by mouth daily 90 tablet 3    moxifloxacin (VIGAMOX) 0.5 % ophthalmic solution       prednisoLONE acetate (PRED FORTE) 1 % ophthalmic suspension       topiramate (TOPAMAX) 50 MG

## 2023-08-04 ENCOUNTER — OFFICE VISIT (OUTPATIENT)
Dept: CARDIOLOGY CLINIC | Age: 78
End: 2023-08-04
Payer: MEDICARE

## 2023-08-04 VITALS
HEART RATE: 59 BPM | BODY MASS INDEX: 27.83 KG/M2 | WEIGHT: 163 LBS | SYSTOLIC BLOOD PRESSURE: 142 MMHG | DIASTOLIC BLOOD PRESSURE: 77 MMHG | RESPIRATION RATE: 18 BRPM | HEIGHT: 64 IN

## 2023-08-04 DIAGNOSIS — I10 PRIMARY HYPERTENSION: ICD-10-CM

## 2023-08-04 DIAGNOSIS — E78.5 HYPERLIPIDEMIA LDL GOAL <70: ICD-10-CM

## 2023-08-04 DIAGNOSIS — I25.10 CORONARY ARTERY DISEASE INVOLVING NATIVE CORONARY ARTERY OF NATIVE HEART WITHOUT ANGINA PECTORIS: Primary | ICD-10-CM

## 2023-08-04 PROCEDURE — 93000 ELECTROCARDIOGRAM COMPLETE: CPT | Performed by: INTERNAL MEDICINE

## 2023-08-04 RX ORDER — ISOSORBIDE MONONITRATE 30 MG/1
30 TABLET, EXTENDED RELEASE ORAL DAILY
Qty: 90 TABLET | Refills: 3 | Status: SHIPPED | OUTPATIENT
Start: 2023-08-04

## 2023-08-04 RX ORDER — METOPROLOL SUCCINATE 50 MG/1
TABLET, EXTENDED RELEASE ORAL
Qty: 180 TABLET | Refills: 3 | Status: SHIPPED | OUTPATIENT
Start: 2023-08-04

## 2023-08-04 RX ORDER — ATORVASTATIN CALCIUM 80 MG/1
TABLET, FILM COATED ORAL
Qty: 90 TABLET | Refills: 3 | Status: SHIPPED | OUTPATIENT
Start: 2023-08-04

## 2023-08-04 RX ORDER — LOSARTAN POTASSIUM 100 MG/1
100 TABLET ORAL DAILY
Qty: 90 TABLET | Refills: 3 | Status: SHIPPED | OUTPATIENT
Start: 2023-08-04

## 2023-08-04 RX ORDER — FENOFIBRATE 145 MG/1
145 TABLET, COATED ORAL DAILY
Qty: 90 TABLET | Refills: 3 | Status: SHIPPED | OUTPATIENT
Start: 2023-08-04

## 2023-08-04 ASSESSMENT — ENCOUNTER SYMPTOMS
GASTROINTESTINAL NEGATIVE: 1
RESPIRATORY NEGATIVE: 1

## 2023-08-09 RX ORDER — VENLAFAXINE HYDROCHLORIDE 150 MG/1
CAPSULE, EXTENDED RELEASE ORAL
Qty: 90 CAPSULE | Refills: 0 | Status: SHIPPED | OUTPATIENT
Start: 2023-08-09

## 2023-09-19 ENCOUNTER — TELEPHONE (OUTPATIENT)
Dept: FAMILY MEDICINE CLINIC | Age: 78
End: 2023-09-19

## 2023-09-22 ENCOUNTER — NURSE ONLY (OUTPATIENT)
Dept: LAB | Age: 78
End: 2023-09-22

## 2023-09-22 ENCOUNTER — HOSPITAL ENCOUNTER (OUTPATIENT)
Age: 78
Discharge: HOME | End: 2023-09-22
Payer: MEDICARE

## 2023-09-22 LAB
ANION GAP SERPL CALC-SCNC: 10 MEQ/L (ref 8–16)
BASOPHILS ABSOLUTE: 0.1 THOU/MM3 (ref 0–0.1)
BASOPHILS NFR BLD AUTO: 1.5 %
BUN SERPL-MCNC: 26 MG/DL (ref 7–22)
CALCIUM SERPL-MCNC: 9.1 MG/DL (ref 8.5–10.5)
CHLORIDE SERPL-SCNC: 112 MEQ/L (ref 98–111)
CO2 SERPL-SCNC: 21 MEQ/L (ref 23–33)
CREAT SERPL-MCNC: 1 MG/DL (ref 0.4–1.2)
DEPRECATED RDW RBC AUTO: 47.3 FL (ref 35–45)
EOSINOPHIL NFR BLD AUTO: 4.5 %
EOSINOPHILS ABSOLUTE: 0.2 THOU/MM3 (ref 0–0.4)
ERYTHROCYTE [DISTWIDTH] IN BLOOD BY AUTOMATED COUNT: 13.5 % (ref 11.5–14.5)
GFR SERPL CREATININE-BSD FRML MDRD: 58 ML/MIN/1.73M2
GLUCOSE SERPL-MCNC: 161 MG/DL (ref 70–108)
HCT VFR BLD AUTO: 40.7 % (ref 37–47)
HGB BLD-MCNC: 12.8 GM/DL (ref 12–16)
IMM GRANULOCYTES # BLD AUTO: 0.02 THOU/MM3 (ref 0–0.07)
IMM GRANULOCYTES NFR BLD AUTO: 0.4 %
INR PPP: 0.93 (ref 0.85–1.13)
LYMPHOCYTES ABSOLUTE: 1.3 THOU/MM3 (ref 1–4.8)
LYMPHOCYTES NFR BLD AUTO: 23.2 %
MCH RBC QN AUTO: 29.9 PG (ref 26–33)
MCHC RBC AUTO-ENTMCNC: 31.4 GM/DL (ref 32.2–35.5)
MCV RBC AUTO: 95.1 FL (ref 81–99)
MONOCYTES ABSOLUTE: 0.5 THOU/MM3 (ref 0.4–1.3)
MONOCYTES NFR BLD AUTO: 9.3 %
NEUTROPHILS NFR BLD AUTO: 61.1 %
NRBC BLD AUTO-RTO: 0 /100 WBC
PLATELET # BLD AUTO: 274 THOU/MM3 (ref 130–400)
PMV BLD AUTO: 12.2 FL (ref 9.4–12.4)
POTASSIUM SERPL-SCNC: 4.3 MEQ/L (ref 3.5–5.2)
RBC # BLD AUTO: 4.28 MILL/MM3 (ref 4.2–5.4)
SEGMENTED NEUTROPHILS ABSOLUTE COUNT: 3.3 THOU/MM3 (ref 1.8–7.7)
SODIUM SERPL-SCNC: 143 MEQ/L (ref 135–145)
WBC # BLD AUTO: 5.4 THOU/MM3 (ref 4.8–10.8)

## 2023-09-22 PROCEDURE — 93005 ELECTROCARDIOGRAM TRACING: CPT | Performed by: PODIATRIST

## 2023-09-23 LAB
EKG ATRIAL RATE: 70 BPM
EKG P AXIS: 42 DEGREES
EKG P-R INTERVAL: 202 MS
EKG Q-T INTERVAL: 414 MS
EKG QRS DURATION: 76 MS
EKG QTC CALCULATION (BAZETT): 447 MS
EKG R AXIS: -36 DEGREES
EKG T AXIS: 143 DEGREES
EKG VENTRICULAR RATE: 70 BPM

## 2023-09-26 ENCOUNTER — OFFICE VISIT (OUTPATIENT)
Dept: FAMILY MEDICINE CLINIC | Age: 78
End: 2023-09-26
Payer: MEDICARE

## 2023-09-26 ENCOUNTER — HOSPITAL ENCOUNTER (OUTPATIENT)
Age: 78
Discharge: HOME | End: 2023-09-26
Payer: MEDICARE

## 2023-09-26 ENCOUNTER — HOSPITAL ENCOUNTER (OUTPATIENT)
Dept: GENERAL RADIOLOGY | Age: 78
Discharge: HOME | End: 2023-09-26
Attending: FAMILY MEDICINE
Payer: MEDICARE

## 2023-09-26 VITALS
RESPIRATION RATE: 16 BRPM | WEIGHT: 173 LBS | TEMPERATURE: 98.6 F | OXYGEN SATURATION: 96 % | DIASTOLIC BLOOD PRESSURE: 76 MMHG | BODY MASS INDEX: 29.53 KG/M2 | SYSTOLIC BLOOD PRESSURE: 128 MMHG | HEART RATE: 68 BPM | HEIGHT: 64 IN

## 2023-09-26 DIAGNOSIS — Z01.818 PREOP EXAMINATION: Primary | ICD-10-CM

## 2023-09-26 DIAGNOSIS — Z01.818 PREOP EXAMINATION: ICD-10-CM

## 2023-09-26 PROCEDURE — 71046 X-RAY EXAM CHEST 2 VIEWS: CPT

## 2023-09-26 PROCEDURE — 1090F PRES/ABSN URINE INCON ASSESS: CPT | Performed by: FAMILY MEDICINE

## 2023-09-26 PROCEDURE — 1123F ACP DISCUSS/DSCN MKR DOCD: CPT | Performed by: FAMILY MEDICINE

## 2023-09-26 PROCEDURE — 1036F TOBACCO NON-USER: CPT | Performed by: FAMILY MEDICINE

## 2023-09-26 PROCEDURE — G8399 PT W/DXA RESULTS DOCUMENT: HCPCS | Performed by: FAMILY MEDICINE

## 2023-09-26 PROCEDURE — 3074F SYST BP LT 130 MM HG: CPT | Performed by: FAMILY MEDICINE

## 2023-09-26 PROCEDURE — G8427 DOCREV CUR MEDS BY ELIG CLIN: HCPCS | Performed by: FAMILY MEDICINE

## 2023-09-26 PROCEDURE — 3078F DIAST BP <80 MM HG: CPT | Performed by: FAMILY MEDICINE

## 2023-09-26 PROCEDURE — 99213 OFFICE O/P EST LOW 20 MIN: CPT | Performed by: FAMILY MEDICINE

## 2023-09-26 PROCEDURE — G8417 CALC BMI ABV UP PARAM F/U: HCPCS | Performed by: FAMILY MEDICINE

## 2023-09-26 SDOH — ECONOMIC STABILITY: INCOME INSECURITY: HOW HARD IS IT FOR YOU TO PAY FOR THE VERY BASICS LIKE FOOD, HOUSING, MEDICAL CARE, AND HEATING?: NOT HARD AT ALL

## 2023-09-26 SDOH — ECONOMIC STABILITY: FOOD INSECURITY: WITHIN THE PAST 12 MONTHS, YOU WORRIED THAT YOUR FOOD WOULD RUN OUT BEFORE YOU GOT MONEY TO BUY MORE.: NEVER TRUE

## 2023-09-26 SDOH — ECONOMIC STABILITY: FOOD INSECURITY: WITHIN THE PAST 12 MONTHS, THE FOOD YOU BOUGHT JUST DIDN'T LAST AND YOU DIDN'T HAVE MONEY TO GET MORE.: NEVER TRUE

## 2023-09-26 ASSESSMENT — PATIENT HEALTH QUESTIONNAIRE - PHQ9
SUM OF ALL RESPONSES TO PHQ QUESTIONS 1-9: 0
SUM OF ALL RESPONSES TO PHQ QUESTIONS 1-9: 0
2. FEELING DOWN, DEPRESSED OR HOPELESS: 0
5. POOR APPETITE OR OVEREATING: 0
7. TROUBLE CONCENTRATING ON THINGS, SUCH AS READING THE NEWSPAPER OR WATCHING TELEVISION: 0
SUM OF ALL RESPONSES TO PHQ QUESTIONS 1-9: 0
SUM OF ALL RESPONSES TO PHQ QUESTIONS 1-9: 0
4. FEELING TIRED OR HAVING LITTLE ENERGY: 0
10. IF YOU CHECKED OFF ANY PROBLEMS, HOW DIFFICULT HAVE THESE PROBLEMS MADE IT FOR YOU TO DO YOUR WORK, TAKE CARE OF THINGS AT HOME, OR GET ALONG WITH OTHER PEOPLE: 0
8. MOVING OR SPEAKING SO SLOWLY THAT OTHER PEOPLE COULD HAVE NOTICED. OR THE OPPOSITE, BEING SO FIGETY OR RESTLESS THAT YOU HAVE BEEN MOVING AROUND A LOT MORE THAN USUAL: 0
SUM OF ALL RESPONSES TO PHQ9 QUESTIONS 1 & 2: 0
3. TROUBLE FALLING OR STAYING ASLEEP: 0
1. LITTLE INTEREST OR PLEASURE IN DOING THINGS: 0
9. THOUGHTS THAT YOU WOULD BE BETTER OFF DEAD, OR OF HURTING YOURSELF: 0
6. FEELING BAD ABOUT YOURSELF - OR THAT YOU ARE A FAILURE OR HAVE LET YOURSELF OR YOUR FAMILY DOWN: 0

## 2023-09-26 NOTE — PROGRESS NOTES
[address] [address]  Dept: [phone]  Dept Fax: [phone]  Loc: [place] Eduardo is a 70 y.o. female who presents today for:  Chief Complaint   Patient presents with    Pre-op Exam     Dr Barker [place] 10/3/23       HPI:     HPI  Here for preop exam for right foot surgery 10/3/23. Reviewed chart forpast medical history , surgical history , allergies, social history , family history and medications. Health Maintenance   Topic Date Due    Hepatitis C screen  Never done    DTaP/Tdap/Td vaccine (1 - Tdap) Never done    Hepatitis B vaccine (1 of 3 - Risk 3-dose series) Never done    Low dose CT lung screening &/or counseling  10/05/2022    Flu vaccine (1) 08/01/2023    Depression Monitoring  10/04/2023    Annual Wellness Visit (AWV)  10/05/2023    COVID-19 Vaccine (5 - Pfizer series) 09/20/2024 (Originally 1/27/2022)    Lipids  07/21/2024    DEXA (modify frequency per FRAX score)  Completed    Shingles vaccine  Completed    Pneumococcal 65+ years Vaccine  Completed    Hepatitis A vaccine  Aged Out    Hib vaccine  Aged Out    Meningococcal (ACWY) vaccine  Aged Out    Diabetic foot exam  Discontinued    A1C test (Diabetic or Prediabetic)  Discontinued    Diabetic Alb to Cr ratio (uACR) test  Discontinued    Diabetic retinal exam  Discontinued    Breast cancer screen  Discontinued    Colorectal Cancer Screen  Discontinued       Subjective:      Constitutional:Negative for fever, chills, diaphoresis, activity change, appetite change and fatigue. HENT: Negative for hearing loss, ear pain, congestion, sore throat, rhinorrhea, postnasal drip and ear discharge. Eyes: Negative for photophobia and visual disturbance. Respiratory: Negative for cough, chest tightness, shortness of breath and wheezing. Cardiovascular: Negative for chest pain and leg swelling.    Gastrointestinal: Negative for nausea,

## 2023-09-26 NOTE — PROGRESS NOTES
NPO after midnight  [employer] and drivers license  Wear comfortable clean clothing  Do not bring jewelry  Shower night before and morning of surgery with a liquid antibacterial soap  Bring list of medications with dosage and how often taken  Follow all instructions given by your physician   needed at discharge  Please limit to 2 visitors for surgery  You must have a responsible adult with you day of surgery and for 24 hours after surgery  Call [phone] for any questions

## 2023-09-26 NOTE — PROGRESS NOTES
EKG given to Dr. Marti Henderson on 9/25 to see if pt needs cardiac clearance.  Dr. Marti Henderson is OK w/ the pt proceeding w/ surgery at the [address] [place] [address]

## 2023-09-27 NOTE — PROGRESS NOTES
Wisam Brown CNP cleared patient as moderate risk, information given to anesthesia for review.  Per Dr. Maria R dexter to proceed at the surgery center 10/3

## 2023-10-03 ENCOUNTER — HOSPITAL ENCOUNTER (OUTPATIENT)
Age: 78
Setting detail: OUTPATIENT SURGERY
Discharge: HOME | End: 2023-10-03
Attending: PODIATRIST | Admitting: PODIATRIST
Payer: MEDICARE

## 2023-10-03 ENCOUNTER — ANESTHESIA (OUTPATIENT)
Dept: OPERATING ROOM | Age: 78
End: 2023-10-03
Payer: MEDICARE

## 2023-10-03 ENCOUNTER — ANESTHESIA EVENT (OUTPATIENT)
Dept: OPERATING ROOM | Age: 78
End: 2023-10-03
Payer: MEDICARE

## 2023-10-03 VITALS
OXYGEN SATURATION: 93 % | SYSTOLIC BLOOD PRESSURE: 151 MMHG | TEMPERATURE: 99.6 F | WEIGHT: 170.2 LBS | HEIGHT: 64 IN | BODY MASS INDEX: 29.06 KG/M2 | RESPIRATION RATE: 12 BRPM | DIASTOLIC BLOOD PRESSURE: 65 MMHG | HEART RATE: 73 BPM

## 2023-10-03 DIAGNOSIS — G89.18 POST-OP PAIN: Primary | ICD-10-CM

## 2023-10-03 PROCEDURE — 6360000002 HC RX W HCPCS: Performed by: NURSE ANESTHETIST, CERTIFIED REGISTERED

## 2023-10-03 PROCEDURE — 2500000003 HC RX 250 WO HCPCS: Performed by: NURSE ANESTHETIST, CERTIFIED REGISTERED

## 2023-10-03 PROCEDURE — 2709999900 HC NON-CHARGEABLE SUPPLY: Performed by: PODIATRIST

## 2023-10-03 PROCEDURE — 6360000002 HC RX W HCPCS

## 2023-10-03 PROCEDURE — 6360000002 HC RX W HCPCS: Performed by: PODIATRIST

## 2023-10-03 PROCEDURE — 7100000001 HC PACU RECOVERY - ADDTL 15 MIN: Performed by: PODIATRIST

## 2023-10-03 PROCEDURE — C1713 ANCHOR/SCREW BN/BN,TIS/BN: HCPCS | Performed by: PODIATRIST

## 2023-10-03 PROCEDURE — 7100000011 HC PHASE II RECOVERY - ADDTL 15 MIN: Performed by: PODIATRIST

## 2023-10-03 PROCEDURE — 2580000003 HC RX 258

## 2023-10-03 PROCEDURE — 7100000010 HC PHASE II RECOVERY - FIRST 15 MIN: Performed by: PODIATRIST

## 2023-10-03 PROCEDURE — 7100000000 HC PACU RECOVERY - FIRST 15 MIN: Performed by: PODIATRIST

## 2023-10-03 PROCEDURE — 3700000001 HC ADD 15 MINUTES (ANESTHESIA): Performed by: PODIATRIST

## 2023-10-03 PROCEDURE — 3700000000 HC ANESTHESIA ATTENDED CARE: Performed by: PODIATRIST

## 2023-10-03 PROCEDURE — 3600000004 HC SURGERY LEVEL 4 BASE: Performed by: PODIATRIST

## 2023-10-03 PROCEDURE — 3600000014 HC SURGERY LEVEL 4 ADDTL 15MIN: Performed by: PODIATRIST

## 2023-10-03 PROCEDURE — 2720000010 HC SURG SUPPLY STERILE: Performed by: PODIATRIST

## 2023-10-03 DEVICE — MINI MONSTER HEADLESS, SHORT THREAD, 3.0 X 28MM
Type: IMPLANTABLE DEVICE | Status: FUNCTIONAL
Brand: MONSTER SCREW SYSTEM

## 2023-10-03 DEVICE — 3.5 X 16 MM R3CON NON-LOCKING PLATE SCREW
Type: IMPLANTABLE DEVICE | Status: FUNCTIONAL
Brand: GORILLA PLATING SYSTEM

## 2023-10-03 DEVICE — MTP, 0 DEGREE, MEDIUM PLATE, RIGHT
Type: IMPLANTABLE DEVICE | Status: FUNCTIONAL
Brand: GORILLA PLATING SYSTEM

## 2023-10-03 DEVICE — 3.5 X 14 MM R3CON LOCKING PLATE SCREW
Type: IMPLANTABLE DEVICE | Status: FUNCTIONAL
Brand: GORILLA PLATING SYSTEM

## 2023-10-03 RX ORDER — OXYCODONE HYDROCHLORIDE 5 MG/1
5 TABLET ORAL EVERY 4 HOURS PRN
Status: DISCONTINUED | OUTPATIENT
Start: 2023-10-03 | End: 2023-10-03 | Stop reason: HOSPADM

## 2023-10-03 RX ORDER — FENTANYL CITRATE 50 UG/ML
50 INJECTION, SOLUTION INTRAMUSCULAR; INTRAVENOUS EVERY 5 MIN PRN
Status: DISCONTINUED | OUTPATIENT
Start: 2023-10-03 | End: 2023-10-03 | Stop reason: HOSPADM

## 2023-10-03 RX ORDER — SODIUM CHLORIDE 9 MG/ML
INJECTION, SOLUTION INTRAVENOUS CONTINUOUS
Status: DISCONTINUED | OUTPATIENT
Start: 2023-10-03 | End: 2023-10-03 | Stop reason: HOSPADM

## 2023-10-03 RX ORDER — SODIUM CHLORIDE 9 MG/ML
INJECTION, SOLUTION INTRAVENOUS PRN
Status: DISCONTINUED | OUTPATIENT
Start: 2023-10-03 | End: 2023-10-03 | Stop reason: HOSPADM

## 2023-10-03 RX ORDER — SODIUM CHLORIDE 0.9 % (FLUSH) 0.9 %
5-40 SYRINGE (ML) INJECTION EVERY 12 HOURS SCHEDULED
Status: DISCONTINUED | OUTPATIENT
Start: 2023-10-03 | End: 2023-10-03 | Stop reason: HOSPADM

## 2023-10-03 RX ORDER — SODIUM CHLORIDE 0.9 % (FLUSH) 0.9 %
5-40 SYRINGE (ML) INJECTION PRN
Status: DISCONTINUED | OUTPATIENT
Start: 2023-10-03 | End: 2023-10-03 | Stop reason: HOSPADM

## 2023-10-03 RX ORDER — DEXAMETHASONE SODIUM PHOSPHATE 4 MG/ML
INJECTION, SOLUTION INTRA-ARTICULAR; INTRALESIONAL; INTRAMUSCULAR; INTRAVENOUS; SOFT TISSUE PRN
Status: DISCONTINUED | OUTPATIENT
Start: 2023-10-03 | End: 2023-10-03 | Stop reason: SDUPTHER

## 2023-10-03 RX ORDER — MORPHINE SULFATE 2 MG/ML
2 INJECTION, SOLUTION INTRAMUSCULAR; INTRAVENOUS EVERY 5 MIN PRN
Status: DISCONTINUED | OUTPATIENT
Start: 2023-10-03 | End: 2023-10-03 | Stop reason: HOSPADM

## 2023-10-03 RX ORDER — OXYCODONE HYDROCHLORIDE 5 MG/1
10 TABLET ORAL EVERY 4 HOURS PRN
Status: DISCONTINUED | OUTPATIENT
Start: 2023-10-03 | End: 2023-10-03 | Stop reason: HOSPADM

## 2023-10-03 RX ORDER — PHENYLEPHRINE HCL IN 0.9% NACL 1 MG/10 ML
SYRINGE (ML) INTRAVENOUS PRN
Status: DISCONTINUED | OUTPATIENT
Start: 2023-10-03 | End: 2023-10-03 | Stop reason: SDUPTHER

## 2023-10-03 RX ORDER — LABETALOL HYDROCHLORIDE 5 MG/ML
10 INJECTION INTRAVENOUS
Status: DISCONTINUED | OUTPATIENT
Start: 2023-10-03 | End: 2023-10-03 | Stop reason: HOSPADM

## 2023-10-03 RX ORDER — OXYCODONE HYDROCHLORIDE AND ACETAMINOPHEN 5; 325 MG/1; MG/1
1 TABLET ORAL EVERY 6 HOURS PRN
Qty: 28 TABLET | Refills: 0 | Status: SHIPPED | OUTPATIENT
Start: 2023-10-03 | End: 2023-10-10

## 2023-10-03 RX ORDER — ONDANSETRON 2 MG/ML
4 INJECTION INTRAMUSCULAR; INTRAVENOUS EVERY 6 HOURS PRN
Status: DISCONTINUED | OUTPATIENT
Start: 2023-10-03 | End: 2023-10-03 | Stop reason: HOSPADM

## 2023-10-03 RX ORDER — BUPIVACAINE HYDROCHLORIDE 5 MG/ML
INJECTION, SOLUTION EPIDURAL; INTRACAUDAL PRN
Status: DISCONTINUED | OUTPATIENT
Start: 2023-10-03 | End: 2023-10-03 | Stop reason: ALTCHOICE

## 2023-10-03 RX ORDER — ONDANSETRON 2 MG/ML
INJECTION INTRAMUSCULAR; INTRAVENOUS PRN
Status: DISCONTINUED | OUTPATIENT
Start: 2023-10-03 | End: 2023-10-03 | Stop reason: SDUPTHER

## 2023-10-03 RX ORDER — MORPHINE SULFATE 2 MG/ML
4 INJECTION, SOLUTION INTRAMUSCULAR; INTRAVENOUS
Status: DISCONTINUED | OUTPATIENT
Start: 2023-10-03 | End: 2023-10-03 | Stop reason: HOSPADM

## 2023-10-03 RX ORDER — FENTANYL CITRATE 50 UG/ML
INJECTION, SOLUTION INTRAMUSCULAR; INTRAVENOUS PRN
Status: DISCONTINUED | OUTPATIENT
Start: 2023-10-03 | End: 2023-10-03 | Stop reason: SDUPTHER

## 2023-10-03 RX ORDER — PROPOFOL 10 MG/ML
INJECTION, EMULSION INTRAVENOUS PRN
Status: DISCONTINUED | OUTPATIENT
Start: 2023-10-03 | End: 2023-10-03 | Stop reason: SDUPTHER

## 2023-10-03 RX ORDER — ONDANSETRON 4 MG/1
4 TABLET, ORALLY DISINTEGRATING ORAL EVERY 8 HOURS PRN
Status: DISCONTINUED | OUTPATIENT
Start: 2023-10-03 | End: 2023-10-03 | Stop reason: HOSPADM

## 2023-10-03 RX ORDER — MORPHINE SULFATE 2 MG/ML
2 INJECTION, SOLUTION INTRAMUSCULAR; INTRAVENOUS
Status: DISCONTINUED | OUTPATIENT
Start: 2023-10-03 | End: 2023-10-03 | Stop reason: HOSPADM

## 2023-10-03 RX ORDER — LIDOCAINE HYDROCHLORIDE 20 MG/ML
INJECTION, SOLUTION EPIDURAL; INFILTRATION; INTRACAUDAL; PERINEURAL PRN
Status: DISCONTINUED | OUTPATIENT
Start: 2023-10-03 | End: 2023-10-03 | Stop reason: SDUPTHER

## 2023-10-03 RX ORDER — HYDROXYZINE PAMOATE 25 MG/1
25 CAPSULE ORAL 3 TIMES DAILY PRN
Qty: 28 CAPSULE | Refills: 0 | Status: SHIPPED | OUTPATIENT
Start: 2023-10-03 | End: 2023-10-10

## 2023-10-03 RX ORDER — EPHEDRINE SULFATE/0.9% NACL/PF 50 MG/5 ML
SYRINGE (ML) INTRAVENOUS PRN
Status: DISCONTINUED | OUTPATIENT
Start: 2023-10-03 | End: 2023-10-03 | Stop reason: SDUPTHER

## 2023-10-03 RX ADMIN — ONDANSETRON 4 MG: 2 INJECTION INTRAMUSCULAR; INTRAVENOUS at 09:22

## 2023-10-03 RX ADMIN — DEXAMETHASONE SODIUM PHOSPHATE 10 MG: 4 INJECTION, SOLUTION INTRAMUSCULAR; INTRAVENOUS at 07:49

## 2023-10-03 RX ADMIN — Medication 200 MCG: at 08:16

## 2023-10-03 RX ADMIN — Medication 200 MCG: at 08:50

## 2023-10-03 RX ADMIN — Medication 15 MG: at 07:45

## 2023-10-03 RX ADMIN — Medication 10 MG: at 07:41

## 2023-10-03 RX ADMIN — LIDOCAINE HYDROCHLORIDE 40 MG: 20 INJECTION, SOLUTION EPIDURAL; INFILTRATION; INTRACAUDAL; PERINEURAL at 07:36

## 2023-10-03 RX ADMIN — Medication 200 MCG: at 07:57

## 2023-10-03 RX ADMIN — Medication 200 MCG: at 07:48

## 2023-10-03 RX ADMIN — Medication 2000 MG: at 07:41

## 2023-10-03 RX ADMIN — PROPOFOL 120 MG: 10 INJECTION, EMULSION INTRAVENOUS at 07:36

## 2023-10-03 RX ADMIN — FENTANYL CITRATE 50 MCG: 50 INJECTION, SOLUTION INTRAMUSCULAR; INTRAVENOUS at 07:36

## 2023-10-03 RX ADMIN — SODIUM CHLORIDE: 9 INJECTION, SOLUTION INTRAVENOUS at 07:30

## 2023-10-03 RX ADMIN — Medication 25 MG: at 08:08

## 2023-10-03 ASSESSMENT — PAIN - FUNCTIONAL ASSESSMENT: PAIN_FUNCTIONAL_ASSESSMENT: NONE - DENIES PAIN

## 2023-10-03 NOTE — ANESTHESIA POSTPROCEDURE EVALUATION
Department of Anesthesiology  Postprocedure Note    Patient: Rafa Penn  MRN: [id number]  YOB: 1945  Date of evaluation: 10/3/2023      Procedure Summary     Date: 10/03/23 Room / Location: [address] [place] [address] 02 / [address] [facility]    Anesthesia Start: 0730 Anesthesia Stop: 9691    Procedure: RIGHT 1st METATARSOPHALANGEAL JOINT FUSION (Right: Foot) Diagnosis:       Hallux valgus of right foot      Right foot pain      (Hallux valgus of right foot [M20.11])      (Right foot pain [M79.671])    Surgeons: Kadie Weber DPM Responsible Provider: Priscila Osorio MD    Anesthesia Type: general ASA Status: 3          Anesthesia Type: No value filed.     Gael Phase I: Gael Score: 10    Gael Phase II: Gael Score: 10      Anesthesia Post Evaluation    Patient location during evaluation: PACU  Patient participation: complete - patient participated  Level of consciousness: awake  Airway patency: patent  Nausea & Vomiting: no vomiting and no nausea  Complications: no  Cardiovascular status: hemodynamically stable  Respiratory status: acceptable  Hydration status: stable  Pain management: adequate

## 2023-10-03 NOTE — H&P
[facility]  History and Physical Update    Pt Name: Alyse Mir  MRN: [id number]  YOB: 1945  Date of evaluation: 10/3/2023    I have examined the patient and reviewed the H&P/Consult and there are no changes to the patient or plans.       Isis Collins DPM,FACFAS  Electronically signed 10/3/2023 at 7:33 AM

## 2023-10-03 NOTE — ANESTHESIA PRE PROCEDURE
INR 0.93 09/22/2023 10:53 AM    APTT 30.0 04/25/2019 07:01 PM       HCG (If Applicable): No results found for: \"PREGTESTUR\", \"PREGSERUM\", \"HCG\", \"HCGQUANT\"     ABGs: No results found for: \"PHART\", \"PO2ART\", \"RWP2UZU\", \"XOG6DLV\", \"BEART\", \"N6BYSESK\"     Type & Screen (If Applicable):  Lab Results   Component Value Date    LABRH POS 07/08/2022       Drug/Infectious Status (If Applicable):  No results found for: \"HIV\", \"HEPCAB\"    COVID-19 Screening (If Applicable): No results found for: \"COVID19\"        Anesthesia Evaluation    Airway: Mallampati: II  TM distance: >3 FB   Neck ROM: full  Mouth opening: > = 3 FB   Dental:          Pulmonary:   (+) COPD:  sleep apnea:  decreased breath sounds                            Cardiovascular:    (+) hypertension:, CAD:,         Rhythm: regular                      Neuro/Psych:   (+) psychiatric history:            GI/Hepatic/Renal:   (+) GERD:, renal disease: CRI,           Endo/Other:    (+) Diabetes, . Abdominal:   (+) obese,           Vascular: Other Findings:           Anesthesia Plan      general     ASA 3       Induction: intravenous. MIPS: Postoperative opioids intended and Prophylactic antiemetics administered. Anesthetic plan and risks discussed with patient. Plan discussed with CRNA.                     Chyna Li MD   10/3/2023

## 2023-10-03 NOTE — OP NOTE
Operative Note    Patient: Tammy De Luna  YOB: 1945  MRN: [id number]    Date of Procedure: 10/3/2023    Pre-Op Diagnosis Codes:     * Hallux valgus of right foot [M20.11]     * Right foot pain [M79.671]    Post-Op Diagnosis: Same       Procedure(s):  RIGHT 1st METATARSOPHALANGEAL JOINT FUSION    Surgeon(s):  Laura Mendoza DPM    Assistant:  Resident: Holland Byrnes DPM  Student: Sera Still MS4    Anesthesia: General    Estimated Blood Loss (mL): Minimal    Complications: None    Specimens:   * No specimens in log *    Implants:  Implant Name Type Inv. Item Serial No.  Lot No. LRB No. Used Action   PLATE BNE M 0DEG R MT GORILLA - [id number]  PLATE BNE M 0DEG R MT GORILLA  PARAGON 28-WD  Right 1 Implanted   SCREW BNE L14MM DIA3. 5MM R3CON SAPNA PLT GORILLA PLATING SYS - [id number]  SCREW BNE L14MM DIA3. 5MM R3CON SAPNA PLT GORILLA PLATING SYS  PARAGON 28-WD  Right 4 Implanted   SCREW BNE L16MM DIA3. 5MM R3CON SAPNA PLT [place]A PLATING SYS - [id number]  SCREW BNE L16MM DIA3. 5MM R3CON SAPNA PLT GORILLA PLATING SYS  PARAGON 28-WD  Right 1 Implanted   IMPLANT OP RM MINI-MONSTER 3.0 X 28MM H - [id number]  IMPLANT OP RM MINI-MONSTER 3.0 X 28MM H  PARAGON 28-WD  Right 1 Implanted         Drains: * No LDAs found *    Findings: Consistent with pre-op    Hemostasis: Right ankle tourniquet at 250 mmHg    Injectables: 27 cc of 0.5% Marcaine plain     Materials: 3-0 Vicryl, 4-0 Monocryl      Detailed Description of Procedure: Indications: Patient is a 70 y.o. female with a chief complaint of painful right foot bunion who is being seen by Dr. Tomasa Benjamin and being treated for hallux valgus of the right foot. At this time all conservative options have been exhausted and surgical intervention is necessary.   The procedure has been explained to the patient and they understand the risks, benefits and possible complications including infection, wound healing complications, need for further surgery, DVT, acute

## 2023-10-03 NOTE — BRIEF OP NOTE
Brief Postoperative Note      Patient: Suyapa Webb  YOB: 1945  MRN: [id number]    Date of Procedure: 10/3/2023    Pre-Op Diagnosis Codes:     * Hallux valgus of right foot [M20.11]     * Right foot pain [M79.671]    Post-Op Diagnosis: Same       Procedure(s):  RIGHT 1st METATARSOPHALANGEAL JOINT FUSION    Surgeon(s):  Demetria Bruner DPM    Assistant:  Resident: Lincoln Villasenor DPM    Anesthesia: General    Estimated Blood Loss (mL): Minimal    Complications: None    Specimens:   * No specimens in log *    Implants:  Implant Name Type Inv. Item Serial No.  Lot No. LRB No. Used Action   PLATE BNE M 0DEG R MT GORILLA - [id number]  PLATE BNE M 0DEG R MT GORILLA  PARAGON 28-WD  Right 1 Implanted   SCREW BNE L14MM DIA3. 5MM R3CON SAPNA PLT GORILLA PLATING SYS - [id number]  SCREW BNE L14MM DIA3. 5MM R3CON SAPNA PLT GORILLA PLATING SYS  PARAGON 28-WD  Right 4 Implanted   SCREW BNE L16MM DIA3. 5MM R3CON SAPNA PLT GORILLA PLATING SYS - [id number]  SCREW BNE L16MM DIA3. 5MM R3CON SAPNA PLT GORILLA PLATING SYS  PARAGON 28-WD  Right 1 Implanted   IMPLANT OP RM MINI-MONSTER 3.0 X 28MM H - [id number]  IMPLANT OP RM MINI-MONSTER 3.0 X 28MM H  PARAGON 28-WD  Right 1 Implanted         Drains: * No LDAs found *    Findings: Consistent with pre-op    Hemostasis: Right ankle tourniquet at 250 mmHg    Injectables: 27 cc of 0.5% Marcaine plain     Materials: 3-0 Vicryl, 4-0 Monocryl        Electronically signed by Lincoln Villasenor DPM on 10/3/2023 at 9:39 AM

## 2023-10-03 NOTE — DISCHARGE INSTRUCTIONS
Post-Operative Instructions    Diet: Drink liquids first; if tolerated add soft foods and increase diet as tolerated  Activity: Adults do NOT drive today. Due to anesthesia and medication your reflexes are slower. If you are wearing a surgical shoe or walking boot you may not drive until Dr. Rosina Carranza releases you to do so. Ambulate: Non-weight bearing to right foot. Leave the bandage/boot in place. Do not remove it. Keep it dry and clean. Rest by staying off your feet as much as possible. (Rent movies, read a book, etc.)  Wear surgical shoe/boot at all times when you are up on your foot/feet. Elevate the involved foot. Proper elevation is when the involved foot/feet are at the same level as the heart or slightly higher. If the foot has some throbbing you may apply ice to the top of the ankle area or behind the knee, 20 min on 20 min off. Take medications as prescribed   If there should be any excess bleeding to the bandage (wet blood larger than the size of a quarter), uncontrolled pain, questions or concerns please contact:   Office between 8:00 a.m. - 4:00P. M.    [phone]              Please return to office for a post-op dressing change and elevation of healing. Post-operative appointment is at previously scheduled date.      Dr. Inés Naylor DPM  10/03/23  9:39 AM

## 2023-10-03 NOTE — PROGRESS NOTES
7055: Patient arrived to Phase I recovery via cart. Awake and alert. Report received from Carmelo Mcdowell CRNA and Amna Oneill. VSS. Patient denies pain to R foot. Boot present and dressing clean, dry and intact. Ice pack applied behind R knee. R foot elevated with pillow support. 1278: VSS. HOB elevated. Ice water provided. Patient encouraged to cough and deep breathe.  0945: VSS.  0950: VSS.  0955: VSS. 1000: VSS. 1005: VSS. Patient continues to deny pain. 1006: Patient completed Phase I recovery. 1007: Entered into Phase II Recovery via cart. Friend brought to bedside. Coffee and ice water provided. Call light in reach. 1036: Discharge instructions reviewed with patient and patient's friend. AVS provided for discharge. Patient sat edge of bed and dressed with this RN's assistance. 1040: IV removed. Bleeding from site. Pressure held and dressing changed. 1050: Patient escorted to vehicle via wheelchair and discharged home in stable condition with friend.

## 2023-10-11 ENCOUNTER — TELEPHONE (OUTPATIENT)
Dept: CARDIOLOGY CLINIC | Age: 78
End: 2023-10-11

## 2023-10-11 ENCOUNTER — OFFICE VISIT (OUTPATIENT)
Dept: ENT CLINIC | Age: 78
End: 2023-10-11

## 2023-10-11 VITALS
WEIGHT: 170 LBS | HEIGHT: 64 IN | TEMPERATURE: 97 F | DIASTOLIC BLOOD PRESSURE: 66 MMHG | SYSTOLIC BLOOD PRESSURE: 124 MMHG | HEART RATE: 65 BPM | BODY MASS INDEX: 29.02 KG/M2 | RESPIRATION RATE: 16 BRPM | OXYGEN SATURATION: 97 %

## 2023-10-11 DIAGNOSIS — Z01.818 PRE-OP TESTING: Primary | ICD-10-CM

## 2023-10-11 DIAGNOSIS — J34.2 NASAL SEPTAL DEVIATION: ICD-10-CM

## 2023-10-11 DIAGNOSIS — Z78.9 INTOLERANCE OF CONTINUOUS POSITIVE AIRWAY PRESSURE (CPAP) VENTILATION: ICD-10-CM

## 2023-10-11 DIAGNOSIS — J34.89 REFRACTORY OBSTRUCTION OF NASAL AIRWAY: ICD-10-CM

## 2023-10-11 DIAGNOSIS — G47.33 OBSTRUCTIVE SLEEP APNEA: Primary | ICD-10-CM

## 2023-10-11 DIAGNOSIS — M95.0 NASAL VALVE COLLAPSE: ICD-10-CM

## 2023-10-11 DIAGNOSIS — J34.3 HYPERTROPHY OF INFERIOR NASAL TURBINATE: ICD-10-CM

## 2023-10-11 DIAGNOSIS — J38.7 ACQUIRED LARYNGOMALACIA IN ADULT: ICD-10-CM

## 2023-10-11 DIAGNOSIS — R06.5 CHRONIC MOUTH BREATHING: ICD-10-CM

## 2023-10-11 DIAGNOSIS — R06.83 SNORING: ICD-10-CM

## 2023-10-11 DIAGNOSIS — J35.1 LINGUAL TONSIL HYPERTROPHY: ICD-10-CM

## 2023-10-11 DIAGNOSIS — J34.89: ICD-10-CM

## 2023-10-11 NOTE — PROGRESS NOTES
[facility] EAR, NOSE AND THROAT  [address]  Dept: [phone]  Dept Fax: [phone]  Loc: [phone]    Office Visit and Procedure      HPI:     Tammy Citizen is a 70 y.o. female complaining of   Chief Complaint   Patient presents with    New Patient     New patient here for Difficulty with CPAP use,KAYLEEN on CPAP,Benign essential HTN and Obesity (BMI 30-39.9). Referred by Dalton Palma       The patient was diagnosed with obstructive sleep apnea approximately 2 years ago after traveling with friends and having her roommate tell her that she has apneic spells in conjunction with her loud snoring. The friend was a nurse and recommended she get a sleep study. That resulted in a diagnosis of severe obstructive sleep apnea for which she was placed on a variety of mask types and modalities without successful improvement of her sleep quality and restfulness. She has recently run up to the end of that process and wishes to be evaluated as a possible candidate for the Inspire hypoglossal nerve stimulator. She reports commonly having nasal congestion and breathing through her mouth. History:      Allergies   Allergen Reactions    Benadryl [Diphenhydramine Hcl]      OVER-STIMULATED    Flexeril [Cyclobenzaprine Hcl]      OVER-STIMULATED       Current Outpatient Medications   Medication Sig Dispense Refill    venlafaxine (EFFEXOR XR) 150 MG extended release capsule TAKE 1 CAPSULE DAILY 90 capsule 0    fenofibrate (TRICOR) 145 MG tablet Take 1 tablet by mouth daily 90 tablet 3    isosorbide mononitrate (IMDUR) 30 MG extended release tablet Take 1 tablet by mouth daily 90 tablet 3    atorvastatin (LIPITOR) 80 MG tablet TAKE 1 TABLET IN THE MORNING 90 tablet 3    metoprolol succinate (TOPROL XL) 50 MG extended release tablet TAKE 1 TABLET BY MOUTH TWICE  DAILY 180 tablet 3    losartan (COZAAR) 100 MG tablet Take 1 tablet by mouth

## 2023-10-11 NOTE — TELEPHONE ENCOUNTER
Daquan Gill is scheduled for a procedure with Dr Martha Morgan on 12/14/23. We are requesting cardiac clearance. Surgery:  DISE = Drug Induced Sleep Endoscopy    Please advise. Thank you!

## 2023-10-15 PROBLEM — J35.1 LINGUAL TONSIL HYPERTROPHY: Status: ACTIVE | Noted: 2023-10-15

## 2023-10-15 PROBLEM — Z78.9 INTOLERANCE OF CONTINUOUS POSITIVE AIRWAY PRESSURE (CPAP) VENTILATION: Status: ACTIVE | Noted: 2023-10-15

## 2023-10-15 PROBLEM — G47.33 OBSTRUCTIVE SLEEP APNEA: Status: ACTIVE | Noted: 2023-10-15

## 2023-10-15 PROBLEM — J34.89: Status: ACTIVE | Noted: 2023-10-15

## 2023-10-15 PROBLEM — J34.2 NASAL SEPTAL DEVIATION: Status: ACTIVE | Noted: 2023-10-15

## 2023-10-15 PROBLEM — J34.89 REFRACTORY OBSTRUCTION OF NASAL AIRWAY: Status: ACTIVE | Noted: 2023-10-15

## 2023-10-15 PROBLEM — M95.0 NASAL VALVE COLLAPSE: Status: ACTIVE | Noted: 2023-10-15

## 2023-10-15 PROBLEM — J34.3 HYPERTROPHY OF INFERIOR NASAL TURBINATE: Status: ACTIVE | Noted: 2023-10-15

## 2023-10-15 PROBLEM — J38.7 ACQUIRED LARYNGOMALACIA IN ADULT: Status: ACTIVE | Noted: 2023-10-15

## 2023-10-15 PROBLEM — R06.5 CHRONIC MOUTH BREATHING: Status: ACTIVE | Noted: 2023-10-15

## 2023-10-15 PROBLEM — J34.829 NASAL VALVE COLLAPSE: Status: ACTIVE | Noted: 2023-10-15

## 2023-10-15 PROBLEM — R06.83 SNORING: Status: ACTIVE | Noted: 2023-10-15

## 2023-11-01 ENCOUNTER — TELEPHONE (OUTPATIENT)
Dept: FAMILY MEDICINE CLINIC | Age: 78
End: 2023-11-01

## 2023-11-03 RX ORDER — VENLAFAXINE HYDROCHLORIDE 150 MG/1
150 CAPSULE, EXTENDED RELEASE ORAL DAILY
Qty: 90 CAPSULE | Refills: 1 | Status: SHIPPED | OUTPATIENT
Start: 2023-11-03

## 2023-11-13 ENCOUNTER — HOSPITAL ENCOUNTER (OUTPATIENT)
Dept: CT IMAGING | Age: 78
Discharge: HOME | End: 2023-11-13
Attending: OTOLARYNGOLOGY
Payer: MEDICARE

## 2023-11-13 DIAGNOSIS — G47.33 OBSTRUCTIVE SLEEP APNEA: ICD-10-CM

## 2023-11-13 DIAGNOSIS — R06.5 CHRONIC MOUTH BREATHING: ICD-10-CM

## 2023-11-13 DIAGNOSIS — J34.89 REFRACTORY OBSTRUCTION OF NASAL AIRWAY: ICD-10-CM

## 2023-11-13 DIAGNOSIS — Z78.9 INTOLERANCE OF CONTINUOUS POSITIVE AIRWAY PRESSURE (CPAP) VENTILATION: ICD-10-CM

## 2023-11-13 DIAGNOSIS — Z01.818 PRE-OP TESTING: ICD-10-CM

## 2023-11-13 LAB
ALBUMIN SERPL BCG-MCNC: 4.6 G/DL (ref 3.5–5.1)
ALP SERPL-CCNC: 45 U/L (ref 38–126)
ALT SERPL W/O P-5'-P-CCNC: 23 U/L (ref 11–66)
ANION GAP SERPL CALC-SCNC: 16 MEQ/L (ref 8–16)
AST SERPL-CCNC: 35 U/L (ref 5–40)
BILIRUB SERPL-MCNC: 0.3 MG/DL (ref 0.3–1.2)
BUN SERPL-MCNC: 25 MG/DL (ref 7–22)
CALCIUM SERPL-MCNC: 11.3 MG/DL (ref 8.5–10.5)
CHLORIDE SERPL-SCNC: 108 MEQ/L (ref 98–111)
CO2 SERPL-SCNC: 23 MEQ/L (ref 23–33)
CREAT SERPL-MCNC: 1.2 MG/DL (ref 0.4–1.2)
GFR SERPL CREATININE-BSD FRML MDRD: 47 ML/MIN/1.73M2
GLUCOSE SERPL-MCNC: 144 MG/DL (ref 70–108)
POTASSIUM SERPL-SCNC: 3.5 MEQ/L (ref 3.5–5.2)
PROT SERPL-MCNC: 7.1 G/DL (ref 6.1–8)
SODIUM SERPL-SCNC: 147 MEQ/L (ref 135–145)

## 2023-11-13 PROCEDURE — 36415 COLL VENOUS BLD VENIPUNCTURE: CPT

## 2023-11-13 PROCEDURE — 80053 COMPREHEN METABOLIC PANEL: CPT

## 2023-11-13 PROCEDURE — 70486 CT MAXILLOFACIAL W/O DYE: CPT

## 2023-12-05 ENCOUNTER — OFFICE VISIT (OUTPATIENT)
Dept: FAMILY MEDICINE CLINIC | Age: 78
End: 2023-12-05

## 2023-12-05 ENCOUNTER — NURSE ONLY (OUTPATIENT)
Dept: LAB | Age: 78
End: 2023-12-05

## 2023-12-05 VITALS
TEMPERATURE: 97.2 F | RESPIRATION RATE: 16 BRPM | HEART RATE: 61 BPM | WEIGHT: 166.89 LBS | SYSTOLIC BLOOD PRESSURE: 134 MMHG | HEIGHT: 64 IN | BODY MASS INDEX: 28.49 KG/M2 | DIASTOLIC BLOOD PRESSURE: 80 MMHG | OXYGEN SATURATION: 98 %

## 2023-12-05 DIAGNOSIS — G25.0 BENIGN ESSENTIAL TREMOR: ICD-10-CM

## 2023-12-05 DIAGNOSIS — E11.9 TYPE 2 DIABETES MELLITUS WITHOUT COMPLICATION, WITHOUT LONG-TERM CURRENT USE OF INSULIN (HCC): ICD-10-CM

## 2023-12-05 DIAGNOSIS — E53.8 B12 DEFICIENCY: ICD-10-CM

## 2023-12-05 DIAGNOSIS — G25.2 INTENTION TREMOR: ICD-10-CM

## 2023-12-05 DIAGNOSIS — M85.89 OSTEOPENIA OF MULTIPLE SITES: ICD-10-CM

## 2023-12-05 DIAGNOSIS — Z23 NEED FOR INFLUENZA VACCINATION: ICD-10-CM

## 2023-12-05 DIAGNOSIS — L57.8 SUN-DAMAGED SKIN: ICD-10-CM

## 2023-12-05 DIAGNOSIS — Z98.61 POST PTCA: ICD-10-CM

## 2023-12-05 DIAGNOSIS — Z87.891 HISTORY OF TOBACCO ABUSE: ICD-10-CM

## 2023-12-05 DIAGNOSIS — J32.9 RECURRENT SINUSITIS: ICD-10-CM

## 2023-12-05 DIAGNOSIS — F33.42 RECURRENT MAJOR DEPRESSIVE DISORDER, IN FULL REMISSION (HCC): ICD-10-CM

## 2023-12-05 DIAGNOSIS — E78.5 HYPERLIPIDEMIA LDL GOAL <70: ICD-10-CM

## 2023-12-05 DIAGNOSIS — Z72.0 TOBACCO ABUSE: ICD-10-CM

## 2023-12-05 DIAGNOSIS — D10.6: ICD-10-CM

## 2023-12-05 DIAGNOSIS — I10 PRIMARY HYPERTENSION: ICD-10-CM

## 2023-12-05 DIAGNOSIS — R53.83 OTHER FATIGUE: ICD-10-CM

## 2023-12-05 DIAGNOSIS — J43.1 PANLOBULAR EMPHYSEMA (HCC): ICD-10-CM

## 2023-12-05 DIAGNOSIS — I25.10 CORONARY ARTERY DISEASE INVOLVING NATIVE CORONARY ARTERY OF NATIVE HEART WITHOUT ANGINA PECTORIS: ICD-10-CM

## 2023-12-05 DIAGNOSIS — K21.00 GASTROESOPHAGEAL REFLUX DISEASE WITH ESOPHAGITIS WITHOUT HEMORRHAGE: ICD-10-CM

## 2023-12-05 DIAGNOSIS — E78.00 PURE HYPERCHOLESTEROLEMIA: ICD-10-CM

## 2023-12-05 DIAGNOSIS — J44.1 COPD EXACERBATION (HCC): ICD-10-CM

## 2023-12-05 DIAGNOSIS — I10 HYPERTENSION, UNSPECIFIED TYPE: ICD-10-CM

## 2023-12-05 DIAGNOSIS — J30.2 SEASONAL ALLERGIES: ICD-10-CM

## 2023-12-05 DIAGNOSIS — R35.0 URINARY FREQUENCY: ICD-10-CM

## 2023-12-05 DIAGNOSIS — D62 ANEMIA DUE TO ACUTE BLOOD LOSS: ICD-10-CM

## 2023-12-05 DIAGNOSIS — Z00.00 MEDICARE ANNUAL WELLNESS VISIT, SUBSEQUENT: Primary | ICD-10-CM

## 2023-12-05 LAB
ALBUMIN SERPL BCG-MCNC: 4.4 G/DL (ref 3.5–5.1)
ALP SERPL-CCNC: 44 U/L (ref 38–126)
ALT SERPL W/O P-5'-P-CCNC: 22 U/L (ref 11–66)
ANION GAP SERPL CALC-SCNC: 11 MEQ/L (ref 8–16)
AST SERPL-CCNC: 29 U/L (ref 5–40)
BILIRUB SERPL-MCNC: 0.3 MG/DL (ref 0.3–1.2)
BUN SERPL-MCNC: 29 MG/DL (ref 7–22)
CALCIUM SERPL-MCNC: 10.6 MG/DL (ref 8.5–10.5)
CHLORIDE SERPL-SCNC: 107 MEQ/L (ref 98–111)
CHOLEST SERPL-MCNC: 162 MG/DL (ref 100–199)
CO2 SERPL-SCNC: 23 MEQ/L (ref 23–33)
CREAT SERPL-MCNC: 1.2 MG/DL (ref 0.4–1.2)
CREAT UR-MCNC: 104.7 MG/DL
DEPRECATED MEAN GLUCOSE BLD GHB EST-ACNC: 132 MG/DL (ref 70–126)
DEPRECATED RDW RBC AUTO: 45.5 FL (ref 35–45)
ERYTHROCYTE [DISTWIDTH] IN BLOOD BY AUTOMATED COUNT: 13.2 % (ref 11.5–14.5)
FOLATE SERPL-MCNC: > 20 NG/ML (ref 4.8–24.2)
GFR SERPL CREATININE-BSD FRML MDRD: 47 ML/MIN/1.73M2
GLUCOSE FASTING: 124 MG/DL (ref 70–108)
HBA1C MFR BLD HPLC: 6.4 % (ref 4.4–6.4)
HCT VFR BLD AUTO: 42.6 % (ref 37–47)
HDLC SERPL-MCNC: 35 MG/DL
HGB BLD-MCNC: 13.3 GM/DL (ref 12–16)
LDLC SERPL CALC-MCNC: 74 MG/DL
MCH RBC QN AUTO: 29.4 PG (ref 26–33)
MCHC RBC AUTO-ENTMCNC: 31.2 GM/DL (ref 32.2–35.5)
MCV RBC AUTO: 94.2 FL (ref 81–99)
MICROALBUMIN UR-MCNC: 1.52 MG/DL
MICROALBUMIN/CREAT RATIO PNL UR: 15 MG/G (ref 0–30)
PLATELET # BLD AUTO: 349 THOU/MM3 (ref 130–400)
PMV BLD AUTO: 11.9 FL (ref 9.4–12.4)
POTASSIUM SERPL-SCNC: 3.6 MEQ/L (ref 3.5–5.2)
PROT SERPL-MCNC: 6.9 G/DL (ref 6.1–8)
RBC # BLD AUTO: 4.52 MILL/MM3 (ref 4.2–5.4)
SODIUM SERPL-SCNC: 141 MEQ/L (ref 135–145)
TRIGL SERPL-MCNC: 264 MG/DL (ref 0–199)
TSH SERPL DL<=0.005 MIU/L-ACNC: 2.63 UIU/ML (ref 0.4–4.2)
VIT B12 SERPL-MCNC: 424 PG/ML (ref 211–911)
WBC # BLD AUTO: 8.1 THOU/MM3 (ref 4.8–10.8)

## 2023-12-05 RX ORDER — NITROGLYCERIN 0.3 MG/1
0.3 TABLET SUBLINGUAL EVERY 5 MIN PRN
COMMUNITY
End: 2023-12-05 | Stop reason: SDUPTHER

## 2023-12-05 RX ORDER — NITROGLYCERIN 0.3 MG/1
0.3 TABLET SUBLINGUAL EVERY 5 MIN PRN
Qty: 30 TABLET | Refills: 0 | Status: SHIPPED | OUTPATIENT
Start: 2023-12-05

## 2023-12-05 RX ORDER — FENOFIBRATE 145 MG/1
145 TABLET, COATED ORAL DAILY
Qty: 90 TABLET | Refills: 3 | Status: SHIPPED | OUTPATIENT
Start: 2023-12-05

## 2023-12-05 RX ORDER — VENLAFAXINE HYDROCHLORIDE 150 MG/1
150 CAPSULE, EXTENDED RELEASE ORAL DAILY
Qty: 90 CAPSULE | Refills: 1 | Status: SHIPPED | OUTPATIENT
Start: 2023-12-05

## 2023-12-05 RX ORDER — TOPIRAMATE 50 MG/1
50 TABLET, FILM COATED ORAL DAILY
Qty: 90 TABLET | Refills: 3 | Status: SHIPPED | OUTPATIENT
Start: 2023-12-05

## 2023-12-05 RX ORDER — ATORVASTATIN CALCIUM 80 MG/1
TABLET, FILM COATED ORAL
Qty: 90 TABLET | Refills: 3 | Status: SHIPPED | OUTPATIENT
Start: 2023-12-05

## 2023-12-05 ASSESSMENT — PATIENT HEALTH QUESTIONNAIRE - PHQ9
10. IF YOU CHECKED OFF ANY PROBLEMS, HOW DIFFICULT HAVE THESE PROBLEMS MADE IT FOR YOU TO DO YOUR WORK, TAKE CARE OF THINGS AT HOME, OR GET ALONG WITH OTHER PEOPLE: 0
2. FEELING DOWN, DEPRESSED OR HOPELESS: 0
7. TROUBLE CONCENTRATING ON THINGS, SUCH AS READING THE NEWSPAPER OR WATCHING TELEVISION: 0
5. POOR APPETITE OR OVEREATING: 0
SUM OF ALL RESPONSES TO PHQ QUESTIONS 1-9: 0
9. THOUGHTS THAT YOU WOULD BE BETTER OFF DEAD, OR OF HURTING YOURSELF: 0
6. FEELING BAD ABOUT YOURSELF - OR THAT YOU ARE A FAILURE OR HAVE LET YOURSELF OR YOUR FAMILY DOWN: 0
SUM OF ALL RESPONSES TO PHQ QUESTIONS 1-9: 0
SUM OF ALL RESPONSES TO PHQ QUESTIONS 1-9: 0
8. MOVING OR SPEAKING SO SLOWLY THAT OTHER PEOPLE COULD HAVE NOTICED. OR THE OPPOSITE, BEING SO FIGETY OR RESTLESS THAT YOU HAVE BEEN MOVING AROUND A LOT MORE THAN USUAL: 0
3. TROUBLE FALLING OR STAYING ASLEEP: 0
4. FEELING TIRED OR HAVING LITTLE ENERGY: 0
SUM OF ALL RESPONSES TO PHQ QUESTIONS 1-9: 0
1. LITTLE INTEREST OR PLEASURE IN DOING THINGS: 0
SUM OF ALL RESPONSES TO PHQ9 QUESTIONS 1 & 2: 0

## 2023-12-05 ASSESSMENT — COLUMBIA-SUICIDE SEVERITY RATING SCALE - C-SSRS
4. HAVE YOU HAD THESE THOUGHTS AND HAD SOME INTENTION OF ACTING ON THEM?: NO
5. HAVE YOU STARTED TO WORK OUT OR WORKED OUT THE DETAILS OF HOW TO KILL YOURSELF? DO YOU INTEND TO CARRY OUT THIS PLAN?: NO
7. DID THIS OCCUR IN THE LAST THREE MONTHS: NO
3. HAVE YOU BEEN THINKING ABOUT HOW YOU MIGHT KILL YOURSELF?: NO

## 2023-12-05 NOTE — PROGRESS NOTES
Vaccine Information Sheet, \"Influenza - Inactivated\"  given to Britton Long, or parent/legal guardian of  Britton Long and verbalized understanding. Patient responses:    Have you ever had a reaction to a flu vaccine? No  Do you have an allergy to eggs, neomycin or polymixin? No  Do you have an allergy to Thimerosal, contact lens solution, or Merthiolate? No  Have you ever had Guillian [place] Syndrome? No  Do you have any current illness? No  Do you have a temperature above 100 degrees? No  Are you pregnant? No  If pregnant, permission obtained from physician? No  Do you have an active neurological disorder? No      Flu vaccine given per order. Please see immunization tab. Immunizations Administered       Name Date Dose Route    Influenza, FLUAD, (age 72 y+), Adjuvanted, 0.5mL 12/5/2023 0.5 mL Intramuscular    Site: Deltoid- Left    Lot: [id number]    NDC: 62145-250-00            VIS GIVEN. CONSENT SIGNED  PATIENT TOLERATED WELL.
edema. Left lower leg: She exhibits no edema. Neurological: She is alert. MMSE:   not done      Allergies   Allergen Reactions    Benadryl [Diphenhydramine Hcl]      OVER-STIMULATED    Flexeril [Cyclobenzaprine Hcl]      OVER-STIMULATED       Prior to Visit Medications    Medication Sig Taking? Authorizing Provider   venlafaxine (EFFEXOR XR) 150 MG extended release capsule TAKE 1 CAPSULE BY MOUTH DAILY  Gloria Ramirez MD   fenofibrate (TRICOR) 145 MG tablet Take 1 tablet by mouth daily  Stephany Harris APRN - CNP   isosorbide mononitrate (IMDUR) 30 MG extended release tablet Take 1 tablet by mouth daily  Stephany Harris APRN - CNP   atorvastatin (LIPITOR) 80 MG tablet TAKE 1 TABLET IN THE MORNING  La Nena Harris APRN - CNP   metoprolol succinate (TOPROL XL) 50 MG extended release tablet TAKE 1 TABLET BY MOUTH TWICE  DAILY  Stephany Harris APRN - CNP   losartan (COZAAR) 100 MG tablet Take 1 tablet by mouth daily  Stephany Harris APRN - CNP   topiramate (TOPAMAX) 50 MG tablet Take 1 tablet by mouth daily  Ferdinand Lentz MD   MYRBETRIQ 50 MG TB24 TAKE 1 TABLET BY MOUTH DAILY  Paul Cano MD   Multiple Vitamin (MULTI VITAMIN DAILY PO) Take by mouth daily  Garfield Hubbard MD   Loratadine (CLARITIN PO) Take by mouth as needed Avie Faster version as needed - April 2022  Garfield Hubbard MD   Fluticasone Propionate (FLONASE NA) by Nasal route as needed  Garfield Hubbard MD   albuterol sulfate HFA (VENTOLIN HFA) 108 (90 Base) MCG/ACT inhaler Inhale 2 puffs into the lungs 4 times daily as needed for Wheezing  Paul Cano MD   aspirin EC 81 MG EC tablet Take 1 tablet by mouth daily  Manny Martinez MD   darifenacin (ENABLEX) 15 MG SR tablet Take 1 tablet by mouth daily.   MD Chio JorgensenTe (Including outside providers/suppliers regularly involved in providing care):   Patient Care Team:  Paul Cano MD as PCP - General (Family
Marin Gonzalez MD   darifenacin (ENABLEX) 15 MG SR tablet Take 1 tablet by mouth daily.   Ayleen Fermin MD       [facility] (Including outside providers/suppliers regularly involved in providing care):   Patient Care Team:  Ayleen Fermin MD as PCP - General (Family Medicine)  Ayleen Fermin MD as PCP - Empaneled Provider  Isael Daniels MD as Consulting Physician (Cardiology)     Reviewed and updated this visit:  Tobacco  Allergies  Meds  Med Hx  Surg Hx  Soc Hx  Fam Hx

## 2023-12-06 ENCOUNTER — TELEPHONE (OUTPATIENT)
Dept: ENT CLINIC | Age: 78
End: 2023-12-06

## 2023-12-06 ENCOUNTER — HOSPITAL ENCOUNTER (OUTPATIENT)
Dept: MAMMOGRAPHY | Age: 78
Discharge: HOME | End: 2023-12-06
Payer: MEDICARE

## 2023-12-06 VITALS — WEIGHT: 164 LBS | HEIGHT: 64 IN | BODY MASS INDEX: 28 KG/M2

## 2023-12-06 DIAGNOSIS — Z12.39 BREAST SCREENING: ICD-10-CM

## 2023-12-06 PROCEDURE — 77063 BREAST TOMOSYNTHESIS BI: CPT

## 2023-12-06 NOTE — RESULT ENCOUNTER NOTE
Labs look good  No changes to med  Triglycerides high, limit sweets and carbs and red meats  Call patient

## 2023-12-06 NOTE — TELEPHONE ENCOUNTER
Dr Jayro Tubbs is scheduled for a DISE on Thursday 12/14/23 in Endoscopy. However, she had a CT scan done in November and I believe you were possibly going to decide if she needed anything else done besides the DISE. If she does need additional surgery, I will need to schedule that a different day because she is currently only scheduled for a DISE and it's in Endo. Please advise if okay to keep on the schedule for 12/14/23 or if she needs rescheduled after you review the CT scan. Thank you!

## 2023-12-11 RX ORDER — SODIUM CHLORIDE 0.9 % (FLUSH) 0.9 %
5-40 SYRINGE (ML) INJECTION PRN
Status: DISCONTINUED | OUTPATIENT
Start: 2023-12-11 | End: 2023-12-14 | Stop reason: HOSPADM

## 2023-12-11 RX ORDER — SODIUM CHLORIDE 0.9 % (FLUSH) 0.9 %
5-40 SYRINGE (ML) INJECTION EVERY 12 HOURS SCHEDULED
Status: DISCONTINUED | OUTPATIENT
Start: 2023-12-11 | End: 2023-12-14 | Stop reason: HOSPADM

## 2023-12-11 RX ORDER — SODIUM CHLORIDE 9 MG/ML
25 INJECTION, SOLUTION INTRAVENOUS PRN
Status: DISCONTINUED | OUTPATIENT
Start: 2023-12-11 | End: 2023-12-14 | Stop reason: HOSPADM

## 2023-12-11 RX ORDER — SODIUM CHLORIDE 9 MG/ML
INJECTION, SOLUTION INTRAVENOUS CONTINUOUS
Status: DISCONTINUED | OUTPATIENT
Start: 2023-12-11 | End: 2023-12-14 | Stop reason: HOSPADM

## 2023-12-13 NOTE — H&P
CREATININE 1.1 07/21/2023 10:47 AM     CREATININE 1.1 10/04/2022 11:20 AM     CALCIUM 9.1 09/22/2023 10:53 AM     CALCIUM 9.4 07/21/2023 10:47 AM     CALCIUM 9.7 10/04/2022 11:20 AM     PROT 7.1 07/21/2023 10:47 AM     PROT 7.1 10/04/2022 11:20 AM     PROT 7.3 07/08/2022 05:44 AM     LABALBU 4.5 07/21/2023 10:47 AM     LABALBU 4.3 10/04/2022 11:20 AM     LABALBU 4.6 07/08/2022 05:44 AM     LABALBU 4.5 03/16/2012 11:40 AM     LABALBU 4.4 09/16/2011 10:04 AM     BILITOT 0.3 07/21/2023 10:47 AM     BILITOT 0.4 10/04/2022 11:20 AM     BILITOT 0.3 07/08/2022 05:44 AM     ALKPHOS 40 07/21/2023 10:47 AM     ALKPHOS 51 10/04/2022 11:20 AM     ALKPHOS 45 07/08/2022 05:44 AM     AST 29 07/21/2023 10:47 AM     AST 26 10/04/2022 11:20 AM     AST 31 07/08/2022 05:44 AM     ALT 21 07/21/2023 10:47 AM     ALT 21 10/04/2022 11:20 AM     ALT 29 07/08/2022 05:44 AM            Lab Results   Component Value Date     CALCIUM 9.1 09/22/2023            Lab Results   Component Value Date     TSH 2.140 05/31/2022         All of the past medical history, past surgical history, family history,social history, allergies and current medications were reviewed with the patient. Assessment & Plan   Diagnoses and all orders for this visit:       Diagnosis Orders   1. Obstructive sleep apnea  MS DISE DYN EVAL SLEEP DISORDERED BREATHING FLX DX     CT SINUS WO CONTRAST       2. Intolerance of continuous positive airway pressure (CPAP) ventilation  MS DISE DYN EVAL SLEEP DISORDERED BREATHING FLX DX     CT SINUS WO CONTRAST       3. Refractory obstruction of nasal airway  CT SINUS WO CONTRAST       4. Chronic mouth breathing  CT SINUS WO CONTRAST       5. Lingual tonsil hypertrophy          6. Acquired laryngomalacia in adult          7. Nasal septal deviation          8. Hypertrophy of inferior nasal turbinate          9. Snoring          10. Nasal valve collapse          11.  Stenosis of nostril        The findings were explained and her questions were

## 2023-12-14 ENCOUNTER — ANESTHESIA (OUTPATIENT)
Dept: ENDOSCOPY | Age: 78
End: 2023-12-14
Payer: MEDICARE

## 2023-12-14 ENCOUNTER — HOSPITAL ENCOUNTER (OUTPATIENT)
Age: 78
Setting detail: OUTPATIENT SURGERY
Discharge: HOME | End: 2023-12-14
Attending: OTOLARYNGOLOGY | Admitting: OTOLARYNGOLOGY
Payer: MEDICARE

## 2023-12-14 ENCOUNTER — ANESTHESIA EVENT (OUTPATIENT)
Dept: ENDOSCOPY | Age: 78
End: 2023-12-14
Payer: MEDICARE

## 2023-12-14 VITALS
SYSTOLIC BLOOD PRESSURE: 157 MMHG | TEMPERATURE: 96.8 F | BODY MASS INDEX: 27.93 KG/M2 | DIASTOLIC BLOOD PRESSURE: 67 MMHG | RESPIRATION RATE: 14 BRPM | OXYGEN SATURATION: 98 % | HEIGHT: 64 IN | HEART RATE: 60 BPM | WEIGHT: 163.6 LBS

## 2023-12-14 PROCEDURE — APPNB45 APP NON BILLABLE 31-45 MINUTES: Performed by: NURSE PRACTITIONER

## 2023-12-14 PROCEDURE — 6370000000 HC RX 637 (ALT 250 FOR IP): Performed by: OTOLARYNGOLOGY

## 2023-12-14 PROCEDURE — 7100000010 HC PHASE II RECOVERY - FIRST 15 MIN: Performed by: OTOLARYNGOLOGY

## 2023-12-14 PROCEDURE — 3700000000 HC ANESTHESIA ATTENDED CARE: Performed by: OTOLARYNGOLOGY

## 2023-12-14 PROCEDURE — 2500000003 HC RX 250 WO HCPCS: Performed by: OTOLARYNGOLOGY

## 2023-12-14 PROCEDURE — 2500000003 HC RX 250 WO HCPCS: Performed by: NURSE ANESTHETIST, CERTIFIED REGISTERED

## 2023-12-14 PROCEDURE — 3609127700 HC LARYNGOSCOPY: Performed by: OTOLARYNGOLOGY

## 2023-12-14 PROCEDURE — 2580000003 HC RX 258: Performed by: OTOLARYNGOLOGY

## 2023-12-14 PROCEDURE — 6360000002 HC RX W HCPCS: Performed by: NURSE ANESTHETIST, CERTIFIED REGISTERED

## 2023-12-14 PROCEDURE — 3700000001 HC ADD 15 MINUTES (ANESTHESIA): Performed by: OTOLARYNGOLOGY

## 2023-12-14 RX ORDER — OXYMETAZOLINE HYDROCHLORIDE 0.05 G/100ML
SPRAY NASAL PRN
Status: DISCONTINUED | OUTPATIENT
Start: 2023-12-14 | End: 2023-12-14 | Stop reason: ALTCHOICE

## 2023-12-14 RX ORDER — LIDOCAINE HYDROCHLORIDE 20 MG/ML
INJECTION, SOLUTION EPIDURAL; INFILTRATION; INTRACAUDAL; PERINEURAL PRN
Status: DISCONTINUED | OUTPATIENT
Start: 2023-12-14 | End: 2023-12-14 | Stop reason: SDUPTHER

## 2023-12-14 RX ORDER — PROPOFOL 10 MG/ML
INJECTION, EMULSION INTRAVENOUS PRN
Status: DISCONTINUED | OUTPATIENT
Start: 2023-12-14 | End: 2023-12-14 | Stop reason: SDUPTHER

## 2023-12-14 RX ORDER — LIDOCAINE HYDROCHLORIDE 40 MG/ML
INJECTION, SOLUTION RETROBULBAR; TOPICAL PRN
Status: DISCONTINUED | OUTPATIENT
Start: 2023-12-14 | End: 2023-12-14 | Stop reason: ALTCHOICE

## 2023-12-14 RX ADMIN — LIDOCAINE HYDROCHLORIDE 40 MG: 20 INJECTION, SOLUTION EPIDURAL; INFILTRATION; INTRACAUDAL; PERINEURAL at 10:59

## 2023-12-14 RX ADMIN — PROPOFOL 40 MG: 10 INJECTION, EMULSION INTRAVENOUS at 10:59

## 2023-12-14 RX ADMIN — LIDOCAINE HYDROCHLORIDE 40 MG: 20 INJECTION, SOLUTION EPIDURAL; INFILTRATION; INTRACAUDAL; PERINEURAL at 11:01

## 2023-12-14 RX ADMIN — SODIUM CHLORIDE: 9 INJECTION, SOLUTION INTRAVENOUS at 09:40

## 2023-12-14 RX ADMIN — LIDOCAINE HYDROCHLORIDE 20 MG: 20 INJECTION, SOLUTION EPIDURAL; INFILTRATION; INTRACAUDAL; PERINEURAL at 11:04

## 2023-12-14 ASSESSMENT — PAIN - FUNCTIONAL ASSESSMENT
PAIN_FUNCTIONAL_ASSESSMENT: NONE - DENIES PAIN

## 2023-12-14 NOTE — ANESTHESIA PRE PROCEDURE
Department of Anesthesiology  Preprocedure Note       Name:  Alicia Shipley   Age:  70 y.o.  :  1945                                          MRN:  [id number]         Date:  2023      Surgeon: Shannan Diamond):  Beulah Morse MD    Procedure: Procedure(s):  DRUG INDUCED SLEEP ENDOSCOPY    Medications prior to admission:   Prior to Admission medications    Medication Sig Start Date End Date Taking? Authorizing Provider   atorvastatin (LIPITOR) 80 MG tablet TAKE 1 TABLET IN THE MORNING 23   Arnulfo Cuevas MD   fenofibrate (TRICOR) 145 MG tablet Take 1 tablet by mouth daily 23   Arnulfo Cuevas MD   mirabegron (MYRBETRIQ) 50 MG TB24 Take 50 mg by mouth daily 23   Arnulfo Cuevas MD   topiramate (TOPAMAX) 50 MG tablet Take 1 tablet by mouth daily 23   Arnulfo Cuevas MD   venlafaxine (EFFEXOR XR) 150 MG extended release capsule Take 1 capsule by mouth daily 23   Arnulfo Cuevas MD   nitroGLYCERIN (NITROSTAT) 0.3 MG SL tablet Place 1 tablet under the tongue every 5 minutes as needed for Chest pain up to max of 3 total doses.  If no relief after 1 dose, call 911. 23   Arnulfo Cuevas MD   isosorbide mononitrate (IMDUR) 30 MG extended release tablet Take 1 tablet by mouth daily 23   BOAZ Coyne CNP   metoprolol succinate (TOPROL XL) 50 MG extended release tablet TAKE 1 TABLET BY MOUTH TWICE  DAILY 23   BOAZ Coyne CNP   losartan (COZAAR) 100 MG tablet Take 1 tablet by mouth daily 23   BOAZ Coyne CNP   Multiple Vitamin (MULTI VITAMIN DAILY PO) Take by mouth daily    Garfield Hubbard MD   Loratadine (CLARITIN PO) Take by mouth as needed Chichi Mandes version as needed - 2022    Garfield Hubbard MD   Fluticasone Propionate (FLONASE NA) by Nasal route as needed    Garfield Hubbard MD   aspirin EC 81 MG EC tablet Take 1 tablet by mouth daily 19   Kelly Jones MD   darifenacin (ENABLEX)

## 2023-12-14 NOTE — PROGRESS NOTES
Recovery mode. Denies discomfor. Dr. Opal Kerns discussed findings and plan of care with patient and . Discharge instructions provided, understanding verbalized.

## 2023-12-14 NOTE — ANESTHESIA POSTPROCEDURE EVALUATION
Department of Anesthesiology  Postprocedure Note    Patient: Azalea Patterson  MRN: [id number]  [address] [facility] [place]: 1945  Date of evaluation: 12/14/2023    Procedure Summary       Date: 12/14/23 Room / Location: [address] [place] [address] / [address] [place] [address]    Anesthesia Start: 4765 Anesthesia Stop: 1113    Procedure: DRUG INDUCED SLEEP ENDOSCOPY Diagnosis:       Obstructive sleep apnea      Intolerance of continuous positive airway pressure (CPAP) ventilation      (Obstructive sleep apnea [G47.33])      (Intolerance of continuous positive airway pressure (CPAP) ventilation [Z78.9])    Surgeons: Lobo Francisco MD Responsible Provider: Oneyda Craven DO    Anesthesia Type: MAC ASA Status: 3            Anesthesia Type: No value filed. Gael Phase I: Gael Score: 10    Gael Phase II: Gael Score: 10    Anesthesia Post Evaluation    Patient location during evaluation: bedside  Patient participation: complete - patient participated  Level of consciousness: awake and alert  Pain score: 0  Airway patency: patent  Nausea & Vomiting: no nausea and no vomiting  Cardiovascular status: hemodynamically stable  Respiratory status: room air and spontaneous ventilation  Hydration status: stable  Pain management: satisfactory to patient        No notable events documented.

## 2023-12-14 NOTE — PROGRESS NOTES
Drug Induced Sleep Endoscopy complete, photos taken, pt tolerated procedure well. Scope Number  used.

## 2023-12-14 NOTE — OP NOTE
Operative Note      Patient: Christy Roberts  YOB: 1945  MRN: [id number]    Date of Procedure: 12/14/2023    Pre-Op Diagnosis Codes:     * Obstructive sleep apnea [G47.33]     * Intolerance of continuous positive airway pressure (CPAP) ventilation [Z78.9]    Post-Op Diagnosis: Same       Procedure(s):  DRUG INDUCED SLEEP ENDOSCOPY    Surgeon(s):  Deangelo Pineda MD    Assistant:   * No surgical staff found *    Anesthesia: General    Estimated Blood Loss (mL): None    Complications: None    Specimens:   * No specimens in log *    Implants:  * No implants in log *      Drains: * No LDAs found *    Findings: 1. Soft palate fluttering; no transverse or circumferential collapse; posterior collapse commensurate with snoring  2. Hypertrophic tongue base and supraglottic obstruction basis of apnea  3. Patient tolerated sedation and woke up promptly        Detailed Description of Procedure: The patient was taken to the operating room awake and left on the gurney she was placed on for transfer. A timeout was performed verifying the patient's identity and planned procedure. The patient's nasal airways were sprayed with Afrin, 2 puffs per nostril, and lidocaine 4%, 2 puffs in the right nostril and 2 puffs in the left nostril. The patient was instructed to sniff backwards and propofol sedation was commenced according to FDA regulations for the DISE procedure. Additional boluses of propofol were used to achieve the desired level of sedation following the onset of snoring. The scope was passed into the left nasal airway to thoroughly examine it and  through the left nasal airway to complete the nasal exam as well as to perform the rest of the DISE study. Findings within the nose include mild turbinate hypertrophy and an irregular soft palate. No mucopurulence or nasal polyps were seen. No signs of recent bleeding were seen.      Beginning in the nasopharynx, as the snoring began I was able to see

## 2024-03-15 ENCOUNTER — TELEPHONE (OUTPATIENT)
Dept: FAMILY MEDICINE CLINIC | Age: 79
End: 2024-03-15

## 2024-03-15 ENCOUNTER — TELEPHONE (OUTPATIENT)
Dept: PULMONOLOGY | Age: 79
End: 2024-03-15

## 2024-03-15 DIAGNOSIS — G47.33 OBSTRUCTIVE SLEEP APNEA: ICD-10-CM

## 2024-03-15 DIAGNOSIS — Z78.9 INTOLERANCE OF CONTINUOUS POSITIVE AIRWAY PRESSURE (CPAP) VENTILATION: ICD-10-CM

## 2024-03-15 DIAGNOSIS — Z01.818 PRE-OP TESTING: Primary | ICD-10-CM

## 2024-03-15 DIAGNOSIS — G47.33 OBSTRUCTIVE SLEEP APNEA: Primary | ICD-10-CM

## 2024-03-15 NOTE — TELEPHONE ENCOUNTER
Mitra, Can you help?  I was getting Francesca's chart ready to do a prior authorization but noticed she has [facility] medicare. Those guidelines changed on 3/1/24 but regardless of that, her last sleep study was a Home Sleep Test and [facility] does not accept those. Her previous PSG was done on 12-13-00 and I was going to try to submit that along with the HST anyway to see if they would accept it but it looks like her AHI was 10.8 (I think?)  so that didn't help.    Can you take a look because it seems as though she will need an updated PSG.      She is scheduled for Inspire implant on 4/26/24 if there's any chance of getting her in before then for the PSG?? (She has already been rescheduled one before).    (I already informed the patient she may need another PSG so she is aware if that's the case).    Please advise.   Thank you!

## 2024-03-15 NOTE — TELEPHONE ENCOUNTER
Please advise!  Aracelii when I was on the phone with sleep lab this morning they had some cancellations ... So may be a possibility to get her in before then !

## 2024-03-15 NOTE — TELEPHONE ENCOUNTER
Mayra is scheduled for surgery with Dr Johnson on 4/26/24. We are requesting a medical clearance.    Surgery: INSPIRE implant- Open Implant, hypoglossal nerve neurostimulator, pulse generator, distal respiratory sensor electrode    Please advise.    Thank you!

## 2024-03-18 ENCOUNTER — TELEPHONE (OUTPATIENT)
Dept: ENT CLINIC | Age: 79
End: 2024-03-18

## 2024-03-18 ENCOUNTER — TELEPHONE (OUTPATIENT)
Dept: PULMONOLOGY | Age: 79
End: 2024-03-18

## 2024-03-18 NOTE — TELEPHONE ENCOUNTER
Mayra called stating she is flying to [place] for her daughter's wedding on 5/1/24 and has been doing well and has decided she does not want the Inspire implant. I did inform her we were scheduling implants into the fall currently so if she changed her mind to call us back.  She again said she does not want to move forward at this time.

## 2024-03-18 NOTE — TELEPHONE ENCOUNTER
I will have my staff reach out to her for a follow up with me to discuss her sleep apnea and need for treatment. As far as PA , she did have an HST but it was completed before they changed the guidelines to PSG. Before patient has to spend more money on PSG we need to try to fight to get approval

## 2024-03-18 NOTE — TELEPHONE ENCOUNTER
Patient needs f/u with me in sleep clinic to discuss her sleep apnea treatment.  She has cancelled the inspire surgery and discontinued her CPAP.  I would like to follow up to discuss need for treatment/ options.

## 2024-03-18 NOTE — TELEPHONE ENCOUNTER
Patient called today wanting to hold off on surgery for now. Her daughter is getting  and she is flying out to [place]. Will contact us if she decides to reschedule. Thank you!

## 2024-03-18 NOTE — TELEPHONE ENCOUNTER
Patient called this morning wanting to cancel surgery. (She said she is currently doing well without using CPAP?)  She is flying out to [place] on 5/1/24 for her daughter's wedding and has decided not to schedule surgery at this time. I did inform her that he does schedule these surgeries out about 6 months and she can call me back to schedule if she changes her mind.    Surgery and post op appointments are cancelled.

## 2024-03-19 NOTE — TELEPHONE ENCOUNTER
Called and spoke with patient and asked if I could get her on the schedule to come in and discuss her need for treatment with Mitra and she stated \" no I dont think I will. I think we'll just leave it alone. I advised her to call back if she decides to pursue treatment for kendrick . She verbalized her understanding

## 2024-06-18 DIAGNOSIS — F33.42 RECURRENT MAJOR DEPRESSIVE DISORDER, IN FULL REMISSION (HCC): ICD-10-CM

## 2024-06-18 DIAGNOSIS — E78.5 HYPERLIPIDEMIA LDL GOAL <70: ICD-10-CM

## 2024-06-18 DIAGNOSIS — I10 PRIMARY HYPERTENSION: ICD-10-CM

## 2024-06-18 DIAGNOSIS — I25.10 CORONARY ARTERY DISEASE INVOLVING NATIVE CORONARY ARTERY OF NATIVE HEART WITHOUT ANGINA PECTORIS: ICD-10-CM

## 2024-06-19 RX ORDER — VENLAFAXINE HYDROCHLORIDE 150 MG/1
150 CAPSULE, EXTENDED RELEASE ORAL DAILY
Qty: 90 CAPSULE | Refills: 3 | Status: SHIPPED | OUTPATIENT
Start: 2024-06-19

## 2024-06-19 RX ORDER — ISOSORBIDE MONONITRATE 30 MG/1
30 TABLET, EXTENDED RELEASE ORAL DAILY
Qty: 90 TABLET | Refills: 1 | Status: SHIPPED | OUTPATIENT
Start: 2024-06-19

## 2024-06-19 NOTE — TELEPHONE ENCOUNTER
Last visit- 12/5/2023  Next visit- Visit date not found    Requested Prescriptions     Pending Prescriptions Disp Refills    venlafaxine (EFFEXOR XR) 150 MG extended release capsule [Pharmacy Med Name: Venlafaxine HCl  MG Oral Capsule Extended Release 24 Hour] 90 capsule 3     Sig: TAKE 1 CAPSULE BY MOUTH DAILY

## 2024-07-11 DIAGNOSIS — E78.5 HYPERLIPIDEMIA LDL GOAL <70: ICD-10-CM

## 2024-07-11 DIAGNOSIS — I25.10 CORONARY ARTERY DISEASE INVOLVING NATIVE CORONARY ARTERY OF NATIVE HEART WITHOUT ANGINA PECTORIS: ICD-10-CM

## 2024-07-11 DIAGNOSIS — I10 PRIMARY HYPERTENSION: ICD-10-CM

## 2024-07-12 RX ORDER — LOSARTAN POTASSIUM 100 MG/1
100 TABLET ORAL DAILY
Qty: 90 TABLET | Refills: 3 | Status: SHIPPED | OUTPATIENT
Start: 2024-07-12

## 2024-07-12 RX ORDER — METOPROLOL SUCCINATE 50 MG/1
TABLET, EXTENDED RELEASE ORAL
Qty: 180 TABLET | Refills: 3 | Status: SHIPPED | OUTPATIENT
Start: 2024-07-12

## 2024-07-17 NOTE — TELEPHONE ENCOUNTER
Ok for same day after 3-26-24   89 y/o F w/ PMH of TIA, Alzheimer's dementia, parkinson's disease, CAD s/p PCI, HTN, PAD, a-flutter (on eliquis), p/w AMS    *Metabolic Encephalopathy 2/2 Sepsis 2/2 UTI vs aspiration PNA  -monitor MS  -monitor T  -BCx NG  -now on RA  -continue CTX for now, plan for 7d abx for to cover for aspiration PNA  -s/p IVF  -appreciate ID input  -appreciate neuro input    #Worsening mental status over the last 3 weeks  -appreciate neuro and SLP evals  -protracted delirium in setting of recent COVID vs worsening dementia  -continue home meds  -lives w family and has aides    #A-flutter w/ RVR  -increased BB dose here  -amiodarone started given continue aflutter, now in NSR from overnight  -Eliquis  -f/u cards recs    *Hypokalemia  -replete prn    *H/o TIA / alzheimer's dementia / parkinson's disease / CAD / HTN / PAD   -C/w home meds and f/u outpatient for further management if conditions remain stable during hospitalization     *DVT ppx   -On Eliquis     Poss d/c 7/18 if remains afebrile, HR controlled, continued improvement MS.   Updated DIL at bedside.

## 2024-08-13 ENCOUNTER — TELEPHONE (OUTPATIENT)
Dept: CARDIOLOGY CLINIC | Age: 79
End: 2024-08-13

## 2024-08-13 ENCOUNTER — HOSPITAL ENCOUNTER (OUTPATIENT)
Age: 79
Discharge: HOME | End: 2024-08-13
Payer: MEDICARE

## 2024-08-13 DIAGNOSIS — I10 PRIMARY HYPERTENSION: Primary | ICD-10-CM

## 2024-08-13 DIAGNOSIS — I25.10 CORONARY ARTERY DISEASE INVOLVING NATIVE CORONARY ARTERY OF NATIVE HEART WITHOUT ANGINA PECTORIS: ICD-10-CM

## 2024-08-13 DIAGNOSIS — E78.5 HYPERLIPIDEMIA LDL GOAL <70: ICD-10-CM

## 2024-08-13 LAB
ALBUMIN SERPL BCG-MCNC: 4.2 G/DL (ref 3.5–5.1)
ALP SERPL-CCNC: 104 U/L (ref 38–126)
ALT SERPL W/O P-5'-P-CCNC: 56 U/L (ref 11–66)
ANION GAP SERPL CALC-SCNC: 13 MEQ/L (ref 8–16)
AST SERPL-CCNC: 55 U/L (ref 5–40)
BASOPHILS ABSOLUTE: 0.1 THOU/MM3 (ref 0–0.1)
BASOPHILS NFR BLD AUTO: 0.9 %
BILIRUB CONJ SERPL-MCNC: < 0.1 MG/DL (ref 0.1–13.8)
BILIRUB SERPL-MCNC: 0.4 MG/DL (ref 0.3–1.2)
BUN SERPL-MCNC: 16 MG/DL (ref 7–22)
CALCIUM SERPL-MCNC: 9 MG/DL (ref 8.5–10.5)
CHLORIDE SERPL-SCNC: 107 MEQ/L (ref 98–111)
CHOLEST SERPL-MCNC: 138 MG/DL (ref 100–199)
CO2 SERPL-SCNC: 21 MEQ/L (ref 23–33)
CREAT SERPL-MCNC: 0.9 MG/DL (ref 0.4–1.2)
DEPRECATED RDW RBC AUTO: 47.7 FL (ref 35–45)
EOSINOPHIL NFR BLD AUTO: 4.4 %
EOSINOPHILS ABSOLUTE: 0.3 THOU/MM3 (ref 0–0.4)
ERYTHROCYTE [DISTWIDTH] IN BLOOD BY AUTOMATED COUNT: 13.5 % (ref 11.5–14.5)
GFR SERPL CREATININE-BSD FRML MDRD: 66 ML/MIN/1.73M2
GLUCOSE SERPL-MCNC: 138 MG/DL (ref 70–108)
HCT VFR BLD AUTO: 43.2 % (ref 37–47)
HDLC SERPL-MCNC: 42 MG/DL
HGB BLD-MCNC: 13.5 GM/DL (ref 12–16)
IMM GRANULOCYTES # BLD AUTO: 0.02 THOU/MM3 (ref 0–0.07)
IMM GRANULOCYTES NFR BLD AUTO: 0.3 %
LDLC SERPL CALC-MCNC: 52 MG/DL
LYMPHOCYTES ABSOLUTE: 1.3 THOU/MM3 (ref 1–4.8)
LYMPHOCYTES NFR BLD AUTO: 20.9 %
MCH RBC QN AUTO: 29.9 PG (ref 26–33)
MCHC RBC AUTO-ENTMCNC: 31.3 GM/DL (ref 32.2–35.5)
MCV RBC AUTO: 95.6 FL (ref 81–99)
MONOCYTES ABSOLUTE: 0.5 THOU/MM3 (ref 0.4–1.3)
MONOCYTES NFR BLD AUTO: 8.7 %
NEUTROPHILS ABSOLUTE: 4.1 THOU/MM3 (ref 1.8–7.7)
NEUTROPHILS NFR BLD AUTO: 64.8 %
NRBC BLD AUTO-RTO: 0 /100 WBC
PLATELET # BLD AUTO: 256 THOU/MM3 (ref 130–400)
PMV BLD AUTO: 12.3 FL (ref 9.4–12.4)
POTASSIUM SERPL-SCNC: 3.7 MEQ/L (ref 3.5–5.2)
PROT SERPL-MCNC: 6.9 G/DL (ref 6.1–8)
RBC # BLD AUTO: 4.52 MILL/MM3 (ref 4.2–5.4)
SODIUM SERPL-SCNC: 141 MEQ/L (ref 135–145)
TRIGL SERPL-MCNC: 218 MG/DL (ref 0–199)
WBC # BLD AUTO: 6.3 THOU/MM3 (ref 4.8–10.8)

## 2024-08-13 PROCEDURE — 80053 COMPREHEN METABOLIC PANEL: CPT

## 2024-08-13 PROCEDURE — 85025 COMPLETE CBC W/AUTO DIFF WBC: CPT

## 2024-08-13 PROCEDURE — 36415 COLL VENOUS BLD VENIPUNCTURE: CPT

## 2024-08-13 PROCEDURE — 80061 LIPID PANEL: CPT

## 2024-08-13 PROCEDURE — 82248 BILIRUBIN DIRECT: CPT

## 2024-08-15 ENCOUNTER — OFFICE VISIT (OUTPATIENT)
Dept: CARDIOLOGY CLINIC | Age: 79
End: 2024-08-15
Payer: MEDICARE

## 2024-08-15 VITALS
DIASTOLIC BLOOD PRESSURE: 70 MMHG | HEIGHT: 63 IN | WEIGHT: 166.8 LBS | HEART RATE: 63 BPM | SYSTOLIC BLOOD PRESSURE: 160 MMHG | BODY MASS INDEX: 29.55 KG/M2

## 2024-08-15 DIAGNOSIS — E78.5 HYPERLIPIDEMIA LDL GOAL <70: ICD-10-CM

## 2024-08-15 DIAGNOSIS — I25.10 CORONARY ARTERY DISEASE INVOLVING NATIVE CORONARY ARTERY OF NATIVE HEART WITHOUT ANGINA PECTORIS: Primary | ICD-10-CM

## 2024-08-15 DIAGNOSIS — I10 PRIMARY HYPERTENSION: ICD-10-CM

## 2024-08-15 PROCEDURE — 93000 ELECTROCARDIOGRAM COMPLETE: CPT | Performed by: NUCLEAR MEDICINE

## 2024-08-15 PROCEDURE — 1090F PRES/ABSN URINE INCON ASSESS: CPT | Performed by: NUCLEAR MEDICINE

## 2024-08-15 PROCEDURE — 3077F SYST BP >= 140 MM HG: CPT | Performed by: NUCLEAR MEDICINE

## 2024-08-15 PROCEDURE — 3078F DIAST BP <80 MM HG: CPT | Performed by: NUCLEAR MEDICINE

## 2024-08-15 PROCEDURE — 1123F ACP DISCUSS/DSCN MKR DOCD: CPT | Performed by: NUCLEAR MEDICINE

## 2024-08-15 PROCEDURE — 1036F TOBACCO NON-USER: CPT | Performed by: NUCLEAR MEDICINE

## 2024-08-15 PROCEDURE — G8427 DOCREV CUR MEDS BY ELIG CLIN: HCPCS | Performed by: NUCLEAR MEDICINE

## 2024-08-15 PROCEDURE — G8417 CALC BMI ABV UP PARAM F/U: HCPCS | Performed by: NUCLEAR MEDICINE

## 2024-08-15 PROCEDURE — 99214 OFFICE O/P EST MOD 30 MIN: CPT | Performed by: NUCLEAR MEDICINE

## 2024-08-15 PROCEDURE — G8399 PT W/DXA RESULTS DOCUMENT: HCPCS | Performed by: NUCLEAR MEDICINE

## 2024-08-15 RX ORDER — FENOFIBRATE 145 MG/1
145 TABLET, COATED ORAL DAILY
Qty: 90 TABLET | Refills: 3 | Status: SHIPPED | OUTPATIENT
Start: 2024-08-15

## 2024-08-15 ASSESSMENT — ENCOUNTER SYMPTOMS
NAUSEA: 0
RECTAL PAIN: 0
ABDOMINAL DISTENTION: 0
VOMITING: 0
COLOR CHANGE: 0
ABDOMINAL PAIN: 0
ANAL BLEEDING: 0
DIARRHEA: 0
CHEST TIGHTNESS: 0
BACK PAIN: 0
BLOOD IN STOOL: 0
CONSTIPATION: 0
SHORTNESS OF BREATH: 1

## 2024-08-15 NOTE — PROGRESS NOTES
[facility] [place] [facility]  [facility]  [address]  [address]  [place] [address]  Dept: [phone]  Dept Fax: [phone]  Loc: [phone]    Visit Date: 8/15/2024    Mayra Michael is a 79 y.o. female who presents todayfor:  Chief Complaint   Patient presents with    New Patient     Previous Dr. Landaverde pt    Fatigue    Shortness of Breath     With activity     Hypertension    Hyperlipidemia    Coronary Artery Disease   Here for the first time  Used to see Akhil   Has had multiple stents dating back to many years ago   Does have risk for CAD   Known borderline DM   As well HTN and hyperlipidemia  No chest pain   No use of nitro   Does have baseline dyspnea  Exertional in nature  Some fatigue   BP is stable  No dizziness  No syncope  No smoking  Family history of CAD       HPI:  Fatigue  Associated symptoms include arthralgias and fatigue. Pertinent negatives include no abdominal pain, chest pain, joint swelling, myalgias, nausea, neck pain, rash or vomiting.   Shortness of Breath  Pertinent negatives include no abdominal pain, chest pain, leg swelling, neck pain, rash or vomiting. Her past medical history is significant for CAD.   Hypertension  Associated symptoms include shortness of breath. Pertinent negatives include no chest pain, neck pain or palpitations.   Hyperlipidemia  Associated symptoms include shortness of breath. Pertinent negatives include no chest pain or myalgias.   Coronary Artery Disease  Symptoms include shortness of breath. Pertinent negatives include no chest pain, chest tightness, dizziness, leg swelling or palpitations. Risk factors include hyperlipidemia.     Past Medical History:   Diagnosis Date    Atypical ductal hyperplasia of right breast 2006    ADH    CAD (coronary artery disease)     COPD (chronic obstructive pulmonary disease) (HCC)     Depression     Fibromyalgia     Fibromyalgia     GERD (gastroesophageal reflux disease)

## 2024-08-23 ENCOUNTER — OFFICE VISIT (OUTPATIENT)
Dept: NEUROLOGY | Age: 79
End: 2024-08-23
Payer: MEDICARE

## 2024-08-23 VITALS
HEIGHT: 63 IN | DIASTOLIC BLOOD PRESSURE: 76 MMHG | SYSTOLIC BLOOD PRESSURE: 128 MMHG | WEIGHT: 170 LBS | BODY MASS INDEX: 30.12 KG/M2 | OXYGEN SATURATION: 97 % | HEART RATE: 68 BPM

## 2024-08-23 DIAGNOSIS — G25.0 ESSENTIAL TREMOR: ICD-10-CM

## 2024-08-23 PROCEDURE — G8427 DOCREV CUR MEDS BY ELIG CLIN: HCPCS | Performed by: NURSE PRACTITIONER

## 2024-08-23 PROCEDURE — G8399 PT W/DXA RESULTS DOCUMENT: HCPCS | Performed by: NURSE PRACTITIONER

## 2024-08-23 PROCEDURE — 1123F ACP DISCUSS/DSCN MKR DOCD: CPT | Performed by: NURSE PRACTITIONER

## 2024-08-23 PROCEDURE — G8417 CALC BMI ABV UP PARAM F/U: HCPCS | Performed by: NURSE PRACTITIONER

## 2024-08-23 PROCEDURE — 3074F SYST BP LT 130 MM HG: CPT | Performed by: NURSE PRACTITIONER

## 2024-08-23 PROCEDURE — 1036F TOBACCO NON-USER: CPT | Performed by: NURSE PRACTITIONER

## 2024-08-23 PROCEDURE — 99213 OFFICE O/P EST LOW 20 MIN: CPT | Performed by: NURSE PRACTITIONER

## 2024-08-23 PROCEDURE — 1090F PRES/ABSN URINE INCON ASSESS: CPT | Performed by: NURSE PRACTITIONER

## 2024-08-23 PROCEDURE — 3078F DIAST BP <80 MM HG: CPT | Performed by: NURSE PRACTITIONER

## 2024-08-23 RX ORDER — TOPIRAMATE 50 MG/1
50 TABLET, FILM COATED ORAL 2 TIMES DAILY
Qty: 180 TABLET | Refills: 3 | Status: SHIPPED | OUTPATIENT
Start: 2024-08-23

## 2024-08-23 NOTE — PATIENT INSTRUCTIONS
Continue with  Topamax to 50 mg two times daily. Take with plenty of fluids. Refills given.   Stay active.   Follow up in 12 months  Call if any questions or concerns.

## 2024-08-23 NOTE — PROGRESS NOTES
NEUROLOGY OUT PATIENT FOLLOW UP NOTE:  8/23/202412:48 PM    Mayra Michael is here for follow up for action tremor.            Allergies   Allergen Reactions    Benadryl [Diphenhydramine Hcl]      OVER-STIMULATED    Flexeril [Cyclobenzaprine Hcl]      OVER-STIMULATED         Current Outpatient Medications:     fenofibrate (TRICOR) 145 MG tablet, Take 1 tablet by mouth daily, Disp: 90 tablet, Rfl: 3    metoprolol succinate (TOPROL XL) 50 MG extended release tablet, TAKE 1 TABLET BY MOUTH TWICE  DAILY, Disp: 180 tablet, Rfl: 3    losartan (COZAAR) 100 MG tablet, TAKE 1 TABLET BY MOUTH DAILY, Disp: 90 tablet, Rfl: 3    venlafaxine (EFFEXOR XR) 150 MG extended release capsule, TAKE 1 CAPSULE BY MOUTH DAILY, Disp: 90 capsule, Rfl: 3    isosorbide mononitrate (IMDUR) 30 MG extended release tablet, TAKE 1 TABLET BY MOUTH DAILY, Disp: 90 tablet, Rfl: 1    topiramate (TOPAMAX) 50 MG tablet, Take 1 tablet by mouth 2 times daily, Disp: 180 tablet, Rfl: 3    atorvastatin (LIPITOR) 80 MG tablet, TAKE 1 TABLET IN THE MORNING, Disp: 90 tablet, Rfl: 3    mirabegron (MYRBETRIQ) 50 MG TB24, Take 50 mg by mouth daily, Disp: 90 tablet, Rfl: 3    nitroGLYCERIN (NITROSTAT) 0.3 MG SL tablet, Place 1 tablet under the tongue every 5 minutes as needed for Chest pain up to max of 3 total doses. If no relief after 1 dose, call 911., Disp: 30 tablet, Rfl: 0    Multiple Vitamin (MULTI VITAMIN DAILY PO), Take by mouth daily, Disp: , Rfl:     Loratadine (CLARITIN PO), Take by mouth as needed Takes Equate version as needed - April 2022, Disp: , Rfl:     Fluticasone Propionate (FLONASE NA), by Nasal route as needed, Disp: , Rfl:     aspirin EC 81 MG EC tablet, Take 1 tablet by mouth daily, Disp: 30 tablet, Rfl: 0    I reviewed the past medical history, allergies, medications, social history and family history.       PE:   Vitals:    08/23/24 1244   BP: 128/76   Site: Left Upper Arm   Position: Sitting   Cuff Size: Large Adult   Pulse: 68   SpO2:

## 2024-11-13 DIAGNOSIS — E78.5 HYPERLIPIDEMIA LDL GOAL <70: ICD-10-CM

## 2024-11-13 DIAGNOSIS — I10 PRIMARY HYPERTENSION: ICD-10-CM

## 2024-11-13 DIAGNOSIS — I25.10 CORONARY ARTERY DISEASE INVOLVING NATIVE CORONARY ARTERY OF NATIVE HEART WITHOUT ANGINA PECTORIS: ICD-10-CM

## 2024-11-14 RX ORDER — ISOSORBIDE MONONITRATE 30 MG/1
30 TABLET, EXTENDED RELEASE ORAL DAILY
Qty: 90 TABLET | Refills: 3 | Status: SHIPPED | OUTPATIENT
Start: 2024-11-14

## 2024-12-06 ENCOUNTER — HOSPITAL ENCOUNTER (OUTPATIENT)
Dept: MAMMOGRAPHY | Age: 79
Discharge: HOME | End: 2024-12-06
Payer: MEDICARE

## 2024-12-06 DIAGNOSIS — I25.10 CORONARY ARTERY DISEASE INVOLVING NATIVE CORONARY ARTERY OF NATIVE HEART WITHOUT ANGINA PECTORIS: ICD-10-CM

## 2024-12-06 DIAGNOSIS — E78.5 HYPERLIPIDEMIA LDL GOAL <70: ICD-10-CM

## 2024-12-06 DIAGNOSIS — I10 PRIMARY HYPERTENSION: ICD-10-CM

## 2024-12-06 DIAGNOSIS — Z12.39 BREAST SCREENING: ICD-10-CM

## 2024-12-06 PROCEDURE — 77063 BREAST TOMOSYNTHESIS BI: CPT

## 2024-12-09 RX ORDER — ATORVASTATIN CALCIUM 80 MG/1
TABLET, FILM COATED ORAL
Qty: 90 TABLET | Refills: 3 | Status: SHIPPED | OUTPATIENT
Start: 2024-12-09

## 2024-12-09 NOTE — TELEPHONE ENCOUNTER
Last visit- 12/5/2023  Next visit- 1/23/2025    Requested Prescriptions     Pending Prescriptions Disp Refills    atorvastatin (LIPITOR) 80 MG tablet [Pharmacy Med Name: Atorvastatin Calcium 80 MG Oral Tablet] 90 tablet 3     Sig: TAKE 1 TABLET BY MOUTH IN THE  MORNING

## 2024-12-28 DIAGNOSIS — R35.0 URINARY FREQUENCY: ICD-10-CM

## 2024-12-30 RX ORDER — MIRABEGRON 50 MG/1
50 TABLET, FILM COATED, EXTENDED RELEASE ORAL DAILY
Qty: 90 TABLET | Refills: 1 | Status: SHIPPED | OUTPATIENT
Start: 2024-12-30

## 2025-01-16 ASSESSMENT — PATIENT HEALTH QUESTIONNAIRE - PHQ9
3. TROUBLE FALLING OR STAYING ASLEEP: NOT AT ALL
6. FEELING BAD ABOUT YOURSELF - OR THAT YOU ARE A FAILURE OR HAVE LET YOURSELF OR YOUR FAMILY DOWN: NOT AT ALL
10. IF YOU CHECKED OFF ANY PROBLEMS, HOW DIFFICULT HAVE THESE PROBLEMS MADE IT FOR YOU TO DO YOUR WORK, TAKE CARE OF THINGS AT HOME, OR GET ALONG WITH OTHER PEOPLE: NOT DIFFICULT AT ALL
4. FEELING TIRED OR HAVING LITTLE ENERGY: NOT AT ALL
1. LITTLE INTEREST OR PLEASURE IN DOING THINGS: NOT AT ALL
8. MOVING OR SPEAKING SO SLOWLY THAT OTHER PEOPLE COULD HAVE NOTICED. OR THE OPPOSITE, BEING SO FIGETY OR RESTLESS THAT YOU HAVE BEEN MOVING AROUND A LOT MORE THAN USUAL: NOT AT ALL
2. FEELING DOWN, DEPRESSED OR HOPELESS: NOT AT ALL
7. TROUBLE CONCENTRATING ON THINGS, SUCH AS READING THE NEWSPAPER OR WATCHING TELEVISION: NOT AT ALL
SUM OF ALL RESPONSES TO PHQ QUESTIONS 1-9: 0
10. IF YOU CHECKED OFF ANY PROBLEMS, HOW DIFFICULT HAVE THESE PROBLEMS MADE IT FOR YOU TO DO YOUR WORK, TAKE CARE OF THINGS AT HOME, OR GET ALONG WITH OTHER PEOPLE: NOT DIFFICULT AT ALL
SUM OF ALL RESPONSES TO PHQ9 QUESTIONS 1 & 2: 0
7. TROUBLE CONCENTRATING ON THINGS, SUCH AS READING THE NEWSPAPER OR WATCHING TELEVISION: NOT AT ALL
5. POOR APPETITE OR OVEREATING: NOT AT ALL
9. THOUGHTS THAT YOU WOULD BE BETTER OFF DEAD, OR OF HURTING YOURSELF: NOT AT ALL
SUM OF ALL RESPONSES TO PHQ QUESTIONS 1-9: 0
4. FEELING TIRED OR HAVING LITTLE ENERGY: NOT AT ALL
5. POOR APPETITE OR OVEREATING: NOT AT ALL
SUM OF ALL RESPONSES TO PHQ QUESTIONS 1-9: 0
9. THOUGHTS THAT YOU WOULD BE BETTER OFF DEAD, OR OF HURTING YOURSELF: NOT AT ALL
8. MOVING OR SPEAKING SO SLOWLY THAT OTHER PEOPLE COULD HAVE NOTICED. OR THE OPPOSITE - BEING SO FIDGETY OR RESTLESS THAT YOU HAVE BEEN MOVING AROUND A LOT MORE THAN USUAL: NOT AT ALL
1. LITTLE INTEREST OR PLEASURE IN DOING THINGS: NOT AT ALL
SUM OF ALL RESPONSES TO PHQ QUESTIONS 1-9: 0
2. FEELING DOWN, DEPRESSED OR HOPELESS: NOT AT ALL
6. FEELING BAD ABOUT YOURSELF - OR THAT YOU ARE A FAILURE OR HAVE LET YOURSELF OR YOUR FAMILY DOWN: NOT AT ALL
SUM OF ALL RESPONSES TO PHQ QUESTIONS 1-9: 0

## 2025-01-23 ENCOUNTER — OFFICE VISIT (OUTPATIENT)
Dept: FAMILY MEDICINE CLINIC | Age: 80
End: 2025-01-23

## 2025-01-23 VITALS
TEMPERATURE: 98.6 F | SYSTOLIC BLOOD PRESSURE: 136 MMHG | DIASTOLIC BLOOD PRESSURE: 86 MMHG | WEIGHT: 163.8 LBS | BODY MASS INDEX: 29.02 KG/M2 | HEART RATE: 68 BPM | HEIGHT: 63 IN | RESPIRATION RATE: 16 BRPM | OXYGEN SATURATION: 98 %

## 2025-01-23 DIAGNOSIS — J32.9 RECURRENT SINUSITIS: ICD-10-CM

## 2025-01-23 DIAGNOSIS — J43.1 PANLOBULAR EMPHYSEMA (HCC): ICD-10-CM

## 2025-01-23 DIAGNOSIS — Z78.0 ASYMPTOMATIC POSTMENOPAUSAL STATE: ICD-10-CM

## 2025-01-23 DIAGNOSIS — E53.8 B12 DEFICIENCY: ICD-10-CM

## 2025-01-23 DIAGNOSIS — S46.911A STRAIN OF RIGHT SHOULDER, INITIAL ENCOUNTER: ICD-10-CM

## 2025-01-23 DIAGNOSIS — M25.811 IMPINGEMENT OF RIGHT SHOULDER: ICD-10-CM

## 2025-01-23 DIAGNOSIS — E78.5 HYPERLIPIDEMIA LDL GOAL <70: ICD-10-CM

## 2025-01-23 DIAGNOSIS — R53.83 OTHER FATIGUE: ICD-10-CM

## 2025-01-23 DIAGNOSIS — J30.2 SEASONAL ALLERGIES: ICD-10-CM

## 2025-01-23 DIAGNOSIS — M85.89 OSTEOPENIA OF MULTIPLE SITES: ICD-10-CM

## 2025-01-23 DIAGNOSIS — G25.2 INTENTION TREMOR: ICD-10-CM

## 2025-01-23 DIAGNOSIS — E11.9 TYPE 2 DIABETES MELLITUS WITHOUT COMPLICATION, WITHOUT LONG-TERM CURRENT USE OF INSULIN (HCC): ICD-10-CM

## 2025-01-23 DIAGNOSIS — D10.6: ICD-10-CM

## 2025-01-23 DIAGNOSIS — G25.0 BENIGN ESSENTIAL TREMOR: ICD-10-CM

## 2025-01-23 DIAGNOSIS — I10 PRIMARY HYPERTENSION: ICD-10-CM

## 2025-01-23 DIAGNOSIS — F33.42 RECURRENT MAJOR DEPRESSIVE DISORDER, IN FULL REMISSION (HCC): ICD-10-CM

## 2025-01-23 DIAGNOSIS — K21.00 GASTROESOPHAGEAL REFLUX DISEASE WITH ESOPHAGITIS WITHOUT HEMORRHAGE: ICD-10-CM

## 2025-01-23 DIAGNOSIS — Z72.0 TOBACCO ABUSE: ICD-10-CM

## 2025-01-23 DIAGNOSIS — L57.8 SUN-DAMAGED SKIN: ICD-10-CM

## 2025-01-23 DIAGNOSIS — I25.10 CORONARY ARTERY DISEASE INVOLVING NATIVE CORONARY ARTERY OF NATIVE HEART WITHOUT ANGINA PECTORIS: ICD-10-CM

## 2025-01-23 DIAGNOSIS — Z87.891 PERSONAL HISTORY OF TOBACCO USE: ICD-10-CM

## 2025-01-23 DIAGNOSIS — Z98.61 POST PTCA: ICD-10-CM

## 2025-01-23 DIAGNOSIS — Z00.00 MEDICARE ANNUAL WELLNESS VISIT, SUBSEQUENT: Primary | ICD-10-CM

## 2025-01-23 LAB
ALBUMIN SERPL BCG-MCNC: 4.1 G/DL (ref 3.5–5.1)
ALP SERPL-CCNC: 115 U/L (ref 38–126)
ALT SERPL W/O P-5'-P-CCNC: 95 U/L (ref 11–66)
ANION GAP SERPL CALC-SCNC: 12 MEQ/L (ref 8–16)
AST SERPL-CCNC: 55 U/L (ref 5–40)
BILIRUB SERPL-MCNC: 0.3 MG/DL (ref 0.3–1.2)
BUN SERPL-MCNC: 22 MG/DL (ref 7–22)
CALCIUM SERPL-MCNC: 9.4 MG/DL (ref 8.5–10.5)
CHLORIDE SERPL-SCNC: 107 MEQ/L (ref 98–111)
CHOLEST SERPL-MCNC: 157 MG/DL (ref 100–199)
CO2 SERPL-SCNC: 21 MEQ/L (ref 23–33)
CREAT SERPL-MCNC: 0.9 MG/DL (ref 0.4–1.2)
CREAT UR-MCNC: 67.3 MG/DL
DEPRECATED RDW RBC AUTO: 46.4 FL (ref 35–45)
ERYTHROCYTE [DISTWIDTH] IN BLOOD BY AUTOMATED COUNT: 13.6 % (ref 11.5–14.5)
GFR SERPL CREATININE-BSD FRML MDRD: 66 ML/MIN/1.73M2
GLUCOSE FASTING: 121 MG/DL (ref 70–108)
HCT VFR BLD AUTO: 41.1 % (ref 37–47)
HDLC SERPL-MCNC: 39 MG/DL
HGB BLD-MCNC: 12.9 GM/DL (ref 12–16)
LDLC SERPL CALC-MCNC: 79 MG/DL
MCH RBC QN AUTO: 29.6 PG (ref 26–33)
MCHC RBC AUTO-ENTMCNC: 31.4 GM/DL (ref 32.2–35.5)
MCV RBC AUTO: 94.3 FL (ref 81–99)
MICROALBUMIN UR-MCNC: 2.14 MG/DL
MICROALBUMIN/CREAT RATIO PNL UR: 32 MG/G (ref 0–30)
PLATELET # BLD AUTO: 248 THOU/MM3 (ref 130–400)
PMV BLD AUTO: 12.4 FL (ref 9.4–12.4)
POTASSIUM SERPL-SCNC: 3.7 MEQ/L (ref 3.5–5.2)
PROT SERPL-MCNC: 6.7 G/DL (ref 6.1–8)
RBC # BLD AUTO: 4.36 MILL/MM3 (ref 4.2–5.4)
SODIUM SERPL-SCNC: 140 MEQ/L (ref 135–145)
TRIGL SERPL-MCNC: 193 MG/DL (ref 0–199)
TSH SERPL DL<=0.005 MIU/L-ACNC: 1.78 UIU/ML (ref 0.4–4.2)
WBC # BLD AUTO: 6.2 THOU/MM3 (ref 4.8–10.8)

## 2025-01-23 RX ORDER — FENOFIBRATE 145 MG/1
145 TABLET, COATED ORAL DAILY
Qty: 90 TABLET | Refills: 3 | Status: SHIPPED | OUTPATIENT
Start: 2025-01-23

## 2025-01-23 SDOH — ECONOMIC STABILITY: FOOD INSECURITY: WITHIN THE PAST 12 MONTHS, THE FOOD YOU BOUGHT JUST DIDN'T LAST AND YOU DIDN'T HAVE MONEY TO GET MORE.: NEVER TRUE

## 2025-01-23 SDOH — ECONOMIC STABILITY: FOOD INSECURITY: WITHIN THE PAST 12 MONTHS, YOU WORRIED THAT YOUR FOOD WOULD RUN OUT BEFORE YOU GOT MONEY TO BUY MORE.: NEVER TRUE

## 2025-01-23 ASSESSMENT — PATIENT HEALTH QUESTIONNAIRE - PHQ9
1. LITTLE INTEREST OR PLEASURE IN DOING THINGS: NOT AT ALL
SUM OF ALL RESPONSES TO PHQ QUESTIONS 1-9: 1
3. TROUBLE FALLING OR STAYING ASLEEP: NOT AT ALL
8. MOVING OR SPEAKING SO SLOWLY THAT OTHER PEOPLE COULD HAVE NOTICED. OR THE OPPOSITE, BEING SO FIGETY OR RESTLESS THAT YOU HAVE BEEN MOVING AROUND A LOT MORE THAN USUAL: NOT AT ALL
9. THOUGHTS THAT YOU WOULD BE BETTER OFF DEAD, OR OF HURTING YOURSELF: NOT AT ALL
4. FEELING TIRED OR HAVING LITTLE ENERGY: SEVERAL DAYS
SUM OF ALL RESPONSES TO PHQ QUESTIONS 1-9: 1
7. TROUBLE CONCENTRATING ON THINGS, SUCH AS READING THE NEWSPAPER OR WATCHING TELEVISION: NOT AT ALL
6. FEELING BAD ABOUT YOURSELF - OR THAT YOU ARE A FAILURE OR HAVE LET YOURSELF OR YOUR FAMILY DOWN: NOT AT ALL
5. POOR APPETITE OR OVEREATING: NOT AT ALL
10. IF YOU CHECKED OFF ANY PROBLEMS, HOW DIFFICULT HAVE THESE PROBLEMS MADE IT FOR YOU TO DO YOUR WORK, TAKE CARE OF THINGS AT HOME, OR GET ALONG WITH OTHER PEOPLE: NOT DIFFICULT AT ALL
2. FEELING DOWN, DEPRESSED OR HOPELESS: NOT AT ALL
SUM OF ALL RESPONSES TO PHQ9 QUESTIONS 1 & 2: 0

## 2025-01-23 NOTE — PROGRESS NOTES
Medicare Annual Wellness Visit    Mayra Michael is here for No chief complaint on file.    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  Recommended screening schedule for the next 5-10 years is provided to the patient in written form: see Patient Instructions/AVS.     No follow-ups on file.     Subjective     Here for regular checkup but also has a hand tremor since 1/2021 getting worse.  Handwriting and fine motor movements are the worst.  Mom had essential tremor, not diagnosed with Parkinson's.  This is somewhat better after starting primidone but adjusting with neurology.  Staying stable with dosing changes.   ***    Allergies starting usually controlled on OTC medications.  ***      Depression: Patient complains of depression. She complains of depressed mood and fatigue. Onset was approximately several years ago, gradually improving since that time.  She denies current suicidal and homicidal plan or intent.   Family history significant for no psychiatric illness.  Possible organic causes contributing are: none.  Risk factors: previous episode of depression Previous treatment includes Effexor and no group therapy. She complains of the following side effects from the treatment: none.  ***    Hypertension: Patient 
Vaccine Information Sheet, \"Influenza - Inactivated\"  given to Mayra Michael, or parent/legal guardian of  Mayra Michael and verbalized understanding.    Patient responses:    Have you ever had a reaction to a flu vaccine? No  Do you have an allergy to eggs, neomycin or polymixin?  No  Do you have an allergy to Thimerosal, contact lens solution, or Merthiolate? No  Have you ever had Guillian Osburn Syndrome?  No  Do you have any current illness?  No  Do you have a temperature above 100 degrees? No  Are you pregnant? No  If pregnant, permission obtained from physician? No  Do you have an active neurological disorder? No      Flu vaccine given per order. Please see immunization tab.    Immunizations Administered       Name Date Dose Route    Influenza, FLUAD, (age 65 y+), IM, Trivalent PF, 0.5mL 1/23/2025 0.5 mL Intramuscular    Site: Deltoid- Left    Lot: [id number]    NDC: 09886-505-80            VIS GIVEN.  CONSENT SIGNED  PATIENT TOLERATED WELL.     
Venipuncture obtained from  right arm. Patient tolerated the procedure without complications or complaints.  
ROM but pain localizes to the joint line.   Impingement detected with abduction.              Allergies   Allergen Reactions    Benadryl [Diphenhydramine Hcl]      OVER-STIMULATED    Flexeril [Cyclobenzaprine Hcl]      OVER-STIMULATED       Prior to Visit Medications    Medication Sig Taking? Authorizing Provider   fenofibrate (TRICOR) 145 MG tablet Take 1 tablet by mouth daily Yes Gloria Maynard MD   mirabegron (MYRBETRIQ) 50 MG TB24 TAKE 1 TABLET BY MOUTH DAILY Yes Gloria Maynard MD   atorvastatin (LIPITOR) 80 MG tablet TAKE 1 TABLET BY MOUTH IN THE  MORNING Yes Gloria Maynard MD   isosorbide mononitrate (IMDUR) 30 MG extended release tablet TAKE 1 TABLET BY MOUTH DAILY Yes Linda Bauer MD   topiramate (TOPAMAX) 50 MG tablet Take 1 tablet by mouth 2 times daily Yes Leopold, Paige L, APRN - CNP   metoprolol succinate (TOPROL XL) 50 MG extended release tablet TAKE 1 TABLET BY MOUTH TWICE  DAILY Yes Linda Bauer MD   losartan (COZAAR) 100 MG tablet TAKE 1 TABLET BY MOUTH DAILY Yes Linda Bauer MD   venlafaxine (EFFEXOR XR) 150 MG extended release capsule TAKE 1 CAPSULE BY MOUTH DAILY Yes Gloria Maynard MD   nitroGLYCERIN (NITROSTAT) 0.3 MG SL tablet Place 1 tablet under the tongue every 5 minutes as needed for Chest pain up to max of 3 total doses. If no relief after 1 dose, call 911. Yes Gloria Maynard MD   Multiple Vitamin (MULTI VITAMIN DAILY PO) Take by mouth daily Yes Garfield Hubbard MD   Fluticasone Propionate (FLONASE NA) by Nasal route as needed Yes Garfield Hubbard MD   aspirin EC 81 MG EC tablet Take 1 tablet by mouth daily Yes Steven Torres MD   Loratadine (CLARITIN PO) Take by mouth as needed Takes Equate version as needed - April 2022  Patient not taking: Reported on 1/23/2025  Garfield Hubbard MD   darifenacin (ENABLEX) 15 MG SR tablet Take 1 tablet by mouth daily.  Gloria Maynard MD       CareTeam (Including outside providers/suppliers regularly

## 2025-01-23 NOTE — PATIENT INSTRUCTIONS

## 2025-01-24 ENCOUNTER — TELEPHONE (OUTPATIENT)
Dept: FAMILY MEDICINE CLINIC | Age: 80
End: 2025-01-24

## 2025-01-24 DIAGNOSIS — R74.8 ELEVATED LIVER ENZYMES: Primary | ICD-10-CM

## 2025-01-24 DIAGNOSIS — S46.911S STRAIN OF RIGHT SHOULDER, SEQUELA: Primary | ICD-10-CM

## 2025-01-24 LAB
DEPRECATED MEAN GLUCOSE BLD GHB EST-ACNC: 126 MG/DL (ref 70–126)
FOLATE SERPL-MCNC: > 20 NG/ML (ref 4.8–24.2)
HBA1C MFR BLD HPLC: 6.2 % (ref 4.4–6.4)
VIT B12 SERPL-MCNC: 585 PG/ML (ref 211–911)

## 2025-01-24 NOTE — TELEPHONE ENCOUNTER
Called and spoke with Stephanie at [facility] about PT. Pt would be getting PT of the shoulder. Order placed.

## 2025-01-24 NOTE — TELEPHONE ENCOUNTER
Stephanie at [facility] calling stating they received an order/referral for this patient for OT services but think the pt would benefit from PT services. Asking if the order can be changed. Please advise.

## 2025-01-29 ENCOUNTER — HOSPITAL ENCOUNTER (OUTPATIENT)
Dept: WOMENS IMAGING | Age: 80
Discharge: HOME | End: 2025-01-29
Attending: FAMILY MEDICINE
Payer: MEDICARE

## 2025-01-29 ENCOUNTER — HOSPITAL ENCOUNTER (OUTPATIENT)
Dept: CT IMAGING | Age: 80
Discharge: HOME | End: 2025-01-29
Attending: FAMILY MEDICINE
Payer: MEDICARE

## 2025-01-29 DIAGNOSIS — Z78.0 ASYMPTOMATIC POSTMENOPAUSAL STATE: ICD-10-CM

## 2025-01-29 DIAGNOSIS — Z87.891 PERSONAL HISTORY OF TOBACCO USE: ICD-10-CM

## 2025-01-29 PROCEDURE — 77080 DXA BONE DENSITY AXIAL: CPT

## 2025-01-29 PROCEDURE — 71271 CT THORAX LUNG CANCER SCR C-: CPT

## 2025-03-10 ENCOUNTER — TELEPHONE (OUTPATIENT)
Dept: CARDIOLOGY CLINIC | Age: 80
End: 2025-03-10

## 2025-03-10 NOTE — TELEPHONE ENCOUNTER
Patient called stating she had chest pain on Friday and took a 1 nitro and pain subsided.   She hasn't had anymore episodes since then.

## 2025-03-10 NOTE — TELEPHONE ENCOUNTER
If she has had any other similar episodes we might want to consider a follow up cardiolite and echo   If feeling well otherwise we can closely keep an eye

## 2025-04-25 DIAGNOSIS — F33.42 RECURRENT MAJOR DEPRESSIVE DISORDER, IN FULL REMISSION: ICD-10-CM

## 2025-04-28 RX ORDER — VENLAFAXINE HYDROCHLORIDE 150 MG/1
150 CAPSULE, EXTENDED RELEASE ORAL DAILY
Qty: 90 CAPSULE | Refills: 3 | Status: SHIPPED | OUTPATIENT
Start: 2025-04-28

## 2025-04-28 NOTE — TELEPHONE ENCOUNTER
Last visit- 1/23/2025  Next visit- 7/23/2025    Requested Prescriptions     Pending Prescriptions Disp Refills    venlafaxine (EFFEXOR XR) 150 MG extended release capsule [Pharmacy Med Name: Venlafaxine HCl  MG Oral Capsule Extended Release 24 Hour] 90 capsule 3     Sig: TAKE 1 CAPSULE BY MOUTH DAILY

## 2025-05-23 DIAGNOSIS — I10 PRIMARY HYPERTENSION: ICD-10-CM

## 2025-05-23 DIAGNOSIS — R35.0 URINARY FREQUENCY: ICD-10-CM

## 2025-05-23 DIAGNOSIS — E78.5 HYPERLIPIDEMIA LDL GOAL <70: ICD-10-CM

## 2025-05-23 DIAGNOSIS — I25.10 CORONARY ARTERY DISEASE INVOLVING NATIVE CORONARY ARTERY OF NATIVE HEART WITHOUT ANGINA PECTORIS: ICD-10-CM

## 2025-05-27 RX ORDER — METOPROLOL SUCCINATE 50 MG/1
50 TABLET, EXTENDED RELEASE ORAL 2 TIMES DAILY
Qty: 180 TABLET | Refills: 0 | Status: SHIPPED | OUTPATIENT
Start: 2025-05-27

## 2025-05-27 RX ORDER — MIRABEGRON 50 MG/1
50 TABLET, FILM COATED, EXTENDED RELEASE ORAL DAILY
Qty: 90 TABLET | Refills: 1 | Status: SHIPPED | OUTPATIENT
Start: 2025-05-27

## 2025-05-27 RX ORDER — LOSARTAN POTASSIUM 100 MG/1
100 TABLET ORAL DAILY
Qty: 90 TABLET | Refills: 0 | Status: SHIPPED | OUTPATIENT
Start: 2025-05-27

## 2025-07-19 DIAGNOSIS — G25.0 ESSENTIAL TREMOR: ICD-10-CM

## 2025-07-21 RX ORDER — TOPIRAMATE 50 MG/1
50 TABLET, FILM COATED ORAL 2 TIMES DAILY
Qty: 180 TABLET | Refills: 3 | Status: SHIPPED | OUTPATIENT
Start: 2025-07-21

## 2025-07-21 NOTE — TELEPHONE ENCOUNTER
We have received a refill request on the following medications:  Requested Prescriptions     Pending Prescriptions Disp Refills    topiramate (TOPAMAX) 50 MG tablet [Pharmacy Med Name: Topiramate 50 MG Oral Tablet] 180 tablet 3     Sig: TAKE 1 TABLET BY MOUTH TWICE  DAILY       Date last time medication prescribed: 08/23/2024      Last Visit Date:  8/23/2024 with Paige Leopold, CNP    Next Visit Date:    8/25/2025 with Roxane Elizabeth MD      Please approve or deny.

## 2025-07-22 NOTE — PROGRESS NOTES
Abdominal: Soft. Normal appearance and bowel sounds are normal. She exhibits no distension and no mass. There is no hepatosplenomegaly. No tenderness. She has no rigidity, no rebound and no guarding. No hernia.   Musculoskeletal:        Right lower leg: She exhibits no edema.        Left lower leg: She exhibits no edema.   Neurological: She is alert.       Physical Exam  Mouth/Throat: Oropharynx clear, no lesions noted.  Respiratory: Clear to auscultation, no wheezing, rales or rhonchi  Extremities: No swelling noted.  Skin: No rashes noted.    No results found for this visit on 07/23/25.    Assessment/Plan   Mayra \"Francesca\" was seen today for follow-up.    Diagnoses and all orders for this visit:    Type 2 diabetes mellitus without complication, without long-term current use of insulin ([facility])  -     Basic Metabolic Panel; Future  -     Hemoglobin A1C; Future    Primary hypertension  -     fenofibrate (TRICOR) 145 MG tablet; Take 1 tablet by mouth daily  -     losartan (COZAAR) 100 MG tablet; Take 1 tablet by mouth daily  -     metoprolol succinate (TOPROL XL) 50 MG extended release tablet; Take 1 tablet by mouth 2 times daily    Recurrent major depressive disorder, in full remission    Coronary artery disease involving native coronary artery of native heart without angina pectoris  -     fenofibrate (TRICOR) 145 MG tablet; Take 1 tablet by mouth daily  -     losartan (COZAAR) 100 MG tablet; Take 1 tablet by mouth daily  -     metoprolol succinate (TOPROL XL) 50 MG extended release tablet; Take 1 tablet by mouth 2 times daily    Recurrent sinusitis    Post PTCA    Sun-damaged skin    Tobacco abuse    Gastroesophageal reflux disease with esophagitis without hemorrhage    Panlobular emphysema (HCC)    Seasonal allergies    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

## 2025-07-23 ENCOUNTER — OFFICE VISIT (OUTPATIENT)
Dept: FAMILY MEDICINE CLINIC | Age: 80
End: 2025-07-23
Payer: MEDICARE

## 2025-07-23 ENCOUNTER — LAB (OUTPATIENT)
Dept: LAB | Age: 80
End: 2025-07-23

## 2025-07-23 VITALS
SYSTOLIC BLOOD PRESSURE: 132 MMHG | TEMPERATURE: 97.2 F | RESPIRATION RATE: 16 BRPM | BODY MASS INDEX: 28.4 KG/M2 | WEIGHT: 160.27 LBS | HEART RATE: 62 BPM | HEIGHT: 63 IN | OXYGEN SATURATION: 98 % | DIASTOLIC BLOOD PRESSURE: 82 MMHG

## 2025-07-23 DIAGNOSIS — Z87.891 PERSONAL HISTORY OF TOBACCO USE: ICD-10-CM

## 2025-07-23 DIAGNOSIS — I25.10 CORONARY ARTERY DISEASE INVOLVING NATIVE CORONARY ARTERY OF NATIVE HEART WITHOUT ANGINA PECTORIS: ICD-10-CM

## 2025-07-23 DIAGNOSIS — J32.9 RECURRENT SINUSITIS: ICD-10-CM

## 2025-07-23 DIAGNOSIS — E11.9 TYPE 2 DIABETES MELLITUS WITHOUT COMPLICATION, WITHOUT LONG-TERM CURRENT USE OF INSULIN (HCC): Primary | ICD-10-CM

## 2025-07-23 DIAGNOSIS — Z98.61 POST PTCA: ICD-10-CM

## 2025-07-23 DIAGNOSIS — R53.83 OTHER FATIGUE: ICD-10-CM

## 2025-07-23 DIAGNOSIS — E53.8 B12 DEFICIENCY: ICD-10-CM

## 2025-07-23 DIAGNOSIS — Z72.0 TOBACCO ABUSE: ICD-10-CM

## 2025-07-23 DIAGNOSIS — G25.0 BENIGN ESSENTIAL TREMOR: ICD-10-CM

## 2025-07-23 DIAGNOSIS — E11.9 TYPE 2 DIABETES MELLITUS WITHOUT COMPLICATION, WITHOUT LONG-TERM CURRENT USE OF INSULIN (HCC): ICD-10-CM

## 2025-07-23 DIAGNOSIS — D10.6: ICD-10-CM

## 2025-07-23 DIAGNOSIS — J30.2 SEASONAL ALLERGIES: ICD-10-CM

## 2025-07-23 DIAGNOSIS — K21.00 GASTROESOPHAGEAL REFLUX DISEASE WITH ESOPHAGITIS WITHOUT HEMORRHAGE: ICD-10-CM

## 2025-07-23 DIAGNOSIS — L57.8 SUN-DAMAGED SKIN: ICD-10-CM

## 2025-07-23 DIAGNOSIS — S46.911A STRAIN OF RIGHT SHOULDER, INITIAL ENCOUNTER: ICD-10-CM

## 2025-07-23 DIAGNOSIS — G25.2 INTENTION TREMOR: ICD-10-CM

## 2025-07-23 DIAGNOSIS — Z87.891 HISTORY OF TOBACCO ABUSE: ICD-10-CM

## 2025-07-23 DIAGNOSIS — M85.89 OSTEOPENIA OF MULTIPLE SITES: ICD-10-CM

## 2025-07-23 DIAGNOSIS — E78.5 HYPERLIPIDEMIA LDL GOAL <70: ICD-10-CM

## 2025-07-23 DIAGNOSIS — D62 ANEMIA DUE TO ACUTE BLOOD LOSS: ICD-10-CM

## 2025-07-23 DIAGNOSIS — F33.42 RECURRENT MAJOR DEPRESSIVE DISORDER, IN FULL REMISSION: ICD-10-CM

## 2025-07-23 DIAGNOSIS — S46.911S STRAIN OF RIGHT SHOULDER, SEQUELA: ICD-10-CM

## 2025-07-23 DIAGNOSIS — J43.1 PANLOBULAR EMPHYSEMA (HCC): ICD-10-CM

## 2025-07-23 DIAGNOSIS — I10 PRIMARY HYPERTENSION: ICD-10-CM

## 2025-07-23 DIAGNOSIS — R35.0 URINARY FREQUENCY: ICD-10-CM

## 2025-07-23 LAB
ANION GAP SERPL CALC-SCNC: 12 MEQ/L (ref 8–16)
BUN SERPL-MCNC: 21 MG/DL (ref 8–23)
CALCIUM SERPL-MCNC: 9.1 MG/DL (ref 8.8–10.2)
CHLORIDE SERPL-SCNC: 110 MEQ/L (ref 98–111)
CO2 SERPL-SCNC: 22 MEQ/L (ref 22–29)
CREAT SERPL-MCNC: 0.9 MG/DL (ref 0.5–0.9)
DEPRECATED MEAN GLUCOSE BLD GHB EST-ACNC: 129 MG/DL (ref 70–126)
GFR SERPL CREATININE-BSD FRML MDRD: 65 ML/MIN/1.73M2
GLUCOSE SERPL-MCNC: 118 MG/DL (ref 74–109)
HBA1C MFR BLD HPLC: 6.3 % (ref 4–6)
POTASSIUM SERPL-SCNC: 3.7 MEQ/L (ref 3.5–5.2)
SODIUM SERPL-SCNC: 144 MEQ/L (ref 135–145)

## 2025-07-23 PROCEDURE — 3075F SYST BP GE 130 - 139MM HG: CPT | Performed by: FAMILY MEDICINE

## 2025-07-23 PROCEDURE — G8399 PT W/DXA RESULTS DOCUMENT: HCPCS | Performed by: FAMILY MEDICINE

## 2025-07-23 PROCEDURE — 1160F RVW MEDS BY RX/DR IN RCRD: CPT | Performed by: FAMILY MEDICINE

## 2025-07-23 PROCEDURE — 3079F DIAST BP 80-89 MM HG: CPT | Performed by: FAMILY MEDICINE

## 2025-07-23 PROCEDURE — 1090F PRES/ABSN URINE INCON ASSESS: CPT | Performed by: FAMILY MEDICINE

## 2025-07-23 PROCEDURE — G8417 CALC BMI ABV UP PARAM F/U: HCPCS | Performed by: FAMILY MEDICINE

## 2025-07-23 PROCEDURE — 3023F SPIROM DOC REV: CPT | Performed by: FAMILY MEDICINE

## 2025-07-23 PROCEDURE — 1159F MED LIST DOCD IN RCRD: CPT | Performed by: FAMILY MEDICINE

## 2025-07-23 PROCEDURE — G8427 DOCREV CUR MEDS BY ELIG CLIN: HCPCS | Performed by: FAMILY MEDICINE

## 2025-07-23 PROCEDURE — 3044F HG A1C LEVEL LT 7.0%: CPT | Performed by: FAMILY MEDICINE

## 2025-07-23 PROCEDURE — 1123F ACP DISCUSS/DSCN MKR DOCD: CPT | Performed by: FAMILY MEDICINE

## 2025-07-23 PROCEDURE — 1036F TOBACCO NON-USER: CPT | Performed by: FAMILY MEDICINE

## 2025-07-23 PROCEDURE — 99214 OFFICE O/P EST MOD 30 MIN: CPT | Performed by: FAMILY MEDICINE

## 2025-07-23 RX ORDER — LOSARTAN POTASSIUM 100 MG/1
100 TABLET ORAL DAILY
Qty: 90 TABLET | Refills: 3 | Status: SHIPPED | OUTPATIENT
Start: 2025-07-23

## 2025-07-23 RX ORDER — VIT A/VIT C/VIT E/ZINC/COPPER 4296-226
CAPSULE ORAL
COMMUNITY

## 2025-07-23 RX ORDER — METOPROLOL SUCCINATE 50 MG/1
50 TABLET, EXTENDED RELEASE ORAL 2 TIMES DAILY
Qty: 180 TABLET | Refills: 3 | Status: SHIPPED | OUTPATIENT
Start: 2025-07-23

## 2025-07-23 RX ORDER — FENOFIBRATE 145 MG/1
145 TABLET, FILM COATED ORAL DAILY
Qty: 90 TABLET | Refills: 3 | Status: SHIPPED | OUTPATIENT
Start: 2025-07-23

## 2025-08-21 ENCOUNTER — OFFICE VISIT (OUTPATIENT)
Dept: CARDIOLOGY CLINIC | Age: 80
End: 2025-08-21
Payer: MEDICARE

## 2025-08-21 VITALS
HEIGHT: 63 IN | BODY MASS INDEX: 28.53 KG/M2 | WEIGHT: 161 LBS | HEART RATE: 58 BPM | SYSTOLIC BLOOD PRESSURE: 132 MMHG | DIASTOLIC BLOOD PRESSURE: 72 MMHG

## 2025-08-21 DIAGNOSIS — I25.10 CORONARY ARTERY DISEASE INVOLVING NATIVE CORONARY ARTERY OF NATIVE HEART WITHOUT ANGINA PECTORIS: Primary | ICD-10-CM

## 2025-08-21 DIAGNOSIS — E78.5 HYPERLIPIDEMIA LDL GOAL <70: ICD-10-CM

## 2025-08-21 DIAGNOSIS — R06.02 SOB (SHORTNESS OF BREATH): ICD-10-CM

## 2025-08-21 DIAGNOSIS — I10 PRIMARY HYPERTENSION: ICD-10-CM

## 2025-08-21 PROCEDURE — 3078F DIAST BP <80 MM HG: CPT | Performed by: NUCLEAR MEDICINE

## 2025-08-21 PROCEDURE — 1036F TOBACCO NON-USER: CPT | Performed by: NUCLEAR MEDICINE

## 2025-08-21 PROCEDURE — 99214 OFFICE O/P EST MOD 30 MIN: CPT | Performed by: NUCLEAR MEDICINE

## 2025-08-21 PROCEDURE — G8427 DOCREV CUR MEDS BY ELIG CLIN: HCPCS | Performed by: NUCLEAR MEDICINE

## 2025-08-21 PROCEDURE — 1123F ACP DISCUSS/DSCN MKR DOCD: CPT | Performed by: NUCLEAR MEDICINE

## 2025-08-21 PROCEDURE — 3075F SYST BP GE 130 - 139MM HG: CPT | Performed by: NUCLEAR MEDICINE

## 2025-08-21 PROCEDURE — G8417 CALC BMI ABV UP PARAM F/U: HCPCS | Performed by: NUCLEAR MEDICINE

## 2025-08-21 PROCEDURE — 93000 ELECTROCARDIOGRAM COMPLETE: CPT | Performed by: NUCLEAR MEDICINE

## 2025-08-21 PROCEDURE — 1159F MED LIST DOCD IN RCRD: CPT | Performed by: NUCLEAR MEDICINE

## 2025-08-21 PROCEDURE — G8399 PT W/DXA RESULTS DOCUMENT: HCPCS | Performed by: NUCLEAR MEDICINE

## 2025-08-21 PROCEDURE — 1090F PRES/ABSN URINE INCON ASSESS: CPT | Performed by: NUCLEAR MEDICINE

## 2025-08-21 RX ORDER — ISOSORBIDE MONONITRATE 30 MG/1
30 TABLET, EXTENDED RELEASE ORAL DAILY
Qty: 90 TABLET | Refills: 3 | Status: SHIPPED | OUTPATIENT
Start: 2025-08-21

## 2025-08-27 ENCOUNTER — TELEPHONE (OUTPATIENT)
Dept: FAMILY MEDICINE CLINIC | Age: 80
End: 2025-08-27

## 2025-08-27 ENCOUNTER — TELEPHONE (OUTPATIENT)
Dept: CARDIOLOGY CLINIC | Age: 80
End: 2025-08-27

## 2025-08-27 ENCOUNTER — RESULTS FOLLOW-UP (OUTPATIENT)
Dept: FAMILY MEDICINE CLINIC | Age: 80
End: 2025-08-27

## 2025-08-27 ENCOUNTER — HOSPITAL ENCOUNTER (OUTPATIENT)
Dept: INTERVENTIONAL RADIOLOGY/VASCULAR | Age: 80
Discharge: HOME | End: 2025-08-29
Attending: FAMILY MEDICINE
Payer: MEDICARE

## 2025-08-27 DIAGNOSIS — M79.89 LEFT LEG SWELLING: ICD-10-CM

## 2025-08-27 DIAGNOSIS — M79.89 LEFT LEG SWELLING: Primary | ICD-10-CM

## 2025-08-27 PROCEDURE — 93971 EXTREMITY STUDY: CPT

## 2025-08-29 ENCOUNTER — HOSPITAL ENCOUNTER (OUTPATIENT)
Age: 80
Discharge: HOME | End: 2025-08-31
Attending: NUCLEAR MEDICINE
Payer: MEDICARE

## 2025-08-29 ENCOUNTER — HOSPITAL ENCOUNTER (OUTPATIENT)
Dept: NUCLEAR MEDICINE | Age: 80
Discharge: HOME | End: 2025-08-29
Attending: NUCLEAR MEDICINE
Payer: MEDICARE

## 2025-08-29 VITALS — BODY MASS INDEX: 28.35 KG/M2 | HEIGHT: 63 IN | WEIGHT: 160 LBS

## 2025-08-29 DIAGNOSIS — E78.5 HYPERLIPIDEMIA LDL GOAL <70: ICD-10-CM

## 2025-08-29 DIAGNOSIS — R06.02 SOB (SHORTNESS OF BREATH): ICD-10-CM

## 2025-08-29 DIAGNOSIS — I10 PRIMARY HYPERTENSION: ICD-10-CM

## 2025-08-29 DIAGNOSIS — I25.10 CORONARY ARTERY DISEASE INVOLVING NATIVE CORONARY ARTERY OF NATIVE HEART WITHOUT ANGINA PECTORIS: ICD-10-CM

## 2025-08-29 LAB
ECHO BSA: 1.8 M2
NUC STRESS EJECTION FRACTION: 73 %
STRESS BASELINE DIAS BP: 71 MMHG
STRESS BASELINE HR: 56 BPM
STRESS BASELINE SYS BP: 167 MMHG
STRESS ESTIMATED WORKLOAD: 1 METS
STRESS PEAK DIAS BP: 71 MMHG
STRESS PEAK SYS BP: 167 MMHG
STRESS PERCENT HR ACHIEVED: 54 %
STRESS POST PEAK HR: 76 BPM
STRESS RATE PRESSURE PRODUCT: NORMAL BPM*MMHG
STRESS STAGE 1 BP: NORMAL MMHG
STRESS STAGE 1 DURATION: 1 MIN:SEC
STRESS STAGE 1 HR: 62 BPM
STRESS STAGE 2 BP: NORMAL MMHG
STRESS STAGE 2 DURATION: 1 MIN:SEC
STRESS STAGE 2 HR: 75 BPM
STRESS STAGE 3 BP: NORMAL MMHG
STRESS STAGE 3 DURATION: 1 MIN:SEC
STRESS STAGE 3 HR: 74 BPM
STRESS STAGE RECOVERY 1 BP: NORMAL MMHG
STRESS STAGE RECOVERY 1 DURATION: 1 MIN:SEC
STRESS STAGE RECOVERY 1 HR: 71 BPM
STRESS STAGE RECOVERY 2 BP: NORMAL MMHG
STRESS STAGE RECOVERY 2 DURATION: 1 MIN:SEC
STRESS STAGE RECOVERY 2 HR: 70 BPM
STRESS STAGE RECOVERY 3 BP: NORMAL MMHG
STRESS STAGE RECOVERY 3 DURATION: 1 MIN:SEC
STRESS STAGE RECOVERY 3 HR: 70 BPM
STRESS STAGE RECOVERY 4 BP: NORMAL MMHG
STRESS STAGE RECOVERY 4 DURATION: 2 MIN:SEC
STRESS STAGE RECOVERY 4 HR: 68 BPM
STRESS TARGET HR: 142 BPM

## 2025-08-29 PROCEDURE — 6360000002 HC RX W HCPCS: Performed by: NUCLEAR MEDICINE

## 2025-08-29 PROCEDURE — 93017 CV STRESS TEST TRACING ONLY: CPT

## 2025-08-29 PROCEDURE — 3430000000 HC RX DIAGNOSTIC RADIOPHARMACEUTICAL: Performed by: NUCLEAR MEDICINE

## 2025-08-29 PROCEDURE — A9500 TC99M SESTAMIBI: HCPCS | Performed by: NUCLEAR MEDICINE

## 2025-08-29 PROCEDURE — 78452 HT MUSCLE IMAGE SPECT MULT: CPT

## 2025-08-29 RX ORDER — TETRAKIS(2-METHOXYISOBUTYLISOCYANIDE)COPPER(I) TETRAFLUOROBORATE 1 MG/ML
32.1 INJECTION, POWDER, LYOPHILIZED, FOR SOLUTION INTRAVENOUS
Status: COMPLETED | OUTPATIENT
Start: 2025-08-29 | End: 2025-08-29

## 2025-08-29 RX ORDER — TETRAKIS(2-METHOXYISOBUTYLISOCYANIDE)COPPER(I) TETRAFLUOROBORATE 1 MG/ML
9.7 INJECTION, POWDER, LYOPHILIZED, FOR SOLUTION INTRAVENOUS
Status: COMPLETED | OUTPATIENT
Start: 2025-08-29 | End: 2025-08-29

## 2025-08-29 RX ORDER — REGADENOSON 0.08 MG/ML
0.4 INJECTION, SOLUTION INTRAVENOUS
Status: COMPLETED | OUTPATIENT
Start: 2025-08-29 | End: 2025-08-29

## 2025-08-29 RX ADMIN — Medication 9.7 MILLICURIE: at 09:11

## 2025-08-29 RX ADMIN — Medication 32.1 MILLICURIE: at 09:55

## 2025-08-29 RX ADMIN — REGADENOSON 0.4 MG: 0.08 INJECTION, SOLUTION INTRAVENOUS at 09:56

## (undated) DEVICE — COUNTERSINK, 3.0 HEADLESS: Brand: MONSTER® SCREW SYSTEM

## (undated) DEVICE — PENCIL SMK EVAC ALL IN 1 DSGN ENH VISIBILITY IMPROVED AIR

## (undated) DEVICE — DRESSING TRNSPAR W3XL4IN SILIC1 POLYUR RECT NET W/ SAFETAC

## (undated) DEVICE — K-WIRE, SINGLE ENDED TROCAR TIP, SMOOTH, 1.1MM X 150 MM
Type: IMPLANTABLE DEVICE | Status: NON-FUNCTIONAL
Brand: MONSTER SCREW SYSTEM
Removed: 2023-10-03

## (undated) DEVICE — PACK PROCEDURE SURG POD SC SRHP LF

## (undated) DEVICE — OLIVE WIRE, THREADED, 1.4MM
Type: IMPLANTABLE DEVICE | Status: NON-FUNCTIONAL
Brand: BABY GORILLA/GORILLA PLATING SYSTEM
Removed: 2023-10-03

## (undated) DEVICE — DRILL,  2.1 X 120MM, CANNULATED, AO: Brand: MONSTER SCREW SYSTEM

## (undated) DEVICE — BONE FENESTRATION PERFORATOR: Brand: BABY GORILLA®/GORILLA® PLATING SYSTEM

## (undated) DEVICE — SUTURE VCRL SZ 3-0 L27IN ABSRB UD FS-2 L19MM 1/2 CIR J423H

## (undated) DEVICE — GLOVE SURG SZ 8 L11.77IN FNGR THK9.8MIL STRW LTX POLYMER

## (undated) DEVICE — DRILL,  2.4 X 140MM, SOLID, MEASURING, LONG, AO: Brand: BABY GORILLA®/GORILLA® PLATING SYSTEM

## (undated) DEVICE — P28, K-WIRE, 1.6 X 150 MM, SINGLE TROCAR, SMOOTH, SS
Type: IMPLANTABLE DEVICE | Status: NON-FUNCTIONAL
Brand: MULTI SYSTEM
Removed: 2023-10-03

## (undated) DEVICE — GLOVE ORANGE PI 8   MSG9080